# Patient Record
Sex: MALE | Race: BLACK OR AFRICAN AMERICAN | NOT HISPANIC OR LATINO | Employment: OTHER | URBAN - METROPOLITAN AREA
[De-identification: names, ages, dates, MRNs, and addresses within clinical notes are randomized per-mention and may not be internally consistent; named-entity substitution may affect disease eponyms.]

---

## 2017-10-17 ENCOUNTER — HOSPITAL ENCOUNTER (INPATIENT)
Facility: HOSPITAL | Age: 57
LOS: 1 days | Discharge: HOME | DRG: 872 | End: 2017-10-19
Attending: HOSPITALIST | Admitting: HOSPITALIST
Payer: MEDICARE

## 2017-10-17 DIAGNOSIS — A41.9 SEPSIS SECONDARY TO UTI: ICD-10-CM

## 2017-10-17 DIAGNOSIS — R07.9 CHEST PAIN: ICD-10-CM

## 2017-10-17 DIAGNOSIS — N39.0 SEPSIS SECONDARY TO UTI: ICD-10-CM

## 2017-10-17 DIAGNOSIS — R07.89 CHEST TIGHTNESS OR PRESSURE: ICD-10-CM

## 2017-10-17 PROBLEM — E78.5 HYPERLIPIDEMIA: Status: ACTIVE | Noted: 2017-10-17

## 2017-10-17 PROBLEM — E66.01 MORBID OBESITY: Status: ACTIVE | Noted: 2017-10-17

## 2017-10-17 PROBLEM — E11.9 DIABETES MELLITUS, TYPE 2: Status: ACTIVE | Noted: 2017-10-17

## 2017-10-17 PROBLEM — R50.9 FEVER: Status: ACTIVE | Noted: 2017-10-17

## 2017-10-17 PROBLEM — I10 ESSENTIAL HYPERTENSION: Status: ACTIVE | Noted: 2017-10-17

## 2017-10-17 PROCEDURE — 87040 BLOOD CULTURE FOR BACTERIA: CPT

## 2017-10-17 PROCEDURE — G0379 DIRECT REFER HOSPITAL OBSERV: HCPCS

## 2017-10-17 PROCEDURE — 84439 ASSAY OF FREE THYROXINE: CPT

## 2017-10-17 PROCEDURE — 80053 COMPREHEN METABOLIC PANEL: CPT

## 2017-10-17 PROCEDURE — G0378 HOSPITAL OBSERVATION PER HR: HCPCS

## 2017-10-17 PROCEDURE — 36415 COLL VENOUS BLD VENIPUNCTURE: CPT

## 2017-10-17 PROCEDURE — 84443 ASSAY THYROID STIM HORMONE: CPT

## 2017-10-17 PROCEDURE — 84481 FREE ASSAY (FT-3): CPT

## 2017-10-17 PROCEDURE — 25000003 PHARM REV CODE 250: Performed by: HOSPITALIST

## 2017-10-17 PROCEDURE — 85025 COMPLETE CBC W/AUTO DIFF WBC: CPT

## 2017-10-17 PROCEDURE — 85610 PROTHROMBIN TIME: CPT

## 2017-10-17 PROCEDURE — 83036 HEMOGLOBIN GLYCOSYLATED A1C: CPT

## 2017-10-17 PROCEDURE — 84100 ASSAY OF PHOSPHORUS: CPT

## 2017-10-17 PROCEDURE — 84484 ASSAY OF TROPONIN QUANT: CPT

## 2017-10-17 RX ORDER — ASPIRIN 325 MG
325 TABLET ORAL DAILY
Status: DISCONTINUED | OUTPATIENT
Start: 2017-10-18 | End: 2017-10-19 | Stop reason: HOSPADM

## 2017-10-17 RX ORDER — POLYETHYLENE GLYCOL 3350 17 G/17G
17 POWDER, FOR SOLUTION ORAL DAILY
Status: DISCONTINUED | OUTPATIENT
Start: 2017-10-18 | End: 2017-10-19 | Stop reason: HOSPADM

## 2017-10-17 RX ORDER — SIMVASTATIN 40 MG/1
40 TABLET, FILM COATED ORAL NIGHTLY
COMMUNITY
End: 2020-02-19

## 2017-10-17 RX ORDER — MORPHINE SULFATE 10 MG/ML
2 INJECTION INTRAMUSCULAR; INTRAVENOUS; SUBCUTANEOUS EVERY 4 HOURS PRN
Status: DISCONTINUED | OUTPATIENT
Start: 2017-10-18 | End: 2017-10-19 | Stop reason: HOSPADM

## 2017-10-17 RX ORDER — HYDRALAZINE HYDROCHLORIDE 20 MG/ML
10 INJECTION INTRAMUSCULAR; INTRAVENOUS EVERY 6 HOURS PRN
Status: DISCONTINUED | OUTPATIENT
Start: 2017-10-18 | End: 2017-10-19 | Stop reason: HOSPADM

## 2017-10-17 RX ORDER — METOPROLOL TARTRATE 25 MG/1
25 TABLET, FILM COATED ORAL DAILY
Status: DISCONTINUED | OUTPATIENT
Start: 2017-10-18 | End: 2017-10-19 | Stop reason: HOSPADM

## 2017-10-17 RX ORDER — OLMESARTAN MEDOXOMIL AND HYDROCHLOROTHIAZIDE 40/25 40; 25 MG/1; MG/1
1 TABLET ORAL DAILY
Status: ON HOLD | COMMUNITY
End: 2019-09-16 | Stop reason: HOSPADM

## 2017-10-17 RX ORDER — RAMELTEON 8 MG/1
8 TABLET ORAL NIGHTLY PRN
Status: DISCONTINUED | OUTPATIENT
Start: 2017-10-18 | End: 2017-10-19 | Stop reason: HOSPADM

## 2017-10-17 RX ORDER — INSULIN ASPART 100 [IU]/ML
1-10 INJECTION, SOLUTION INTRAVENOUS; SUBCUTANEOUS EVERY 6 HOURS PRN
Status: DISCONTINUED | OUTPATIENT
Start: 2017-10-18 | End: 2017-10-19 | Stop reason: HOSPADM

## 2017-10-17 RX ORDER — LUBIPROSTONE 24 UG/1
24 CAPSULE ORAL 2 TIMES DAILY
Status: DISCONTINUED | OUTPATIENT
Start: 2017-10-18 | End: 2017-10-19 | Stop reason: HOSPADM

## 2017-10-17 RX ORDER — METFORMIN HYDROCHLORIDE 1000 MG/1
1000 TABLET ORAL 2 TIMES DAILY WITH MEALS
COMMUNITY

## 2017-10-17 RX ORDER — FELODIPINE 5 MG/1
10 TABLET, EXTENDED RELEASE ORAL DAILY
Status: DISCONTINUED | OUTPATIENT
Start: 2017-10-18 | End: 2017-10-19 | Stop reason: HOSPADM

## 2017-10-17 RX ORDER — MELOXICAM 15 MG/1
15 TABLET ORAL DAILY
COMMUNITY
End: 2022-08-08

## 2017-10-17 RX ORDER — SODIUM CHLORIDE 0.9 % (FLUSH) 0.9 %
3 SYRINGE (ML) INJECTION EVERY 8 HOURS
Status: DISCONTINUED | OUTPATIENT
Start: 2017-10-18 | End: 2017-10-19 | Stop reason: HOSPADM

## 2017-10-17 RX ORDER — PROMETHAZINE HYDROCHLORIDE 25 MG/1
25 SUPPOSITORY RECTAL EVERY 6 HOURS PRN
Status: DISCONTINUED | OUTPATIENT
Start: 2017-10-18 | End: 2017-10-19 | Stop reason: HOSPADM

## 2017-10-17 RX ORDER — BISACODYL 10 MG
10 SUPPOSITORY, RECTAL RECTAL DAILY PRN
Status: DISCONTINUED | OUTPATIENT
Start: 2017-10-18 | End: 2017-10-19 | Stop reason: HOSPADM

## 2017-10-17 RX ORDER — ACETAMINOPHEN 325 MG/1
650 TABLET ORAL EVERY 6 HOURS PRN
Status: DISCONTINUED | OUTPATIENT
Start: 2017-10-18 | End: 2017-10-19 | Stop reason: HOSPADM

## 2017-10-17 RX ORDER — ONDANSETRON 2 MG/ML
8 INJECTION INTRAMUSCULAR; INTRAVENOUS EVERY 8 HOURS PRN
Status: DISCONTINUED | OUTPATIENT
Start: 2017-10-18 | End: 2017-10-19 | Stop reason: HOSPADM

## 2017-10-17 RX ORDER — METOPROLOL TARTRATE 1 MG/ML
5 INJECTION, SOLUTION INTRAVENOUS EVERY 5 MIN PRN
Status: DISCONTINUED | OUTPATIENT
Start: 2017-10-18 | End: 2017-10-19 | Stop reason: HOSPADM

## 2017-10-17 RX ORDER — ATORVASTATIN CALCIUM 40 MG/1
40 TABLET, FILM COATED ORAL DAILY
Status: DISCONTINUED | OUTPATIENT
Start: 2017-10-18 | End: 2017-10-19 | Stop reason: HOSPADM

## 2017-10-17 RX ORDER — HYDROCODONE BITARTRATE AND ACETAMINOPHEN 10; 325 MG/1; MG/1
1 TABLET ORAL EVERY 8 HOURS PRN
COMMUNITY
End: 2020-02-19

## 2017-10-17 RX ORDER — DEXTROMETHORPHAN POLISTIREX 30 MG/5 ML
1 SUSPENSION, EXTENDED RELEASE 12 HR ORAL DAILY PRN
Status: DISCONTINUED | OUTPATIENT
Start: 2017-10-18 | End: 2017-10-19 | Stop reason: HOSPADM

## 2017-10-17 RX ORDER — MORPHINE SULFATE 10 MG/ML
4 INJECTION INTRAMUSCULAR; INTRAVENOUS; SUBCUTANEOUS EVERY 4 HOURS PRN
Status: DISCONTINUED | OUTPATIENT
Start: 2017-10-18 | End: 2017-10-19 | Stop reason: HOSPADM

## 2017-10-17 RX ORDER — NITROGLYCERIN 0.4 MG/1
0.4 TABLET SUBLINGUAL EVERY 5 MIN PRN
Status: DISCONTINUED | OUTPATIENT
Start: 2017-10-18 | End: 2017-10-19 | Stop reason: HOSPADM

## 2017-10-17 RX ORDER — GLUCAGON 1 MG
1 KIT INJECTION
Status: DISCONTINUED | OUTPATIENT
Start: 2017-10-18 | End: 2017-10-19 | Stop reason: HOSPADM

## 2017-10-17 RX ORDER — AMOXICILLIN 250 MG
1 CAPSULE ORAL 2 TIMES DAILY
Status: DISCONTINUED | OUTPATIENT
Start: 2017-10-17 | End: 2017-10-19 | Stop reason: HOSPADM

## 2017-10-17 RX ORDER — HEPARIN SODIUM 5000 [USP'U]/ML
5000 INJECTION, SOLUTION INTRAVENOUS; SUBCUTANEOUS EVERY 8 HOURS
Status: DISCONTINUED | OUTPATIENT
Start: 2017-10-18 | End: 2017-10-18

## 2017-10-17 RX ORDER — METOPROLOL TARTRATE 25 MG/1
25 TABLET, FILM COATED ORAL DAILY
Status: ON HOLD | COMMUNITY
End: 2019-09-16 | Stop reason: HOSPADM

## 2017-10-17 RX ORDER — FELODIPINE 10 MG/1
10 TABLET, EXTENDED RELEASE ORAL DAILY
COMMUNITY
End: 2022-08-08 | Stop reason: ALTCHOICE

## 2017-10-17 RX ORDER — ATORVASTATIN CALCIUM 40 MG/1
80 TABLET, FILM COATED ORAL DAILY
Status: DISCONTINUED | OUTPATIENT
Start: 2017-10-17 | End: 2017-10-17

## 2017-10-17 RX ORDER — LUBIPROSTONE 24 UG/1
24 CAPSULE ORAL 2 TIMES DAILY
COMMUNITY
End: 2022-08-08

## 2017-10-17 RX ADMIN — DOCUSATE SODIUM AND SENNOSIDES 1 TABLET: 8.6; 5 TABLET, FILM COATED ORAL at 11:10

## 2017-10-17 RX ADMIN — ACETAMINOPHEN 650 MG: 325 TABLET ORAL at 11:10

## 2017-10-18 PROBLEM — N39.0 UTI (URINARY TRACT INFECTION): Status: ACTIVE | Noted: 2017-10-18

## 2017-10-18 PROBLEM — N39.0 SEPSIS SECONDARY TO UTI: Status: ACTIVE | Noted: 2017-10-18

## 2017-10-18 PROBLEM — A41.9 SEPSIS SECONDARY TO UTI: Status: ACTIVE | Noted: 2017-10-18

## 2017-10-18 LAB
ALBUMIN SERPL BCP-MCNC: 3.6 G/DL
ALBUMIN SERPL BCP-MCNC: 3.6 G/DL
ALP SERPL-CCNC: 61 U/L
ALP SERPL-CCNC: 62 U/L
ALT SERPL W/O P-5'-P-CCNC: 23 U/L
ALT SERPL W/O P-5'-P-CCNC: 26 U/L
ANION GAP SERPL CALC-SCNC: 10 MMOL/L
ANION GAP SERPL CALC-SCNC: 11 MMOL/L
AST SERPL-CCNC: 25 U/L
AST SERPL-CCNC: 29 U/L
BACTERIA #/AREA URNS HPF: ABNORMAL /HPF
BASOPHILS # BLD AUTO: 0.02 K/UL
BASOPHILS NFR BLD: 0.3 %
BILIRUB SERPL-MCNC: 0.6 MG/DL
BILIRUB SERPL-MCNC: 0.7 MG/DL
BILIRUB UR QL STRIP: NEGATIVE
BUN SERPL-MCNC: 10 MG/DL
BUN SERPL-MCNC: 8 MG/DL
CALCIUM SERPL-MCNC: 9 MG/DL
CALCIUM SERPL-MCNC: 9.3 MG/DL
CHLORIDE SERPL-SCNC: 104 MMOL/L
CHLORIDE SERPL-SCNC: 104 MMOL/L
CHOLEST SERPL-MCNC: 118 MG/DL
CHOLEST/HDLC SERPL: 3.3 {RATIO}
CLARITY UR: CLEAR
CO2 SERPL-SCNC: 25 MMOL/L
CO2 SERPL-SCNC: 25 MMOL/L
COLOR UR: YELLOW
CREAT SERPL-MCNC: 0.7 MG/DL
CREAT SERPL-MCNC: 0.7 MG/DL
DIFFERENTIAL METHOD: ABNORMAL
EOSINOPHIL # BLD AUTO: 0.1 K/UL
EOSINOPHIL NFR BLD: 0.7 %
ERYTHROCYTE [DISTWIDTH] IN BLOOD BY AUTOMATED COUNT: 17.5 %
EST. GFR  (AFRICAN AMERICAN): >60 ML/MIN/1.73 M^2
EST. GFR  (AFRICAN AMERICAN): >60 ML/MIN/1.73 M^2
EST. GFR  (NON AFRICAN AMERICAN): >60 ML/MIN/1.73 M^2
EST. GFR  (NON AFRICAN AMERICAN): >60 ML/MIN/1.73 M^2
ESTIMATED AVG GLUCOSE: 131 MG/DL
ESTIMATED PA SYSTOLIC PRESSURE: 63.06
GLOBAL PERICARDIAL EFFUSION: ABNORMAL
GLUCOSE SERPL-MCNC: 133 MG/DL
GLUCOSE SERPL-MCNC: 164 MG/DL
GLUCOSE UR QL STRIP: NEGATIVE
HBA1C MFR BLD HPLC: 6.2 %
HCT VFR BLD AUTO: 35.1 %
HDLC SERPL-MCNC: 36 MG/DL
HDLC SERPL: 30.5 %
HGB BLD-MCNC: 11.3 G/DL
HGB UR QL STRIP: ABNORMAL
INR PPP: 1.1
KETONES UR QL STRIP: NEGATIVE
LDLC SERPL CALC-MCNC: 69.8 MG/DL
LEUKOCYTE ESTERASE UR QL STRIP: ABNORMAL
LYMPHOCYTES # BLD AUTO: 0.6 K/UL
LYMPHOCYTES NFR BLD: 7.4 %
MAGNESIUM SERPL-MCNC: 1.8 MG/DL
MCH RBC QN AUTO: 23.9 PG
MCHC RBC AUTO-ENTMCNC: 32.2 G/DL
MCV RBC AUTO: 74 FL
MICROSCOPIC COMMENT: ABNORMAL
MITRAL VALVE MOBILITY: NORMAL
MONOCYTES # BLD AUTO: 0.1 K/UL
MONOCYTES NFR BLD: 0.9 %
NEUTROPHILS # BLD AUTO: 6.9 K/UL
NEUTROPHILS NFR BLD: 90.7 %
NITRITE UR QL STRIP: NEGATIVE
NONHDLC SERPL-MCNC: 82 MG/DL
PH UR STRIP: 5 [PH] (ref 5–8)
PHOSPHATE SERPL-MCNC: 3.1 MG/DL
PLATELET # BLD AUTO: 225 K/UL
PMV BLD AUTO: 9.3 FL
POCT GLUCOSE: 141 MG/DL (ref 70–110)
POCT GLUCOSE: 171 MG/DL (ref 70–110)
POCT GLUCOSE: 183 MG/DL (ref 70–110)
POTASSIUM SERPL-SCNC: 3.7 MMOL/L
POTASSIUM SERPL-SCNC: 3.7 MMOL/L
PROT SERPL-MCNC: 7.4 G/DL
PROT SERPL-MCNC: 7.5 G/DL
PROT UR QL STRIP: NEGATIVE
PROTHROMBIN TIME: 11.5 SEC
RBC # BLD AUTO: 4.72 M/UL
RBC #/AREA URNS HPF: 2 /HPF (ref 0–4)
RETIRED EF AND QEF - SEE NOTES: 70 (ref 55–65)
SODIUM SERPL-SCNC: 139 MMOL/L
SODIUM SERPL-SCNC: 140 MMOL/L
SP GR UR STRIP: 1.01 (ref 1–1.03)
SQUAMOUS #/AREA URNS HPF: ABNORMAL /HPF
T3FREE SERPL-MCNC: 2.1 PG/ML
T4 FREE SERPL-MCNC: 1.16 NG/DL
TRICUSPID VALVE REGURGITATION: ABNORMAL
TRIGL SERPL-MCNC: 61 MG/DL
TROPONIN I SERPL DL<=0.01 NG/ML-MCNC: 0.04 NG/ML
TROPONIN I SERPL DL<=0.01 NG/ML-MCNC: 0.05 NG/ML
TROPONIN I SERPL DL<=0.01 NG/ML-MCNC: 0.06 NG/ML
TSH SERPL DL<=0.005 MIU/L-ACNC: 0.18 UIU/ML
URN SPEC COLLECT METH UR: ABNORMAL
UROBILINOGEN UR STRIP-ACNC: NEGATIVE EU/DL
WBC # BLD AUTO: 7.6 K/UL
WBC #/AREA URNS HPF: >100 /HPF (ref 0–5)

## 2017-10-18 PROCEDURE — 99900035 HC TECH TIME PER 15 MIN (STAT)

## 2017-10-18 PROCEDURE — 80053 COMPREHEN METABOLIC PANEL: CPT

## 2017-10-18 PROCEDURE — 99223 1ST HOSP IP/OBS HIGH 75: CPT | Mod: ,,, | Performed by: INTERNAL MEDICINE

## 2017-10-18 PROCEDURE — 83735 ASSAY OF MAGNESIUM: CPT

## 2017-10-18 PROCEDURE — 81000 URINALYSIS NONAUTO W/SCOPE: CPT

## 2017-10-18 PROCEDURE — 63600175 PHARM REV CODE 636 W HCPCS: Performed by: INTERNAL MEDICINE

## 2017-10-18 PROCEDURE — 25000003 PHARM REV CODE 250: Performed by: HOSPITALIST

## 2017-10-18 PROCEDURE — 63600175 PHARM REV CODE 636 W HCPCS: Performed by: HOSPITALIST

## 2017-10-18 PROCEDURE — 93306 TTE W/DOPPLER COMPLETE: CPT | Mod: 26,,, | Performed by: INTERNAL MEDICINE

## 2017-10-18 PROCEDURE — 21400001 HC TELEMETRY ROOM

## 2017-10-18 PROCEDURE — 94761 N-INVAS EAR/PLS OXIMETRY MLT: CPT

## 2017-10-18 PROCEDURE — C8929 TTE W OR WO FOL WCON,DOPPLER: HCPCS

## 2017-10-18 PROCEDURE — 36415 COLL VENOUS BLD VENIPUNCTURE: CPT

## 2017-10-18 PROCEDURE — 82962 GLUCOSE BLOOD TEST: CPT

## 2017-10-18 PROCEDURE — 87086 URINE CULTURE/COLONY COUNT: CPT

## 2017-10-18 PROCEDURE — 84484 ASSAY OF TROPONIN QUANT: CPT

## 2017-10-18 PROCEDURE — 27000190 HC CPAP FULL FACE MASK W/VALVE

## 2017-10-18 PROCEDURE — 80061 LIPID PANEL: CPT

## 2017-10-18 PROCEDURE — 63600175 PHARM REV CODE 636 W HCPCS

## 2017-10-18 PROCEDURE — 94660 CPAP INITIATION&MGMT: CPT

## 2017-10-18 PROCEDURE — A4216 STERILE WATER/SALINE, 10 ML: HCPCS | Performed by: HOSPITALIST

## 2017-10-18 PROCEDURE — 87040 BLOOD CULTURE FOR BACTERIA: CPT

## 2017-10-18 PROCEDURE — 27000221 HC OXYGEN, UP TO 24 HOURS

## 2017-10-18 RX ORDER — ENOXAPARIN SODIUM 100 MG/ML
40 INJECTION SUBCUTANEOUS EVERY 24 HOURS
Status: DISCONTINUED | OUTPATIENT
Start: 2017-10-18 | End: 2017-10-19 | Stop reason: HOSPADM

## 2017-10-18 RX ORDER — SODIUM CHLORIDE 9 MG/ML
INJECTION, SOLUTION INTRAVENOUS CONTINUOUS
Status: DISCONTINUED | OUTPATIENT
Start: 2017-10-18 | End: 2017-10-19 | Stop reason: HOSPADM

## 2017-10-18 RX ADMIN — ENOXAPARIN SODIUM 40 MG: 100 INJECTION SUBCUTANEOUS at 04:10

## 2017-10-18 RX ADMIN — SODIUM CHLORIDE, PRESERVATIVE FREE 3 ML: 5 INJECTION INTRAVENOUS at 05:10

## 2017-10-18 RX ADMIN — SODIUM CHLORIDE: 0.9 INJECTION, SOLUTION INTRAVENOUS at 05:10

## 2017-10-18 RX ADMIN — HUMAN ALBUMIN MICROSPHERES AND PERFLUTREN 0.5 ML: 10; .22 INJECTION, SOLUTION INTRAVENOUS at 09:10

## 2017-10-18 RX ADMIN — ASPIRIN 325 MG ORAL TABLET 325 MG: 325 PILL ORAL at 10:10

## 2017-10-18 RX ADMIN — LUBIPROSTONE 24 MCG: 24 CAPSULE, GELATIN COATED ORAL at 10:10

## 2017-10-18 RX ADMIN — FELODIPINE 10 MG: 5 TABLET, FILM COATED, EXTENDED RELEASE ORAL at 10:10

## 2017-10-18 RX ADMIN — DOCUSATE SODIUM AND SENNOSIDES 1 TABLET: 8.6; 5 TABLET, FILM COATED ORAL at 10:10

## 2017-10-18 RX ADMIN — DOCUSATE SODIUM AND SENNOSIDES 1 TABLET: 8.6; 5 TABLET, FILM COATED ORAL at 09:10

## 2017-10-18 RX ADMIN — POLYETHYLENE GLYCOL 3350 17 G: 17 POWDER, FOR SOLUTION ORAL at 10:10

## 2017-10-18 RX ADMIN — ACETAMINOPHEN 650 MG: 325 TABLET ORAL at 05:10

## 2017-10-18 RX ADMIN — METOPROLOL TARTRATE 25 MG: 25 TABLET ORAL at 02:10

## 2017-10-18 RX ADMIN — CEFTRIAXONE 1 G: 1 INJECTION, SOLUTION INTRAVENOUS at 10:10

## 2017-10-18 RX ADMIN — LUBIPROSTONE 24 MCG: 24 CAPSULE, GELATIN COATED ORAL at 09:10

## 2017-10-18 RX ADMIN — ATORVASTATIN CALCIUM 40 MG: 40 TABLET, FILM COATED ORAL at 10:10

## 2017-10-18 RX ADMIN — HEPARIN SODIUM 5000 UNITS: 5000 INJECTION, SOLUTION INTRAVENOUS; SUBCUTANEOUS at 05:10

## 2017-10-18 NOTE — PROGRESS NOTES
Rec'd fax number for medical records at [facility].  Release of information request faxed.

## 2017-10-18 NOTE — ASSESSMENT & PLAN NOTE
· 2 out of 4 SIRS criteria in immunocompromised patient with type 2 diabetes  · As evidenced by UA and history of urinary incontinence  · Started on Rocephin.  Await UCx and sensitivities.  · Will tailor antibiotic regimen according to culture & sensitivity results  · Maintain euvolemic status with IV fluids

## 2017-10-18 NOTE — PROGRESS NOTES
[facility] Hospital Medicine  Progress Note    Patient Name: Russell Santos  MRN: [id number]  Patient Class: IP- Inpatient   Admission Date: 10/17/2017  Length of Stay: 0 days  Attending Physician: Emery Busby MD  Primary Care Provider: [facility]        Subjective:     Principal Problem:Sepsis secondary to UTI    HPI:     Russell Santos is a 57 y.o. male that (in part)  has a past medical history of Diabetes mellitus; High cholesterol; and Hypertension. Presents to [facility]  From the [place] emergency Department for evaluation of chest pain and fever.  He was being admitted there by hospital medicine, however his insurance company would not cover the admission costs so he was transferred here.  He states he started having chest pain at approximately 2 PM today and it was intermittent.  Each episode lasted approximately 15 minutes.  He has had several episodes throughout the day the lasted up until the time he arrived in the emergency department.  He received nitroglycerin and aspirin without significant change in his chest discomfort.  In fact he states the only gave him relief was after starting CPAP tonight after arrival at our facility.  He also had a fever and has been experiencing urinary incontinence throughout the day.  Denies dysuria or hematuria.  After the patient was accepted, received information from the transferring facility that his urinalysis was positive for a urinary tract infection and that he was given a dose of Rocephin just prior to discharge.     In the emergency department at [place] he underwent evaluation for what was thought to be possible acute coronary syndrome but with a negative preliminary workup.       Hospital medicine has been asked to accept the patient for admission for further evaluation and treatment.     Hospital Course:  56 y/o male presented with chest pain and fevers.  Cardiology consulted.  Minimally  elevated Troponin probably secondary to UTI.  No plans for further work up.  UTI and started on Rocephin.    Interval History: Febrile overnight.  Feeling better.    Review of Systems   HENT: Negative for ear discharge and ear pain.    Eyes: Negative for pain and itching.   Neurological: Negative for seizures and syncope.   Psychiatric/Behavioral: Negative for agitation and hallucinations.     Objective:     Vital Signs (Most Recent):  Temp: 98.6 °F (37 °C) (10/18/17 1213)  Pulse: 94 (10/18/17 1213)  Resp: 18 (10/18/17 1213)  BP: 135/71 (10/18/17 1213)  SpO2: (!) 93 % (10/18/17 1213) Vital Signs (24h Range):  Temp:  [97.6 °F (36.4 °C)-103.5 °F (39.7 °C)] 98.6 °F (37 °C)  Pulse:  [] 94  Resp:  [18-20] 18  SpO2:  [93 %-100 %] 93 %  BP: (123-135)/(63-99) 135/71     Weight: (!) 151.8 kg (334 lb 10.5 oz)  Body mass index is 49.42 kg/m².    Intake/Output Summary (Last 24 hours) at 10/18/17 1542  Last data filed at 10/18/17 1500   Gross per 24 hour   Intake           666.25 ml   Output              800 ml   Net          -133.75 ml      Physical Exam   Constitutional: He is oriented to person, place, and time. He appears well-developed. No distress.   HENT:   Head: Normocephalic and atraumatic.   Eyes: Conjunctivae are normal. Right eye exhibits no discharge. Left eye exhibits no discharge.   Neck: Neck supple.   Cardiovascular: Normal heart sounds.    Tachycardic   Pulmonary/Chest: Effort normal and breath sounds normal. No stridor.   Abdominal: Soft. Bowel sounds are normal.   Musculoskeletal: Normal range of motion. He exhibits no deformity.   Neurological: He is alert and oriented to person, place, and time.   Skin: Skin is warm and dry.       Significant Labs:   BMP:   Recent Labs  Lab 10/18/17  0548   *      K 3.7      CO2 25   BUN 8   CREATININE 0.7   CALCIUM 9.3   MG 1.8     CBC:   Recent Labs  Lab 10/17/17  2347   WBC 7.60   HGB 11.3*   HCT 35.1*          Significant Imaging: I have  reviewed all pertinent imaging results/findings within the past 24 hours.    Assessment/Plan:      * Sepsis secondary to UTI    · 2 out of 4 SIRS criteria in immunocompromised patient with type 2 diabetes  · As evidenced by UA and history of urinary incontinence  · Started on Rocephin.  Await UCx and sensitivities.  · Will tailor antibiotic regimen according to culture & sensitivity results  · Maintain euvolemic status with IV fluids              Chest pain    Acute Chest Pain, rule-out myocardial infarction   · Minimally elevated Troponin, probably secondary to infection.  · Appreciate Cards input.  · No plans for further work up.          Morbid obesity    · Body mass index is 49.41 kg/m².            Diabetes mellitus, type 2    · BG in acceptable range at this time  · Maintain w/ subcutaneous insulin management order set  · Hold oral diabetic meds  · ADA 1800 kcal diet            Hyperlipidemia     Continue statin            Essential hypertension    · Continue current management.              VTE Risk Mitigation         Ordered     enoxaparin injection 40 mg  Daily     Route:  Subcutaneous        10/18/17 1324     Medium Risk of VTE  Once      10/17/17 9401              Emery Busby MD  Department of Hospital Medicine   [facility]

## 2017-10-18 NOTE — SUBJECTIVE & OBJECTIVE
Past Medical History:   Diagnosis Date    Diabetes mellitus     High cholesterol     Hypertension        Past Surgical History:   Procedure Laterality Date    THYROIDECTOMY, PARTIAL  2006    TONSILLECTOMY         Review of patient's allergies indicates:   Allergen Reactions    Contrast media Hives       No current facility-administered medications on file prior to encounter.      No current outpatient prescriptions on file prior to encounter.     Family History     None        Social History Main Topics    Smoking status: Never Smoker    Smokeless tobacco: Never Used    Alcohol use Yes      Comment: occasionally    Drug use: No    Sexual activity: Not Currently     Review of Systems   Constitution: Positive for fever. Negative for chills, diaphoresis, weakness and malaise/fatigue.   HENT: Negative for nosebleeds.    Eyes: Negative for blurred vision and double vision.   Cardiovascular: Negative for chest pain, claudication, cyanosis, dyspnea on exertion, leg swelling, orthopnea, palpitations, paroxysmal nocturnal dyspnea and syncope.   Respiratory: Positive for shortness of breath. Negative for cough and wheezing.    Skin: Negative for dry skin and poor wound healing.   Musculoskeletal: Negative for back pain, joint swelling and myalgias.   Gastrointestinal: Negative for abdominal pain, nausea and vomiting.   Genitourinary: Positive for bladder incontinence. Negative for hematuria.   Neurological: Negative for dizziness, headaches, numbness and seizures.   Psychiatric/Behavioral: Negative for altered mental status and depression.     Objective:     Vital Signs (Most Recent):  Temp: (!) 101.6 °F (38.7 °C) (10/18/17 0640)  Pulse: 108 (10/18/17 0430)  Resp: 20 (10/18/17 0430)  BP: 125/70 (10/18/17 0430)  SpO2: 100 % (10/18/17 0430) Vital Signs (24h Range):  Temp:  [99.5 °F (37.5 °C)-103.5 °F (39.7 °C)] 101.6 °F (38.7 °C)  Pulse:  [107-116] 108  Resp:  [18-20] 20  SpO2:  [99 %-100 %] 100 %  BP:  (123-131)/(63-99) 125/70     Weight: (!) 151.8 kg (334 lb 10.5 oz)  Body mass index is 49.42 kg/m².    SpO2: 100 %  O2 Device (Oxygen Therapy): CPAP    No intake or output data in the 24 hours ending 10/18/17 0745    Lines/Drains/Airways     Peripheral Intravenous Line                 Peripheral IV - Single Lumen 10/17/17 Left Hand 1 day                Physical Exam   Constitutional: He is oriented to person, place, and time. He appears well-developed and well-nourished.   HENT:   Head: Normocephalic and atraumatic.   Eyes: Conjunctivae and EOM are normal. Pupils are equal, round, and reactive to light. No scleral icterus.   Neck: Neck supple. No JVD present. Carotid bruit is not present. No tracheal deviation present. No thyromegaly present.   Cardiovascular: Regular rhythm, S1 normal and S2 normal.  Frequent extrasystoles are present. Tachycardia present.  Exam reveals no gallop and no friction rub.    No murmur heard.  Pulmonary/Chest: Effort normal and breath sounds normal. He has no wheezes. He exhibits no tenderness.   Abdominal: Soft. Bowel sounds are normal. He exhibits no distension. There is no tenderness.   obese   Musculoskeletal: He exhibits no edema.   Neurological: He is alert and oriented to person, place, and time. He has normal strength. No cranial nerve deficit.   Skin: Skin is warm and dry. No rash noted.   Psychiatric: He has a normal mood and affect. His behavior is normal.       Current Medications:   aspirin  325 mg Oral Daily    atorvastatin  40 mg Oral Daily    cefTRIAXone (ROCEPHIN) IVPB  1 g Intravenous Q24H    felodipine  10 mg Oral Daily    heparin (porcine)  5,000 Units Subcutaneous Q8H    lubiprostone  24 mcg Oral BID    metoprolol tartrate  25 mg Oral Daily    polyethylene glycol  17 g Oral Daily    senna-docusate 8.6-50 mg  1 tablet Oral BID    sodium chloride 0.9%  3 mL Intravenous Q8H      sodium chloride 0.9% 75 mL/hr at 10/18/17 0545     acetaminophen, bisacodyl,  dextrose 50%, glucagon (human recombinant), hydrALAZINE, influenza, insulin aspart, metoprolol, mineral oil, morphine, morphine, nitroGLYCERIN, ondansetron, pneumoc 13-zayra conj-dip cr(PF), promethazine, ramelteon    Laboratory:  CBC:    Recent Labs  Lab 10/17/17  2347   WHITE BLOOD CELL COUNT 7.60   HEMOGLOBIN 11.3 L   HEMATOCRIT 35.1 L   PLATELETS 225       CHEMISTRIES:    Recent Labs  Lab 10/17/17  2345 10/18/17  0548   GLUCOSE 164 H 133 H   SODIUM 140 139   POTASSIUM 3.7 3.7   BUN BLD 10 8   CREATININE 0.7 0.7   EGFR IF  >60 >60   EGFR IF NON- >60 >60   CALCIUM 9.0 9.3   MAGNESIUM  --  1.8       CARDIAC BIOMARKERS:    Recent Labs  Lab 10/17/17  2345 10/18/17  0548   TROPONIN I 0.046 H 0.063 H       COAGS:    Recent Labs  Lab 10/17/17  2345   INR 1.1       LIPIDS/LFTS:    Recent Labs  Lab 10/17/17  2345 10/18/17  0548   CHOLESTEROL  --  118 L   TRIGLYCERIDES  --  61   HDL  --  36 L   LDL CHOLESTEROL  --  69.8   NON-HDL CHOLESTEROL  --  82   AST 25 29   ALT 23 26           Diagnostic Results:  ECG (personally reviewed tracings):   10/17/17     Chest X-Ray 10/17/17 ([facility]) NAD

## 2017-10-18 NOTE — PROGRESS NOTES
Spoke with Aure at [place] in medical records.  She will send records over within the hour.  Will follow up.

## 2017-10-18 NOTE — ASSESSMENT & PLAN NOTE
· Goal while inpatient is a systolic blood pressure less than 140mmHgin the setting of chest pain  · BP in acceptable range at this time  · Continue current home regimen with hold parameters  · PRN antihypertensives available

## 2017-10-18 NOTE — ASSESSMENT & PLAN NOTE
· 2 out of 4 SIRS criteria in immunocompromised patient with type 2 diabetes  · As evidenced by UA and history of urinary incontinence  · Urine culture and Gram stain obtained prior to antibiotics (follow-up with [facility])  · Given empiric antibiotics with ceftriaxone  · Will tailor antibiotic regimen according to culture & sensitivity results  · Maintain euvolemic status with IV fluids

## 2017-10-18 NOTE — PROGRESS NOTES
Patient transferred to Tele Rm 310A  Belongings and paperwork sent. Tele monitor exchanged..    Report given to Jennifer. Patient appears to be in no distress at this time. Call light within reach.

## 2017-10-18 NOTE — ASSESSMENT & PLAN NOTE
Minimal trop elev, likely demand from sepsis/fever.  Pt reports prior cor angio at [facility], will attempt to obtain records.  From his description, he has NICM.  Check echo.  No plan for stress test at this time.

## 2017-10-18 NOTE — ASSESSMENT & PLAN NOTE
Exact etiology unknown but suspicious  Early pneumonia versus bronchitis.  Blood cultures obtained.  No antibiotics given at this time.  Viral?

## 2017-10-18 NOTE — PLAN OF CARE
Problem: Patient Care Overview  Goal: Plan of Care Review   10/18/17 0424   Coping/Psychosocial   Plan Of Care Reviewed With patient   PAtient updated on plan of care and verbalized understanding.     Problem: Diabetes, Type 2 (Adult)  Intervention: Optimize Glycemic Control   10/18/17 0424   Nutrition Interventions   Glycemic Management blood glucose monitoring         Problem: Urinary Tract Infection (Adult)  Goal: Signs and Symptoms of Listed Potential Problems Will be Absent, Minimized or Managed (Urinary Tract Infection)  Signs and symptoms of listed potential problems will be absent, minimized or managed by discharge/transition of care (reference Urinary Tract Infection (Adult) CPG).    10/18/17 0424   Urinary Tract Infection   Problems Assessed (Urinary Tract Infection) all   Problems Present (Urinary Tract Infection) infection progression;pain     Patient received 1 dose of Rocephin in ED. Blood and urine cultures collected. Prn antipyretic administered per orders.

## 2017-10-18 NOTE — PROGRESS NOTES
Faxed rec'd from Aure at  medical records.   No records found for pt Russell Santos.  Attempted to notifiy Dr. Vasquez.  No answer.  Will try again.

## 2017-10-18 NOTE — HPI
"57 y.o. male that (in part)  has a past medical history of Diabetes mellitus; High cholesterol; and Hypertension. Presents to [facility]  From the [place] emergency Department for evaluation of chest pain and fever.  He was being admitted there by hospital medicine, however his insurance company would not cover the admission costs so he was transferred here.  He states he started having chest pain at approximately 2 PM today and it was intermittent.  Each episode lasted approximately 15 minutes.  He has had several episodes throughout the day the lasted up until the time he arrived in the emergency department.  He received nitroglycerin and aspirin without significant change in his chest discomfort.  In fact he states the only gave him relief was after starting CPAP tonight after arrival at our facility.  He also had a fever and has been experiencing urinary incontinence throughout the day.  Denies dysuria or hematuria.  After the patient was accepted, received information from the transferring facility that his urinalysis was positive for a urinary tract infection and that he was given a dose of Rocephin just prior to discharge.    The pt describes fever and dyspnea with associated CP.  He also reports a prior hx of "enlargened heart"  He says he had a heart cath at [facility] a few years ago without obst CAD.  "

## 2017-10-18 NOTE — CONSULTS
"  [facility]  Cardiology  Consult Note    Patient Name: Russell Santos  MRN: [id number]  Admission Date: 10/17/2017  Hospital Length of Stay: 0 days  Code Status: Full Code   Attending Provider: Emery Busby MD   Consulting Provider: Deven Haley MD  Primary Care Physician: Roxanne Phillip  Principal Problem:Sepsis secondary to UTI    Patient information was obtained from patient and ER records.     Inpatient consult to Cardiology  Consult performed by: DEVEN HALEY  Consult ordered by: DEVEN BRITT  Reason for consult: CP        Subjective:     Chief Complaint:  sepsis     HPI:   57 y.o. male that (in part)  has a past medical history of Diabetes mellitus; High cholesterol; and Hypertension. Presents to [facility]  From the [place] emergency Department for evaluation of chest pain and fever.  He was being admitted there by hospital medicine, however his insurance company would not cover the admission costs so he was transferred here.  He states he started having chest pain at approximately 2 PM today and it was intermittent.  Each episode lasted approximately 15 minutes.  He has had several episodes throughout the day the lasted up until the time he arrived in the emergency department.  He received nitroglycerin and aspirin without significant change in his chest discomfort.  In fact he states the only gave him relief was after starting CPAP tonight after arrival at our facility.  He also had a fever and has been experiencing urinary incontinence throughout the day.  Denies dysuria or hematuria.  After the patient was accepted, received information from the transferring facility that his urinalysis was positive for a urinary tract infection and that he was given a dose of Rocephin just prior to discharge.    The pt describes fever and dyspnea with associated CP.  He also reports a prior hx of "enlargened heart"  He says he had a heart cath at " [facility] a few years ago without obst CAD.    Past Medical History:   Diagnosis Date    Diabetes mellitus     High cholesterol     Hypertension        Past Surgical History:   Procedure Laterality Date    THYROIDECTOMY, PARTIAL  2006    TONSILLECTOMY         Review of patient's allergies indicates:   Allergen Reactions    Contrast media Hives       No current facility-administered medications on file prior to encounter.      No current outpatient prescriptions on file prior to encounter.     Family History     None        Social History Main Topics    Smoking status: Never Smoker    Smokeless tobacco: Never Used    Alcohol use Yes      Comment: occasionally    Drug use: No    Sexual activity: Not Currently     Review of Systems   Constitution: Positive for fever. Negative for chills, diaphoresis, weakness and malaise/fatigue.   HENT: Negative for nosebleeds.    Eyes: Negative for blurred vision and double vision.   Cardiovascular: Negative for chest pain, claudication, cyanosis, dyspnea on exertion, leg swelling, orthopnea, palpitations, paroxysmal nocturnal dyspnea and syncope.   Respiratory: Positive for shortness of breath. Negative for cough and wheezing.    Skin: Negative for dry skin and poor wound healing.   Musculoskeletal: Negative for back pain, joint swelling and myalgias.   Gastrointestinal: Negative for abdominal pain, nausea and vomiting.   Genitourinary: Positive for bladder incontinence. Negative for hematuria.   Neurological: Negative for dizziness, headaches, numbness and seizures.   Psychiatric/Behavioral: Negative for altered mental status and depression.     Objective:     Vital Signs (Most Recent):  Temp: (!) 101.6 °F (38.7 °C) (10/18/17 0640)  Pulse: 108 (10/18/17 0430)  Resp: 20 (10/18/17 0430)  BP: 125/70 (10/18/17 0430)  SpO2: 100 % (10/18/17 0430) Vital Signs (24h Range):  Temp:  [99.5 °F (37.5 °C)-103.5 °F (39.7 °C)] 101.6 °F (38.7 °C)  Pulse:  [107-116] 108  Resp:  [18-20]  20  SpO2:  [99 %-100 %] 100 %  BP: (123-131)/(63-99) 125/70     Weight: (!) 151.8 kg (334 lb 10.5 oz)  Body mass index is 49.42 kg/m².    SpO2: 100 %  O2 Device (Oxygen Therapy): CPAP    No intake or output data in the 24 hours ending 10/18/17 0745    Lines/Drains/Airways     Peripheral Intravenous Line                 Peripheral IV - Single Lumen 10/17/17 Left Hand 1 day                Physical Exam   Constitutional: He is oriented to person, place, and time. He appears well-developed and well-nourished.   HENT:   Head: Normocephalic and atraumatic.   Eyes: Conjunctivae and EOM are normal. Pupils are equal, round, and reactive to light. No scleral icterus.   Neck: Neck supple. No JVD present. Carotid bruit is not present. No tracheal deviation present. No thyromegaly present.   Cardiovascular: Regular rhythm, S1 normal and S2 normal.  Frequent extrasystoles are present. Tachycardia present.  Exam reveals no gallop and no friction rub.    No murmur heard.  Pulmonary/Chest: Effort normal and breath sounds normal. He has no wheezes. He exhibits no tenderness.   Abdominal: Soft. Bowel sounds are normal. He exhibits no distension. There is no tenderness.   obese   Musculoskeletal: He exhibits no edema.   Neurological: He is alert and oriented to person, place, and time. He has normal strength. No cranial nerve deficit.   Skin: Skin is warm and dry. No rash noted.   Psychiatric: He has a normal mood and affect. His behavior is normal.       Current Medications:   aspirin  325 mg Oral Daily    atorvastatin  40 mg Oral Daily    cefTRIAXone (ROCEPHIN) IVPB  1 g Intravenous Q24H    felodipine  10 mg Oral Daily    heparin (porcine)  5,000 Units Subcutaneous Q8H    lubiprostone  24 mcg Oral BID    metoprolol tartrate  25 mg Oral Daily    polyethylene glycol  17 g Oral Daily    senna-docusate 8.6-50 mg  1 tablet Oral BID    sodium chloride 0.9%  3 mL Intravenous Q8H      sodium chloride 0.9% 75 mL/hr at 10/18/17 0599      acetaminophen, bisacodyl, dextrose 50%, glucagon (human recombinant), hydrALAZINE, influenza, insulin aspart, metoprolol, mineral oil, morphine, morphine, nitroGLYCERIN, ondansetron, pneumoc 13-zayra conj-dip cr(PF), promethazine, ramelteon    Laboratory:  CBC:    Recent Labs  Lab 10/17/17  2347   WHITE BLOOD CELL COUNT 7.60   HEMOGLOBIN 11.3 L   HEMATOCRIT 35.1 L   PLATELETS 225       CHEMISTRIES:    Recent Labs  Lab 10/17/17  2345 10/18/17  0548   GLUCOSE 164 H 133 H   SODIUM 140 139   POTASSIUM 3.7 3.7   BUN BLD 10 8   CREATININE 0.7 0.7   EGFR IF  >60 >60   EGFR IF NON- >60 >60   CALCIUM 9.0 9.3   MAGNESIUM  --  1.8       CARDIAC BIOMARKERS:    Recent Labs  Lab 10/17/17  2345 10/18/17  0548   TROPONIN I 0.046 H 0.063 H       COAGS:    Recent Labs  Lab 10/17/17  2345   INR 1.1       LIPIDS/LFTS:    Recent Labs  Lab 10/17/17  2345 10/18/17  0548   CHOLESTEROL  --  118 L   TRIGLYCERIDES  --  61   HDL  --  36 L   LDL CHOLESTEROL  --  69.8   NON-HDL CHOLESTEROL  --  82   AST 25 29   ALT 23 26           Diagnostic Results:  ECG (personally reviewed tracings):   10/17/17     Chest X-Ray 10/17/17 ([facility]) NAD      Assessment and Plan:     * Sepsis secondary to UTI    Per IM        Diabetes mellitus, type 2    Per IM        Hyperlipidemia    Cont statin        Essential hypertension    Cont med rx  Consider ACEi in place of felodipine if NICM confirmed        Chest pain    Minimal trop elev, likely demand from sepsis/fever.  Pt reports prior cor angio at [facility], will attempt to obtain records.  From his description, he has NICM.  Check echo.  No plan for stress test at this time.            VTE Risk Mitigation         Ordered     heparin (porcine) injection 5,000 Units  Every 8 hours     Route:  Subcutaneous        10/17/17 2332     Medium Risk of VTE  Once      10/17/17 2332          Thank you for your consult. I will follow-up with patient. Please contact us if you have any  additional questions.    Deven Vasquez MD  Cardiology   [facility]

## 2017-10-18 NOTE — ASSESSMENT & PLAN NOTE
· BG in acceptable range at this time  · Maintain w/ subcutaneous insulin management order set  · Hold oral diabetic meds  · ADA 1800 kcal diet

## 2017-10-18 NOTE — ASSESSMENT & PLAN NOTE
Acute Chest Pain, rule-out myocardial infarction   · Intitial EKG findings shows no evidence of ST elevation MI  · Initial troponin is within normal limits  · Intermediate to high risk for MI;  Morbid obesity, hypertension, diabetes hyperlipidemia  · Initiate ACS protocol to rule out MI, then explore noncardiac etiology  · Trend cardiac enzymes  · Aspirin  · Beta-blocker (after stress or immediately if evidence of NSTEMI)  · Statin  · Supplemental oxygen  · nitroglycerine and morphine PRN  · 2D echocardiogram  · TSH, FT3, FT4  · ESR and cardiac specific CRP   · PT, PTT, INR   · Tight glycemic control  · Monitor on telemetry unit  · Consult cardiologist  · Will admit for rule out acute MI and possible further provocative testing for risk stratification.

## 2017-10-18 NOTE — ASSESSMENT & PLAN NOTE
Acute Chest Pain, rule-out myocardial infarction   · Minimally elevated Troponin, probably secondary to infection.  · Appreciate Cards input.  · No plans for further work up.

## 2017-10-18 NOTE — ASSESSMENT & PLAN NOTE
· Body mass index is 49.41 kg/m².  · Order a cardiac diet  · Counseling given  · Nutrition consult as an outpatient

## 2017-10-18 NOTE — H&P
[facility] Hospital Medicine  History & Physical    Patient Name: Russell Santos  MRN: [id number]  Admission Date: 10/18/2017  Attending Physician: Deven Ren MD, MPH      PCP:     Roxanne Phillip    CC:     Transfer from [facility] for evaluation of chest pain and fever.    HISTORY OF PRESENT ILLNESS:     Russell Santos is a 57 y.o. male that (in part)  has a past medical history of Diabetes mellitus; High cholesterol; and Hypertension. Presents to [facility]  From the [place] emergency Department for evaluation of chest pain and fever.  He was being admitted there by hospital medicine, however his insurance company would not cover the admission costs so he was transferred here.  He states he started having chest pain at approximately 2 PM today and it was intermittent.  Each episode lasted approximately 15 minutes.  He has had several episodes throughout the day the lasted up until the time he arrived in the emergency department.  He received nitroglycerin and aspirin without significant change in his chest discomfort.  In fact he states the only gave him relief was after starting CPAP tonight after arrival at our facility.  He also had a fever and has been experiencing urinary incontinence throughout the day.  Denies dysuria or hematuria.  After the patient was accepted, received information from the transferring facility that his urinalysis was positive for a urinary tract infection and that he was given a dose of Rocephin just prior to discharge.    In the emergency department at [place] he underwent evaluation for what was thought to be possible acute coronary syndrome but with a negative preliminary workup.      Hospital medicine has been asked to accept the patient for admission for further evaluation and treatment.       REVIEW OF SYSTEMS:     -- Constitutional: Positive for fever and chills.  -- Eyes: No visual changes,  diplopia, pain, tearing, blind spots, or discharge.   -- Ears, nose, mouth, throat, and face: No congestion, sore throat, epistaxis, d/c, bleeding gums, neck stiffness masses, or dental issues.  -- Respiratory: Sleep apnea.  Requires CPAP at night.  No cough, shortness of breath, hemoptysis, stridor, wheezing, or night sweats.  -- Cardiovascular: No chest pain, LYNCH, syncope, PND, edema, cyanosis, or palpitations.   -- Gastrointestinal: No vomiting, abdominal pain, hematemesis, melena, dyspepsia, or change in bowel habits.  -- Genitourinary: As above in history of present illness.  -- Integument/breast: No rash, pruritis, pigmentation changes, dryness, or changes in hair  -- Hematologic/lymphatic: No easy bruising or lymphadenopathy.   -- Musculoskeletal: Chronic arthralgias.  No acute arthralgias, acute myalgias, joint swelling, acute limitations of ROM, or acute muscular weakness.  -- Neurological: No seizures, headaches, incoordination, paraesthesias, ataxia, vertigo, or tremors.  -- Behavioral/Psych: No auditory or visual hallucinations, depression, or suicidal/homicidal ideations.  -- Endocrine: Chronic unintentional weight gain.  No heat or cold intolerance.  -- Allergy/Immunologic: No recurrent infections or adverse reaction to food, insects, or difficulty breathing.      PAST MEDICAL / SURGICAL HISTORY:     Past Medical History:   Diagnosis Date    Diabetes mellitus     High cholesterol     Hypertension      Past Surgical History:   Procedure Laterality Date    THYROIDECTOMY, PARTIAL  2006    TONSILLECTOMY           FAMILY HISTORY:     Family history of cardiovascular disease    SOCIAL HISTORY:     Social History   Substance Use Topics    Smoking status: Never Smoker    Smokeless tobacco: Never Used    Alcohol use Yes      Comment: occasionally     Social History     Social History    Marital status:      Spouse name: N/A    Number of children: N/A    Years of education: N/A     Social  History Main Topics    Smoking status: Never Smoker    Smokeless tobacco: Never Used    Alcohol use Yes      Comment: occasionally    Drug use: No    Sexual activity: Not Currently     Other Topics Concern    None     Social History Narrative    None         ALLERGIES:       Review of patient's allergies indicates:   Allergen Reactions    Contrast media Hives         HEALTH SCREENING:     Influenza vaccine not up-to-date for this season.  Prevnar 13 pneumonia vaccine = no evidence of previous vaccination found in the medical record      HOME MEDICATIONS:     Prior to Admission medications    Medication Sig Start Date End Date Taking? Authorizing Provider   felodipine (PLENDIL) 10 MG 24 hr tablet Take 10 mg by mouth once daily.   Yes Historical Provider, MD   hydrocodone-acetaminophen 10-325mg (NORCO)  mg Tab Take 1 tablet by mouth every 8 (eight) hours as needed for Pain.   Yes Historical Provider, MD   lubiprostone (AMITIZA) 24 MCG Cap Take 24 mcg by mouth 2 (two) times daily.   Yes Historical Provider, MD   meloxicam (MOBIC) 15 MG tablet Take 15 mg by mouth once daily.   Yes Historical Provider, MD   metformin (GLUCOPHAGE) 1000 MG tablet Take 1,000 mg by mouth 2 (two) times daily with meals.   Yes Historical Provider, MD   metoprolol tartrate (LOPRESSOR) 25 MG tablet Take 25 mg by mouth once daily.    Yes Historical Provider, MD   olmesartan-hydrochlorothiazide (BENICAR HCT) 40-25 mg per tablet Take 1 tablet by mouth once daily.   Yes Historical Provider, MD   simvastatin (ZOCOR) 40 MG tablet Take 40 mg by mouth every evening.   Yes Historical Provider, MD          HOSPITAL MEDICATIONS:     Scheduled Meds:    aspirin  325 mg Oral Daily    atorvastatin  40 mg Oral Daily    cefTRIAXone (ROCEPHIN) IVPB  1 g Intravenous Q24H    felodipine  10 mg Oral Daily    heparin (porcine)  5,000 Units Subcutaneous Q8H    lubiprostone  24 mcg Oral BID    metoprolol tartrate  25 mg Oral Daily    polyethylene  glycol  17 g Oral Daily    senna-docusate 8.6-50 mg  1 tablet Oral BID    sodium chloride 0.9%  3 mL Intravenous Q8H     Continuous Infusions:    PRN Meds: acetaminophen, bisacodyl, dextrose 50%, glucagon (human recombinant), hydrALAZINE, influenza, insulin aspart, metoprolol, mineral oil, morphine, morphine, nitroGLYCERIN, ondansetron, pneumoc 13-zayra conj-dip cr(PF), promethazine, ramelteon      PHYSICAL EXAM:     Wt Readings from Last 1 Encounters:   10/17/17 2331 (!) 151.8 kg (334 lb 10.5 oz)   10/17/17 2312 (!) 151.8 kg (334 lb 9.8 oz)     Body mass index is 49.42 kg/m².  Vitals:    10/17/17 2357 10/18/17 0009 10/18/17 0045 10/18/17 0101   BP:  123/63     BP Location:  Left arm     Patient Position:  Lying     Pulse:  (!) 116     Resp:  20     Temp: (!) 101.9 °F (38.8 °C) (!) 101.1 °F (38.4 °C)  100.1 °F (37.8 °C)   TempSrc:  Oral  Oral   SpO2:  99% 100%    Weight:       Height:              -- General appearance: well developed. appears stated age   -- Head: normocephalic, atraumatic   -- Eyes: conjunctivae clear. Extraocular muscles intact  -- Nose: Nares normal. Septum midline.   -- Mouth/Throat: lips, mucosa, and tongue normal. no throat erythema.   -- Neck: supple, symmetrical, trachea midline, no JVD and thyroid not grossly enlarged, appears symmetric  -- Lungs: clear to auscultation bilaterally. normal respiratory effort. No use of accessory muscles.   -- Chest wall: no tenderness. equal bilateral chest rise   -- Heart: rapid rate and regular rhythm. S1, S2 normal.  no click, rub or gallop   -- Abdomen:  Morbidly obese, soft, non-tender, non-distended, non-tympanic; bowel sounds normal; megaly exam limited by body habitus  -- Extremities: no cyanosis, clubbing or edema.   -- Pulses: 2+ and symmetric   -- Skin: color normal, texture normal, turgor normal. No rashes or lesions.   -- Neurologic: Normal strength and tone. No focal numbness or weakness. CNII-XII intact. Valarie coma scale: eyes open  spontaneously-4, oriented & converses-5, obeys commands-6.      LABORATORY STUDIES:     Recent Results (from the past 36 hour(s))   Phosphorus    Collection Time: 10/17/17 11:45 PM   Result Value Ref Range    Phosphorus 3.1 2.7 - 4.5 mg/dL   Troponin I    Collection Time: 10/17/17 11:45 PM   Result Value Ref Range    Troponin I 0.046 (H) 0.000 - 0.026 ng/mL   Protime-INR    Collection Time: 10/17/17 11:45 PM   Result Value Ref Range    Prothrombin Time 11.5 9.0 - 12.5 sec    INR 1.1 0.8 - 1.2   T4, free    Collection Time: 10/17/17 11:45 PM   Result Value Ref Range    Free T4 1.16 0.71 - 1.51 ng/dL   TSH    Collection Time: 10/17/17 11:45 PM   Result Value Ref Range    TSH 0.178 (L) 0.400 - 4.000 uIU/mL   Comprehensive metabolic panel    Collection Time: 10/17/17 11:45 PM   Result Value Ref Range    Sodium 140 136 - 145 mmol/L    Potassium 3.7 3.5 - 5.1 mmol/L    Chloride 104 95 - 110 mmol/L    CO2 25 23 - 29 mmol/L    Glucose 164 (H) 70 - 110 mg/dL    BUN, Bld 10 6 - 20 mg/dL    Creatinine 0.7 0.5 - 1.4 mg/dL    Calcium 9.0 8.7 - 10.5 mg/dL    Total Protein 7.4 6.0 - 8.4 g/dL    Albumin 3.6 3.5 - 5.2 g/dL    Total Bilirubin 0.6 0.1 - 1.0 mg/dL    Alkaline Phosphatase 61 55 - 135 U/L    AST 25 10 - 40 U/L    ALT 23 10 - 44 U/L    Anion Gap 11 8 - 16 mmol/L    eGFR if African American >60 >60 mL/min/1.73 m^2    eGFR if non African American >60 >60 mL/min/1.73 m^2   CBC auto differential    Collection Time: 10/17/17 11:47 PM   Result Value Ref Range    WBC 7.60 3.90 - 12.70 K/uL    RBC 4.72 4.60 - 6.20 M/uL    Hemoglobin 11.3 (L) 14.0 - 18.0 g/dL    Hematocrit 35.1 (L) 40.0 - 54.0 %    MCV 74 (L) 82 - 98 fL    MCH 23.9 (L) 27.0 - 31.0 pg    MCHC 32.2 32.0 - 36.0 g/dL    RDW 17.5 (H) 11.5 - 14.5 %    Platelets 225 150 - 350 K/uL    MPV 9.3 9.2 - 12.9 fL    Gran # 6.9 1.8 - 7.7 K/uL    Lymph # 0.6 (L) 1.0 - 4.8 K/uL    Mono # 0.1 (L) 0.3 - 1.0 K/uL    Eos # 0.1 0.0 - 0.5 K/uL    Baso # 0.02 0.00 - 0.20 K/uL    Gran%  90.7 (H) 38.0 - 73.0 %    Lymph% 7.4 (L) 18.0 - 48.0 %    Mono% 0.9 (L) 4.0 - 15.0 %    Eosinophil% 0.7 0.0 - 8.0 %    Basophil% 0.3 0.0 - 1.9 %    Differential Method Automated    POCT glucose    Collection Time: 10/18/17 12:11 AM   Result Value Ref Range    POCT Glucose 171 (H) 70 - 110 mg/dL   Urinalysis    Collection Time: 10/18/17 12:55 AM   Result Value Ref Range    Specimen UA Urine, Clean Catch     Color, UA Yellow Yellow, Straw, [place]    Appearance, UA Clear Clear    pH, UA 5.0 5.0 - 8.0    Specific Gravity, UA 1.015 1.005 - 1.030    Protein, UA Negative Negative    Glucose, UA Negative Negative    Ketones, UA Negative Negative    Bilirubin (UA) Negative Negative    Occult Blood UA 1+ (A) Negative    Nitrite, UA Negative Negative    Urobilinogen, UA Negative <2.0 EU/dL    Leukocytes, UA 2+ (A) Negative   Urinalysis Microscopic    Collection Time: 10/18/17 12:55 AM   Result Value Ref Range    RBC, UA 2 0 - 4 /hpf    WBC, UA >100 (H) 0 - 5 /hpf    Bacteria, UA Rare None-Occ /hpf    Squam Epithel, UA occ /hpf    Microscopic Comment SEE COMMENT        Lab Results   Component Value Date    INR 1.1 10/17/2017     No results found for: HGBA1C  Recent Labs      10/18/17   0011   POCTGLUCOSE  171*           MICROBIOLOGY DATA:      blood cultures ordered  [place] culture obtained at [place] per report    IMAGING:     CXR:    No evidence of acute intrapulmonary process.    CONSULTS:     IP CONSULT TO CARDIOLOGY       ASSESSMENT & PLAN:     Primary Diagnosis:  Sepsis secondary to UTI    Active Hospital Problems    Diagnosis  POA    *Sepsis secondary to UTI [A41.9, N39.0]  Yes     Priority: 1 - High    UTI (urinary tract infection) [N39.0]  Yes     Priority: 2     Chest pain [R07.9]  Yes     Priority: 4     Essential hypertension [I10]  Yes    Hyperlipidemia [E78.5]  Yes    Diabetes mellitus, type 2 [E11.9]  Yes    Morbid obesity [E66.01]  Yes      Resolved Hospital Problems    Diagnosis Date  Resolved POA   No resolved problems to display.         Sepsis secondary to UTI  · 2 out of 4 SIRS criteria in immunocompromised patient with type 2 diabetes  · As evidenced by UA and history of urinary incontinence  · Urine culture and Gram stain obtained prior to antibiotics (follow-up with [facility])  · Given empiric antibiotics with ceftriaxone  · Will tailor antibiotic regimen according to culture & sensitivity results  · Maintain euvolemic status with IV fluids        UTI (urinary tract infection)  Management of UTI as noted above.        Diabetes mellitus, type 2  · BG in acceptable range at this time  · Maintain w/ subcutaneous insulin management order set  · Hold oral diabetic meds  · ADA 1800 kcal diet  · BG goal while in patient is <180mg/dL  · HgA1c = Pending      Hyperlipidemia  · Lipid panel - pending  · Cardiac diet  · Continue statin      Essential hypertension  · Goal while inpatient is a systolic blood pressure less than 140mmHgin the setting of chest pain  · BP in acceptable range at this time  · Continue current home regimen with hold parameters  · PRN antihypertensives available      Chest pain  Acute Chest Pain, rule-out myocardial infarction   · Intitial EKG findings shows no evidence of ST elevation MI  · Initial troponin is within normal limits  · Intermediate to high risk for MI;  Morbid obesity, hypertension, diabetes hyperlipidemia  · Initiate ACS protocol to rule out MI, then explore noncardiac etiology  · Trend cardiac enzymes  · Aspirin  · Beta-blocker (after stress or immediately if evidence of NSTEMI)  · Statin  · Supplemental oxygen  · nitroglycerine and morphine PRN  · 2D echocardiogram  · TSH, FT3, FT4  · ESR and cardiac specific CRP   · PT, PTT, INR   · Tight glycemic control  · Monitor on telemetry unit  · Consult cardiologist  · Will admit for rule out acute MI and possible further provocative testing for risk stratification.      Morbid obesity  · Body mass  index is 49.41 kg/m².  · Order a cardiac diet  · Counseling given  · Nutrition consult as an outpatient          VTE Risk Mitigation         Ordered     heparin (porcine) injection 5,000 Units  Every 8 hours     Route:  Subcutaneous        10/17/17 2332     Medium Risk of VTE  Once      10/17/17 2332            Adult PRN medications available   DVT prophylaxis given       DISPOSITION:     Will admit to the Hospital Medicine service for further evaluation and treatment.    Chart reviewed and updated where applicable.    High Risk Conditions:  Patient has a condition that poses threat to life and bodily function: chest pain and fever      ===============================================================    Deven Ren MD, MPH  Department of Hospital Medicine   [facility]  [phone] pg  (7pm - 6am)          This note is dictated using Dragon Medical 360 voice recognition software.  There are word recognition mistakes that are occasionally missed on review.

## 2017-10-18 NOTE — HPI
Russell Santos is a 57 y.o. male that (in part)  has a past medical history of Diabetes mellitus; High cholesterol; and Hypertension. Presents to [facility]  From the [place] emergency Department for evaluation of chest pain and fever.  He was being admitted there by hospital medicine, however his insurance company would not cover the admission costs so he was transferred here.  He states he started having chest pain at approximately 2 PM today and it was intermittent.  Each episode lasted approximately 15 minutes.  He has had several episodes throughout the day the lasted up until the time he arrived in the emergency department.  He received nitroglycerin and aspirin without significant change in his chest discomfort.  In fact he states the only gave him relief was after starting CPAP tonight after arrival at our facility.  He also had a fever and has been experiencing urinary incontinence throughout the day.  Denies dysuria or hematuria.  After the patient was accepted, received information from the transferring facility that his urinalysis was positive for a urinary tract infection and that he was given a dose of Rocephin just prior to discharge.     In the emergency department at [place] he underwent evaluation for what was thought to be possible acute coronary syndrome but with a negative preliminary workup.       Hospital medicine has been asked to accept the patient for admission for further evaluation and treatment.

## 2017-10-18 NOTE — SUBJECTIVE & OBJECTIVE
Interval History: Febrile overnight.  Feeling better.    Review of Systems   HENT: Negative for ear discharge and ear pain.    Eyes: Negative for pain and itching.   Neurological: Negative for seizures and syncope.   Psychiatric/Behavioral: Negative for agitation and hallucinations.     Objective:     Vital Signs (Most Recent):  Temp: 98.6 °F (37 °C) (10/18/17 1213)  Pulse: 94 (10/18/17 1213)  Resp: 18 (10/18/17 1213)  BP: 135/71 (10/18/17 1213)  SpO2: (!) 93 % (10/18/17 1213) Vital Signs (24h Range):  Temp:  [97.6 °F (36.4 °C)-103.5 °F (39.7 °C)] 98.6 °F (37 °C)  Pulse:  [] 94  Resp:  [18-20] 18  SpO2:  [93 %-100 %] 93 %  BP: (123-135)/(63-99) 135/71     Weight: (!) 151.8 kg (334 lb 10.5 oz)  Body mass index is 49.42 kg/m².    Intake/Output Summary (Last 24 hours) at 10/18/17 1542  Last data filed at 10/18/17 1500   Gross per 24 hour   Intake           666.25 ml   Output              800 ml   Net          -133.75 ml      Physical Exam   Constitutional: He is oriented to person, place, and time. He appears well-developed. No distress.   HENT:   Head: Normocephalic and atraumatic.   Eyes: Conjunctivae are normal. Right eye exhibits no discharge. Left eye exhibits no discharge.   Neck: Neck supple.   Cardiovascular: Normal heart sounds.    Tachycardic   Pulmonary/Chest: Effort normal and breath sounds normal. No stridor.   Abdominal: Soft. Bowel sounds are normal.   Musculoskeletal: Normal range of motion. He exhibits no deformity.   Neurological: He is alert and oriented to person, place, and time.   Skin: Skin is warm and dry.       Significant Labs:   BMP:   Recent Labs  Lab 10/18/17  0548   *      K 3.7      CO2 25   BUN 8   CREATININE 0.7   CALCIUM 9.3   MG 1.8     CBC:   Recent Labs  Lab 10/17/17  2347   WBC 7.60   HGB 11.3*   HCT 35.1*          Significant Imaging: I have reviewed all pertinent imaging results/findings within the past 24 hours.

## 2017-10-18 NOTE — PROGRESS NOTES
Left message with medical records at [place] requesting fax number for cath procedure records requested by Dr. Vasquez.  Awaiting return call.

## 2017-10-18 NOTE — HOSPITAL COURSE
58 y/o male presented with chest pain and fevers.  Cardiology consulted.  Minimally elevated Troponin probably secondary to UTI.  No plans for further work up.  UTI and started on Rocephin.  Patient's fevers and tachycardia improved during hospital stay.  His urinary urgency also improved.  Patient had received ABx's prior to admission here.  UCx are currently unremarkable.  I have been unable to get UCx results from [facility].  Patient is currently hemodynamically stable.  Will change ABx's to PO Cipro.  BCx negative.  Patient would need to follow up with Urology if UTI recurs.  He will be discharged home.

## 2017-10-18 NOTE — PROGRESS NOTES
2300 Patient arrived to unit from [facility] via stretcher accompanied by wife and daughter. Patient ambulates to bed independently. Gait is steady. Patient complains of 3/10 mid sternal chest pressure at this time and appears short of breathe. Vitals documented. Shift assessment initiated. Tele monitoring initiated.     0000 Patient has chills and body feels warm. His temp is 101.9. Prn tylenol administered per orders. Per House Supervisor Marlo, the patient's urinalysis results from [facility] revealing bacteria, wbcs and leukocytes. Dr. Ren updated. New orders noted.     100 Temp is down to 100.1 orally. Patient appears to be in no distress at this time. Will continue to monitor.

## 2017-10-19 VITALS
TEMPERATURE: 98 F | BODY MASS INDEX: 46.65 KG/M2 | RESPIRATION RATE: 18 BRPM | SYSTOLIC BLOOD PRESSURE: 115 MMHG | HEIGHT: 69 IN | DIASTOLIC BLOOD PRESSURE: 66 MMHG | HEART RATE: 71 BPM | WEIGHT: 315 LBS | OXYGEN SATURATION: 99 %

## 2017-10-19 PROBLEM — N39.0 SEPSIS SECONDARY TO UTI: Status: RESOLVED | Noted: 2017-10-18 | Resolved: 2017-10-19

## 2017-10-19 PROBLEM — A41.9 SEPSIS SECONDARY TO UTI: Status: RESOLVED | Noted: 2017-10-18 | Resolved: 2017-10-19

## 2017-10-19 PROBLEM — R07.9 CHEST PAIN: Status: RESOLVED | Noted: 2017-10-17 | Resolved: 2017-10-19

## 2017-10-19 LAB
BASOPHILS # BLD AUTO: 0.01 K/UL
BASOPHILS NFR BLD: 0.2 %
DIFFERENTIAL METHOD: ABNORMAL
EOSINOPHIL # BLD AUTO: 0.1 K/UL
EOSINOPHIL NFR BLD: 1.6 %
ERYTHROCYTE [DISTWIDTH] IN BLOOD BY AUTOMATED COUNT: 17.6 %
HCT VFR BLD AUTO: 34.1 %
HGB BLD-MCNC: 10.9 G/DL
LYMPHOCYTES # BLD AUTO: 0.5 K/UL
LYMPHOCYTES NFR BLD: 12 %
MAGNESIUM SERPL-MCNC: 1.9 MG/DL
MCH RBC QN AUTO: 23.9 PG
MCHC RBC AUTO-ENTMCNC: 32 G/DL
MCV RBC AUTO: 75 FL
MONOCYTES # BLD AUTO: 0.1 K/UL
MONOCYTES NFR BLD: 2.7 %
NEUTROPHILS # BLD AUTO: 3.7 K/UL
NEUTROPHILS NFR BLD: 83.5 %
PLATELET # BLD AUTO: 188 K/UL
PMV BLD AUTO: 9.7 FL
POCT GLUCOSE: 139 MG/DL (ref 70–110)
POCT GLUCOSE: 139 MG/DL (ref 70–110)
POCT GLUCOSE: 96 MG/DL (ref 70–110)
RBC # BLD AUTO: 4.57 M/UL
WBC # BLD AUTO: 4.43 K/UL

## 2017-10-19 PROCEDURE — 63600175 PHARM REV CODE 636 W HCPCS: Performed by: HOSPITALIST

## 2017-10-19 PROCEDURE — G0009 ADMIN PNEUMOCOCCAL VACCINE: HCPCS | Performed by: HOSPITALIST

## 2017-10-19 PROCEDURE — 25000003 PHARM REV CODE 250: Performed by: HOSPITALIST

## 2017-10-19 PROCEDURE — 90472 IMMUNIZATION ADMIN EACH ADD: CPT | Performed by: HOSPITALIST

## 2017-10-19 PROCEDURE — 90670 PCV13 VACCINE IM: CPT | Performed by: HOSPITALIST

## 2017-10-19 PROCEDURE — G0008 ADMIN INFLUENZA VIRUS VAC: HCPCS | Performed by: HOSPITALIST

## 2017-10-19 PROCEDURE — 90686 IIV4 VACC NO PRSV 0.5 ML IM: CPT | Performed by: HOSPITALIST

## 2017-10-19 PROCEDURE — 99233 SBSQ HOSP IP/OBS HIGH 50: CPT | Mod: ,,, | Performed by: INTERNAL MEDICINE

## 2017-10-19 PROCEDURE — 85025 COMPLETE CBC W/AUTO DIFF WBC: CPT

## 2017-10-19 PROCEDURE — 3E0234Z INTRODUCTION OF SERUM, TOXOID AND VACCINE INTO MUSCLE, PERCUTANEOUS APPROACH: ICD-10-PCS | Performed by: HOSPITALIST

## 2017-10-19 PROCEDURE — 36415 COLL VENOUS BLD VENIPUNCTURE: CPT

## 2017-10-19 PROCEDURE — 83735 ASSAY OF MAGNESIUM: CPT

## 2017-10-19 PROCEDURE — 90471 IMMUNIZATION ADMIN: CPT | Performed by: HOSPITALIST

## 2017-10-19 RX ORDER — ACETAMINOPHEN 325 MG/1
650 TABLET ORAL EVERY 6 HOURS PRN
Refills: 0 | COMMUNITY
Start: 2017-10-19 | End: 2020-02-19

## 2017-10-19 RX ORDER — CIPROFLOXACIN 500 MG/1
500 TABLET ORAL EVERY 12 HOURS
Qty: 14 TABLET | Refills: 0 | Status: SHIPPED | OUTPATIENT
Start: 2017-10-19 | End: 2017-10-26

## 2017-10-19 RX ADMIN — DOCUSATE SODIUM AND SENNOSIDES 1 TABLET: 8.6; 5 TABLET, FILM COATED ORAL at 09:10

## 2017-10-19 RX ADMIN — FELODIPINE 10 MG: 5 TABLET, FILM COATED, EXTENDED RELEASE ORAL at 09:10

## 2017-10-19 RX ADMIN — SODIUM CHLORIDE: 0.9 INJECTION, SOLUTION INTRAVENOUS at 09:10

## 2017-10-19 RX ADMIN — LUBIPROSTONE 24 MCG: 24 CAPSULE, GELATIN COATED ORAL at 09:10

## 2017-10-19 RX ADMIN — POLYETHYLENE GLYCOL 3350 17 G: 17 POWDER, FOR SOLUTION ORAL at 09:10

## 2017-10-19 RX ADMIN — PNEUMOCOCCAL 13-VALENT CONJUGATE VACCINE 0.5 ML: 2.2; 2.2; 2.2; 2.2; 2.2; 4.4; 2.2; 2.2; 2.2; 2.2; 2.2; 2.2; 2.2 INJECTION, SUSPENSION INTRAMUSCULAR at 12:10

## 2017-10-19 RX ADMIN — CEFTRIAXONE 1 G: 1 INJECTION, SOLUTION INTRAVENOUS at 09:10

## 2017-10-19 RX ADMIN — INFLUENZA A VIRUS A/MICHIGAN/45/2015 X-275 (H1N1) ANTIGEN (FORMALDEHYDE INACTIVATED), INFLUENZA A VIRUS A/HONG KONG/4801/2014 X-263B (H3N2) ANTIGEN (FORMALDEHYDE INACTIVATED), INFLUENZA B VIRUS B/PHUKET/3073/2013 ANTIGEN (FORMALDEHYDE INACTIVATED), AND INFLUENZA B VIRUS B/BRISBANE/60/2008 ANTIGEN (FORMALDEHYDE INACTIVATED) 0.5 ML: 15; 15; 15; 15 INJECTION, SUSPENSION INTRAMUSCULAR at 12:10

## 2017-10-19 RX ADMIN — METOPROLOL TARTRATE 25 MG: 25 TABLET ORAL at 09:10

## 2017-10-19 RX ADMIN — ATORVASTATIN CALCIUM 40 MG: 40 TABLET, FILM COATED ORAL at 09:10

## 2017-10-19 RX ADMIN — ASPIRIN 325 MG ORAL TABLET 325 MG: 325 PILL ORAL at 09:10

## 2017-10-19 NOTE — PLAN OF CARE
10/19/17 1230   Discharge Assessment   Assessment Type Discharge Planning Assessment   Confirmed/corrected address and phone number on facesheet? Yes   Assessment information obtained from? Patient   Expected Length of Stay (days) 1   Communicated expected length of stay with patient/caregiver yes   Prior to hospitilization cognitive status: Alert/Oriented   Prior to hospitalization functional status: Independent;Assistive Equipment   Current cognitive status: Alert/Oriented   Current Functional Status: Independent;Assistive Equipment  (cane)   Facility Arrived From: home   Lives With spouse   Able to Return to Prior Arrangements yes   Is patient able to care for self after discharge? Yes   Patient's perception of discharge disposition home or selfcare   Readmission Within The Last 30 Days no previous admission in last 30 days   Patient currently being followed by outpatient case management? No   Patient currently receives any other outside agency services? No   Equipment Currently Used at Home CPAP;cane, straight   Do you have any problems affording any of your prescribed medications? No   Is the patient taking medications as prescribed? yes   Does the patient have transportation home? Yes   Transportation Available car   Does the patient receive services at the [facility]? No  (Aspirin)   Discharge Plan A Home   Discharge Plan B Home   Patient/Family In Agreement With Plan yes   Katherine Hoover, CHRISTOPHER, ACSIXTO-Sw, CCM  10/19/2017

## 2017-10-19 NOTE — PROGRESS NOTES
[facility]  Cardiology  Progress Note    Patient Name: Russell Santso  MRN: [id number]  Admission Date: 10/17/2017  Hospital Length of Stay: 1 days  Code Status: Full Code   Attending Physician: Emery Busby MD   Primary Care Physician: Roxanne Phillip  Expected Discharge Date:   Principal Problem:Sepsis secondary to UTI    Subjective:     Interval Hx: feeling better.  No cp/sob.    Tele: SR/ST (pers rev)      Past Medical History:   Diagnosis Date    Diabetes mellitus     High cholesterol     Hypertension        Past Surgical History:   Procedure Laterality Date    THYROIDECTOMY, PARTIAL  2006    TONSILLECTOMY         Review of patient's allergies indicates:   Allergen Reactions    Contrast media Hives       No current facility-administered medications on file prior to encounter.      No current outpatient prescriptions on file prior to encounter.     Family History     None        Social History Main Topics    Smoking status: Never Smoker    Smokeless tobacco: Never Used    Alcohol use Yes      Comment: occasionally    Drug use: No    Sexual activity: Not Currently     Review of Systems   Gastrointestinal: Negative for melena.   Genitourinary: Negative for hematuria.     Objective:     Vital Signs (Most Recent):  Temp: 97.8 °F (36.6 °C) (10/19/17 0721)  Pulse: 79 (10/19/17 0721)  Resp: 18 (10/19/17 0721)  BP: 102/61 (10/19/17 0721)  SpO2: 98 % (10/19/17 0721) Vital Signs (24h Range):  Temp:  [97.8 °F (36.6 °C)-98.6 °F (37 °C)] 97.8 °F (36.6 °C)  Pulse:  [] 79  Resp:  [18-20] 18  SpO2:  [93 %-100 %] 98 %  BP: (102-135)/(59-72) 102/61     Weight: (!) 146.5 kg (322 lb 15.6 oz)  Body mass index is 47.69 kg/m².    SpO2: 98 %  O2 Device (Oxygen Therapy): nasal cannula      Intake/Output Summary (Last 24 hours) at 10/19/17 0932  Last data filed at 10/19/17 0800   Gross per 24 hour   Intake          1568.75 ml   Output             1850 ml   Net          -281.25 ml        Lines/Drains/Airways     Peripheral Intravenous Line                 Peripheral IV - Single Lumen 10/17/17 Left Hand 2 days                Physical Exam   Constitutional: He is oriented to person, place, and time. He appears well-developed and well-nourished.   HENT:   Head: Normocephalic and atraumatic.   Eyes: Conjunctivae and EOM are normal. Pupils are equal, round, and reactive to light. No scleral icterus.   Neck: Neck supple. No JVD present. Carotid bruit is not present. No tracheal deviation present. No thyromegaly present.   Cardiovascular: Regular rhythm, S1 normal and S2 normal.  Frequent extrasystoles are present. Tachycardia present.  Exam reveals no gallop and no friction rub.    No murmur heard.  Pulmonary/Chest: Effort normal and breath sounds normal. He has no wheezes. He exhibits no tenderness.   Abdominal: Soft. Bowel sounds are normal. He exhibits no distension. There is no tenderness.   obese   Musculoskeletal: He exhibits no edema.   Neurological: He is alert and oriented to person, place, and time. He has normal strength. No cranial nerve deficit.   Skin: Skin is warm and dry. No rash noted.   Psychiatric: He has a normal mood and affect. His behavior is normal.       Current Medications:   aspirin  325 mg Oral Daily    atorvastatin  40 mg Oral Daily    cefTRIAXone (ROCEPHIN) IVPB  1 g Intravenous Q24H    enoxaparin  40 mg Subcutaneous Daily    felodipine  10 mg Oral Daily    lubiprostone  24 mcg Oral BID    metoprolol tartrate  25 mg Oral Daily    polyethylene glycol  17 g Oral Daily    senna-docusate 8.6-50 mg  1 tablet Oral BID    sodium chloride 0.9%  3 mL Intravenous Q8H      sodium chloride 0.9% 75 mL/hr at 10/18/17 0545     acetaminophen, bisacodyl, dextrose 50%, glucagon (human recombinant), hydrALAZINE, influenza, insulin aspart, metoprolol, mineral oil, morphine, morphine, nitroGLYCERIN, ondansetron, pneumoc 13-zayra conj-dip cr(PF), promethazine,  ramelteon    Laboratory:  CBC:    Recent Labs  Lab 10/17/17  2347 10/19/17  0603   WHITE BLOOD CELL COUNT 7.60 4.43   HEMOGLOBIN 11.3 L 10.9 L   HEMATOCRIT 35.1 L 34.1 L   PLATELETS 225 188       CHEMISTRIES:    Recent Labs  Lab 10/17/17  2345 10/18/17  0548 10/19/17  0603   GLUCOSE 164 H 133 H  --    SODIUM 140 139  --    POTASSIUM 3.7 3.7  --    BUN BLD 10 8  --    CREATININE 0.7 0.7  --    EGFR IF  >60 >60  --    EGFR IF NON- >60 >60  --    CALCIUM 9.0 9.3  --    MAGNESIUM  --  1.8 1.9       CARDIAC BIOMARKERS:    Recent Labs  Lab 10/17/17  2345 10/18/17  0548 10/18/17  1207   TROPONIN I 0.046 H 0.063 H 0.037 H       COAGS:    Recent Labs  Lab 10/17/17  2345   INR 1.1       LIPIDS/LFTS:    Recent Labs  Lab 10/17/17  2345 10/18/17  0548   CHOLESTEROL  --  118 L   TRIGLYCERIDES  --  61   HDL  --  36 L   LDL CHOLESTEROL  --  69.8   NON-HDL CHOLESTEROL  --  82   AST 25 29   ALT 23 26           Diagnostic Results:  ECG (personally reviewed tracings):   10/17/17     Chest X-Ray 10/17/17 ([facility]) NAD    Echo 10/18/17 (images pers rev)    1 - Normal left ventricular systolic function (EF 65-70%).     2 - No wall motion abnormalities.     3 - Concentric hypertrophy.     4 - Mildly to moderately enlarged aortic root, 4.3 cm.     5 - Trivial tricuspid regurgitation.     6 - Pulmonary hypertension. The estimated PA systolic pressure is 63 mmHg.       Assessment and Plan:     * Sepsis secondary to UTI    Per IM  Improved        Diabetes mellitus, type 2    Per IM        Hyperlipidemia    Cont statin        Essential hypertension    Cont med rx        Chest pain    Minimal trop elev, likely demand from sepsis/fever.  No record of cath at [facility] (we received fax from their records dept).  Echo with normal LV fxn.  No plan for stress test as inpat, can be contemplated as outpat if no recent isch eval (or if pt has not had cath).  He plan to follow up with [facility] cardiology.              VTE  Risk Mitigation         Ordered     enoxaparin injection 40 mg  Daily     Route:  Subcutaneous        10/18/17 1324     Medium Risk of VTE  Once      10/17/17 2332        Cardiology will sign off, pls call with questions.  Pt to follow up with [facility] cardiology.    Deven Vasquez MD  Cardiology  [facility]

## 2017-10-19 NOTE — SUBJECTIVE & OBJECTIVE
Past Medical History:   Diagnosis Date    Diabetes mellitus     High cholesterol     Hypertension        Past Surgical History:   Procedure Laterality Date    THYROIDECTOMY, PARTIAL  2006    TONSILLECTOMY         Review of patient's allergies indicates:   Allergen Reactions    Contrast media Hives       No current facility-administered medications on file prior to encounter.      No current outpatient prescriptions on file prior to encounter.     Family History     None        Social History Main Topics    Smoking status: Never Smoker    Smokeless tobacco: Never Used    Alcohol use Yes      Comment: occasionally    Drug use: No    Sexual activity: Not Currently     Review of Systems   Gastrointestinal: Negative for melena.   Genitourinary: Negative for hematuria.     Objective:     Vital Signs (Most Recent):  Temp: 97.8 °F (36.6 °C) (10/19/17 0721)  Pulse: 79 (10/19/17 0721)  Resp: 18 (10/19/17 0721)  BP: 102/61 (10/19/17 0721)  SpO2: 98 % (10/19/17 0721) Vital Signs (24h Range):  Temp:  [97.8 °F (36.6 °C)-98.6 °F (37 °C)] 97.8 °F (36.6 °C)  Pulse:  [] 79  Resp:  [18-20] 18  SpO2:  [93 %-100 %] 98 %  BP: (102-135)/(59-72) 102/61     Weight: (!) 146.5 kg (322 lb 15.6 oz)  Body mass index is 47.69 kg/m².    SpO2: 98 %  O2 Device (Oxygen Therapy): nasal cannula      Intake/Output Summary (Last 24 hours) at 10/19/17 0932  Last data filed at 10/19/17 0800   Gross per 24 hour   Intake          1568.75 ml   Output             1850 ml   Net          -281.25 ml       Lines/Drains/Airways     Peripheral Intravenous Line                 Peripheral IV - Single Lumen 10/17/17 Left Hand 2 days                Physical Exam   Constitutional: He is oriented to person, place, and time. He appears well-developed and well-nourished.   HENT:   Head: Normocephalic and atraumatic.   Eyes: Conjunctivae and EOM are normal. Pupils are equal, round, and reactive to light. No scleral icterus.   Neck: Neck supple. No JVD  present. Carotid bruit is not present. No tracheal deviation present. No thyromegaly present.   Cardiovascular: Regular rhythm, S1 normal and S2 normal.  Frequent extrasystoles are present. Tachycardia present.  Exam reveals no gallop and no friction rub.    No murmur heard.  Pulmonary/Chest: Effort normal and breath sounds normal. He has no wheezes. He exhibits no tenderness.   Abdominal: Soft. Bowel sounds are normal. He exhibits no distension. There is no tenderness.   obese   Musculoskeletal: He exhibits no edema.   Neurological: He is alert and oriented to person, place, and time. He has normal strength. No cranial nerve deficit.   Skin: Skin is warm and dry. No rash noted.   Psychiatric: He has a normal mood and affect. His behavior is normal.       Current Medications:   aspirin  325 mg Oral Daily    atorvastatin  40 mg Oral Daily    cefTRIAXone (ROCEPHIN) IVPB  1 g Intravenous Q24H    enoxaparin  40 mg Subcutaneous Daily    felodipine  10 mg Oral Daily    lubiprostone  24 mcg Oral BID    metoprolol tartrate  25 mg Oral Daily    polyethylene glycol  17 g Oral Daily    senna-docusate 8.6-50 mg  1 tablet Oral BID    sodium chloride 0.9%  3 mL Intravenous Q8H      sodium chloride 0.9% 75 mL/hr at 10/18/17 0545     acetaminophen, bisacodyl, dextrose 50%, glucagon (human recombinant), hydrALAZINE, influenza, insulin aspart, metoprolol, mineral oil, morphine, morphine, nitroGLYCERIN, ondansetron, pneumoc 13-zayra conj-dip cr(PF), promethazine, ramelteon    Laboratory:  CBC:    Recent Labs  Lab 10/17/17  2347 10/19/17  0603   WHITE BLOOD CELL COUNT 7.60 4.43   HEMOGLOBIN 11.3 L 10.9 L   HEMATOCRIT 35.1 L 34.1 L   PLATELETS 225 188       CHEMISTRIES:    Recent Labs  Lab 10/17/17  2345 10/18/17  0548 10/19/17  0603   GLUCOSE 164 H 133 H  --    SODIUM 140 139  --    POTASSIUM 3.7 3.7  --    BUN BLD 10 8  --    CREATININE 0.7 0.7  --    EGFR IF  >60 >60  --    EGFR IF NON- >60  >60  --    CALCIUM 9.0 9.3  --    MAGNESIUM  --  1.8 1.9       CARDIAC BIOMARKERS:    Recent Labs  Lab 10/17/17  2345 10/18/17  0548 10/18/17  1207   TROPONIN I 0.046 H 0.063 H 0.037 H       COAGS:    Recent Labs  Lab 10/17/17  2345   INR 1.1       LIPIDS/LFTS:    Recent Labs  Lab 10/17/17  2345 10/18/17  0548   CHOLESTEROL  --  118 L   TRIGLYCERIDES  --  61   HDL  --  36 L   LDL CHOLESTEROL  --  69.8   NON-HDL CHOLESTEROL  --  82   AST 25 29   ALT 23 26           Diagnostic Results:  ECG (personally reviewed tracings):   10/17/17     Chest X-Ray 10/17/17 ([facility]) NAD    Echo 10/18/17 (images pers rev)    1 - Normal left ventricular systolic function (EF 65-70%).     2 - No wall motion abnormalities.     3 - Concentric hypertrophy.     4 - Mildly to moderately enlarged aortic root, 4.3 cm.     5 - Trivial tricuspid regurgitation.     6 - Pulmonary hypertension. The estimated PA systolic pressure is 63 mmHg.

## 2017-10-19 NOTE — ASSESSMENT & PLAN NOTE
Minimal trop elev, likely demand from sepsis/fever.  No record of cath at [facility] (we received fax from their records dept).  Echo with normal LV fxn.  No plan for stress test as inpat, can be contemplated as outpat if no recent isch eval (or if pt has not had cath).  He plan to follow up with [facility] cardiology.

## 2017-10-19 NOTE — NURSING
Discharge instructions, follow up appointments and prescriptions given and explained to patient. Verbalizes understanding of all. IV removed patient tolerated well. Patient awaiting transportation at this time. Will continue to monitor patient.

## 2017-10-19 NOTE — DISCHARGE SUMMARY
[facility] Hospital Medicine  Discharge Summary      Patient Name: Russell Santos  MRN: [id number]  Admission Date: 10/17/2017  Hospital Length of Stay: 1 days  Discharge Date and Time:  10/19/2017 10:23 AM  Attending Physician: Emery Busby MD   Discharging Provider: Emery Busby MD  Primary Care Provider: [facility]      HPI:      Russell Santso is a 57 y.o. male that (in part)  has a past medical history of Diabetes mellitus; High cholesterol; and Hypertension. Presents to [facility]  From the [place] emergency Department for evaluation of chest pain and fever.  He was being admitted there by hospital medicine, however his insurance company would not cover the admission costs so he was transferred here.  He states he started having chest pain at approximately 2 PM today and it was intermittent.  Each episode lasted approximately 15 minutes.  He has had several episodes throughout the day the lasted up until the time he arrived in the emergency department.  He received nitroglycerin and aspirin without significant change in his chest discomfort.  In fact he states the only gave him relief was after starting CPAP tonight after arrival at our facility.  He also had a fever and has been experiencing urinary incontinence throughout the day.  Denies dysuria or hematuria.  After the patient was accepted, received information from the transferring facility that his urinalysis was positive for a urinary tract infection and that he was given a dose of Rocephin just prior to discharge.     In the emergency department at [facility] he underwent evaluation for what was thought to be possible acute coronary syndrome but with a negative preliminary workup.       Hospital medicine has been asked to accept the patient for admission for further evaluation and treatment.     * No surgery found *      Indwelling Lines/Drains at time of discharge:   Lines/Drains/Airways           No matching active lines, drains, or airways        Hospital Course:   56 y/o male presented with chest pain and fevers.  Cardiology consulted.  Minimally elevated Troponin probably secondary to UTI.  No plans for further work up.  UTI and started on Rocephin.  Patient's fevers and tachycardia improved during hospital stay.  His urinary urgency also improved.  Patient had received ABx's prior to admission here.  UCx are currently unremarkable.  I have been unable to get UCx results from [facility].  Patient is currently hemodynamically stable.  Will change ABx's to PO Cipro.  BCx negative.  Patient would need to follow up with Urology if UTI recurs.  He will be discharged home.     Consults:   Consults         Status Ordering Provider     Inpatient consult to Cardiology  Once     Provider:  Deven Vasquez MD    Completed DEVEN BRITT              Pending Diagnostic Studies:     None        Final Active Diagnoses:    Diagnosis Date Noted POA    Essential hypertension [I10] 10/17/2017 Yes    Hyperlipidemia [E78.5] 10/17/2017 Yes    Diabetes mellitus, type 2 [E11.9] 10/17/2017 Yes    Morbid obesity [E66.01] 10/17/2017 Yes      Problems Resolved During this Admission:    Diagnosis Date Noted Date Resolved POA    PRINCIPAL PROBLEM:  Sepsis secondary to UTI [A41.9, N39.0] 10/18/2017 10/19/2017 Yes    Chest pain [R07.9] 10/17/2017 10/19/2017 Yes      No new Assessment & Plan notes have been filed under this hospital service since the last note was generated.  Service: Hospital Medicine      Discharged Condition: stable    Disposition: Home or Self Care    Follow Up:  Follow-up Information     [facility]    Contact information:  [address] [place] [address]  [place] [id number] [phone]                 Patient Instructions:     Diet Diabetic 2000 Calories     Diet Cardiac     Activity as tolerated     Call MD for:  temperature >100.4     Call MD for:  persistent nausea and vomiting or  diarrhea     Call MD for:  difficulty breathing or increased cough     Call MD for:  persistent dizziness, light-headedness, or visual disturbances     Call MD for:  increased confusion or weakness       Medications:  Reconciled Home Medications:   Current Discharge Medication List      START taking these medications    Details   acetaminophen (TYLENOL) 325 MG tablet Take 2 tablets (650 mg total) by mouth every 6 (six) hours as needed for Temperature greater than (100.4).  Refills: 0      ciprofloxacin HCl (CIPRO) 500 MG tablet Take 1 tablet (500 mg total) by mouth every 12 (twelve) hours.  Qty: 14 tablet, Refills: 0         CONTINUE these medications which have NOT CHANGED    Details   felodipine (PLENDIL) 10 MG 24 hr tablet Take 10 mg by mouth once daily.      hydrocodone-acetaminophen 10-325mg (NORCO)  mg Tab Take 1 tablet by mouth every 8 (eight) hours as needed for Pain.      lubiprostone (AMITIZA) 24 MCG Cap Take 24 mcg by mouth 2 (two) times daily.      meloxicam (MOBIC) 15 MG tablet Take 15 mg by mouth once daily.      metformin (GLUCOPHAGE) 1000 MG tablet Take 1,000 mg by mouth 2 (two) times daily with meals.      metoprolol tartrate (LOPRESSOR) 25 MG tablet Take 25 mg by mouth once daily.       olmesartan-hydrochlorothiazide (BENICAR HCT) 40-25 mg per tablet Take 1 tablet by mouth once daily.      simvastatin (ZOCOR) 40 MG tablet Take 40 mg by mouth every evening.           Time spent on the discharge of patient: >30 minutes      Emery Busby MD  Department of Hospital Medicine  [facility]

## 2017-10-19 NOTE — PROGRESS NOTES
WRITTEN HEALTHCARE INFORMATION:    Follow-up Information     [facility] On 10/20/2017.    Why:  10:30 a.m. Friday October 20, 2017 go to [facility] for your hospital followup  visit.  Contact information:  [place] [address]  [phone]                 [facility]  Unless otherwise directed by your provider, please   contact [facility], our nurse care line   that is available for 24/7 assistance.      [phone] (toll-free)      Registered nurses in the [facility]   provide: appointment scheduling, clinical advisement, health education, and other advisory services.    Thank you for choosing [facility] for your care.  Within 48-72 hours after leaving the hospital you will receive a call from [facility] nurses following up to see how you are doing.  The team will ask you a few questions and the call will last approximately 20 minutes.    Please answer any calls you may receive from [facility].  We want to continue to support you as you manage your healthcare needs.  [facility] happy to have the opportunity to serve you.    Sincerely      Katherine-  Your [facility] Team  [phone]

## 2017-10-19 NOTE — PLAN OF CARE
Patient's post discharge f/u appt @ [facility] scheduled for Friday, October 20, 2017 at 1:20 p.m.  Written healthcare instructions and case management's education sheet on chest pain reviewed with patient using teachback method.  Patient's nurse, Sarah, advised that it is ok for patient to discharge from SW standpoint.       10/19/17 1238   Final Note   Assessment Type Final Discharge Note   Discharge Disposition Home   What phone number can be called within the next 1-3 days to see how you are doing after discharge? [phone]   Hospital Follow Up  Appt(s) scheduled? Yes   Discharge plans and expectations educations in teach back method with documentation complete? Yes   Right Care Referral Info   Post Acute Recommendation No Care   Katherine Hoover LMSW, KAMINI-ANAID, [facility]  10/19/2017

## 2017-10-20 LAB — BACTERIA UR CULT: NO GROWTH

## 2017-10-23 LAB
BACTERIA BLD CULT: NORMAL
BACTERIA BLD CULT: NORMAL

## 2019-08-09 ENCOUNTER — TELEPHONE (OUTPATIENT)
Dept: FAMILY MEDICINE | Facility: CLINIC | Age: 59
End: 2019-08-09

## 2019-08-09 NOTE — TELEPHONE ENCOUNTER
----- Message from Florinda Smith sent at 8/9/2019  2:58 PM CDT -----  Contact: Naty/ Maureen/[phone]  Type: Patient Call Back    Who called:  Patient    What is the request in detail:  Patient couldn't get the MRI with contrast due to dye allergies.  So does Dr Lombard just want a lumbar MRI.  Thank you    Would the patient rather a call back or a response via [facility]?   Call back    Best call back number:  [phone]

## 2019-08-09 NOTE — TELEPHONE ENCOUNTER
Spoke to Nguyen with Maureen. Informed that patient has never been seen by Dr.Lombard and he an order for MRI has not been placed. Verbalized understanding. States she will let Naty original caller know.

## 2019-09-16 ENCOUNTER — ANESTHESIA (OUTPATIENT)
Dept: CARDIOLOGY | Facility: HOSPITAL | Age: 59
End: 2019-09-16
Payer: MEDICARE

## 2019-09-16 ENCOUNTER — ANESTHESIA EVENT (OUTPATIENT)
Dept: CARDIOLOGY | Facility: HOSPITAL | Age: 59
End: 2019-09-16
Payer: MEDICARE

## 2019-09-16 ENCOUNTER — HOSPITAL ENCOUNTER (OUTPATIENT)
Facility: HOSPITAL | Age: 59
Discharge: HOME | End: 2019-09-16
Attending: EMERGENCY MEDICINE | Admitting: EMERGENCY MEDICINE
Payer: MEDICARE

## 2019-09-16 VITALS
HEART RATE: 51 BPM | BODY MASS INDEX: 46.65 KG/M2 | DIASTOLIC BLOOD PRESSURE: 65 MMHG | OXYGEN SATURATION: 96 % | HEIGHT: 69 IN | TEMPERATURE: 98 F | SYSTOLIC BLOOD PRESSURE: 100 MMHG | RESPIRATION RATE: 20 BRPM | WEIGHT: 315 LBS

## 2019-09-16 DIAGNOSIS — R55 NEAR SYNCOPE: Primary | ICD-10-CM

## 2019-09-16 DIAGNOSIS — I48.91 NEW ONSET ATRIAL FIBRILLATION: ICD-10-CM

## 2019-09-16 DIAGNOSIS — I10 HYPERTENSION: ICD-10-CM

## 2019-09-16 DIAGNOSIS — I10 ESSENTIAL HYPERTENSION: ICD-10-CM

## 2019-09-16 DIAGNOSIS — I48.0 PAF (PAROXYSMAL ATRIAL FIBRILLATION): ICD-10-CM

## 2019-09-16 DIAGNOSIS — I48.91 ATRIAL FIBRILLATION, UNSPECIFIED TYPE: ICD-10-CM

## 2019-09-16 DIAGNOSIS — E11.01 TYPE 2 DIABETES MELLITUS WITH HYPEROSMOLAR COMA, WITHOUT LONG-TERM CURRENT USE OF INSULIN: ICD-10-CM

## 2019-09-16 DIAGNOSIS — I95.9 HYPOTENSION, UNSPECIFIED HYPOTENSION TYPE: ICD-10-CM

## 2019-09-16 DIAGNOSIS — E78.2 MIXED HYPERLIPIDEMIA: ICD-10-CM

## 2019-09-16 LAB
ALBUMIN SERPL BCP-MCNC: 3.9 G/DL (ref 3.5–5.2)
ALP SERPL-CCNC: 57 U/L (ref 55–135)
ALT SERPL W/O P-5'-P-CCNC: 20 U/L (ref 10–44)
ANION GAP SERPL CALC-SCNC: 11 MMOL/L (ref 8–16)
AST SERPL-CCNC: 20 U/L (ref 10–40)
BACTERIA #/AREA URNS HPF: NORMAL /HPF
BASOPHILS # BLD AUTO: 0.03 K/UL (ref 0–0.2)
BASOPHILS NFR BLD: 0.6 % (ref 0–1.9)
BILIRUB SERPL-MCNC: 0.4 MG/DL (ref 0.1–1)
BILIRUB UR QL STRIP: NEGATIVE
BNP SERPL-MCNC: 153 PG/ML (ref 0–99)
BSA FOR ECHO PROCEDURE: 2.64 M2
BUN SERPL-MCNC: 12 MG/DL (ref 6–20)
CALCIUM SERPL-MCNC: 9.3 MG/DL (ref 8.7–10.5)
CHLORIDE SERPL-SCNC: 108 MMOL/L (ref 95–110)
CHOLEST SERPL-MCNC: 87 MG/DL (ref 120–199)
CHOLEST/HDLC SERPL: 3.5 {RATIO} (ref 2–5)
CLARITY UR: ABNORMAL
CO2 SERPL-SCNC: 25 MMOL/L (ref 23–29)
COLOR UR: ABNORMAL
CREAT SERPL-MCNC: 0.7 MG/DL (ref 0.5–1.4)
CTP QC/QA: YES
DIFFERENTIAL METHOD: ABNORMAL
EOSINOPHIL # BLD AUTO: 0.1 K/UL (ref 0–0.5)
EOSINOPHIL NFR BLD: 1.7 % (ref 0–8)
ERYTHROCYTE [DISTWIDTH] IN BLOOD BY AUTOMATED COUNT: 17.6 % (ref 11.5–14.5)
EST. GFR  (AFRICAN AMERICAN): >60 ML/MIN/1.73 M^2
EST. GFR  (NON AFRICAN AMERICAN): >60 ML/MIN/1.73 M^2
GLUCOSE SERPL-MCNC: 113 MG/DL (ref 70–110)
GLUCOSE UR QL STRIP: NEGATIVE
HCT VFR BLD AUTO: 41.5 % (ref 40–54)
HDLC SERPL-MCNC: 25 MG/DL (ref 40–75)
HDLC SERPL: 28.7 % (ref 20–50)
HGB BLD-MCNC: 12.6 G/DL (ref 14–18)
HGB UR QL STRIP: NEGATIVE
HYALINE CASTS #/AREA URNS LPF: 0 /LPF
KETONES UR QL STRIP: NEGATIVE
LDLC SERPL CALC-MCNC: 49.4 MG/DL (ref 63–159)
LEUKOCYTE ESTERASE UR QL STRIP: NEGATIVE
LYMPHOCYTES # BLD AUTO: 1.3 K/UL (ref 1–4.8)
LYMPHOCYTES NFR BLD: 26.1 % (ref 18–48)
MAGNESIUM SERPL-MCNC: 1.7 MG/DL (ref 1.6–2.6)
MCH RBC QN AUTO: 23 PG (ref 27–31)
MCHC RBC AUTO-ENTMCNC: 30.4 G/DL (ref 32–36)
MCV RBC AUTO: 76 FL (ref 82–98)
MICROSCOPIC COMMENT: NORMAL
MONOCYTES # BLD AUTO: 0.1 K/UL (ref 0.3–1)
MONOCYTES NFR BLD: 2.7 % (ref 4–15)
NEUTROPHILS # BLD AUTO: 3.3 K/UL (ref 1.8–7.7)
NEUTROPHILS NFR BLD: 68.9 % (ref 38–73)
NITRITE UR QL STRIP: NEGATIVE
NONHDLC SERPL-MCNC: 62 MG/DL
PH UR STRIP: 5 [PH] (ref 5–8)
PLATELET # BLD AUTO: 261 K/UL (ref 150–350)
PMV BLD AUTO: 9.5 FL (ref 9.2–12.9)
POC MOLECULAR INFLUENZA A AGN: NEGATIVE
POC MOLECULAR INFLUENZA B AGN: NEGATIVE
POCT GLUCOSE: 111 MG/DL (ref 70–110)
POCT GLUCOSE: 116 MG/DL (ref 70–110)
POTASSIUM SERPL-SCNC: 3.7 MMOL/L (ref 3.5–5.1)
PROT SERPL-MCNC: 7.2 G/DL (ref 6–8.4)
PROT UR QL STRIP: ABNORMAL
RBC # BLD AUTO: 5.49 M/UL (ref 4.6–6.2)
RBC #/AREA URNS HPF: 0 /HPF (ref 0–4)
SINUS: 4.1 CM
SODIUM SERPL-SCNC: 144 MMOL/L (ref 136–145)
SP GR UR STRIP: 1.02 (ref 1–1.03)
SQUAMOUS #/AREA URNS HPF: 7 /HPF
TRIGL SERPL-MCNC: 63 MG/DL (ref 30–150)
TROPONIN I SERPL DL<=0.01 NG/ML-MCNC: <0.006 NG/ML (ref 0–0.03)
TSH SERPL DL<=0.005 MIU/L-ACNC: 1.6 UIU/ML (ref 0.4–4)
URN SPEC COLLECT METH UR: ABNORMAL
UROBILINOGEN UR STRIP-ACNC: NEGATIVE EU/DL
WBC # BLD AUTO: 4.82 K/UL (ref 3.9–12.7)
WBC #/AREA URNS HPF: 3 /HPF (ref 0–5)

## 2019-09-16 PROCEDURE — 96361 HYDRATE IV INFUSION ADD-ON: CPT

## 2019-09-16 PROCEDURE — 25000003 PHARM REV CODE 250: Performed by: INTERNAL MEDICINE

## 2019-09-16 PROCEDURE — 80061 LIPID PANEL: CPT

## 2019-09-16 PROCEDURE — G0378 HOSPITAL OBSERVATION PER HR: HCPCS

## 2019-09-16 PROCEDURE — 63600175 PHARM REV CODE 636 W HCPCS: Performed by: EMERGENCY MEDICINE

## 2019-09-16 PROCEDURE — 99220 PR INITIAL OBSERVATION CARE,LEVL III: CPT | Mod: ,,, | Performed by: INTERNAL MEDICINE

## 2019-09-16 PROCEDURE — 99220 PR INITIAL OBSERVATION CARE,LEVL III: ICD-10-PCS | Mod: ,,, | Performed by: INTERNAL MEDICINE

## 2019-09-16 PROCEDURE — 94761 N-INVAS EAR/PLS OXIMETRY MLT: CPT

## 2019-09-16 PROCEDURE — D9220A PRA ANESTHESIA: Mod: ANES,,, | Performed by: ANESTHESIOLOGY

## 2019-09-16 PROCEDURE — 99285 EMERGENCY DEPT VISIT HI MDM: CPT | Mod: 25

## 2019-09-16 PROCEDURE — 90686 IIV4 VACC NO PRSV 0.5 ML IM: CPT | Performed by: HOSPITALIST

## 2019-09-16 PROCEDURE — G0008 ADMIN INFLUENZA VIRUS VAC: HCPCS | Performed by: HOSPITALIST

## 2019-09-16 PROCEDURE — 94660 CPAP INITIATION&MGMT: CPT

## 2019-09-16 PROCEDURE — 25000003 PHARM REV CODE 250: Performed by: EMERGENCY MEDICINE

## 2019-09-16 PROCEDURE — 27000190 HC CPAP FULL FACE MASK W/VALVE

## 2019-09-16 PROCEDURE — 96372 THER/PROPH/DIAG INJ SC/IM: CPT | Mod: 59

## 2019-09-16 PROCEDURE — 83735 ASSAY OF MAGNESIUM: CPT

## 2019-09-16 PROCEDURE — 80053 COMPREHEN METABOLIC PANEL: CPT

## 2019-09-16 PROCEDURE — 84484 ASSAY OF TROPONIN QUANT: CPT

## 2019-09-16 PROCEDURE — 63600175 PHARM REV CODE 636 W HCPCS: Performed by: INTERNAL MEDICINE

## 2019-09-16 PROCEDURE — D9220A PRA ANESTHESIA: Mod: CRNA,,, | Performed by: NURSE ANESTHETIST, CERTIFIED REGISTERED

## 2019-09-16 PROCEDURE — D9220A PRA ANESTHESIA: ICD-10-PCS | Mod: ANES,,, | Performed by: ANESTHESIOLOGY

## 2019-09-16 PROCEDURE — 37000009 HC ANESTHESIA EA ADD 15 MINS: Performed by: ANESTHESIOLOGY

## 2019-09-16 PROCEDURE — 25000003 PHARM REV CODE 250: Performed by: NURSE PRACTITIONER

## 2019-09-16 PROCEDURE — 87502 INFLUENZA DNA AMP PROBE: CPT

## 2019-09-16 PROCEDURE — 84443 ASSAY THYROID STIM HORMONE: CPT

## 2019-09-16 PROCEDURE — 85025 COMPLETE CBC W/AUTO DIFF WBC: CPT

## 2019-09-16 PROCEDURE — 83880 ASSAY OF NATRIURETIC PEPTIDE: CPT

## 2019-09-16 PROCEDURE — 99900035 HC TECH TIME PER 15 MIN (STAT)

## 2019-09-16 PROCEDURE — D9220A PRA ANESTHESIA: ICD-10-PCS | Mod: CRNA,,, | Performed by: NURSE ANESTHETIST, CERTIFIED REGISTERED

## 2019-09-16 PROCEDURE — 37000008 HC ANESTHESIA 1ST 15 MINUTES: Performed by: ANESTHESIOLOGY

## 2019-09-16 PROCEDURE — 63600175 PHARM REV CODE 636 W HCPCS: Performed by: NURSE ANESTHETIST, CERTIFIED REGISTERED

## 2019-09-16 PROCEDURE — 36415 COLL VENOUS BLD VENIPUNCTURE: CPT

## 2019-09-16 PROCEDURE — 63600175 PHARM REV CODE 636 W HCPCS: Performed by: HOSPITALIST

## 2019-09-16 PROCEDURE — 90471 IMMUNIZATION ADMIN: CPT | Performed by: HOSPITALIST

## 2019-09-16 PROCEDURE — 81000 URINALYSIS NONAUTO W/SCOPE: CPT

## 2019-09-16 PROCEDURE — 96360 HYDRATION IV INFUSION INIT: CPT | Mod: 59

## 2019-09-16 RX ORDER — METOPROLOL TARTRATE 25 MG/1
25 TABLET, FILM COATED ORAL 2 TIMES DAILY
Status: DISCONTINUED | OUTPATIENT
Start: 2019-09-16 | End: 2019-09-16 | Stop reason: HOSPADM

## 2019-09-16 RX ORDER — ONDANSETRON 2 MG/ML
4 INJECTION INTRAMUSCULAR; INTRAVENOUS EVERY 8 HOURS PRN
Status: DISCONTINUED | OUTPATIENT
Start: 2019-09-16 | End: 2019-09-16 | Stop reason: HOSPADM

## 2019-09-16 RX ORDER — LIDOCAINE HCL/PF 100 MG/5ML
SYRINGE (ML) INTRAVENOUS
Status: DISCONTINUED | OUTPATIENT
Start: 2019-09-16 | End: 2019-09-16

## 2019-09-16 RX ORDER — SODIUM CHLORIDE 9 MG/ML
INJECTION, SOLUTION INTRAVENOUS CONTINUOUS
Status: DISCONTINUED | OUTPATIENT
Start: 2019-09-16 | End: 2019-09-16 | Stop reason: HOSPADM

## 2019-09-16 RX ORDER — PROPOFOL 10 MG/ML
VIAL (ML) INTRAVENOUS
Status: DISCONTINUED | OUTPATIENT
Start: 2019-09-16 | End: 2019-09-16

## 2019-09-16 RX ORDER — LOSARTAN POTASSIUM AND HYDROCHLOROTHIAZIDE 12.5; 5 MG/1; MG/1
1 TABLET ORAL DAILY
Status: ON HOLD | COMMUNITY
End: 2019-09-16 | Stop reason: HOSPADM

## 2019-09-16 RX ORDER — METOPROLOL TARTRATE 25 MG/1
25 TABLET, FILM COATED ORAL 2 TIMES DAILY
Qty: 60 TABLET | Refills: 6 | Status: SHIPPED | OUTPATIENT
Start: 2019-09-16 | End: 2020-02-19

## 2019-09-16 RX ORDER — OLMESARTAN MEDOXOMIL 40 MG/1
40 TABLET ORAL DAILY
Qty: 90 TABLET | Refills: 3 | Status: SHIPPED | OUTPATIENT
Start: 2019-09-16 | End: 2023-05-29

## 2019-09-16 RX ORDER — ENOXAPARIN SODIUM 150 MG/ML
1 INJECTION SUBCUTANEOUS
Status: DISCONTINUED | OUTPATIENT
Start: 2019-09-16 | End: 2019-09-16

## 2019-09-16 RX ORDER — FAMOTIDINE 20 MG/1
20 TABLET, FILM COATED ORAL 2 TIMES DAILY
Status: DISCONTINUED | OUTPATIENT
Start: 2019-09-16 | End: 2019-09-16 | Stop reason: HOSPADM

## 2019-09-16 RX ORDER — ASPIRIN 81 MG/1
81 TABLET ORAL DAILY
Status: DISCONTINUED | OUTPATIENT
Start: 2019-09-16 | End: 2019-09-16

## 2019-09-16 RX ORDER — ENOXAPARIN SODIUM 100 MG/ML
1 INJECTION SUBCUTANEOUS
Status: DISCONTINUED | OUTPATIENT
Start: 2019-09-16 | End: 2019-09-16

## 2019-09-16 RX ORDER — ACETAMINOPHEN 325 MG/1
650 TABLET ORAL EVERY 8 HOURS PRN
Status: DISCONTINUED | OUTPATIENT
Start: 2019-09-16 | End: 2019-09-16 | Stop reason: HOSPADM

## 2019-09-16 RX ORDER — ENOXAPARIN SODIUM 150 MG/ML
1 INJECTION SUBCUTANEOUS
Status: COMPLETED | OUTPATIENT
Start: 2019-09-16 | End: 2019-09-16

## 2019-09-16 RX ADMIN — PROPOFOL 20 MG: 10 INJECTION, EMULSION INTRAVENOUS at 12:09

## 2019-09-16 RX ADMIN — METOPROLOL TARTRATE 25 MG: 25 TABLET ORAL at 10:09

## 2019-09-16 RX ADMIN — SODIUM CHLORIDE: 0.9 INJECTION, SOLUTION INTRAVENOUS at 05:09

## 2019-09-16 RX ADMIN — INFLUENZA VIRUS VACCINE 0.5 ML: 15; 15; 15; 15 SUSPENSION INTRAMUSCULAR at 04:09

## 2019-09-16 RX ADMIN — ENOXAPARIN SODIUM 150 MG: 150 INJECTION SUBCUTANEOUS at 10:09

## 2019-09-16 RX ADMIN — SODIUM CHLORIDE 500 ML: 0.9 INJECTION, SOLUTION INTRAVENOUS at 02:09

## 2019-09-16 RX ADMIN — RIVAROXABAN 20 MG: 20 TABLET, FILM COATED ORAL at 04:09

## 2019-09-16 RX ADMIN — FAMOTIDINE 20 MG: 20 TABLET ORAL at 09:09

## 2019-09-16 RX ADMIN — ASPIRIN 81 MG: 81 TABLET ORAL at 09:09

## 2019-09-16 RX ADMIN — PROPOFOL 100 MG: 10 INJECTION, EMULSION INTRAVENOUS at 12:09

## 2019-09-16 RX ADMIN — LIDOCAINE HYDROCHLORIDE 100 MG: 20 INJECTION, SOLUTION INTRAVENOUS at 12:09

## 2019-09-16 NOTE — TRANSFER OF CARE
"Anesthesia Transfer of Care Note    Patient: Russell Santos    Procedure(s) Performed: Procedure(s) (LRB):  Transesophageal echo (BRI) intra-procedure log documentation Start before 1230pm or after 230pm (N/A)  Cardioversion or Defibrillation    Patient location: Other: OR 8 report to ary FRITZ    Anesthesia Type: general    Transport from OR: Transported from OR on room air with adequate spontaneous ventilation    Post pain: adequate analgesia    Post assessment: no apparent anesthetic complications    Post vital signs: stable    Level of consciousness: awake, alert and oriented    Nausea/Vomiting: no nausea/vomiting    Complications: none    Transfer of care protocol was followed      Last vitals:   Visit Vitals  BP (!) 98/54   Pulse (!) 56   Temp 36.5 °C (97.7 °F)   Resp 20   Ht 5' 9" (1.753 m)   Wt (!) 148.5 kg (327 lb 6.1 oz)   SpO2 100%   BMI 48.35 kg/m²     "

## 2019-09-16 NOTE — NURSING
Received report from MICHEL Dos Santos RN. Pt AAOx4 with no c/o pain. Telemetry monitor in place. Saline lock in place to site is clear. Pt informed of plan of care and safety maintained with bed low side rails up x2 with nurse call bell within reach w/ bed alarm

## 2019-09-16 NOTE — PROGRESS NOTES
WRITTEN HEALTHCARE DISCHARGE INFORMATION      Things that YOU are RESPONSIBLE for to Manage Your Care At Home:     1. Getting your prescriptions filled.  2. Taking you medications as directed. DO NOT MISS ANY DOSES!  3. Going to your follow-up doctor appointments. This is important because it allows the doctor to monitor your progress and to determine if any changes need to be made to your treatment plan.     If you are unable to make your follow up appointments, please call the number listed and reschedule this appointment.      ____________HELP AT HOME____________________     Experiencing any SIGNS or SYMPTOMS: YOU CAN     Schedule a same day appopintment with your Primary Care Doctor or  you can call [facility] Nurse Care Line for 24/7 assistance at [phone]     If you are experience any signs or symptoms that have become severe, Call 911 and come to your nearest Emergency Room.     Thank you for choosing [facility] and allowing us to care for you.   From your care management team:      You should receive a call from [facility] within 48-72 hours to help manage your care after discharge. Please try to make sure that you answer your phone for this important phone call.    Follow-up Information     [facility] On 9/18/2019.    Why:  Appointment scheduled for Wednesday September 18th at 1:30pm  Contact information:  [place] [address]  [phone]

## 2019-09-16 NOTE — SUBJECTIVE & OBJECTIVE
Past Medical History:   Diagnosis Date    Atrial fibrillation 09/16/2019    Diabetes mellitus     High cholesterol     Hypertension        Past Surgical History:   Procedure Laterality Date    THYROIDECTOMY, PARTIAL  2006    TONSILLECTOMY         Review of patient's allergies indicates:   Allergen Reactions    Contrast media Hives       No current facility-administered medications on file prior to encounter.      Current Outpatient Medications on File Prior to Encounter   Medication Sig    felodipine (PLENDIL) 10 MG 24 hr tablet Take 10 mg by mouth once daily.    hydrocodone-acetaminophen 10-325mg (NORCO)  mg Tab Take 1 tablet by mouth every 8 (eight) hours as needed for Pain.    losartan-hydrochlorothiazide 50-12.5 mg (HYZAAR) 50-12.5 mg per tablet Take 1 tablet by mouth once daily.    lubiprostone (AMITIZA) 24 MCG Cap Take 24 mcg by mouth 2 (two) times daily.    meloxicam (MOBIC) 15 MG tablet Take 15 mg by mouth once daily.    metformin (GLUCOPHAGE) 1000 MG tablet Take 1,000 mg by mouth 2 (two) times daily with meals.    simvastatin (ZOCOR) 40 MG tablet Take 40 mg by mouth every evening.    acetaminophen (TYLENOL) 325 MG tablet Take 2 tablets (650 mg total) by mouth every 6 (six) hours as needed for Temperature greater than (100.4).    metoprolol tartrate (LOPRESSOR) 25 MG tablet Take 25 mg by mouth once daily. PRN HR greater than 100    olmesartan-hydrochlorothiazide (BENICAR HCT) 40-25 mg per tablet Take 1 tablet by mouth once daily.     Family History     None        Tobacco Use    Smoking status: Never Smoker    Smokeless tobacco: Never Used   Substance and Sexual Activity    Alcohol use: Not Currently     Comment: occasionally    Drug use: No    Sexual activity: Not Currently     Review of Systems   Constitutional: Positive for activity change and diaphoresis. Negative for appetite change, chills and fever.   HENT: Negative for congestion, hearing loss, sore throat, tinnitus and  trouble swallowing.    Eyes: Negative for photophobia, discharge, itching and visual disturbance.   Respiratory: Positive for shortness of breath. Negative for apnea, cough, wheezing and stridor.    Cardiovascular: Positive for chest pain. Negative for palpitations and leg swelling.   Gastrointestinal: Negative for abdominal distention, abdominal pain, blood in stool, constipation, diarrhea and nausea.   Endocrine: Negative for polydipsia, polyphagia and polyuria.   Genitourinary: Negative for difficulty urinating, dysuria, flank pain and frequency.   Musculoskeletal: Negative for arthralgias, joint swelling and neck stiffness.   Skin: Negative for color change, rash and wound.   Neurological: Positive for dizziness, weakness and light-headedness. Negative for tremors, seizures, numbness and headaches.   Hematological: Negative for adenopathy.   Psychiatric/Behavioral: Negative for hallucinations and self-injury.     Objective:     Vital Signs (Most Recent):  Temp: 97.5 °F (36.4 °C) (09/16/19 0804)  Pulse: (!) 116 (09/16/19 0915)  Resp: (!) 24 (09/16/19 0915)  BP: 133/81 (09/16/19 0915)  SpO2: 99 % (09/16/19 0915) Vital Signs (24h Range):  Temp:  [97.5 °F (36.4 °C)-98.1 °F (36.7 °C)] 97.5 °F (36.4 °C)  Pulse:  [] 116  Resp:  [18-31] 24  SpO2:  [92 %-100 %] 99 %  BP: ()/(50-87) 133/81     Weight: (!) 148.5 kg (327 lb 6.1 oz)  Body mass index is 48.35 kg/m².    Physical Exam   Constitutional: He appears well-developed and well-nourished. He is cooperative.   HENT:   Head: Normocephalic and atraumatic.   Eyes: Pupils are equal, round, and reactive to light. Conjunctivae, EOM and lids are normal.   Neck: Normal range of motion and full passive range of motion without pain. Neck supple. No JVD present. No edema present. No thyroid mass present.   Cardiovascular: S1 normal, S2 normal and intact distal pulses.   No murmur heard.  Irregular rhythm   Pulmonary/Chest:   sob   Abdominal: Soft. Bowel sounds are  normal. He exhibits no distension and no abdominal bruit. There is no splenomegaly or hepatomegaly. There is no tenderness. There is no CVA tenderness.   Musculoskeletal: Normal range of motion. He exhibits no edema.   Lymphadenopathy:     He has no cervical adenopathy.     He has no axillary adenopathy.   Neurological: He is alert. He has normal reflexes. He displays no tremor. He displays no seizure activity.   Skin: Skin is warm, dry and intact.   Psychiatric: He has a normal mood and affect. His speech is normal. Thought content normal. Cognition and memory are normal.         CRANIAL NERVES     CN III, IV, VI   Pupils are equal, round, and reactive to light.  Extraocular motions are normal.        Significant Labs: All pertinent labs within the past 24 hours have been reviewed.    Significant Imaging: I have reviewed all pertinent imaging results/findings within the past 24 hours.

## 2019-09-16 NOTE — HPI
59 y.o male who presents to the ED, per EMS, complaining of a near syncopal episode lasting 10-15 mins PTA. Patient states that he woke up in a cold sweat, despite feeling fine before bed, as well as feeling well yesterday. He states that felt weak and dizzy, preventing him from responding to verbal commands or questions from his wife.  States he cannot sit up because he was too weak.  Symptoms are improving when EMS arrived but his blood pressure was reported is low so he came to the emergency room.  Patient also reports of having post nasal drip for the past week. However, he denies sore throat, rhinorrhea, and cough. Known sick contacts with his grandchildren that have colds. Patient admits that he can become short of breath upon walking a short distance, but denies any abnormal SOB.  This is chronic in nature.  He also denies abdominal pain, new back pain, HA, CP, and rash. No testicular pain or swelling, dysuria, or difficulty urinating. No myaligia other than usual arthritis. PMHx of AFIB, DM, HTN, HLD, thyroid cancer. Patient takes levothyroxine and medications for HTN.  No palpitations.  No chest pain. He is no longer on anticoagulants for atrial fibrillation.  He is only taking aspirin for last 18 months.  No rectal bleeding.  No melena.    The patient follows with Dr. Harper at [facility].    The patient is a very pleasant 59-year-old man who presents with symptoms of near neurocardiogenic syncope.  He reports the awaking in the middle night with his CPAP mask on feeling like he had swallowed quite a bit of air and was feeling bloated and like he had to burp.  Apparently, the patient's wife noted that he was cold, clammy, and diaphoretic.  He subsequently got very lightheaded, did not lose consciousness.  EMS was called and by report noted blood pressures in the 60s.  He was noted to be in atrial fibrillation he was somewhat increased ventricular response.  The patient is aware of a irregular  heart rhythm, but is not on anticoagulation for atrial fibrillation nor the familiar with the term atrial fibrillation.  He previously was on anticoagulation for DVT.  I have spoken with Dr. Harper regarding the patient and the patient has not been seen in the last year, and does not have a history of atrial fibrillation in his chart.    I discussed the pros and cons of proceeding with BRI and cardioversion, the patient is agreeable to proceed.

## 2019-09-16 NOTE — PLAN OF CARE
09/16/19 0936   Discharge Assessment   Assessment Type Discharge Planning Assessment   Assessment information obtained from? Medical Record   Prior to hospitilization cognitive status: Alert/Oriented   Prior to hospitalization functional status: Independent;Assistive Equipment   Current cognitive status: Alert/Oriented   Current Functional Status: Independent;Assistive Equipment   Facility Arrived From: home   Lives With spouse   Able to Return to Prior Arrangements yes   Is patient able to care for self after discharge? Yes   Who are your caregiver(s) and their phone number(s)? [place]- [phone]   Patient's perception of discharge disposition home or selfcare   Readmission Within the Last 30 Days no previous admission in last 30 days   Patient currently being followed by outpatient case management? No   Patient currently receives any other outside agency services? No   Equipment Currently Used at Home CPAP;cane, straight   Do you have any problems affording any of your prescribed medications? No   Is the patient taking medications as prescribed? yes   Does the patient have transportation home? Yes   Transportation Anticipated family or friend will provide   Does the patient receive services at the [facility]? No   Discharge Plan A Home with family  (with follow up )   Discharge Plan B Home with family   DME Needed Upon Discharge  none   Patient/Family in Agreement with Plan yes

## 2019-09-16 NOTE — H&P
[facility] Medicine  History & Physical    Patient Name: Russell Santos  MRN: [id number]  Admission Date: 9/16/2019  Attending Physician: Jennifer Live MD   Primary Care Provider: [facility]         Patient information was obtained from patient and ER records.     Subjective:     Principal Problem:Near syncope    Chief Complaint:   Chief Complaint   Patient presents with    Loss of Consciousness     +dizziness, lightheaded, SOB, hypotensive, pt place his own Cpap on at home         HPI: Russell Santos is a 59 y.o. AAM with PMHx including DMII, HTN, PE (2006). Patient presented for evaluation of acute onset of profound fatigue that started about 1 am in the morning prior to admission. He states that he became diaphoretic, dizzy/lightheaded, sat up on side of bed. He states that he attempted to stand and fell back on the bed. His wife who is at the bedside reports that the episode lasted about 5 seconds, she had to shake the patient to get him to respond. He denies similar episodes in the past but endorses that he was feeling fatigued the day prior to the event. He denies smoking history, is a [facility] patient. He did not check his BGL during the event but reports that when EMS arrived his SBP was 80. He denies recent long trips, leg or calve pain, sick contacts, changes in bowel or bladder functions, CP,  HA, weakness or deficits in any exremity.    Patient was found to be in atrial fibrillation at the time of presentation; controlled rate - he was not started on any rate controlled medication at that time. He has been controlled since that time    Past Medical History:   Diagnosis Date    Atrial fibrillation 09/16/2019    Diabetes mellitus     High cholesterol     Hypertension        Past Surgical History:   Procedure Laterality Date    THYROIDECTOMY, PARTIAL  2006    TONSILLECTOMY         Review of patient's allergies indicates:   Allergen Reactions    Contrast media Hives        No current facility-administered medications on file prior to encounter.      Current Outpatient Medications on File Prior to Encounter   Medication Sig    felodipine (PLENDIL) 10 MG 24 hr tablet Take 10 mg by mouth once daily.    hydrocodone-acetaminophen 10-325mg (NORCO)  mg Tab Take 1 tablet by mouth every 8 (eight) hours as needed for Pain.    losartan-hydrochlorothiazide 50-12.5 mg (HYZAAR) 50-12.5 mg per tablet Take 1 tablet by mouth once daily.    lubiprostone (AMITIZA) 24 MCG Cap Take 24 mcg by mouth 2 (two) times daily.    meloxicam (MOBIC) 15 MG tablet Take 15 mg by mouth once daily.    metformin (GLUCOPHAGE) 1000 MG tablet Take 1,000 mg by mouth 2 (two) times daily with meals.    simvastatin (ZOCOR) 40 MG tablet Take 40 mg by mouth every evening.    acetaminophen (TYLENOL) 325 MG tablet Take 2 tablets (650 mg total) by mouth every 6 (six) hours as needed for Temperature greater than (100.4).    metoprolol tartrate (LOPRESSOR) 25 MG tablet Take 25 mg by mouth once daily. PRN HR greater than 100    olmesartan-hydrochlorothiazide (BENICAR HCT) 40-25 mg per tablet Take 1 tablet by mouth once daily.     Family History     None        Tobacco Use    Smoking status: Never Smoker    Smokeless tobacco: Never Used   Substance and Sexual Activity    Alcohol use: Not Currently     Comment: occasionally    Drug use: No    Sexual activity: Not Currently     Review of Systems   Constitutional: Positive for activity change and diaphoresis. Negative for appetite change, chills and fever.   HENT: Negative for congestion, hearing loss, sore throat, tinnitus and trouble swallowing.    Eyes: Negative for photophobia, discharge, itching and visual disturbance.   Respiratory: Positive for shortness of breath. Negative for apnea, cough, wheezing and stridor.    Cardiovascular: Positive for chest pain. Negative for palpitations and leg swelling.   Gastrointestinal: Negative for abdominal distention,  abdominal pain, blood in stool, constipation, diarrhea and nausea.   Endocrine: Negative for polydipsia, polyphagia and polyuria.   Genitourinary: Negative for difficulty urinating, dysuria, flank pain and frequency.   Musculoskeletal: Negative for arthralgias, joint swelling and neck stiffness.   Skin: Negative for color change, rash and wound.   Neurological: Positive for dizziness, weakness and light-headedness. Negative for tremors, seizures, numbness and headaches.   Hematological: Negative for adenopathy.   Psychiatric/Behavioral: Negative for hallucinations and self-injury.     Objective:     Vital Signs (Most Recent):  Temp: 97.5 °F (36.4 °C) (09/16/19 0804)  Pulse: (!) 116 (09/16/19 0915)  Resp: (!) 24 (09/16/19 0915)  BP: 133/81 (09/16/19 0915)  SpO2: 99 % (09/16/19 0915) Vital Signs (24h Range):  Temp:  [97.5 °F (36.4 °C)-98.1 °F (36.7 °C)] 97.5 °F (36.4 °C)  Pulse:  [] 116  Resp:  [18-31] 24  SpO2:  [92 %-100 %] 99 %  BP: ()/(50-87) 133/81     Weight: (!) 148.5 kg (327 lb 6.1 oz)  Body mass index is 48.35 kg/m².    Physical Exam   Constitutional: He appears well-developed and well-nourished. He is cooperative.   HENT:   Head: Normocephalic and atraumatic.   Eyes: Pupils are equal, round, and reactive to light. Conjunctivae, EOM and lids are normal.   Neck: Normal range of motion and full passive range of motion without pain. Neck supple. No JVD present. No edema present. No thyroid mass present.   Cardiovascular: S1 normal, S2 normal and intact distal pulses.   No murmur heard.  Irregular rhythm   Pulmonary/Chest:   sob   Abdominal: Soft. Bowel sounds are normal. He exhibits no distension and no abdominal bruit. There is no splenomegaly or hepatomegaly. There is no tenderness. There is no CVA tenderness.   Musculoskeletal: Normal range of motion. He exhibits no edema.   Lymphadenopathy:     He has no cervical adenopathy.     He has no axillary adenopathy.   Neurological: He is alert. He has  normal reflexes. He displays no tremor. He displays no seizure activity.   Skin: Skin is warm, dry and intact.   Psychiatric: He has a normal mood and affect. His speech is normal. Thought content normal. Cognition and memory are normal.         CRANIAL NERVES     CN III, IV, VI   Pupils are equal, round, and reactive to light.  Extraocular motions are normal.        Significant Labs: All pertinent labs within the past 24 hours have been reviewed.    Significant Imaging: I have reviewed all pertinent imaging results/findings within the past 24 hours.    Assessment/Plan:     * Near syncope  Pre-syncope and dizziness likely symptomatic arrythmia - found in afib at the time of presenation.  2D echo  Start metoprolol  Consult to cardiology keep npo anticipate BRI/DCCV  Patient has been counseled on the risk and benefits of anticoagulation therapy for Afib and given choices including no therapy at all, coumadin therapy, Eliquis, and Xarelto. He has been instructed on a) coumadin dosing, therapeutic ranges for INR, reversal agents, monitoring, and diet restrictions b) Eliquis reversal agent twice daily dosing, and the need for close relationship and follow up with MD outpatient and c) Xarelto - reversal agent - and need for close relationship and follow up with MD outpatient as well as, need to continue the medication until Following MD discontinues. Opted for xarelto        New onset atrial fibrillation  As above - CHADS2 score at least two (DMII/HTN)  Xarelto  Cardiology consulted and following      Diabetes mellitus, type 2  Hold home oral hyperglycemic metformin - while hospitalized will use combined insulin therapy with basal and prandial insulin coverage, POCT glucose checks, hypoglycemic protocol and correction scale - HgA1c      Essential hypertension  Restart home medications    Hyperlipidemia  Lipid panel      VTE Risk Mitigation (From admission, onward)        Ordered     rivaroxaban tablet 20 mg  With dinner       09/16/19 0945     IP VTE HIGH RISK PATIENT  Once      09/16/19 0527             TORI Weiss, FNP-C  Hospitalist - Department of Hospital Medicine  [address] [place] [address]  Office [phone]; Pager [phone]

## 2019-09-16 NOTE — ANESTHESIA PREPROCEDURE EVALUATION
09/16/2019  Russell Santos is a 59 y.o., male.  Pre-operative evaluation for Procedure(s) (LRB):  Transesophageal echo (BRI) intra-procedure log documentation Start before 1230pm or after 230pm (N/A)        Patient Active Problem List   Diagnosis    Essential hypertension    Hyperlipidemia    Diabetes mellitus, type 2    Morbid obesity    Near syncope    New onset atrial fibrillation       Review of patient's allergies indicates:   Allergen Reactions    Contrast media Hives       No current facility-administered medications on file prior to encounter.      Current Outpatient Medications on File Prior to Encounter   Medication Sig Dispense Refill    felodipine (PLENDIL) 10 MG 24 hr tablet Take 10 mg by mouth once daily.      hydrocodone-acetaminophen 10-325mg (NORCO)  mg Tab Take 1 tablet by mouth every 8 (eight) hours as needed for Pain.      losartan-hydrochlorothiazide 50-12.5 mg (HYZAAR) 50-12.5 mg per tablet Take 1 tablet by mouth once daily.      lubiprostone (AMITIZA) 24 MCG Cap Take 24 mcg by mouth 2 (two) times daily.      meloxicam (MOBIC) 15 MG tablet Take 15 mg by mouth once daily.      metformin (GLUCOPHAGE) 1000 MG tablet Take 1,000 mg by mouth 2 (two) times daily with meals.      simvastatin (ZOCOR) 40 MG tablet Take 40 mg by mouth every evening.      acetaminophen (TYLENOL) 325 MG tablet Take 2 tablets (650 mg total) by mouth every 6 (six) hours as needed for Temperature greater than (100.4).  0    metoprolol tartrate (LOPRESSOR) 25 MG tablet Take 25 mg by mouth once daily. PRN HR greater than 100      olmesartan-hydrochlorothiazide (BENICAR HCT) 40-25 mg per tablet Take 1 tablet by mouth once daily.         Past Surgical History:   Procedure Laterality Date    THYROIDECTOMY, PARTIAL  2006    TONSILLECTOMY         Social History     Socioeconomic History    Marital  status:      Spouse name: Not on file    Number of children: Not on file    Years of education: Not on file    Highest education level: Not on file   Occupational History    Not on file   Social Needs    Financial resource strain: Not on file    Food insecurity:     Worry: Not on file     Inability: Not on file    Transportation needs:     Medical: Not on file     Non-medical: Not on file   Tobacco Use    Smoking status: Never Smoker    Smokeless tobacco: Never Used   Substance and Sexual Activity    Alcohol use: Not Currently     Comment: occasionally    Drug use: No    Sexual activity: Not Currently   Lifestyle    Physical activity:     Days per week: Not on file     Minutes per session: Not on file    Stress: Not on file   Relationships    Social connections:     Talks on phone: Not on file     Gets together: Not on file     Attends [employer] witness service: Not on file     Active member of club or organization: Not on file     Attends meetings of clubs or organizations: Not on file     Relationship status: Not on file   Other Topics Concern    Not on file   Social History Narrative    Not on file         CBC:   Recent Labs     19  0122   WBC 4.82   RBC 5.49   HGB 12.6*   HCT 41.5      MCV 76*   MCH 23.0*   MCHC 30.4*       CMP:   Recent Labs     19  0122      K 3.7      CO2 25   BUN 12   CREATININE 0.7   *   MG 1.7   CALCIUM 9.3   ALBUMIN 3.9   PROT 7.2   ALKPHOS 57   ALT 20   AST 20   BILITOT 0.4       INR  No results for input(s): PT, INR, PROTIME, APTT in the last 72 hours.        Diagnostic Studies:      EKD Echo:  Results for orders placed or performed during the hospital encounter of 10/17/17   2D echo with color flow doppler   Result Value Ref Range    QEF 70 55 - 65    Est. PA Systolic Pressure 63.06 (A)     Pericardial Effusion NONE     Mitral Valve Mobility NORMAL     Tricuspid Valve Regurgitation TRIVIAL      Anesthesia Evaluation    I  have reviewed the Patient Summary Reports.    I have reviewed the Nursing Notes.   I have reviewed the Medications.     Review of Systems  Anesthesia Hx:  No problems with previous Anesthesia    Social:  Non-Smoker    Cardiovascular:   Denies Pacemaker. Hypertension  Denies Valvular problems/Murmurs.  Denies MI.  Denies CAD.    Denies CABG/stent. Dysrhythmias atrial fibrillation  Denies Angina.             denies PVD hyperlipidemia    Pulmonary:  Pulmonary Normal    Renal/:  Renal/ Normal     Hepatic/GI:  Hepatic/GI Normal    Neurological:  Neurology Normal    Endocrine:   Diabetes, type 2 Denies Hypothyroidism. Denies Hyperthyroidism.        Physical Exam  General:  Morbid Obesity    Airway/Jaw/Neck:  AIRWAY FINDINGS: Normal      Chest/Lungs:  Chest/Lungs Clear    Heart/Vascular:  Heart Findings: Normal       Mental Status:  Mental Status Findings: Normal        Anesthesia Plan  Type of Anesthesia, risks & benefits discussed:  Anesthesia Type:  general  Patient's Preference:   Intra-op Monitoring Plan: standard ASA monitors  Intra-op Monitoring Plan Comments:   Post Op Pain Control Plan:   Post Op Pain Control Plan Comments:   Induction:   IV  Beta Blocker:  Patient is on a Beta-Blocker and has received one dose within the past 24 hours (No further documentation required).       Informed Consent: Patient understands risks and agrees with Anesthesia plan.  Questions answered. Anesthesia consent signed with patient.  ASA Score: 3     Day of Surgery Review of History & Physical:  There are no significant changes.  H&P update referred to the provider.         Ready For Surgery From Anesthesia Perspective.

## 2019-09-16 NOTE — ED TRIAGE NOTES
"Pt reports sleeping in bed when he woke up in a cold sweat, tried to stand and then collapsed back onto bed in a near syncopal episode. Pt expressed that he may have "doubled up" on one of his night time meds. Pt states that his symptoms have resolved at this time  "

## 2019-09-16 NOTE — ASSESSMENT & PLAN NOTE
CHADSVASC 2  Will plan BRI/DCCV today  Cont BBl  Add Xarelto  Likely home this PM  Follow up with Dr. Harper at [facility]

## 2019-09-16 NOTE — NURSING
Received patient from ER to room via stretcher.Patient accompanied by transport and family. Transferred patient to bed. Evaluated general patient appearance and condition. Admit assessment initiated. Tele monitoring initiated. Saline lock at left antecubital, and is intact. No apparent distress noted at this time.

## 2019-09-16 NOTE — ASSESSMENT & PLAN NOTE
Pre-syncope and dizziness likely symptomatic arrythmia - found in afib at the time of presenation.  2D echo  Start metoprolol  Consult to cardiology keep npo anticipate BRI/DCCV  Patient has been counseled on the risk and benefits of anticoagulation therapy for Afib and given choices including no therapy at all, coumadin therapy, Eliquis, and Xarelto. He has been instructed on a) coumadin dosing, therapeutic ranges for INR, reversal agents, monitoring, and diet restrictions b) Eliquis reversal agent twice daily dosing, and the need for close relationship and follow up with MD outpatient and c) Xarelto - reversal agent - and need for close relationship and follow up with MD outpatient as well as, need to continue the medication until Following MD discontinues. Opted for xarelto

## 2019-09-16 NOTE — ED NOTES
Bed: 07main  Expected date:   Expected time:   Means of arrival: Ambulance Service  Comments:  EMS

## 2019-09-16 NOTE — ASSESSMENT & PLAN NOTE
Hold home oral hyperglycemic metformin - while hospitalized will use combined insulin therapy with basal and prandial insulin coverage, POCT glucose checks, hypoglycemic protocol and correction scale - HgA1c

## 2019-09-16 NOTE — HPI
Russell Santos is a 59 y.o. AAM with PMHx including DMII, HTN, PE (2006). Patient presented for evaluation of acute onset of profound fatigue that started about 1 am in the morning prior to admission. He states that he became diaphoretic, dizzy/lightheaded, sat up on side of bed. He states that he attempted to stand and fell back on the bed. His wife who is at the bedside reports that the episode lasted about 5 seconds, she had to shake the patient to get him to respond. He denies similar episodes in the past but endorses that he was feeling fatigued the day prior to the event. He denies smoking history, is a [facility] patient. He did not check his BGL during the event but reports that when EMS arrived his SBP was 80. He denies recent long trips, leg or calve pain, sick contacts, changes in bowel or bladder functions, CP,  HA, weakness or deficits in any exremity.    Patient was found to be in atrial fibrillation at the time of presentation; controlled rate - he was not started on any rate controlled medication at that time. He has been controlled since that time

## 2019-09-16 NOTE — BRIEF OP NOTE
[facility]  Brief Operative Note     SUMMARY     Surgery Date: 9/16/2019     Surgeon(s) and Role:     * Deven Vasquez MD - Primary    Assisting Surgeon: None    Pre-op Diagnosis:  PAF (paroxysmal atrial fibrillation) [I48.0]    Post-op Diagnosis:  Post-Op Diagnosis Codes:     * PAF (paroxysmal atrial fibrillation) [I48.0]    Procedure(s) (LRB):  Transesophageal echo (BRI) intra-procedure log documentation Start before 1230pm or after 230pm (N/A)  Cardioversion or Defibrillation    Anesthesia: RN IV Sedation    Description of the findings of the procedure: Uneventful BRI/DCCV with anesthesia    Findings/Key Components:   LVEF 40%, mild global HK  Normal RV size/fxn  Normal valves  Trace AI, mild MR, mild-mod TR  No LA/SONNY thrombus  Biatrial dilation  Mild aortic root dilatation, 4.1 cm.    Successful DCCV AF-> NSR 200J x4 (last defib with replacement of pads to more of an interscapular location for the posterior pad).    Imp:  Successful BRI guided DCCV AF-> NSR  Mild CMP, ?tachymyopathy    Plan:  Cont med rx  Cont Xarelto 20mg qd  Home today  Follow up with Dr. Harper ([facility] cardiology)  Repeat echo 1-2 months for reassessment of LV fxn    Estimated Blood Loss: none         Specimens: None    Diet: ADA/cardiac    Activity: ad domi.

## 2019-09-16 NOTE — HOSPITAL COURSE
Placed in observation for evaluation of acute afib - cardiology consulted with successful BRI/DCCV today. Home on xarelto, fu with cardiology in clinic. He is SR. Stop HCTZ - continue olmesartin 40 mg and lopressor. /65. FU in clinic with Cardiology.

## 2019-09-16 NOTE — CONSULTS
[facility]  Cardiology  Consult Note    Patient Name: Russell Santos  MRN: [id number]  Admission Date: 9/16/2019  Hospital Length of Stay: 0 days  Code Status: Full Code   Attending Provider: Jennifer Live MD   Consulting Provider: Deven Vasquez MD  Primary Care Physician: [facility]  Principal Problem:Near syncope    Patient information was obtained from patient and ER records.     Inpatient consult to Cardiology  Consult performed by: Deven Vasquez MD  Consult ordered by: ROBBIN Weiss  Reason for consult: near syncope, AF new onset        Subjective:     Chief Complaint: near syncope     HPI:   59 y.o male who presents to the ED, per EMS, complaining of a near syncopal episode lasting 10-15 mins PTA. Patient states that he woke up in a cold sweat, despite feeling fine before bed, as well as feeling well yesterday. He states that felt weak and dizzy, preventing him from responding to verbal commands or questions from his wife.  States he cannot sit up because he was too weak.  Symptoms are improving when EMS arrived but his blood pressure was reported is low so he came to the emergency room.  Patient also reports of having post nasal drip for the past week. However, he denies sore throat, rhinorrhea, and cough. Known sick contacts with his grandchildren that have colds. Patient admits that he can become short of breath upon walking a short distance, but denies any abnormal SOB.  This is chronic in nature.  He also denies abdominal pain, new back pain, HA, CP, and rash. No testicular pain or swelling, dysuria, or difficulty urinating. No myaligia other than usual arthritis. PMHx of AFIB, DM, HTN, HLD, thyroid cancer. Patient takes levothyroxine and medications for HTN.  No palpitations.  No chest pain. He is no longer on anticoagulants for atrial fibrillation.  He is only taking aspirin for last 18 months.  No rectal bleeding.  No melena.    The patient follows with  Dr. Harper at [facility].    The patient is a very pleasant 59-year-old man who presents with symptoms of near neurocardiogenic syncope.  He reports the awaking in the middle night with his CPAP mask on feeling like he had swallowed quite a bit of air and was feeling bloated and like he had to burp.  Apparently, the patient's wife noted that he was cold, clammy, and diaphoretic.  He subsequently got very lightheaded, did not lose consciousness.  EMS was called and by report noted blood pressures in the 60s.  He was noted to be in atrial fibrillation he was somewhat increased ventricular response.  The patient is aware of a irregular heart rhythm, but is not on anticoagulation for atrial fibrillation nor the familiar with the term atrial fibrillation.  He previously was on anticoagulation for DVT.  I have spoken with Dr. Harper regarding the patient and the patient has not been seen in the last year, and does not have a history of atrial fibrillation in his chart.    I discussed the pros and cons of proceeding with BRI and cardioversion, the patient is agreeable to proceed.      Past Medical History:   Diagnosis Date    Atrial fibrillation 09/16/2019    Diabetes mellitus     High cholesterol     Hypertension        Past Surgical History:   Procedure Laterality Date    THYROIDECTOMY, PARTIAL  2006    TONSILLECTOMY         Review of patient's allergies indicates:   Allergen Reactions    Contrast media Hives       No current facility-administered medications on file prior to encounter.      Current Outpatient Medications on File Prior to Encounter   Medication Sig    felodipine (PLENDIL) 10 MG 24 hr tablet Take 10 mg by mouth once daily.    hydrocodone-acetaminophen 10-325mg (NORCO)  mg Tab Take 1 tablet by mouth every 8 (eight) hours as needed for Pain.    losartan-hydrochlorothiazide 50-12.5 mg (HYZAAR) 50-12.5 mg per tablet Take 1 tablet by mouth once daily.    lubiprostone (AMITIZA) 24  MCG Cap Take 24 mcg by mouth 2 (two) times daily.    meloxicam (MOBIC) 15 MG tablet Take 15 mg by mouth once daily.    metformin (GLUCOPHAGE) 1000 MG tablet Take 1,000 mg by mouth 2 (two) times daily with meals.    simvastatin (ZOCOR) 40 MG tablet Take 40 mg by mouth every evening.    acetaminophen (TYLENOL) 325 MG tablet Take 2 tablets (650 mg total) by mouth every 6 (six) hours as needed for Temperature greater than (100.4).    metoprolol tartrate (LOPRESSOR) 25 MG tablet Take 25 mg by mouth once daily. PRN HR greater than 100    olmesartan-hydrochlorothiazide (BENICAR HCT) 40-25 mg per tablet Take 1 tablet by mouth once daily.     Family History     None        Tobacco Use    Smoking status: Never Smoker    Smokeless tobacco: Never Used   Substance and Sexual Activity    Alcohol use: Not Currently     Comment: occasionally    Drug use: No    Sexual activity: Not Currently     Review of Systems   Constitution: Positive for diaphoresis. Negative for chills, fever and malaise/fatigue.   HENT: Negative for nosebleeds.    Eyes: Negative for blurred vision and double vision.   Cardiovascular: Negative for chest pain, claudication, cyanosis, dyspnea on exertion, leg swelling, orthopnea, palpitations, paroxysmal nocturnal dyspnea and syncope.   Respiratory: Negative for cough, shortness of breath and wheezing.    Skin: Negative for dry skin and poor wound healing.   Musculoskeletal: Negative for back pain, joint swelling and myalgias.   Gastrointestinal: Negative for abdominal pain, nausea and vomiting.   Genitourinary: Negative for hematuria.   Neurological: Positive for dizziness. Negative for headaches, numbness, seizures and weakness.   Psychiatric/Behavioral: Negative for altered mental status and depression.     Objective:     Vital Signs (Most Recent):  Temp: 97.5 °F (36.4 °C) (09/16/19 0804)  Pulse: (!) 116 (09/16/19 0915)  Resp: (!) 24 (09/16/19 0915)  BP: 133/81 (09/16/19 0915)  SpO2: 99 % (09/16/19  0915) Vital Signs (24h Range):  Temp:  [97.5 °F (36.4 °C)-98.1 °F (36.7 °C)] 97.5 °F (36.4 °C)  Pulse:  [] 116  Resp:  [18-31] 24  SpO2:  [92 %-100 %] 99 %  BP: ()/(50-87) 133/81     Weight: (!) 148.5 kg (327 lb 6.1 oz)  Body mass index is 48.35 kg/m².    SpO2: 99 %  O2 Device (Oxygen Therapy): CPAP      Intake/Output Summary (Last 24 hours) at 9/16/2019 0929  Last data filed at 9/16/2019 0230  Gross per 24 hour   Intake 500 ml   Output --   Net 500 ml       Lines/Drains/Airways     Peripheral Intravenous Line                 Peripheral IV - Single Lumen 09/16/19 0000 18 G Left Antecubital less than 1 day                Physical Exam   Constitutional: He is oriented to person, place, and time. He appears well-developed and well-nourished.   HENT:   Head: Normocephalic and atraumatic.   Eyes: Pupils are equal, round, and reactive to light. Conjunctivae and EOM are normal. No scleral icterus.   Neck: Normal range of motion. Neck supple. No JVD present. No tracheal deviation present. No thyromegaly present.   Cardiovascular: S1 normal and S2 normal. An irregular rhythm present. Tachycardia present. Exam reveals no gallop and no friction rub.   No murmur heard.  Pulmonary/Chest: Effort normal and breath sounds normal. No respiratory distress. He has no wheezes. He has no rales. He exhibits no tenderness.   Abdominal: Soft. He exhibits no distension.   obese   Musculoskeletal: Normal range of motion. He exhibits no edema.   Neurological: He is alert and oriented to person, place, and time. He has normal strength. No cranial nerve deficit.   Skin: Skin is warm and dry. No rash noted.   Psychiatric: He has a normal mood and affect. His behavior is normal.       Current Medications:   aspirin  81 mg Oral Daily    enoxaparin  1 mg/kg Subcutaneous Q12H    famotidine  20 mg Oral BID    metoprolol tartrate  25 mg Oral BID      sodium chloride 0.9% 100 mL/hr at 09/16/19 0542     acetaminophen, influenza,  ondansetron    Laboratory (all labs reviewed):  CBC:  Recent Labs   Lab 10/17/17  2347 10/19/17  0603 09/16/19  0122   WBC 7.60 4.43 4.82   Hemoglobin 11.3 L 10.9 L 12.6 L   Hematocrit 35.1 L 34.1 L 41.5   Platelets 225 188 261       CHEMISTRIES:  Recent Labs   Lab 10/17/17  2345 10/18/17  0548 10/19/17  0603 09/16/19  0122   Glucose 164 H 133 H  --  113 H   Sodium 140 139  --  144   Potassium 3.7 3.7  --  3.7   BUN, Bld 10 8  --  12   Creatinine 0.7 0.7  --  0.7   eGFR if  >60 >60  --  >60   eGFR if non  >60 >60  --  >60   Calcium 9.0 9.3  --  9.3   Magnesium  --  1.8 1.9 1.7       CARDIAC BIOMARKERS:  Recent Labs   Lab 10/17/17  2345 10/18/17  0548 10/18/17  1207 09/16/19  0122 09/16/19  0748   Troponin I 0.046 H 0.063 H 0.037 H <0.006 <0.006       COAGS:  Recent Labs   Lab 10/17/17  2345   INR 1.1       LIPIDS/LFTS:  Recent Labs   Lab 10/17/17  2345 10/18/17  0548 09/16/19  0122   Cholesterol  --  118 L  --    Triglycerides  --  61  --    HDL  --  36 L  --    LDL Cholesterol  --  69.8  --    Non-HDL Cholesterol  --  82  --    AST 25 29 20   ALT 23 26 20       BNP:  Recent Labs   Lab 09/16/19  0122    H       TSH:  Recent Labs   Lab 10/17/17  2345 09/16/19  0122   TSH 0.178 L 1.604       Free T4:  Recent Labs   Lab 10/17/17  2345   Free T4 1.16       Diagnostic Results:  ECG (personally reviewed and interpreted tracing(s)):  9/16/19 0118 , PVC, NSSTTW changes.  AF new vs 10/17/17    Chest X-Ray (personally reviewed and interpreted image(s)): 9/16/19 NAD, elev R diaph    Echo: 10/18/17 (repeat pending)    1 - Normal left ventricular systolic function (EF 65-70%).     2 - No wall motion abnormalities.     3 - Concentric hypertrophy.     4 - Mildly to moderately enlarged aortic root, 4.3 cm.     5 - Trivial tricuspid regurgitation.     6 - Pulmonary hypertension. The estimated PA systolic pressure is 63 mmHg.           Assessment and Plan:     * Near syncope  Seems  vasovagal in character and pt did not actually lose consciousness.  Noted to be in AF, new dx.    New onset atrial fibrillation  CHADSVASC 2  Will plan BRI/DCCV today  Cont BBl  Add Xarelto  Likely home this PM  Follow up with Dr. Harper at [facility] cardiology    Essential hypertension  Controlled  Cont med rx    Hyperlipidemia  Cont statin    Diabetes mellitus, type 2  Per IM    Morbid obesity  On CPAP  Diet/exercise/weight loss          VTE Risk Mitigation (From admission, onward)        Ordered     rivaroxaban tablet 20 mg  With dinner      09/16/19 0945     enoxaparin injection 150 mg  Every 12 hours (non-standard times)      09/16/19 0945     IP VTE HIGH RISK PATIENT  Once      09/16/19 0574          Thank you for your consult. I will follow-up with patient. Please contact us if you have any additional questions.    Deven Vasquez MD  Cardiology   [facility]

## 2019-09-16 NOTE — DISCHARGE SUMMARY
[facility] Hospital Medicine  Discharge Summary      Patient Name: Russell Santos  MRN: [id number]  Admission Date: 9/16/2019  Hospital Length of Stay: 0 days  Discharge Date and Time:  09/16/2019 3:05 PM  Attending Physician: Jennifer Live MD   Discharging Provider: ROBBIN Hernadez  Primary Care Provider: [facility]      HPI:   Russell Santos is a 59 y.o. AAM with PMHx including DMII, HTN, PE (2006). Patient presented for evaluation of acute onset of profound fatigue that started about 1 am in the morning prior to admission. He states that he became diaphoretic, dizzy/lightheaded, sat up on side of bed. He states that he attempted to stand and fell back on the bed. His wife who is at the bedside reports that the episode lasted about 5 seconds, she had to shake the patient to get him to respond. He denies similar episodes in the past but endorses that he was feeling fatigued the day prior to the event. He denies smoking history, is a [facility] patient. He did not check his BGL during the event but reports that when EMS arrived his SBP was 80. He denies recent long trips, leg or calve pain, sick contacts, changes in bowel or bladder functions, CP,  HA, weakness or deficits in any exremity.    Patient was found to be in atrial fibrillation at the time of presentation; controlled rate - he was not started on any rate controlled medication at that time. He has been controlled since that time    Procedure(s) (LRB):  Transesophageal echo (BRI) intra-procedure log documentation Start before 1230pm or after 230pm (N/A)  Cardioversion or Defibrillation      Hospital Course:   Placed in observation for evaluation of acute afib - cardiology consulted with successful BRI/DCCV today. Home on xarelto, fu with cardiology in clinic. He is SR. Stop HCTZ - continue olmesartin 40 mg and lopressor. /65. FU in clinic with Cardiology.     Consults:   Consults (From admission, onward)        Status  Ordering Provider     Inpatient consult to Cardiology  Once     Provider:  Deven Haley MD    Completed KAREN JAVIER     Inpatient consult to Social Work  Once     Provider:  (Not yet assigned)    Acknowledged DEVEN HALEY          No new Assessment & Plan notes have been filed under this hospital service since the last note was generated.  Service: Hospital Medicine    Final Active Diagnoses:    Diagnosis Date Noted POA    PRINCIPAL PROBLEM:  Near syncope [R55] 09/16/2019 Yes    New onset atrial fibrillation [I48.91] 09/16/2019 Yes    Essential hypertension [I10] 10/17/2017 Yes    Diabetes mellitus, type 2 [E11.9] 10/17/2017 Yes    Hyperlipidemia [E78.5] 10/17/2017 Yes    Morbid obesity [E66.01] 10/17/2017 Yes      Problems Resolved During this Admission:       Discharged Condition: stable    Disposition: Home or Self Care    Follow Up:  Follow-up Information     [facility] On 9/18/2019.    Why:  Appointment scheduled for Wednesday September 18th at 1:30pm  Contact information:  [address] [place] [address]  [phone]                 Patient Instructions:      Diet Cardiac     Activity as tolerated       Significant Diagnostic Studies: Labs:   CMP   Recent Labs   Lab 09/16/19  0122      K 3.7      CO2 25   *   BUN 12   CREATININE 0.7   CALCIUM 9.3   PROT 7.2   ALBUMIN 3.9   BILITOT 0.4   ALKPHOS 57   AST 20   ALT 20   ANIONGAP 11   ESTGFRAFRICA >60   EGFRNONAA >60   , CBC   Recent Labs   Lab 09/16/19  0122   WBC 4.82   HGB 12.6*   HCT 41.5      , INR   Lab Results   Component Value Date    INR 1.1 10/17/2017   , Lipid Panel   Lab Results   Component Value Date    CHOL 87 (L) 09/16/2019    HDL 25 (L) 09/16/2019    LDLCALC 49.4 (L) 09/16/2019    TRIG 63 09/16/2019    CHOLHDL 28.7 09/16/2019   , Troponin   Recent Labs   Lab 09/16/19  0748   TROPONINI <0.006    and A1C: No results for input(s): HGBA1C in the last 4320 hours.    Pending Diagnostic  Studies:     Procedure Component Value Units Date/Time    Troponin I [[id number]] Collected:  09/16/19 1432    Order Status:  Sent Lab Status:  In process Updated:  09/16/19 1432    Specimen:  Blood          Medications:  Reconciled Home Medications:      Medication List      START taking these medications    olmesartan 40 MG tablet  Commonly known as:  BENICAR  Take 1 tablet (40 mg total) by mouth once daily.     rivaroxaban 20 mg Tab  Commonly known as:  XARELTO  Take 1 tablet (20 mg total) by mouth daily with dinner or evening meal.        CHANGE how you take these medications    metoprolol tartrate 25 MG tablet  Commonly known as:  LOPRESSOR  Take 1 tablet (25 mg total) by mouth 2 (two) times daily.  What changed:    · when to take this  · additional instructions        CONTINUE taking these medications    acetaminophen 325 MG tablet  Commonly known as:  TYLENOL  Take 2 tablets (650 mg total) by mouth every 6 (six) hours as needed for Temperature greater than (100.4).     felodipine 10 MG 24 hr tablet  Commonly known as:  PLENDIL  Take 10 mg by mouth once daily.     HYDROcodone-acetaminophen  mg per tablet  Commonly known as:  NORCO  Take 1 tablet by mouth every 8 (eight) hours as needed for Pain.     lubiprostone 24 MCG Cap  Commonly known as:  AMITIZA  Take 24 mcg by mouth 2 (two) times daily.     meloxicam 15 MG tablet  Commonly known as:  MOBIC  Take 15 mg by mouth once daily.     metFORMIN 1000 MG tablet  Commonly known as:  GLUCOPHAGE  Take 1,000 mg by mouth 2 (two) times daily with meals.     simvastatin 40 MG tablet  Commonly known as:  ZOCOR  Take 40 mg by mouth every evening.        STOP taking these medications    losartan-hydrochlorothiazide 50-12.5 mg 50-12.5 mg per tablet  Commonly known as:  HYZAAR     olmesartan-hydrochlorothiazide 40-25 mg per tablet  Commonly known as:  BENICAR HCT            Indwelling Lines/Drains at time of discharge:   Lines/Drains/Airways     Airway                  Airway - Non-Surgical 09/16/19 1145 Nasal Cannula;Mask less than 1 day                Time spent on the discharge of patient: 35 minutes  Patient was seen and examined on the date of discharge and determined to be suitable for discharge.           TORI Weiss, FNP-C  Hospitalist - Department of Hospital Medicine  [address] [place] [address]  Office [phone]; Pager [phone]

## 2019-09-16 NOTE — PLAN OF CARE
09/16/19 1433   Final Note   Assessment Type Final Discharge Note   Anticipated Discharge Disposition Home   Hospital Follow Up  Appt(s) scheduled? Yes   Discharge plans and expectations educations in teach back method with documentation complete? Yes   Right Care Referral Info   Post Acute Recommendation No Care   pts nurse [place] notified that pt can d/c from CM standpoint.

## 2019-09-16 NOTE — ED PROVIDER NOTES
Encounter Date: 9/16/2019    SCRIBE #1 NOTE: I, Jessica Burnette, am scribing for, and in the presence of,  Bernardo Schmidt MD. I have scribed the following portions of the note - Other sections scribed: HPI, ROS, PE, EKG.       History     Chief Complaint   Patient presents with    Loss of Consciousness     +dizziness, lightheaded, SOB, hypotensive, pt place his own Cpap on at home      CC: LOC    HPI: The patient is a 59 y.o male who presents to the ED, per EMS, complaining of a near syncopal episode lasting 10-15 mins PTA. Patient states that he woke up in a cold sweat, despite feeling fine before bed, as well as feeling well yesterday. He states that felt weak and dizzy, preventing him from responding to verbal commands or questions from his wife.  States he cannot sit up because he was too weak.  Symptoms are improving when EMS arrived but his blood pressure was reported is low so he came to the emergency room.  Patient also reports of having post nasal drip for the past week. However, he denies sore throat, rhinorrhea, and cough. Known sick contacts with his grandchildren that have colds. Patient admits that he can become short of breath upon walking a short distance, but denies any abnormal SOB.  This is chronic in nature.  He also denies abdominal pain, new back pain, HA, CP, and rash. No testicular pain or swelling, dysuria, or difficulty urinating. No myaligia other than usual arthritis. PMHx of AFIB, DM, HTN, HLD, thyroid cancer. Patient takes levothyroxine and medications for HTN.  No palpitations.  No chest pain. He is no longer on anticoagulants for is atrial fibrillation.  He is only taking aspirin for last 18 months.  No rectal bleeding.  No melena.    According to EMS, the patient's blood pressure was averaging in around 60 mmHg upon arrival.      The history is provided by the patient and the EMS personnel. No  was used.     Review of patient's allergies indicates:   Allergen  Reactions    Contrast media Hives     Past Medical History:   Diagnosis Date    Diabetes mellitus     High cholesterol     Hypertension      Past Surgical History:   Procedure Laterality Date    THYROIDECTOMY, PARTIAL  2006    TONSILLECTOMY       No family history on file.  Social History     Tobacco Use    Smoking status: Never Smoker    Smokeless tobacco: Never Used   Substance Use Topics    Alcohol use: Yes     Comment: occasionally    Drug use: No     Review of Systems   Constitutional: Positive for chills and diaphoresis. Negative for fever.   HENT: Positive for congestion and postnasal drip. Negative for rhinorrhea and sore throat.    Eyes: Negative.    Respiratory: Negative for cough and shortness of breath.    Cardiovascular: Positive for leg swelling (Chronic). Negative for chest pain.   Gastrointestinal: Negative for abdominal pain, blood in stool, diarrhea and vomiting.        NO melena or rectal bleeding   Genitourinary: Negative for difficulty urinating, dysuria, frequency, scrotal swelling and testicular pain.   Musculoskeletal: Positive for arthralgias (Chronic) and back pain (Chronic). Negative for myalgias.   Skin: Negative for rash.   Neurological: Positive for dizziness, syncope (Near syncopal episode) and weakness. Negative for headaches.        No numbness   Psychiatric/Behavioral: Negative for confusion.   All other systems reviewed and are negative.      Physical Exam     Initial Vitals [09/16/19 0107]   BP Pulse Resp Temp SpO2   115/80 91 18 98.1 °F (36.7 °C) (!) 92 %      MAP       --         Physical Exam    Nursing note and vitals reviewed.  Constitutional: He appears well-developed and well-nourished. He is not diaphoretic. No distress.   Patient is morbidly obese.    HENT:   Head: Normocephalic and atraumatic.   Nose: Nose normal.   Mouth/Throat: Oropharynx is clear and moist.   Eyes: Conjunctivae and EOM are normal. Pupils are equal, round, and reactive to light. Right eye  exhibits no discharge. Left eye exhibits no discharge. No scleral icterus.   Neck: Neck supple. No tracheal deviation present.   Cardiovascular: Normal heart sounds. An irregularly irregular rhythm present.  Tachycardia present.  Exam reveals no gallop and no friction rub.    No murmur heard.  Pulmonary/Chest: Breath sounds normal. No stridor. No respiratory distress. He has no wheezes. He has no rhonchi. He has no rales. He exhibits no tenderness.   Abdominal: Soft. He exhibits no distension and no mass. There is no tenderness. There is no rebound and no guarding.   No abdominal tenderness.   Musculoskeletal: Normal range of motion. He exhibits no edema or tenderness.   Patient has trace, non-pitting edema to bilateral ankles.   Neurological: He is alert and oriented to person, place, and time. He has normal strength and normal reflexes. No cranial nerve deficit or sensory deficit. GCS score is 15. GCS eye subscore is 4. GCS verbal subscore is 5. GCS motor subscore is 6.   Neuro exam was normal.   Skin: Skin is warm and dry. No rash noted.   Psychiatric: He has a normal mood and affect. His behavior is normal. Judgment and thought content normal.         ED Course   Procedures  Labs Reviewed   CBC W/ AUTO DIFFERENTIAL - Abnormal; Notable for the following components:       Result Value    Hemoglobin 12.6 (*)     Mean Corpuscular Volume 76 (*)     Mean Corpuscular Hemoglobin 23.0 (*)     Mean Corpuscular Hemoglobin Conc 30.4 (*)     RDW 17.6 (*)     Mono # 0.1 (*)     Mono% 2.7 (*)     All other components within normal limits   COMPREHENSIVE METABOLIC PANEL - Abnormal; Notable for the following components:    Glucose 113 (*)     All other components within normal limits   URINALYSIS, REFLEX TO URINE CULTURE - Abnormal; Notable for the following components:    Appearance, UA Hazy (*)     Protein, UA 1+ (*)     All other components within normal limits    Narrative:     Preferred Collection Type->Urine, Clean Catch    B-TYPE NATRIURETIC PEPTIDE - Abnormal; Notable for the following components:     (*)     All other components within normal limits   TROPONIN I   TSH   MAGNESIUM   URINALYSIS MICROSCOPIC    Narrative:     Preferred Collection Type->Urine, Clean Catch   POCT INFLUENZA A/B MOLECULAR     EKG Readings: (Independently Interpreted)   Initial Reading: No STEMI. Previous EKG: Compared with most recent EKG Previous EKG Date: 10/17/2017. Rhythm: Atrial Fibrillation. Heart Rate: 125 bpm. Ectopy: PVCs. Conduction: Normal. ST Segments: Non-Specific ST Segment Depression. T Waves: Normal. Axis: Normal. Other Findings: Prolonged QT Interval. Clinical Impression: Atrial Fibrillation with RVR with PVCs   No acute ischemic changes.       Imaging Results          X-Ray Chest AP Portable (Final result)  Result time 09/16/19 03:22:51    Final result by Carlton Castillo MD (09/16/19 03:22:51)                 Impression:      No acute findings in the chest.      Electronically signed by: Carlton Castillo MD  Date:    09/16/2019  Time:    03:22             Narrative:    EXAMINATION:  XR CHEST AP PORTABLE    CLINICAL HISTORY:  Chest Pain;    TECHNIQUE:  Single frontal view of the chest was performed.    COMPARISON:  None    FINDINGS:  Elevated right hemidiaphragm.  Surgical clips overlie the cervicothoracic junction.    No consolidation, pleural effusion or pneumothorax.    Cardiomediastinal silhouette is magnified by portable technique..                                 Medical Decision Making:   History:   Old Medical Records: I decided to obtain old medical records.  Differential Diagnosis:   CHF, ACS, pulmonary embolism, uncontrolled atrial fibrillation, ventricular tachycardia  Clinical Tests:   Lab Tests: Ordered and Reviewed  The following lab test(s) were unremarkable: CBC, CMP, Urinalysis, Troponin and BNP  Radiological Study: Ordered and Reviewed  Medical Tests: Ordered and Reviewed  ED Management:  0200:  Frequent PVCs  noted on monitor with continued atrial fibrillation.  Heart rate fluctuating between 95 and 125.    0325:  Heart rate is stabilized to 98.  Patient remains in atrial fibrillation.  Blood pressure stabilized to 109/60 a fluid bolus.  No obvious etiology of patient's near syncopal episode.  Will admit for serial cardiac enzymes.  Suspect that patient had an episode of atrial fibrillation with RVR or a run of ventricular tachycardia given the frequent PVCs that he had on arrival.  Will continue current medications.  Discussed results patient family.            Scribe Attestation:   Scribe #1: I performed the above scribed service and the documentation accurately describes the services I performed. I attest to the accuracy of the note.              I, Bernardo Schmidt MD , personally performed the services described in this documentation. All medical record entries made by the scribe were at my direction and in my presence.  I have reviewed the chart and agree that the record reflects my personal performance and is accurate and complete.    Clinical Impression:       ICD-10-CM ICD-9-CM   1. Near syncope R55 780.2   2. Atrial fibrillation, unspecified type I48.91 427.31   3. Hypotension, unspecified hypotension type I95.9 458.9         Disposition:   Disposition: Placed in Observation  Condition: Stable                        Bernardo Schmidt MD  09/16/19 0337

## 2019-09-16 NOTE — NURSING
Pt IV and telemetry removed w/ nad noted. Pt received D/C instructions and verbalized understanding w/ spouse at bedside.Pt waiting for transport

## 2019-09-16 NOTE — ASSESSMENT & PLAN NOTE
Seems vasovagal in character and pt did not actually lose consciousness.  Noted to be in AF, new dx.

## 2019-09-19 NOTE — ANESTHESIA POSTPROCEDURE EVALUATION
Anesthesia Post Evaluation    Patient: Russell Santos    Procedure(s) Performed: Procedure(s) (LRB):  Transesophageal echo (BRI) intra-procedure log documentation Start before 1230pm or after 230pm (N/A)  Cardioversion or Defibrillation    Final Anesthesia Type: general  Patient location: pt recovered in OR.  Patient participation: Yes- Able to Participate  Level of consciousness: awake and alert  Post-procedure vital signs: reviewed and stable  Pain management: adequate  Airway patency: patent  PONV status at discharge: No PONV  Anesthetic complications: no      Cardiovascular status: blood pressure returned to baseline and hemodynamically stable  Respiratory status: unassisted and spontaneous ventilation  Hydration status: euvolemic  Follow-up not needed.          Vitals Value Taken Time   /65 9/16/2019 12:41 PM   Temp 36.6 °C (97.8 °F) 9/16/2019 12:59 PM   Pulse 51 9/16/2019 12:41 PM   Resp 20 9/16/2019 12:41 PM   SpO2 96 % 9/16/2019 12:41 PM         No case tracking events are documented in the log.      Pain/Devika Score: No data recorded

## 2020-02-19 ENCOUNTER — INITIAL CONSULT (OUTPATIENT)
Dept: HEMATOLOGY/ONCOLOGY | Facility: CLINIC | Age: 60
End: 2020-02-19
Payer: MEDICARE

## 2020-02-19 VITALS
TEMPERATURE: 99 F | HEART RATE: 74 BPM | OXYGEN SATURATION: 98 % | SYSTOLIC BLOOD PRESSURE: 146 MMHG | DIASTOLIC BLOOD PRESSURE: 92 MMHG | HEIGHT: 70 IN | BODY MASS INDEX: 45.1 KG/M2 | WEIGHT: 315 LBS

## 2020-02-19 DIAGNOSIS — R26.81 UNSTEADINESS ON FEET: ICD-10-CM

## 2020-02-19 DIAGNOSIS — D72.819 LEUKOPENIA, UNSPECIFIED TYPE: Primary | ICD-10-CM

## 2020-02-19 DIAGNOSIS — D64.9 ANEMIA, UNSPECIFIED TYPE: ICD-10-CM

## 2020-02-19 PROCEDURE — 3080F DIAST BP >= 90 MM HG: CPT | Mod: CPTII,S$GLB,, | Performed by: INTERNAL MEDICINE

## 2020-02-19 PROCEDURE — 99999 PR PBB SHADOW E&M-EST. PATIENT-LVL IV: CPT | Mod: PBBFAC,,, | Performed by: INTERNAL MEDICINE

## 2020-02-19 PROCEDURE — 3008F BODY MASS INDEX DOCD: CPT | Mod: CPTII,S$GLB,, | Performed by: INTERNAL MEDICINE

## 2020-02-19 PROCEDURE — 99204 OFFICE O/P NEW MOD 45 MIN: CPT | Mod: S$GLB,,, | Performed by: INTERNAL MEDICINE

## 2020-02-19 PROCEDURE — 3080F PR MOST RECENT DIASTOLIC BLOOD PRESSURE >= 90 MM HG: ICD-10-PCS | Mod: CPTII,S$GLB,, | Performed by: INTERNAL MEDICINE

## 2020-02-19 PROCEDURE — 3077F PR MOST RECENT SYSTOLIC BLOOD PRESSURE >= 140 MM HG: ICD-10-PCS | Mod: CPTII,S$GLB,, | Performed by: INTERNAL MEDICINE

## 2020-02-19 PROCEDURE — 99204 PR OFFICE/OUTPT VISIT, NEW, LEVL IV, 45-59 MIN: ICD-10-PCS | Mod: S$GLB,,, | Performed by: INTERNAL MEDICINE

## 2020-02-19 PROCEDURE — 99999 PR PBB SHADOW E&M-EST. PATIENT-LVL IV: ICD-10-PCS | Mod: PBBFAC,,, | Performed by: INTERNAL MEDICINE

## 2020-02-19 PROCEDURE — 3077F SYST BP >= 140 MM HG: CPT | Mod: CPTII,S$GLB,, | Performed by: INTERNAL MEDICINE

## 2020-02-19 PROCEDURE — 3008F PR BODY MASS INDEX (BMI) DOCUMENTED: ICD-10-PCS | Mod: CPTII,S$GLB,, | Performed by: INTERNAL MEDICINE

## 2020-02-19 RX ORDER — LEVOTHYROXINE SODIUM 200 UG/1
200 TABLET ORAL
COMMUNITY

## 2020-02-19 RX ORDER — CHOLECALCIFEROL (VITAMIN D3) 50 MCG
TABLET ORAL DAILY
COMMUNITY

## 2020-02-19 RX ORDER — SOTALOL HYDROCHLORIDE 80 MG/1
TABLET ORAL 2 TIMES DAILY
COMMUNITY
End: 2022-08-11

## 2020-02-19 RX ORDER — ROSUVASTATIN CALCIUM 40 MG/1
10 TABLET, COATED ORAL NIGHTLY
COMMUNITY

## 2020-02-19 RX ORDER — TESTOSTERONE 200 MG
PELLET (EA) IMPLANTATION
COMMUNITY

## 2020-02-19 RX ORDER — GABAPENTIN 600 MG/1
600 TABLET ORAL 3 TIMES DAILY
COMMUNITY

## 2020-02-19 RX ORDER — HYDROCODONE BITARTRATE AND ACETAMINOPHEN 7.5; 325 MG/1; MG/1
1 TABLET ORAL EVERY 6 HOURS PRN
COMMUNITY
End: 2022-08-08

## 2020-02-19 RX ORDER — LANCETS 33 GAUGE
EACH MISCELLANEOUS
COMMUNITY
Start: 2020-02-04

## 2020-02-19 RX ORDER — CALCIUM CITRATE/VITAMIN D3 200MG-6.25
TABLET ORAL
COMMUNITY
Start: 2020-02-04

## 2020-02-19 RX ORDER — ACETAMINOPHEN 500 MG
500 TABLET ORAL EVERY 6 HOURS PRN
COMMUNITY

## 2020-02-19 NOTE — PROGRESS NOTES
Subjective:       Patient ID: Russell Santos is a 59 y.o. male.    Chief Complaint: Consult    HPI     REASON FOR CONSULTATION: Neutropenia  Referring Physician: Daphnie Torres NP       Russell Santos is a 59 y.o. AAM with history of , HTN, PE (2006)., thyroid malignancy, hyperlipidemia with type 2 diabetes mellitus, history of atrial fibrillation, cardiomyopathy, chronic pain, osteoarthritis of spine seen today in consultation for neutropenia.  Patient reports that he has had intermittent neutropenia for the past year.  He denies any history of recurrent infections.  He denies any new medications.  No fevers night sweats or weight loss.  Appetite stable.  No rashes.  No enlarged nodes.  .  He has chronic mild fatigue, stable he has chronic mild dyspnea on exertion stable. He ambulates with assistance of cane.  No chest pain.  He is on chronic anticoagulation for atrial fibrillation.  He also has a history of a PE in 2006. He was recently prescribed iron supplementation B12 supplementation although he has not started taking the supplements.  CBC from 01/31 20/20 reveals a white blood cell count of 2600/mm 3 hemoglobin 11.8 grams/deciliter hematocrit of 38.7% MCV of 76 and a platelet count of 220. Limited records available at time of visit.  He is here for further evaluation.         Past  medical history: Atrial fibrillation, Osteo Adriana arthritis, cardiomyopathy, low testosterone, opioid dependence, chronic pain, osteoarthritis of spine, chronic knee pain, personal history of malignant neoplasm of thyroid, calcified granuloma of lung, atherosclerosis of aorta, hyperlipidemia associated with type 2 diabetes mellitus        Past Surgical History:   Procedure Laterality Date    THYROIDECTOMY, PARTIAL  2006    TONSILLECTOMY      TREATMENT OF CARDIAC ARRHYTHMIA  9/16/2019    Procedure: Cardioversion or Defibrillation;  Surgeon: Deven Vasquez MD;  Location: [facility] CATH LAB;  Service: Cardiology;;             Review of  "Systems   Constitutional: Positive for fatigue. Negative for appetite change, fever and unexpected weight change.   HENT: Negative for mouth sores.    Eyes: Negative for visual disturbance.   Respiratory: Positive for shortness of breath (LYNCH- chronic, mild). Negative for cough.    Cardiovascular: Negative for chest pain.   Gastrointestinal: Negative for abdominal pain and diarrhea.   Genitourinary: Negative for frequency.   Musculoskeletal: Negative for back pain.   Skin: Negative for rash.   Neurological: Negative for headaches.   Hematological: Negative for adenopathy.   Psychiatric/Behavioral: The patient is not nervous/anxious.        Objective:       Vitals:    02/19/20 0921   BP: (!) 146/92   BP Location: Left arm   Patient Position: Sitting   BP Method: Large (Automatic)   Pulse: 74   Temp: 99 °F (37.2 °C)   TempSrc: Oral   SpO2: 98%   Weight: (!) 147.8 kg (325 lb 13.4 oz)   Height: 5' 9.5" (1.765 m)       Physical Exam   Constitutional: He is oriented to person, place, and time. He appears well-developed and well-nourished.   HENT:   Head: Normocephalic.   Mouth/Throat: Oropharynx is clear and moist. No oropharyngeal exudate.   Eyes: Pupils are equal, round, and reactive to light. Conjunctivae are normal. No scleral icterus.   Neck: Normal range of motion. Neck supple. No thyromegaly present.   Cardiovascular: Normal rate, regular rhythm and normal heart sounds.   No murmur heard.  Pulmonary/Chest: Effort normal and breath sounds normal. He has no wheezes. He has no rales.   Abdominal: Soft. Bowel sounds are normal. He exhibits no distension and no mass. There is no hepatosplenomegaly. There is no tenderness. There is no rebound and no guarding.   Musculoskeletal: Normal range of motion. He exhibits no edema.   Lymphadenopathy:     He has no cervical adenopathy.     He has no axillary adenopathy.        Right: No supraclavicular adenopathy present.        Left: No supraclavicular adenopathy present. "   Neurological: He is alert and oriented to person, place, and time. No cranial nerve deficit.   Skin: No rash noted. No erythema.   Psychiatric: He has a normal mood and affect.         Lab Results   Component Value Date    WBC 4.82 09/16/2019    HGB 12.6 (L) 09/16/2019    HCT 41.5 09/16/2019    MCV 76 (L) 09/16/2019     09/16/2019         Assessment:       1. Leukopenia, unspecified type    2. Anemia, unspecified type    3. Unsteadiness on feet         Plan:        Patient  is a 59-year-old multiple medical diagnoses as listed with the neutropenia and a mild anemia of unknown etiology.  Plan to request additional laboratory data from outside facility for review.  Plan battery of testing including CBC, CMP, iron studies, reticulocyte count, SHELLY, B12, red blood cell folate, SPEP and  granulocyte antibodies.  In addition,  plan to evaluate peripheral blood smear for any abnormalities.  All questions posed answered to patient's satisfaction    Thank you for allowing me to participate in the care of your patient    Cc: Daphnie Torres NP

## 2020-03-02 ENCOUNTER — LAB VISIT (OUTPATIENT)
Dept: LAB | Facility: HOSPITAL | Age: 60
End: 2020-03-02
Attending: INTERNAL MEDICINE
Payer: MEDICARE

## 2020-03-02 DIAGNOSIS — D64.9 ANEMIA, UNSPECIFIED TYPE: ICD-10-CM

## 2020-03-02 DIAGNOSIS — D72.819 LEUKOPENIA, UNSPECIFIED TYPE: ICD-10-CM

## 2020-03-02 DIAGNOSIS — R26.81 UNSTEADINESS ON FEET: ICD-10-CM

## 2020-03-02 LAB
ALBUMIN SERPL BCP-MCNC: 3.9 G/DL (ref 3.5–5.2)
ALP SERPL-CCNC: 46 U/L (ref 55–135)
ALT SERPL W/O P-5'-P-CCNC: 13 U/L (ref 10–44)
ANION GAP SERPL CALC-SCNC: 10 MMOL/L (ref 8–16)
AST SERPL-CCNC: 18 U/L (ref 10–40)
BASOPHILS # BLD AUTO: 0.03 K/UL (ref 0–0.2)
BASOPHILS NFR BLD: 0.9 % (ref 0–1.9)
BILIRUB SERPL-MCNC: 0.7 MG/DL (ref 0.1–1)
BUN SERPL-MCNC: 16 MG/DL (ref 6–20)
CALCIUM SERPL-MCNC: 9.3 MG/DL (ref 8.7–10.5)
CHLORIDE SERPL-SCNC: 108 MMOL/L (ref 95–110)
CO2 SERPL-SCNC: 27 MMOL/L (ref 23–29)
CREAT SERPL-MCNC: 0.7 MG/DL (ref 0.5–1.4)
DIFFERENTIAL METHOD: ABNORMAL
EOSINOPHIL # BLD AUTO: 0.1 K/UL (ref 0–0.5)
EOSINOPHIL NFR BLD: 2.4 % (ref 0–8)
ERYTHROCYTE [DISTWIDTH] IN BLOOD BY AUTOMATED COUNT: 16.7 % (ref 11.5–14.5)
EST. GFR  (AFRICAN AMERICAN): >60 ML/MIN/1.73 M^2
EST. GFR  (NON AFRICAN AMERICAN): >60 ML/MIN/1.73 M^2
FERRITIN SERPL-MCNC: 121 NG/ML (ref 20–300)
GLUCOSE SERPL-MCNC: 127 MG/DL (ref 70–110)
HCT VFR BLD AUTO: 39.9 % (ref 40–54)
HGB BLD-MCNC: 11.8 G/DL (ref 14–18)
IMM GRANULOCYTES # BLD AUTO: 0 K/UL (ref 0–0.04)
IMM GRANULOCYTES NFR BLD AUTO: 0 % (ref 0–0.5)
IRON SERPL-MCNC: 32 UG/DL (ref 45–160)
LYMPHOCYTES # BLD AUTO: 1.1 K/UL (ref 1–4.8)
LYMPHOCYTES NFR BLD: 31.7 % (ref 18–48)
MCH RBC QN AUTO: 22.9 PG (ref 27–31)
MCHC RBC AUTO-ENTMCNC: 29.6 G/DL (ref 32–36)
MCV RBC AUTO: 78 FL (ref 82–98)
MONOCYTES # BLD AUTO: 0.1 K/UL (ref 0.3–1)
MONOCYTES NFR BLD: 3.8 % (ref 4–15)
NEUTROPHILS # BLD AUTO: 2.1 K/UL (ref 1.8–7.7)
NEUTROPHILS NFR BLD: 61.2 % (ref 38–73)
NRBC BLD-RTO: 0 /100 WBC
PATH REV BLD -IMP: NORMAL
PLATELET # BLD AUTO: 202 K/UL (ref 150–350)
PMV BLD AUTO: 10.4 FL (ref 9.2–12.9)
POTASSIUM SERPL-SCNC: 4.1 MMOL/L (ref 3.5–5.1)
PROT SERPL-MCNC: 7.6 G/DL (ref 6–8.4)
RBC # BLD AUTO: 5.15 M/UL (ref 4.6–6.2)
RETICS/RBC NFR AUTO: 0.7 % (ref 0.4–2)
SATURATED IRON: 9 % (ref 20–50)
SODIUM SERPL-SCNC: 145 MMOL/L (ref 136–145)
TOTAL IRON BINDING CAPACITY: 357 UG/DL (ref 250–450)
TRANSFERRIN SERPL-MCNC: 241 MG/DL (ref 200–375)
VIT B12 SERPL-MCNC: 348 PG/ML (ref 210–950)
WBC # BLD AUTO: 3.38 K/UL (ref 3.9–12.7)

## 2020-03-02 PROCEDURE — 84165 PATHOLOGIST INTERPRETATION SPE: ICD-10-PCS | Mod: 26,,, | Performed by: PATHOLOGY

## 2020-03-02 PROCEDURE — 86334 PATHOLOGIST INTERPRETATION IFE: ICD-10-PCS | Mod: 26,,, | Performed by: PATHOLOGY

## 2020-03-02 PROCEDURE — 82747 ASSAY OF FOLIC ACID RBC: CPT

## 2020-03-02 PROCEDURE — 86334 IMMUNOFIX E-PHORESIS SERUM: CPT | Mod: 26,,, | Performed by: PATHOLOGY

## 2020-03-02 PROCEDURE — 85025 COMPLETE CBC W/AUTO DIFF WBC: CPT

## 2020-03-02 PROCEDURE — 82607 VITAMIN B-12: CPT

## 2020-03-02 PROCEDURE — 84165 PROTEIN E-PHORESIS SERUM: CPT | Mod: 26,,, | Performed by: PATHOLOGY

## 2020-03-02 PROCEDURE — 83540 ASSAY OF IRON: CPT

## 2020-03-02 PROCEDURE — 86334 IMMUNOFIX E-PHORESIS SERUM: CPT

## 2020-03-02 PROCEDURE — 80053 COMPREHEN METABOLIC PANEL: CPT

## 2020-03-02 PROCEDURE — 84165 PROTEIN E-PHORESIS SERUM: CPT

## 2020-03-02 PROCEDURE — 36415 COLL VENOUS BLD VENIPUNCTURE: CPT | Mod: PO

## 2020-03-02 PROCEDURE — 85045 AUTOMATED RETICULOCYTE COUNT: CPT

## 2020-03-02 PROCEDURE — 86038 ANTINUCLEAR ANTIBODIES: CPT

## 2020-03-02 PROCEDURE — 86021 WBC ANTIBODY IDENTIFICATION: CPT

## 2020-03-02 PROCEDURE — 82728 ASSAY OF FERRITIN: CPT

## 2020-03-03 LAB — INTERPRETATION SERPL IFE-IMP: NORMAL

## 2020-03-04 LAB
ALBUMIN SERPL ELPH-MCNC: 3.86 G/DL (ref 3.35–5.55)
ALPHA1 GLOB SERPL ELPH-MCNC: 0.45 G/DL (ref 0.17–0.41)
ALPHA2 GLOB SERPL ELPH-MCNC: 0.87 G/DL (ref 0.43–0.99)
ANA SER QL IF: NORMAL
B-GLOBULIN SERPL ELPH-MCNC: 0.85 G/DL (ref 0.5–1.1)
FOLATE RBC-MCNC: 539 NG/ML (ref 280–791)
GAMMA GLOB SERPL ELPH-MCNC: 1.27 G/DL (ref 0.67–1.58)
PATHOLOGIST INTERPRETATION IFE: NORMAL
PATHOLOGIST INTERPRETATION SPE: NORMAL
PROT SERPL-MCNC: 7.3 G/DL (ref 6–8.4)

## 2020-03-09 LAB — GRANULOCYTES ANTIBODIES, SERUM: NEGATIVE

## 2020-03-25 ENCOUNTER — OFFICE VISIT (OUTPATIENT)
Dept: HEMATOLOGY/ONCOLOGY | Facility: CLINIC | Age: 60
End: 2020-03-25
Payer: MEDICARE

## 2020-03-25 DIAGNOSIS — D72.819 LEUKOPENIA, UNSPECIFIED TYPE: Primary | ICD-10-CM

## 2020-03-25 DIAGNOSIS — D50.9 MICROCYTIC ANEMIA: ICD-10-CM

## 2020-03-25 PROCEDURE — 99499 NO LOS: ICD-10-PCS | Mod: S$GLB,,, | Performed by: INTERNAL MEDICINE

## 2020-03-25 PROCEDURE — 99499 UNLISTED E&M SERVICE: CPT | Mod: S$GLB,,, | Performed by: INTERNAL MEDICINE

## 2020-03-25 NOTE — PROGRESS NOTES
Subjective:       Patient ID: Russell Santos is a 59 y.o. male.    Chief Complaint: No chief complaint on file.    HPI     The patient location is: HOME  The chief complaint leading to consultation: Follow-up for neutropenia  Visit type: Telephone due to COVID-19 outbreak  Total time spent with patient: 15-20    Diagnosis: Neutropenia  Referring Physician: Daphnie Torres NP       Russell Santos is a 59 y.o. AAM with history of , HTN, PE (2006)., thyroid malignancy, hyperlipidemia with type 2 diabetes mellitus, history of atrial fibrillation, cardiomyopathy, chronic pain, osteoarthritis of spine seen today in consultation for neutropenia.  Patient reports that he has had intermittent neutropenia for the past year.  He denies any history of recurrent infections.  He denies any new medications.  No fevers night sweats or weight loss.  Appetite stable.  No rashes.  No enlarged nodes.  .  He has chronic mild fatigue, stable he has chronic mild dyspnea on exertion stable. He ambulates with assistance of cane.  No chest pain.  He is on chronic anticoagulation for atrial fibrillation.  He also has a history of a PE in 2006. He was recently prescribed iron supplementation B12 supplementation although he has not started taking the supplements.  CBC from 01/31 20/20 reveals a white blood cell count of 2600/mm 3 hemoglobin 11.8 grams/deciliter hematocrit of 38.7% MCV of 76 and a platelet count of 220. Limited records available       No new issues  Doing well  No fatigue  No recent infections  No rashes  No shortness of breath/chest pain/cough  No fevers    Last Colonoscopy 2013-unremarkable      Past  medical history: Atrial fibrillation, Osteo Adriana arthritis, cardiomyopathy, low testosterone, opioid dependence, chronic pain, osteoarthritis of spine, chronic knee pain, personal history of malignant neoplasm of thyroid, calcified granuloma of lung, atherosclerosis of aorta, hyperlipidemia associated with type 2 diabetes  mellitus        Past Surgical History:   Procedure Laterality Date    THYROIDECTOMY, PARTIAL  2006    TONSILLECTOMY      TREATMENT OF CARDIAC ARRHYTHMIA  9/16/2019    Procedure: Cardioversion or Defibrillation;  Surgeon: Deven Vasquez MD;  Location: [facility] CATH LAB;  Service: Cardiology;;             Review of Systems   Constitutional: Negative for appetite change, fatigue, fever and unexpected weight change.   Eyes: Negative for visual disturbance.   Respiratory: Negative for cough.    Cardiovascular: Negative for chest pain.   Gastrointestinal: Negative for abdominal pain and diarrhea.   Musculoskeletal: Negative for back pain.   Skin: Negative for rash.   Neurological: Negative for headaches.   Hematological: Negative for adenopathy.       Objective:       There were no vitals filed for this visit. Televisit          Lab Results   Component Value Date    WBC 4.82 09/16/2019    HGB 12.6 (L) 09/16/2019    HCT 41.5 09/16/2019    MCV 76 (L) 09/16/2019     09/16/2019     Lab Results   Component Value Date    IRON 32 (L) 03/02/2020    TIBC 357 03/02/2020    FERRITIN 121 03/02/2020         Assessment:       1. Leukopenia, unspecified type    2. Microcytic anemia        Plan:        Patient  is a 59-year-old multiple medical diagnoses as listed with the neutropenia and a mild anemia of unknown etiology.    Iron parameters reveal component  iron deficiency  Patient reports underwent a colonoscopy in 2013  Plan referral to GI in future due to iron deficiency anemia for GI evaluation.  Hold off on referral due to COVD-19 19 outbreak  Anti granulocyte antibody testing negative  B12 and red blood cell folate within normal  SPEP within normal  Consider testing for co existing hemoglobinopathy    Consider bone marrow biopsy in the near future for further evaluation of possible underlying hematological malignancy  Bone marrow biopsies delayed in setting of Covid 19 outbreak unless emergent      Follow-up 1month  with cbc, Fe studies  .  All questions posed answered to patient's satisfaction        Cc: Daphnie Torres, NP

## 2020-04-27 ENCOUNTER — LAB VISIT (OUTPATIENT)
Dept: LAB | Facility: HOSPITAL | Age: 60
End: 2020-04-27
Attending: INTERNAL MEDICINE
Payer: MEDICARE

## 2020-04-27 DIAGNOSIS — D50.9 MICROCYTIC ANEMIA: ICD-10-CM

## 2020-04-27 LAB
BASOPHILS # BLD AUTO: 0.02 K/UL (ref 0–0.2)
BASOPHILS NFR BLD: 0.6 % (ref 0–1.9)
DIFFERENTIAL METHOD: ABNORMAL
EOSINOPHIL # BLD AUTO: 0.1 K/UL (ref 0–0.5)
EOSINOPHIL NFR BLD: 1.8 % (ref 0–8)
ERYTHROCYTE [DISTWIDTH] IN BLOOD BY AUTOMATED COUNT: 16.7 % (ref 11.5–14.5)
FERRITIN SERPL-MCNC: 42 NG/ML (ref 20–300)
HCT VFR BLD AUTO: 40.2 % (ref 40–54)
HGB BLD-MCNC: 11.7 G/DL (ref 14–18)
IMM GRANULOCYTES # BLD AUTO: 0.01 K/UL (ref 0–0.04)
IMM GRANULOCYTES NFR BLD AUTO: 0.3 % (ref 0–0.5)
IRON SERPL-MCNC: 59 UG/DL (ref 45–160)
LYMPHOCYTES # BLD AUTO: 1.1 K/UL (ref 1–4.8)
LYMPHOCYTES NFR BLD: 34.3 % (ref 18–48)
MCH RBC QN AUTO: 23 PG (ref 27–31)
MCHC RBC AUTO-ENTMCNC: 29.1 G/DL (ref 32–36)
MCV RBC AUTO: 79 FL (ref 82–98)
MONOCYTES # BLD AUTO: 0.1 K/UL (ref 0.3–1)
MONOCYTES NFR BLD: 3.6 % (ref 4–15)
NEUTROPHILS # BLD AUTO: 2 K/UL (ref 1.8–7.7)
NEUTROPHILS NFR BLD: 59.4 % (ref 38–73)
NRBC BLD-RTO: 0 /100 WBC
PLATELET # BLD AUTO: 225 K/UL (ref 150–350)
PMV BLD AUTO: 10.4 FL (ref 9.2–12.9)
RBC # BLD AUTO: 5.08 M/UL (ref 4.6–6.2)
SATURATED IRON: 14 % (ref 20–50)
TOTAL IRON BINDING CAPACITY: 426 UG/DL (ref 250–450)
TRANSFERRIN SERPL-MCNC: 288 MG/DL (ref 200–375)
WBC # BLD AUTO: 3.29 K/UL (ref 3.9–12.7)

## 2020-04-27 PROCEDURE — 83540 ASSAY OF IRON: CPT

## 2020-04-27 PROCEDURE — 81269 HBA1/HBA2 GENE DUP/DEL VRNTS: CPT

## 2020-04-27 PROCEDURE — 85025 COMPLETE CBC W/AUTO DIFF WBC: CPT

## 2020-04-27 PROCEDURE — 36415 COLL VENOUS BLD VENIPUNCTURE: CPT | Mod: PO

## 2020-04-27 PROCEDURE — 82728 ASSAY OF FERRITIN: CPT

## 2020-04-29 ENCOUNTER — OFFICE VISIT (OUTPATIENT)
Dept: HEMATOLOGY/ONCOLOGY | Facility: CLINIC | Age: 60
End: 2020-04-29
Payer: MEDICARE

## 2020-04-29 DIAGNOSIS — D72.819 LEUKOPENIA, UNSPECIFIED TYPE: Primary | ICD-10-CM

## 2020-04-29 DIAGNOSIS — D50.9 MICROCYTIC ANEMIA: ICD-10-CM

## 2020-04-29 PROCEDURE — 99442 PR PHYSICIAN TELEPHONE EVALUATION 11-20 MIN: CPT | Mod: 95,,, | Performed by: INTERNAL MEDICINE

## 2020-04-29 PROCEDURE — 99442 PR PHYSICIAN TELEPHONE EVALUATION 11-20 MIN: ICD-10-PCS | Mod: 95,,, | Performed by: INTERNAL MEDICINE

## 2020-04-29 NOTE — Clinical Note
Cbc,cmp prior to f/u Early JuneReferral to Dr. shubham LUTZ for DEE ( pt is a former pt of Dr. Luis)

## 2020-04-29 NOTE — PROGRESS NOTES
Subjective:       Patient ID: Russell Santos is a 59 y.o. male.    Chief Complaint: No chief complaint on file.    HPI     Established Patient - Audio Only Telehealth Visit     The patient location is: Home  The chief complaint leading to consultation is: Follow-up Neutropenia  Visit type: Virtual visit with audio only (telephone)  Visit time: 15 min     The reason for the audio only service rather than synchronous audio and video virtual visit was related to technical difficulties or patient preference/necessity.     Each patient to whom I provide medical services by telemedicine is:  (1) informed of the relationship between the physician and patient and the respective role of any other health care provider with respect to management of the patient; and (2) notified that they may decline to receive medical services by telemedicine and may withdraw from such care at any time. Patient verbally consented to receive this service via voice-only telephone call.    This service was not originating from a related E/M service provided within the previous 7 days nor will  to an E/M service or procedure within the next 24 hours or my soonest available appointment.  Prevailing standard of care was able to be met in this audio-only visit.           Diagnosis: Neutropenia  Referring Physician: Daphnie Torres NP     Russell Santos is a 59 y.o. AAM with history of , HTN, PE (2006)., thyroid malignancy, hyperlipidemia with type 2 diabetes mellitus, history of atrial fibrillation, cardiomyopathy, chronic pain, osteoarthritis of spine seen today in follow-up via televisit  for neutropenia.  Patient reports that he has had intermittent neutropenia for the past year.  He denies any history of recurrent infections.  He denies any new medications.  No fevers night sweats or weight loss.  Appetite stable.  No rashes.  No enlarged nodes.  .  He has chronic mild fatigue, stable he has chronic mild dyspnea on exertion stable. He ambulates  with assistance of cane.  No chest pain.  He is on chronic anticoagulation for atrial fibrillation.  He also has a history of a PE in 2006. He was recently prescribed iron supplementation B12 supplementation although he has not started taking the supplements.  CBC from 01/31 20/20 reveals a white blood cell count of 2600/mm 3 hemoglobin 11.8 grams/deciliter hematocrit of 38.7% MCV of 76 and a platelet count of 220. Limited records available       No new issues  Doing well  Appetite and weight stable  No SOB/CP/cough  No fatigue  No recent infections  No rashes  No fevers    Last Colonoscopy 2013-unremarkable      Past  medical history: Atrial fibrillation, Osteo Adriana arthritis, cardiomyopathy, low testosterone, opioid dependence, chronic pain, osteoarthritis of spine, chronic knee pain, personal history of malignant neoplasm of thyroid, calcified granuloma of lung, atherosclerosis of aorta, hyperlipidemia associated with type 2 diabetes mellitus        Past Surgical History:   Procedure Laterality Date    THYROIDECTOMY, PARTIAL  2006    TONSILLECTOMY      TREATMENT OF CARDIAC ARRHYTHMIA  9/16/2019    Procedure: Cardioversion or Defibrillation;  Surgeon: Deven Vasquez MD;  Location: [facility] CATH LAB;  Service: Cardiology;;             Review of Systems   Constitutional: Negative for appetite change, fatigue, fever and unexpected weight change.   Eyes: Negative for visual disturbance.   Respiratory: Negative for cough.    Cardiovascular: Negative for chest pain.   Gastrointestinal: Negative for abdominal pain and diarrhea.   Musculoskeletal: Negative for back pain.   Skin: Negative for rash.   Neurological: Negative for headaches.   Hematological: Negative for adenopathy.       Objective:       PE deferred   Televisit          Lab Results   Component Value Date    WBC 4.82 09/16/2019    HGB 12.6 (L) 09/16/2019    HCT 41.5 09/16/2019    MCV 76 (L) 09/16/2019     09/16/2019     Lab Results   Component  Value Date    IRON 59 04/27/2020    TIBC 426 04/27/2020    FERRITIN 42 04/27/2020     Lab Results   Component Value Date    WBC 3.29 (L) 04/27/2020    HGB 11.7 (L) 04/27/2020    HCT 40.2 04/27/2020    MCV 79 (L) 04/27/2020     04/27/2020       Lab Results   Component Value Date    IRON 59 04/27/2020    TIBC 426 04/27/2020    FERRITIN 42 04/27/2020             Assessment:       1. Leukopenia, unspecified type    2. Microcytic anemia        Plan:        Patient  is a 59-year-old multiple medical diagnoses as listed with the neutropenia and a mild anemia of unknown etiology.    Iron parameters reveal component  iron deficiency  Patient reports underwent a colonoscopy in 2013 ( next C-scope planned 2023), Dr. Castaneda  Plan referral to GI in future due to iron deficiency anemia for GI evaluation.    Hold off on referral due to COVD-19 19 outbreak  Anti granulocyte antibody testing negative  B12 and red blood cell folate within normal  SPEP within normal  Consider testing for co existing hemoglobinopathy in future    Revisited issue of bone marrow biopsy in the near future for further evaluation of possible underlying hematological malignancy  Pt uncertain and plans to discuss procedure further with his wife  Bone marrow biopsies delayed in setting of Covid 19 outbreak unless emergent     Pt with microcytic anemia with component of Fe def'  Plan Ambulatory Referral to [facility]  Start OTC Fe supp  Pt advised on potential drug-induced constipation    Follow-up 1month with cbc, Fe studies      .  All questions posed answered to patient's satisfaction        Cc: YFN Mejia II, MD

## 2020-05-01 LAB
ALPHA GLOBIN RELEASED BY: NORMAL
ALPHA-GLOBIN SPECIMEN: NORMAL
GENETICIST REVIEW: NORMAL
HBA1 GENE MUT ANL BLD/T: NORMAL
HBA1 GENE MUT ANL BLD/T: NORMAL
REF LAB TEST METHOD: NORMAL
SERVICE CMNT-IMP: NORMAL
SPECIMEN SOURCE: NORMAL

## 2020-05-28 ENCOUNTER — DOCUMENTATION ONLY (OUTPATIENT)
Dept: HEMATOLOGY/ONCOLOGY | Facility: CLINIC | Age: 60
End: 2020-05-28

## 2020-05-28 ENCOUNTER — TELEPHONE (OUTPATIENT)
Dept: HEMATOLOGY/ONCOLOGY | Facility: CLINIC | Age: 60
End: 2020-05-28

## 2020-05-29 ENCOUNTER — LAB VISIT (OUTPATIENT)
Dept: LAB | Facility: HOSPITAL | Age: 60
End: 2020-05-29
Attending: INTERNAL MEDICINE
Payer: MEDICARE

## 2020-05-29 DIAGNOSIS — D50.9 MICROCYTIC ANEMIA: ICD-10-CM

## 2020-05-29 LAB
BASOPHILS # BLD AUTO: 0.02 K/UL (ref 0–0.2)
BASOPHILS NFR BLD: 0.5 % (ref 0–1.9)
DIFFERENTIAL METHOD: ABNORMAL
EOSINOPHIL # BLD AUTO: 0 K/UL (ref 0–0.5)
EOSINOPHIL NFR BLD: 0.9 % (ref 0–8)
ERYTHROCYTE [DISTWIDTH] IN BLOOD BY AUTOMATED COUNT: 16.3 % (ref 11.5–14.5)
FERRITIN SERPL-MCNC: 147 NG/ML (ref 20–300)
HCT VFR BLD AUTO: 39.4 % (ref 40–54)
HGB BLD-MCNC: 11.7 G/DL (ref 14–18)
IMM GRANULOCYTES # BLD AUTO: 0.01 K/UL (ref 0–0.04)
IMM GRANULOCYTES NFR BLD AUTO: 0.2 % (ref 0–0.5)
IRON SERPL-MCNC: 23 UG/DL (ref 45–160)
LYMPHOCYTES # BLD AUTO: 1.2 K/UL (ref 1–4.8)
LYMPHOCYTES NFR BLD: 26.9 % (ref 18–48)
MCH RBC QN AUTO: 23.4 PG (ref 27–31)
MCHC RBC AUTO-ENTMCNC: 29.7 G/DL (ref 32–36)
MCV RBC AUTO: 79 FL (ref 82–98)
MONOCYTES # BLD AUTO: 0.2 K/UL (ref 0.3–1)
MONOCYTES NFR BLD: 3.7 % (ref 4–15)
NEUTROPHILS # BLD AUTO: 2.9 K/UL (ref 1.8–7.7)
NEUTROPHILS NFR BLD: 67.8 % (ref 38–73)
NRBC BLD-RTO: 0 /100 WBC
PLATELET # BLD AUTO: 247 K/UL (ref 150–350)
PMV BLD AUTO: 10.9 FL (ref 9.2–12.9)
RBC # BLD AUTO: 5 M/UL (ref 4.6–6.2)
SATURATED IRON: 6 % (ref 20–50)
TOTAL IRON BINDING CAPACITY: 369 UG/DL (ref 250–450)
TRANSFERRIN SERPL-MCNC: 249 MG/DL (ref 200–375)
WBC # BLD AUTO: 4.27 K/UL (ref 3.9–12.7)

## 2020-05-29 PROCEDURE — 85025 COMPLETE CBC W/AUTO DIFF WBC: CPT

## 2020-05-29 PROCEDURE — 82728 ASSAY OF FERRITIN: CPT

## 2020-05-29 PROCEDURE — 83540 ASSAY OF IRON: CPT

## 2020-05-29 PROCEDURE — 36415 COLL VENOUS BLD VENIPUNCTURE: CPT | Mod: PO

## 2020-06-02 ENCOUNTER — OFFICE VISIT (OUTPATIENT)
Dept: HEMATOLOGY/ONCOLOGY | Facility: CLINIC | Age: 60
End: 2020-06-02
Payer: MEDICARE

## 2020-06-02 DIAGNOSIS — D72.819 LEUKOPENIA, UNSPECIFIED TYPE: Primary | ICD-10-CM

## 2020-06-02 DIAGNOSIS — D56.3 ALPHA THALASSEMIA SILENT CARRIER: ICD-10-CM

## 2020-06-02 DIAGNOSIS — D50.9 MICROCYTIC ANEMIA: ICD-10-CM

## 2020-06-02 PROCEDURE — 99441 PR PHYSICIAN TELEPHONE EVALUATION 5-10 MIN: ICD-10-PCS | Mod: 95,,, | Performed by: INTERNAL MEDICINE

## 2020-06-02 PROCEDURE — 99441 PR PHYSICIAN TELEPHONE EVALUATION 5-10 MIN: CPT | Mod: 95,,, | Performed by: INTERNAL MEDICINE

## 2020-06-02 NOTE — PROGRESS NOTES
Subjective:       Patient ID: Russell Santos is a 59 y.o. male.    Chief Complaint: No chief complaint on file.    HPI     Established Patient - Audio Only Telehealth Visit     The patient location is: Home  The chief complaint leading to consultation is: Follow-up Neutropenia  Visit type: Virtual visit with audio only (telephone)  Visit time: 10 min     The reason for the audio only service rather than synchronous audio and video virtual visit was related to technical difficulties or patient preference/necessity.     Each patient to whom I provide medical services by telemedicine is:  (1) informed of the relationship between the physician and patient and the respective role of any other health care provider with respect to management of the patient; and (2) notified that they may decline to receive medical services by telemedicine and may withdraw from such care at any time. Patient verbally consented to receive this service via voice-only telephone call.    This service was not originating from a related E/M service provided within the previous 7 days nor will  to an E/M service or procedure within the next 24 hours or my soonest available appointment.  Prevailing standard of care was able to be met in this audio-only visit.           Diagnosis: Neutropenia  Referring Physician: Daphnie Torres NP     Russell Santos is a 59 y.o. AAM with history of , HTN, PE (2006)., thyroid malignancy, hyperlipidemia with type 2 diabetes mellitus, history of atrial fibrillation, cardiomyopathy, chronic pain, osteoarthritis of spine seen today in follow-up via televisit  for neutropenia.  Patient reports that he has had intermittent neutropenia for the past year.  He denies any history of recurrent infections.  He denies any new medications.  No fevers night sweats or weight loss.  Appetite stable.  No rashes.  No enlarged nodes.  .  He has chronic mild fatigue, stable he has chronic mild dyspnea on exertion stable. He ambulates  "with assistance of cane.  No chest pain.  He is on chronic anticoagulation for atrial fibrillation.  He also has a history of a PE in 2006. He was recently prescribed iron supplementation B12 supplementation although he has not started taking the supplements.  CBC from 01/31 20/20 reveals a white blood cell count of 2600/mm 3 hemoglobin 11.8 grams/deciliter hematocrit of 38.7% MCV of 76 and a platelet count of 220. Limited records available       He recently visited [facility] for insect bite  He received steroid injections  He reports a " bump and swelling on head" has improved  Appetite and weight stable  No SOB  No cough/CP   No fatigue  No recent infections  No rashes  No fevers    Last Colonoscopy 2013-unremarkable      Past  medical history: Atrial fibrillation, Osteo Adriana arthritis, cardiomyopathy, low testosterone, opioid dependence, chronic pain, osteoarthritis of spine, chronic knee pain, personal history of malignant neoplasm of thyroid, calcified granuloma of lung, atherosclerosis of aorta, hyperlipidemia associated with type 2 diabetes mellitus        Past Surgical History:   Procedure Laterality Date    THYROIDECTOMY, PARTIAL  2006    TONSILLECTOMY      TREATMENT OF CARDIAC ARRHYTHMIA  9/16/2019    Procedure: Cardioversion or Defibrillation;  Surgeon: Deven Vasquez MD;  Location: [facility] CATH LAB;  Service: Cardiology;;             Review of Systems   Constitutional: Negative for appetite change, fatigue, fever and unexpected weight change.   Eyes: Negative for visual disturbance.   Respiratory: Negative for cough.    Cardiovascular: Negative for chest pain.   Gastrointestinal: Negative for abdominal pain and diarrhea.   Musculoskeletal: Negative for back pain.   Skin: Negative for rash.   Neurological: Negative for headaches.   Hematological: Negative for adenopathy.       Objective:       PE deferred   Televisit          Lab Results   Component Value Date    WBC 4.82 09/16/2019    HGB 12.6 (L) " 09/16/2019    HCT 41.5 09/16/2019    MCV 76 (L) 09/16/2019     09/16/2019     Lab Results   Component Value Date    IRON 23 (L) 05/29/2020    TIBC 369 05/29/2020    FERRITIN 147 05/29/2020     Lab Results   Component Value Date    WBC 3.29 (L) 04/27/2020    HGB 11.7 (L) 04/27/2020    HCT 40.2 04/27/2020    MCV 79 (L) 04/27/2020     04/27/2020       Lab Results   Component Value Date    IRON 23 (L) 05/29/2020    TIBC 369 05/29/2020    FERRITIN 147 05/29/2020       A single alpha globin gene is deleted (-3.7 Kb,   &quot;rightward&quot;) on one chromosome.   This result indicates that this individual is a silent   alpha thalassemia carrier    Assessment:       1. Leukopenia, unspecified type    2. Microcytic anemia    3. Alpha thalassemia silent carrier        Plan:        1-3 Patient  is a 59-year-old multiple medical diagnoses as listed with the neutropenia and a mild anemia of unknown etiology.    Iron parameters reveal component  iron deficiency  Patient reports underwent a colonoscopy in 2013 ( next C-scope planned 2023), Dr. Castaneda  Follow-up with [facility] for iron deficiency anemia for GI evaluation.    Anti granulocyte antibody testing negative  B12 and red blood cell folate within normal  SPEP within normal  Testing for co existing hemoglobinopathy reveals silent alpha thal carrier. Discussed significance of findings.     WBC ct now normalized  Hold off on bmbx       Pt with microcytic anemia with component of Fe def' ( Fe def and underlying hemoglobinopathy)   Follow-up with  [facility] planned 6/16/20  Cont  OTC Fe supp 2x wk as tolerated   Pt advised on potential drug-induced constipation    Follow-up 2 month with cbc, Fe studies      .  All questions posed answered to patient's satisfaction        Cc: YFN Mejia II, MD

## 2020-07-30 ENCOUNTER — LAB VISIT (OUTPATIENT)
Dept: LAB | Facility: HOSPITAL | Age: 60
End: 2020-07-30
Attending: INTERNAL MEDICINE
Payer: MEDICARE

## 2020-07-30 DIAGNOSIS — D50.9 MICROCYTIC ANEMIA: ICD-10-CM

## 2020-07-30 LAB
BASOPHILS # BLD AUTO: 0.04 K/UL (ref 0–0.2)
BASOPHILS NFR BLD: 1.3 % (ref 0–1.9)
DIFFERENTIAL METHOD: ABNORMAL
EOSINOPHIL # BLD AUTO: 0.1 K/UL (ref 0–0.5)
EOSINOPHIL NFR BLD: 1.9 % (ref 0–8)
ERYTHROCYTE [DISTWIDTH] IN BLOOD BY AUTOMATED COUNT: 17.2 % (ref 11.5–14.5)
FERRITIN SERPL-MCNC: 40 NG/ML (ref 20–300)
HCT VFR BLD AUTO: 40.5 % (ref 40–54)
HGB BLD-MCNC: 12.1 G/DL (ref 14–18)
IMM GRANULOCYTES # BLD AUTO: 0.01 K/UL (ref 0–0.04)
IMM GRANULOCYTES NFR BLD AUTO: 0.3 % (ref 0–0.5)
IRON SERPL-MCNC: 48 UG/DL (ref 45–160)
LYMPHOCYTES # BLD AUTO: 1.2 K/UL (ref 1–4.8)
LYMPHOCYTES NFR BLD: 37.4 % (ref 18–48)
MCH RBC QN AUTO: 23.4 PG (ref 27–31)
MCHC RBC AUTO-ENTMCNC: 29.9 G/DL (ref 32–36)
MCV RBC AUTO: 78 FL (ref 82–98)
MONOCYTES # BLD AUTO: 0.1 K/UL (ref 0.3–1)
MONOCYTES NFR BLD: 3.5 % (ref 4–15)
NEUTROPHILS # BLD AUTO: 1.7 K/UL (ref 1.8–7.7)
NEUTROPHILS NFR BLD: 55.6 % (ref 38–73)
NRBC BLD-RTO: 0 /100 WBC
PLATELET # BLD AUTO: 226 K/UL (ref 150–350)
PMV BLD AUTO: 10.6 FL (ref 9.2–12.9)
RBC # BLD AUTO: 5.18 M/UL (ref 4.6–6.2)
SATURATED IRON: 12 % (ref 20–50)
TOTAL IRON BINDING CAPACITY: 410 UG/DL (ref 250–450)
TRANSFERRIN SERPL-MCNC: 277 MG/DL (ref 200–375)
WBC # BLD AUTO: 3.13 K/UL (ref 3.9–12.7)

## 2020-07-30 PROCEDURE — 85025 COMPLETE CBC W/AUTO DIFF WBC: CPT

## 2020-07-30 PROCEDURE — 83540 ASSAY OF IRON: CPT

## 2020-07-30 PROCEDURE — 36415 COLL VENOUS BLD VENIPUNCTURE: CPT | Mod: PO

## 2020-07-30 PROCEDURE — 82728 ASSAY OF FERRITIN: CPT

## 2020-08-03 ENCOUNTER — OFFICE VISIT (OUTPATIENT)
Dept: HEMATOLOGY/ONCOLOGY | Facility: CLINIC | Age: 60
End: 2020-08-03
Payer: MEDICARE

## 2020-08-03 VITALS
TEMPERATURE: 100 F | WEIGHT: 315 LBS | DIASTOLIC BLOOD PRESSURE: 88 MMHG | BODY MASS INDEX: 45.1 KG/M2 | SYSTOLIC BLOOD PRESSURE: 168 MMHG | HEIGHT: 70 IN | OXYGEN SATURATION: 95 % | HEART RATE: 95 BPM

## 2020-08-03 DIAGNOSIS — D72.819 LEUKOPENIA, UNSPECIFIED TYPE: Primary | ICD-10-CM

## 2020-08-03 DIAGNOSIS — D64.9 ANEMIA, UNSPECIFIED TYPE: ICD-10-CM

## 2020-08-03 DIAGNOSIS — D56.3 ALPHA THALASSEMIA SILENT CARRIER: ICD-10-CM

## 2020-08-03 DIAGNOSIS — I10 ESSENTIAL HYPERTENSION: ICD-10-CM

## 2020-08-03 PROCEDURE — 99999 PR PBB SHADOW E&M-EST. PATIENT-LVL IV: CPT | Mod: PBBFAC,,, | Performed by: INTERNAL MEDICINE

## 2020-08-03 PROCEDURE — 3077F PR MOST RECENT SYSTOLIC BLOOD PRESSURE >= 140 MM HG: ICD-10-PCS | Mod: CPTII,S$GLB,, | Performed by: INTERNAL MEDICINE

## 2020-08-03 PROCEDURE — 99214 OFFICE O/P EST MOD 30 MIN: CPT | Mod: S$GLB,,, | Performed by: INTERNAL MEDICINE

## 2020-08-03 PROCEDURE — 3079F DIAST BP 80-89 MM HG: CPT | Mod: CPTII,S$GLB,, | Performed by: INTERNAL MEDICINE

## 2020-08-03 PROCEDURE — 99999 PR PBB SHADOW E&M-EST. PATIENT-LVL IV: ICD-10-PCS | Mod: PBBFAC,,, | Performed by: INTERNAL MEDICINE

## 2020-08-03 PROCEDURE — 99214 PR OFFICE/OUTPT VISIT, EST, LEVL IV, 30-39 MIN: ICD-10-PCS | Mod: S$GLB,,, | Performed by: INTERNAL MEDICINE

## 2020-08-03 PROCEDURE — 3008F BODY MASS INDEX DOCD: CPT | Mod: CPTII,S$GLB,, | Performed by: INTERNAL MEDICINE

## 2020-08-03 PROCEDURE — 3079F PR MOST RECENT DIASTOLIC BLOOD PRESSURE 80-89 MM HG: ICD-10-PCS | Mod: CPTII,S$GLB,, | Performed by: INTERNAL MEDICINE

## 2020-08-03 PROCEDURE — 3008F PR BODY MASS INDEX (BMI) DOCUMENTED: ICD-10-PCS | Mod: CPTII,S$GLB,, | Performed by: INTERNAL MEDICINE

## 2020-08-03 PROCEDURE — 3077F SYST BP >= 140 MM HG: CPT | Mod: CPTII,S$GLB,, | Performed by: INTERNAL MEDICINE

## 2020-08-03 NOTE — PROGRESS NOTES
Subjective:       Patient ID: Russell Santos is a 59 y.o. male.    Chief Complaint: No chief complaint on file.    HPI        Diagnosis: Neutropenia       Russell Santos is a 59 y.o. AAM with history of , HTN, PE (2006)., thyroid malignancy, hyperlipidemia with type 2 diabetes mellitus, history of atrial fibrillation, cardiomyopathy, chronic pain, osteoarthritis of spine seen today in follow-up   for neutropenia.  Patient reports that he has had intermittent neutropenia for the past year.  He denies any history of recurrent infections.  He denies any new medications.  No fevers night sweats or weight loss.  Appetite stable.  No rashes.  No enlarged nodes.  .  He has chronic mild fatigue, stable he has chronic mild dyspnea on exertion stable. He ambulates with assistance of cane.  No chest pain.  He is on chronic anticoagulation for atrial fibrillation.  He also has a history of a PE in 2006. He was recently prescribed iron supplementation B12 supplementation although he has not started taking the supplements.  CBC from 01/31 20/20 reveals a white blood cell count of 2600/mm 3 hemoglobin 11.8 grams/deciliter hematocrit of 38.7% MCV of 76 and a platelet count of 220. Limited records available       He forgot to take BP med today       Appetite and weight stable  No SOB  No cough/CP   No fatigue  No recent infections  No rashes  No fevers    Followed by Dr. Tonya Oleary  Colonoscopy and EGD planned in near future   Last Colonoscopy 2013-unremarkable      Past  medical history: Atrial fibrillation, Osteo Adriana arthritis, cardiomyopathy, low testosterone, opioid dependence, chronic pain, osteoarthritis of spine, chronic knee pain, personal history of malignant neoplasm of thyroid, calcified granuloma of lung, atherosclerosis of aorta, hyperlipidemia associated with type 2 diabetes mellitus        Past Surgical History:   Procedure Laterality Date    THYROIDECTOMY, PARTIAL  2006    TONSILLECTOMY      TREATMENT OF  "CARDIAC ARRHYTHMIA  9/16/2019    Procedure: Cardioversion or Defibrillation;  Surgeon: Deven Vasquez MD;  Location: [facility] CATH LAB;  Service: Cardiology;;             Review of Systems   Constitutional: Negative for appetite change, fatigue, fever and unexpected weight change.   Eyes: Negative for visual disturbance.   Respiratory: Negative for cough.    Cardiovascular: Negative for chest pain.   Gastrointestinal: Negative for abdominal pain and diarrhea.   Musculoskeletal: Negative for back pain.   Skin: Negative for rash.   Neurological: Negative for headaches.   Hematological: Negative for adenopathy.       Objective:       Vitals:    08/03/20 1408   BP: (!) 168/88 ( did not take BP meds)    BP Location: Left arm   Patient Position: Sitting   BP Method: Large (Manual)   Pulse: 95   Temp: 99.5 °F (37.5 °C)   TempSrc: Oral   SpO2: 95%   Weight: (!) 146.9 kg (323 lb 13.7 oz)   Height: 5' 9.5" (1.765 m)       Physical Exam   Constitutional: She is oriented to person, place, and time. She appears well-developed and well-nourished.   HENT:   Head: Normocephalic.   Mouth/Throat: Oropharynx is clear and moist. No oropharyngeal exudate.   Eyes: Conjunctivae and lids are normal. Pupils are equal, round, and reactive to light. No scleral icterus.   Neck: Normal range of motion. Neck supple. No thyromegaly present.   Cardiovascular: Normal rate, regular rhythm and normal heart sounds.    No murmur heard.  Pulmonary/Chest: Breath sounds normal. She has no wheezes. She has no rales.   Abdominal: Soft. Bowel sounds are normal. She exhibits no distension and no mass. There is no hepatosplenomegaly. There is no tenderness. There is no rebound and no guarding.   Musculoskeletal: Normal range of motion. She exhibits no edema and no tenderness.   Lymphadenopathy:     She has no cervical adenopathy.     She has no axillary adenopathy.        Right: No supraclavicular adenopathy present.        Left: No supraclavicular adenopathy " present.   Neurological: She is alert and oriented to person, place, and time. No cranial nerve deficit. Coordination normal.   Skin: Skin is warm and dry. No ecchymosis, no petechiae and no rash noted. No erythema.   Psychiatric: She has a normal mood and affect.           Lab Results   Component Value Date    WBC 3.13 (L) 07/30/2020    HGB 12.1 (L) 07/30/2020    HCT 40.5 07/30/2020    MCV 78 (L) 07/30/2020     07/30/2020           Lab Results   Component Value Date    WBC 4.82 09/16/2019    HGB 12.6 (L) 09/16/2019    HCT 41.5 09/16/2019    MCV 76 (L) 09/16/2019     09/16/2019     Lab Results   Component Value Date    IRON 48 07/30/2020    TIBC 410 07/30/2020    FERRITIN 40 07/30/2020     Lab Results   Component Value Date    WBC 3.29 (L) 04/27/2020    HGB 11.7 (L) 04/27/2020    HCT 40.2 04/27/2020    MCV 79 (L) 04/27/2020     04/27/2020     ANC 1700    Lab Results   Component Value Date    IRON 48 07/30/2020    TIBC 410 07/30/2020    FERRITIN 40 07/30/2020       A single alpha globin gene is deleted (-3.7 Kb,   &quot;rightward&quot;) on one chromosome.   This result indicates that this individual is a silent   alpha thalassemia carrier    Assessment:       1. Leukopenia, unspecified type    2. Alpha thalassemia silent carrier    3. Essential hypertension        Plan:        1-2  Patient  is a 59-year-old multiple medical diagnoses as listed with the neutropenia and a mild anemia of unknown etiology.    Iron parameters reveal component  iron deficiency  Patient reports underwent a colonoscopy in 2013 ( next C-scope planned 2023), Dr. Castaneda  Follow-up with [facility] for planned GI evaluation   Anti granulocyte antibody testing negative  B12 and red blood cell folate within normal  SPEP within normal  SHELLY neg   Testing for co existing hemoglobinopathy reveals silent alpha thal carrier. Discussed significance of findings.     WBC ct 3130/mm3 with ANC 1700  Discussed bmbx  Pt elects to hold off  until GI eval complete     Pt with microcytic anemia with component of Fe def' ( Fe def and underlying hemoglobinopathy)   Follow-up with  [facility] , GI evaluation planned   Cont  OTC Fe supp 2x wk as tolerated   Pt advised on potential drug-induced constipation  Pt taking OTC stool softeners     3. Pt advised on compliance with BP meds  Follow-up with PCP    Follow-up 2 month with cbc, Fe studies      .  All questions posed answered to patient's satisfaction        Cc: YFN Mejia II, MD

## 2020-08-25 ENCOUNTER — PATIENT OUTREACH (OUTPATIENT)
Dept: ADMINISTRATIVE | Facility: HOSPITAL | Age: 60
End: 2020-08-25

## 2020-08-25 RX ORDER — ERGOCALCIFEROL 1.25 MG/1
50000 CAPSULE ORAL
COMMUNITY
Start: 2020-05-27

## 2020-08-25 RX ORDER — EPINEPHRINE 0.3 MG/.3ML
INJECTION SUBCUTANEOUS
COMMUNITY
Start: 2020-07-22

## 2020-08-25 RX ORDER — TRIAMCINOLONE ACETONIDE 5 MG/G
CREAM TOPICAL
COMMUNITY
Start: 2020-05-29

## 2020-08-25 RX ORDER — HYDROCODONE BITARTRATE AND ACETAMINOPHEN 10; 325 MG/1; MG/1
TABLET ORAL
COMMUNITY
Start: 2020-07-22

## 2020-08-25 RX ORDER — POLYETHYLENE GLYCOL-3350 AND ELECTROLYTES 236; 6.74; 5.86; 2.97; 22.74 G/274.31G; G/274.31G; G/274.31G; G/274.31G; G/274.31G
POWDER, FOR SOLUTION ORAL
COMMUNITY
Start: 2020-07-15 | End: 2022-08-08

## 2020-10-06 ENCOUNTER — LAB VISIT (OUTPATIENT)
Dept: LAB | Facility: HOSPITAL | Age: 60
End: 2020-10-06
Attending: INTERNAL MEDICINE
Payer: MEDICARE

## 2020-10-06 DIAGNOSIS — D72.819 LEUKOPENIA, UNSPECIFIED TYPE: ICD-10-CM

## 2020-10-06 DIAGNOSIS — D64.9 ANEMIA, UNSPECIFIED TYPE: ICD-10-CM

## 2020-10-06 LAB
ALBUMIN SERPL BCP-MCNC: 4.1 G/DL (ref 3.5–5.2)
ALP SERPL-CCNC: 48 U/L (ref 55–135)
ALT SERPL W/O P-5'-P-CCNC: 10 U/L (ref 10–44)
ANION GAP SERPL CALC-SCNC: 8 MMOL/L (ref 8–16)
AST SERPL-CCNC: 15 U/L (ref 10–40)
BASOPHILS # BLD AUTO: 0.03 K/UL (ref 0–0.2)
BASOPHILS NFR BLD: 1.1 % (ref 0–1.9)
BILIRUB SERPL-MCNC: 0.8 MG/DL (ref 0.1–1)
BUN SERPL-MCNC: 8 MG/DL (ref 6–20)
CALCIUM SERPL-MCNC: 8.8 MG/DL (ref 8.7–10.5)
CHLORIDE SERPL-SCNC: 105 MMOL/L (ref 95–110)
CO2 SERPL-SCNC: 27 MMOL/L (ref 23–29)
CREAT SERPL-MCNC: 0.6 MG/DL (ref 0.5–1.4)
DIFFERENTIAL METHOD: ABNORMAL
EOSINOPHIL # BLD AUTO: 0.1 K/UL (ref 0–0.5)
EOSINOPHIL NFR BLD: 3 % (ref 0–8)
ERYTHROCYTE [DISTWIDTH] IN BLOOD BY AUTOMATED COUNT: 18.6 % (ref 11.5–14.5)
EST. GFR  (AFRICAN AMERICAN): >60 ML/MIN/1.73 M^2
EST. GFR  (NON AFRICAN AMERICAN): >60 ML/MIN/1.73 M^2
FERRITIN SERPL-MCNC: 85 NG/ML (ref 20–300)
GLUCOSE SERPL-MCNC: 109 MG/DL (ref 70–110)
HCT VFR BLD AUTO: 34.8 % (ref 40–54)
HGB BLD-MCNC: 10.3 G/DL (ref 14–18)
IMM GRANULOCYTES # BLD AUTO: 0 K/UL (ref 0–0.04)
IMM GRANULOCYTES NFR BLD AUTO: 0 % (ref 0–0.5)
IRON SERPL-MCNC: 54 UG/DL (ref 45–160)
LYMPHOCYTES # BLD AUTO: 1 K/UL (ref 1–4.8)
LYMPHOCYTES NFR BLD: 37.5 % (ref 18–48)
MCH RBC QN AUTO: 23.8 PG (ref 27–31)
MCHC RBC AUTO-ENTMCNC: 29.6 G/DL (ref 32–36)
MCV RBC AUTO: 80 FL (ref 82–98)
MONOCYTES # BLD AUTO: 0.1 K/UL (ref 0.3–1)
MONOCYTES NFR BLD: 3.8 % (ref 4–15)
NEUTROPHILS # BLD AUTO: 1.4 K/UL (ref 1.8–7.7)
NEUTROPHILS NFR BLD: 54.6 % (ref 38–73)
NRBC BLD-RTO: 0 /100 WBC
PLATELET # BLD AUTO: 261 K/UL (ref 150–350)
PMV BLD AUTO: 10.1 FL (ref 9.2–12.9)
POTASSIUM SERPL-SCNC: 3.9 MMOL/L (ref 3.5–5.1)
PROT SERPL-MCNC: 7.5 G/DL (ref 6–8.4)
RBC # BLD AUTO: 4.33 M/UL (ref 4.6–6.2)
SATURATED IRON: 14 % (ref 20–50)
SODIUM SERPL-SCNC: 140 MMOL/L (ref 136–145)
TOTAL IRON BINDING CAPACITY: 385 UG/DL (ref 250–450)
TRANSFERRIN SERPL-MCNC: 260 MG/DL (ref 200–375)
WBC # BLD AUTO: 2.64 K/UL (ref 3.9–12.7)

## 2020-10-06 PROCEDURE — 36415 COLL VENOUS BLD VENIPUNCTURE: CPT | Mod: PO

## 2020-10-06 PROCEDURE — 80053 COMPREHEN METABOLIC PANEL: CPT

## 2020-10-06 PROCEDURE — 83540 ASSAY OF IRON: CPT

## 2020-10-06 PROCEDURE — 85025 COMPLETE CBC W/AUTO DIFF WBC: CPT

## 2020-10-06 PROCEDURE — 82728 ASSAY OF FERRITIN: CPT

## 2020-10-08 ENCOUNTER — TELEPHONE (OUTPATIENT)
Dept: HEMATOLOGY/ONCOLOGY | Facility: CLINIC | Age: 60
End: 2020-10-08

## 2020-10-08 ENCOUNTER — OFFICE VISIT (OUTPATIENT)
Dept: HEMATOLOGY/ONCOLOGY | Facility: CLINIC | Age: 60
End: 2020-10-08
Payer: MEDICARE

## 2020-10-08 DIAGNOSIS — D72.819 LEUKOPENIA, UNSPECIFIED TYPE: Primary | ICD-10-CM

## 2020-10-08 DIAGNOSIS — D56.3 ALPHA THALASSEMIA SILENT CARRIER: ICD-10-CM

## 2020-10-08 DIAGNOSIS — D64.9 ANEMIA, UNSPECIFIED TYPE: ICD-10-CM

## 2020-10-08 PROCEDURE — 99214 OFFICE O/P EST MOD 30 MIN: CPT | Mod: 95,,, | Performed by: INTERNAL MEDICINE

## 2020-10-08 PROCEDURE — 99214 PR OFFICE/OUTPT VISIT, EST, LEVL IV, 30-39 MIN: ICD-10-PCS | Mod: 95,,, | Performed by: INTERNAL MEDICINE

## 2020-10-08 NOTE — TELEPHONE ENCOUNTER
"----- Message from Thien Sales sent at 10/8/2020 11:29 AM CDT -----  Consult/Advisory:    Name Of Caller: Russell   Contact Preference?: [phone]    What is the nature of the call?:  Pt would like to change 3 PM appointment to a virtual visit per not feeling well  Please contact to discuss    Additional Notes:  "Thank you for all that you do for our patients'"           "

## 2020-10-08 NOTE — TELEPHONE ENCOUNTER
"----- Message from Thien Sales sent at 10/8/2020 11:29 AM CDT -----  Consult/Advisory:    Name Of Caller: Russell   Contact Preference?: [phone]    What is the nature of the call?:  Pt would like to change 3 PM appointment to a virtual visit per not feeling well  Please contact to discuss    Additional Notes:  "Thank you for all that you do for our patients'"           "

## 2020-10-08 NOTE — PROGRESS NOTES
Subjective:        The patient location is: Home  The chief complaint leading to consultation is: Neutropenia     Visit type: audiovisual    Face to Face time with patient: 17 min via [employer]  25  minutes of total time spent on the encounter, which includes face to face time and non-face to face time preparing to see the patient (eg, review of tests), Obtaining and/or reviewing separately obtained history, Documenting clinical information in the electronic or other health record, Independently interpreting results (not separately reported) and communicating results to the patient/family/caregiver, or Care coordination (not separately reported).         Each patient to whom he or she provides medical services by telemedicine is:  (1) informed of the relationship between the physician and patient and the respective role of any other health care provider with respect to management of the patient; and (2) notified that he or she may decline to receive medical services by telemedicine and may withdraw from such care at any time.       Patient ID: Russell Santos is a 60 y.o. male.    Chief Complaint: No chief complaint on file.    HPI        Diagnosis: Neutropenia       Russell Santos is a 59 y.o. AAM with history of , HTN, PE (2006)., thyroid malignancy, hyperlipidemia with type 2 diabetes mellitus, history of atrial fibrillation, cardiomyopathy, chronic pain, osteoarthritis of spine seen today in follow-up   for neutropenia.  Patient reports that he has had intermittent neutropenia for the past year.  He denies any history of recurrent infections.  He denies any new medications.  No fevers night sweats or weight loss.  Appetite stable.  No rashes.  No enlarged nodes.  .  He has chronic mild fatigue, stable he has chronic mild dyspnea on exertion stable. He ambulates with assistance of cane.  No chest pain.  He is on chronic anticoagulation for atrial fibrillation.  He also has a history of a PE in 2006. He was recently  prescribed iron supplementation B12 supplementation although he has not started taking the supplements.  CBC from 01/31 20/20 reveals a white blood cell count of 2600/mm 3 hemoglobin 11.8 grams/deciliter hematocrit of 38.7% MCV of 76 and a platelet count of 220. Limited records available           Appetite and weight stable  No SOB/ cough/CP   No fatigue  No recent infections  No rashes  No weakness  No melena, hematochezia or change in bowel habits      Followed by [facility], Dr. Castaneda  Colonoscopy and EGD planned in near future   Last Colonoscopy 2013-unremarkable      Past  medical history: Atrial fibrillation, Osteo Adriana arthritis, cardiomyopathy, low testosterone, opioid dependence, chronic pain, osteoarthritis of spine, chronic knee pain, personal history of malignant neoplasm of thyroid, calcified granuloma of lung, atherosclerosis of aorta, hyperlipidemia associated with type 2 diabetes mellitus        Past Surgical History:   Procedure Laterality Date    THYROIDECTOMY, PARTIAL  2006    TONSILLECTOMY      TREATMENT OF CARDIAC ARRHYTHMIA  9/16/2019    Procedure: Cardioversion or Defibrillation;  Surgeon: Deven Vasquez MD;  Location: [facility] CATH LAB;  Service: Cardiology;;             Review of Systems   Constitutional: Negative for appetite change, fatigue, fever and unexpected weight change.   Eyes: Negative for visual disturbance.   Respiratory: Negative for cough.    Cardiovascular: Negative for chest pain.   Gastrointestinal: Negative for abdominal pain and diarrhea.   Musculoskeletal: Negative for back pain.   Skin: Negative for rash.   Neurological: Negative for headaches.   Hematological: Negative for adenopathy.       Objective:     Televisit           Lab Results   Component Value Date    WBC 2.64 (L) 10/06/2020    HGB 10.3 (L) 10/06/2020    HCT 34.8 (L) 10/06/2020    MCV 80 (L) 10/06/2020     10/06/2020           Lab Results   Component Value Date    WBC 4.82 09/16/2019    HGB 12.6  (L) 09/16/2019    HCT 41.5 09/16/2019    MCV 76 (L) 09/16/2019     09/16/2019     Lab Results   Component Value Date    IRON 54 10/06/2020    TIBC 385 10/06/2020    FERRITIN 85 10/06/2020     Lab Results   Component Value Date    WBC 3.29 (L) 04/27/2020    HGB 11.7 (L) 04/27/2020    HCT 40.2 04/27/2020    MCV 79 (L) 04/27/2020     04/27/2020         Lab Results   Component Value Date    IRON 54 10/06/2020    TIBC 385 10/06/2020    FERRITIN 85 10/06/2020       A single alpha globin gene is deleted (-3.7 Kb,   &quot;rightward&quot;) on one chromosome.   This result indicates that this individual is a silent   alpha thalassemia carrier    Assessment:       1. Leukopenia, unspecified type    2. Anemia, unspecified type    3. Alpha thalassemia silent carrier        Plan:        1-3  Patient  is a 59-year-old multiple medical diagnoses as listed with the neutropenia and a mild anemia of unknown etiology.    Iron parameters reveal component  iron deficiency  Patient reports underwent a colonoscopy in 2013 ( next C-scope planned 2023), Dr. Castaneda  Follow-up with [facility] for planned GI evaluation   Anti granulocyte antibody testing negative  B12 and red blood cell folate within normal  SPEP within normal  SHELLY neg   Testing for co existing hemoglobinopathy reveals silent alpha thal carrier. Discussed significance of findings.     WBC ct  2640/mm3 with ANC 1400    Discussed bmbx       Pt with microcytic anemia with component of Fe def' ( Fe def and underlying hemoglobinopathy)   Followed by [facility]  GI eval- no bleeding source  Discussed bmbx for further eval     Pt advised on potential drug-induced constipation  Pt taking OTC stool softeners       PLAN BONE MARROW BIOPSY   FOLLOW-UP FOLLOWING BMBX      .  All questions posed answered to patient's satisfaction        Cc: YFN Mejia II, MD

## 2020-10-12 ENCOUNTER — TELEPHONE (OUTPATIENT)
Dept: HEMATOLOGY/ONCOLOGY | Facility: CLINIC | Age: 60
End: 2020-10-12

## 2020-10-12 NOTE — TELEPHONE ENCOUNTER
Received call from pt. Would like a call back from Dr. Marquez. Said he had an umbilical hernia repair. Would like to talk with Dr. Marquez to narrow down his problem concerning infection in his body. Will forward message to Dr. Pagan.  ~June

## 2020-11-02 ENCOUNTER — TELEPHONE (OUTPATIENT)
Dept: HEMATOLOGY/ONCOLOGY | Facility: CLINIC | Age: 60
End: 2020-11-02

## 2020-11-02 NOTE — TELEPHONE ENCOUNTER
Patient called and stated he stopped Xarelto for BM biopsy tomorrow. Patient states he had a bad headache yesterday and took some excedrin. Patient wants to know if BM biopsy should be rescheduled and if so can patient go back on Xarelto because he complains of having a blood clot in neck that is painful and causing discomfort.

## 2020-11-03 NOTE — TELEPHONE ENCOUNTER
Spoke with Mr. Santos and advised ok to take Xarelto again. I also scheduled patient for one month follow up with labs. Mailing appt reminder to patient. Patient thanked me for my call.

## 2020-12-02 ENCOUNTER — LAB VISIT (OUTPATIENT)
Dept: LAB | Facility: HOSPITAL | Age: 60
End: 2020-12-02
Attending: INTERNAL MEDICINE
Payer: MEDICARE

## 2020-12-02 DIAGNOSIS — D64.9 ANEMIA, UNSPECIFIED TYPE: ICD-10-CM

## 2020-12-02 DIAGNOSIS — D72.819 LEUKOPENIA, UNSPECIFIED TYPE: ICD-10-CM

## 2020-12-02 LAB
ALBUMIN SERPL BCP-MCNC: 4 G/DL (ref 3.5–5.2)
ALP SERPL-CCNC: 52 U/L (ref 55–135)
ALT SERPL W/O P-5'-P-CCNC: 13 U/L (ref 10–44)
ANION GAP SERPL CALC-SCNC: 8 MMOL/L (ref 8–16)
AST SERPL-CCNC: 17 U/L (ref 10–40)
BILIRUB SERPL-MCNC: 0.5 MG/DL (ref 0.1–1)
BUN SERPL-MCNC: 11 MG/DL (ref 6–20)
CALCIUM SERPL-MCNC: 9.1 MG/DL (ref 8.7–10.5)
CHLORIDE SERPL-SCNC: 105 MMOL/L (ref 95–110)
CO2 SERPL-SCNC: 30 MMOL/L (ref 23–29)
CREAT SERPL-MCNC: 0.6 MG/DL (ref 0.5–1.4)
EST. GFR  (AFRICAN AMERICAN): >60 ML/MIN/1.73 M^2
EST. GFR  (NON AFRICAN AMERICAN): >60 ML/MIN/1.73 M^2
GLUCOSE SERPL-MCNC: 112 MG/DL (ref 70–110)
POTASSIUM SERPL-SCNC: 4.6 MMOL/L (ref 3.5–5.1)
PROT SERPL-MCNC: 7.8 G/DL (ref 6–8.4)
SODIUM SERPL-SCNC: 143 MMOL/L (ref 136–145)

## 2020-12-02 PROCEDURE — 36415 COLL VENOUS BLD VENIPUNCTURE: CPT | Mod: PO

## 2020-12-02 PROCEDURE — 80053 COMPREHEN METABOLIC PANEL: CPT

## 2020-12-02 NOTE — ASSESSMENT & PLAN NOTE
· BG in acceptable range at this time  · Maintain w/ subcutaneous insulin management order set  · Hold oral diabetic meds  · ADA 1800 kcal diet  · BG goal while in patient is <180mg/dL  · HgA1c = Pending     Methotrexate Pregnancy And Lactation Text: This medication is Pregnancy Category X and is known to cause fetal harm. This medication is excreted in breast milk.

## 2020-12-03 ENCOUNTER — OFFICE VISIT (OUTPATIENT)
Dept: HEMATOLOGY/ONCOLOGY | Facility: CLINIC | Age: 60
End: 2020-12-03
Payer: MEDICARE

## 2020-12-03 VITALS
WEIGHT: 315 LBS | OXYGEN SATURATION: 99 % | DIASTOLIC BLOOD PRESSURE: 95 MMHG | TEMPERATURE: 99 F | HEIGHT: 70 IN | SYSTOLIC BLOOD PRESSURE: 146 MMHG | BODY MASS INDEX: 45.1 KG/M2 | HEART RATE: 69 BPM

## 2020-12-03 DIAGNOSIS — I10 ESSENTIAL HYPERTENSION: ICD-10-CM

## 2020-12-03 DIAGNOSIS — D56.3 ALPHA THALASSEMIA SILENT CARRIER: ICD-10-CM

## 2020-12-03 DIAGNOSIS — D64.9 ANEMIA, UNSPECIFIED TYPE: ICD-10-CM

## 2020-12-03 DIAGNOSIS — D72.819 LEUKOPENIA, UNSPECIFIED TYPE: Primary | ICD-10-CM

## 2020-12-03 PROCEDURE — 99214 OFFICE O/P EST MOD 30 MIN: CPT | Mod: S$GLB,,, | Performed by: INTERNAL MEDICINE

## 2020-12-03 PROCEDURE — 3008F BODY MASS INDEX DOCD: CPT | Mod: CPTII,S$GLB,, | Performed by: INTERNAL MEDICINE

## 2020-12-03 PROCEDURE — 99214 PR OFFICE/OUTPT VISIT, EST, LEVL IV, 30-39 MIN: ICD-10-PCS | Mod: S$GLB,,, | Performed by: INTERNAL MEDICINE

## 2020-12-03 PROCEDURE — 3080F DIAST BP >= 90 MM HG: CPT | Mod: CPTII,S$GLB,, | Performed by: INTERNAL MEDICINE

## 2020-12-03 PROCEDURE — 99999 PR PBB SHADOW E&M-EST. PATIENT-LVL III: ICD-10-PCS | Mod: PBBFAC,,, | Performed by: INTERNAL MEDICINE

## 2020-12-03 PROCEDURE — 1126F AMNT PAIN NOTED NONE PRSNT: CPT | Mod: S$GLB,,, | Performed by: INTERNAL MEDICINE

## 2020-12-03 PROCEDURE — 99999 PR PBB SHADOW E&M-EST. PATIENT-LVL III: CPT | Mod: PBBFAC,,, | Performed by: INTERNAL MEDICINE

## 2020-12-03 PROCEDURE — 3008F PR BODY MASS INDEX (BMI) DOCUMENTED: ICD-10-PCS | Mod: CPTII,S$GLB,, | Performed by: INTERNAL MEDICINE

## 2020-12-03 PROCEDURE — 3077F SYST BP >= 140 MM HG: CPT | Mod: CPTII,S$GLB,, | Performed by: INTERNAL MEDICINE

## 2020-12-03 PROCEDURE — 1126F PR PAIN SEVERITY QUANTIFIED, NO PAIN PRESENT: ICD-10-PCS | Mod: S$GLB,,, | Performed by: INTERNAL MEDICINE

## 2020-12-03 PROCEDURE — 3077F PR MOST RECENT SYSTOLIC BLOOD PRESSURE >= 140 MM HG: ICD-10-PCS | Mod: CPTII,S$GLB,, | Performed by: INTERNAL MEDICINE

## 2020-12-03 PROCEDURE — 3080F PR MOST RECENT DIASTOLIC BLOOD PRESSURE >= 90 MM HG: ICD-10-PCS | Mod: CPTII,S$GLB,, | Performed by: INTERNAL MEDICINE

## 2020-12-03 RX ORDER — OMEPRAZOLE 20 MG/1
CAPSULE, DELAYED RELEASE ORAL
COMMUNITY
Start: 2020-10-17

## 2020-12-03 RX ORDER — SYRINGE WITH NEEDLE, 1 ML 25GX5/8"
SYRINGE, EMPTY DISPOSABLE MISCELLANEOUS
COMMUNITY
Start: 2020-09-18 | End: 2022-08-08

## 2020-12-03 NOTE — PROGRESS NOTES
"Subjective:           Patient ID: Russell Santos is a 60 y.o. male.    Chief Complaint: No chief complaint on file.    HPI        Diagnosis: Neutropenia       Russell Santos is a 60 y.o. AAM with history of , HTN, PE (2006)., thyroid malignancy, hyperlipidemia with type 2 diabetes mellitus, history of atrial fibrillation, cardiomyopathy, chronic pain, osteoarthritis of spine seen today in follow-up   for neutropenia.  Patient reports that he has had intermittent neutropenia for the past year.  He denies any history of recurrent infections.  He denies any new medications.  No fevers night sweats or weight loss.  Appetite stable.  No rashes.  No enlarged nodes.  .  He has chronic mild fatigue, stable he has chronic mild dyspnea on exertion stable. He ambulates with assistance of cane.  No chest pain.  He is on chronic anticoagulation for atrial fibrillation.  He also has a history of a PE in 2006. He was recently prescribed iron supplementation B12 supplementation although he has not started taking the supplements.  CBC from 01/31 20/20 reveals a white blood cell count of 2600/mm 3 hemoglobin 11.8 grams/deciliter hematocrit of 38.7% MCV of 76 and a platelet count of 220. Limited records available         Pt declined planned bmbx    Pt reports had pain in rt neck and radiated to shoulder  Pt concerned " had a blockage"   He is followed by [facility] and underwent Carotid US  Pt now back on anticoagulation   Appetite and weight stable  No SOB/ cough/CP   No fatigue  No recent infections  No rashes  No weakness  No melena, hematochezia or change in bowel habits      Followed by [facility], Dr. Castaneda  Colonoscopy and EGD planned in near future   Last Colonoscopy 2013-unremarkable      Past  medical history: Atrial fibrillation, Osteo Adriana arthritis, cardiomyopathy, low testosterone, opioid dependence, chronic pain, osteoarthritis of spine, chronic knee pain, personal history of malignant neoplasm of thyroid, calcified granuloma " "of lung, atherosclerosis of aorta, hyperlipidemia associated with type 2 diabetes mellitus        Past Surgical History:   Procedure Laterality Date    THYROIDECTOMY, PARTIAL  2006    TONSILLECTOMY      TREATMENT OF CARDIAC ARRHYTHMIA  9/16/2019    Procedure: Cardioversion or Defibrillation;  Surgeon: Deven Vasquez MD;  Location: [facility] CATH LAB;  Service: Cardiology;;             Review of Systems   Constitutional: Negative for appetite change, fatigue, fever and unexpected weight change.   Eyes: Negative for visual disturbance.   Respiratory: Negative for cough.    Cardiovascular: Negative for chest pain.   Gastrointestinal: Negative for abdominal pain and diarrhea.   Musculoskeletal: Negative for back pain.   Skin: Negative for rash.   Neurological: Negative for headaches.   Hematological: Negative for adenopathy.       Objective:       Vitals:    12/03/20 1047   BP: (!) 146/95   BP Location: Left arm   Patient Position: Sitting   BP Method: Large (Automatic)   Pulse: 69   Temp: 98.8 °F (37.1 °C)   TempSrc: Oral   SpO2: 99%   Weight: (!) 144.8 kg (319 lb 3.6 oz)   Height: 5' 9.5" (1.765 m)     Physical Exam   Constitutional: He is oriented to person, place, and time. He appears well-developed and well-nourished.   HENT:   Head: Normocephalic.   Mouth/Throat: Oropharynx is clear and moist. No oropharyngeal exudate.   Eyes: No scleral icterus.   Neck: Normal range of motion. Neck supple. No thyromegaly present.   Cardiovascular: Normal rate, regular rhythm and normal heart sounds.    No murmur heard.  Pulmonary/Chest: Effort normal and breath sounds normal. He has no wheezes. He has no rales.   Abdominal: Soft. Bowel sounds are normal. He exhibits no distension. There is no tenderness. There is no rebound and no guarding.   Musculoskeletal: Normal range of motion. He exhibits no edema.   Lymphadenopathy:     He has no cervical adenopathy.     He has no axillary adenopathy.        Right: No supraclavicular " adenopathy present.        Left: No supraclavicular adenopathy present.   Neurological: He is alert and oriented to person, place, and time. No cranial nerve deficit.   Skin: No rash noted. No erythema.   Psychiatric: He has a normal mood and affect.                 Lab Results   Component Value Date    WBC 2.64 (L) 10/06/2020    HGB 10.3 (L) 10/06/2020    HCT 34.8 (L) 10/06/2020    MCV 80 (L) 10/06/2020     10/06/2020           Lab Results   Component Value Date    WBC 4.82 09/16/2019    HGB 12.6 (L) 09/16/2019    HCT 41.5 09/16/2019    MCV 76 (L) 09/16/2019     09/16/2019     Lab Results   Component Value Date    IRON 54 10/06/2020    TIBC 385 10/06/2020    FERRITIN 85 10/06/2020     Lab Results   Component Value Date    WBC 3.29 (L) 04/27/2020    HGB 11.7 (L) 04/27/2020    HCT 40.2 04/27/2020    MCV 79 (L) 04/27/2020     04/27/2020         Lab Results   Component Value Date    IRON 54 10/06/2020    TIBC 385 10/06/2020    FERRITIN 85 10/06/2020       A single alpha globin gene is deleted (-3.7 Kb,   &quot;rightward&quot;) on one chromosome.   This result indicates that this individual is a silent   alpha thalassemia carrier    Assessment:       1. Leukopenia, unspecified type    2. Anemia, unspecified type    3. Alpha thalassemia silent carrier    4. Essential hypertension        Plan:        1-3  Patient  is a 59-year-old multiple medical diagnoses as listed with the neutropenia and a mild anemia of unknown etiology.    Iron parameters reveal component  iron deficiency  Patient reports underwent a colonoscopy in 2013 ( next C-scope planned 2023), Dr. Castaneda  Follow-up with [facility] for planned GI evaluation   Anti granulocyte antibody testing negative  B12 and red blood cell folate within normal  SPEP within normal  SHELLY neg   Testing for co existing hemoglobinopathy reveals silent alpha thal carrier. Discussed significance of findings.     WBC ct  2640/mm3 with ANC 1400    Revisited issue  of bmbx. Pt now agreeable to procedure       Pt with microcytic anemia with component of Fe def' ( Fe def and underlying hemoglobinopathy)   Followed by [facility]  GI eval- no bleeding source  Discussed bmbx for further eval     Pt advised on potential drug-induced constipation  Pt taking OTC stool softeners       PLAN BONE MARROW BIOPSY   FOLLOW-UP FOLLOWING BMBX  Informed consent obtained    .  All questions posed answered to patient's satisfaction     Follow-up in 1 month with cbc prior to f/u     Cc: YFN Mejia II, MD

## 2020-12-03 NOTE — Clinical Note
Sched bone marrow bx ( pt on xarelto- will d/w his cardiologist hold)  Cbc prior to f/u 1month    Scan consent in chart

## 2020-12-07 NOTE — PROGRESS NOTES
PROCEDURE NOTE:  Bone Marrow Biopsy  Date: 12/15/2020  Indication: Luekopenia   Consent: Informed consent was obtained from patient.  Timeout: Done and documented.  Site: Left posterior illiac crest.  Prep: Betadine.  Needle used: 11 gauge Jamshidi needle.  Anesthetic: 2% lidocaine 10 cc.  Biopsy: The biopsy needle was introduced into the marrow cavity and an aspirate was obtained without complications. Core biopsy obtained and sent for routine histologic examination and cytogenetics. Fish and DNA/RNA Hold done.   Complications: None.  Disposition: The patient was discharged home after applying pressure to site for 15 minutes and being check by an RN.  Minimal blood loss  DOMINIC Barnett NP    Return to clinic in 2 weeks with MD appointment and labs.     Patient is in agreement with the proposed treatment plan. All questions were answered to the patient's satisfaction. Patient knows to call clinic for any new or worsening symptoms and if anything is needed before the next clinic visit.          Selma Barnett, FNP-C  Hematology & Medical Oncology   [facility] [place] [address]  ph. [phone]  Fax. [phone]    Patient dicussed with collaborating physician, Dr. Marquez.

## 2020-12-15 ENCOUNTER — OFFICE VISIT (OUTPATIENT)
Dept: HEMATOLOGY/ONCOLOGY | Facility: CLINIC | Age: 60
End: 2020-12-15
Payer: MEDICARE

## 2020-12-15 VITALS — DIASTOLIC BLOOD PRESSURE: 82 MMHG | SYSTOLIC BLOOD PRESSURE: 144 MMHG

## 2020-12-15 DIAGNOSIS — D72.819 LEUKOPENIA, UNSPECIFIED TYPE: Primary | ICD-10-CM

## 2020-12-15 PROCEDURE — 88342 IMHCHEM/IMCYTCHM 1ST ANTB: CPT | Mod: 26,59,, | Performed by: PATHOLOGY

## 2020-12-15 PROCEDURE — 38222 DX BONE MARROW BX & ASPIR: CPT | Mod: LT,S$GLB,, | Performed by: NURSE PRACTITIONER

## 2020-12-15 PROCEDURE — 99499 NO LOS: ICD-10-PCS | Mod: S$GLB,,, | Performed by: NURSE PRACTITIONER

## 2020-12-15 PROCEDURE — 88311 PR  DECALCIFY TISSUE: ICD-10-PCS | Mod: 26,,, | Performed by: PATHOLOGY

## 2020-12-15 PROCEDURE — 88313 SPECIAL STAINS GROUP 2: CPT | Mod: 26,,, | Performed by: PATHOLOGY

## 2020-12-15 PROCEDURE — 88341 IMHCHEM/IMCYTCHM EA ADD ANTB: CPT | Mod: 26,59,, | Performed by: PATHOLOGY

## 2020-12-15 PROCEDURE — 88189 PR  FLOWCYTOMETRY/READ, 16 & > MARKERS: ICD-10-PCS | Mod: ,,, | Performed by: PATHOLOGY

## 2020-12-15 PROCEDURE — 88341 IMHCHEM/IMCYTCHM EA ADD ANTB: CPT | Performed by: PATHOLOGY

## 2020-12-15 PROCEDURE — 88313 PR  SPECIAL STAINS,GROUP II: ICD-10-PCS | Mod: 26,,, | Performed by: PATHOLOGY

## 2020-12-15 PROCEDURE — 88299 UNLISTED CYTOGENETIC STUDY: CPT

## 2020-12-15 PROCEDURE — 99499 UNLISTED E&M SERVICE: CPT | Mod: S$GLB,,, | Performed by: NURSE PRACTITIONER

## 2020-12-15 PROCEDURE — 99999 PR PBB SHADOW E&M-EST. PATIENT-LVL III: CPT | Mod: PBBFAC,,, | Performed by: NURSE PRACTITIONER

## 2020-12-15 PROCEDURE — 88305 TISSUE EXAM BY PATHOLOGIST: CPT | Mod: 26,,, | Performed by: PATHOLOGY

## 2020-12-15 PROCEDURE — 38222 PR BONE MARROW BIOPSY(IES) W/ASPIRATION(S); DIAGNOSTIC: ICD-10-PCS | Mod: LT,S$GLB,, | Performed by: NURSE PRACTITIONER

## 2020-12-15 PROCEDURE — 81261 IGH GENE REARRANGE AMP METH: CPT | Performed by: PATHOLOGY

## 2020-12-15 PROCEDURE — 88237 TISSUE CULTURE BONE MARROW: CPT

## 2020-12-15 PROCEDURE — 88264 CHROMOSOME ANALYSIS 20-25: CPT

## 2020-12-15 PROCEDURE — 88342 CHG IMMUNOCYTOCHEMISTRY: ICD-10-PCS | Mod: 26,59,, | Performed by: PATHOLOGY

## 2020-12-15 PROCEDURE — 88311 DECALCIFY TISSUE: CPT | Mod: 26,,, | Performed by: PATHOLOGY

## 2020-12-15 PROCEDURE — 88189 FLOWCYTOMETRY/READ 16 & >: CPT | Mod: ,,, | Performed by: PATHOLOGY

## 2020-12-15 PROCEDURE — 88185 FLOWCYTOMETRY/TC ADD-ON: CPT | Mod: 59 | Performed by: PATHOLOGY

## 2020-12-15 PROCEDURE — 85097 BONE MARROW INTERPRETATION: CPT | Mod: ,,, | Performed by: PATHOLOGY

## 2020-12-15 PROCEDURE — 88184 FLOWCYTOMETRY/ TC 1 MARKER: CPT | Performed by: PATHOLOGY

## 2020-12-15 PROCEDURE — 88305 TISSUE EXAM BY PATHOLOGIST: ICD-10-PCS | Mod: 26,,, | Performed by: PATHOLOGY

## 2020-12-15 PROCEDURE — 99999 PR PBB SHADOW E&M-EST. PATIENT-LVL III: ICD-10-PCS | Mod: PBBFAC,,, | Performed by: NURSE PRACTITIONER

## 2020-12-15 PROCEDURE — 81264 IGK REARRANGEABN CLONAL POP: CPT | Performed by: PATHOLOGY

## 2020-12-15 PROCEDURE — 88342 IMHCHEM/IMCYTCHM 1ST ANTB: CPT | Performed by: PATHOLOGY

## 2020-12-15 PROCEDURE — 88313 SPECIAL STAINS GROUP 2: CPT | Performed by: PATHOLOGY

## 2020-12-15 PROCEDURE — 88305 TISSUE EXAM BY PATHOLOGIST: CPT | Performed by: PATHOLOGY

## 2020-12-15 PROCEDURE — 88311 DECALCIFY TISSUE: CPT | Performed by: PATHOLOGY

## 2020-12-15 PROCEDURE — 88341 PR IHC OR ICC EACH ADD'L SINGLE ANTIBODY  STAINPR: ICD-10-PCS | Mod: 26,59,, | Performed by: PATHOLOGY

## 2020-12-15 PROCEDURE — 85097 PR  BONE MARROW,SMEAR INTERPRETATION: ICD-10-PCS | Mod: ,,, | Performed by: PATHOLOGY

## 2020-12-17 LAB
BODY SITE - BONE MARROW: NORMAL
CLINICAL DIAGNOSIS - BONE MARROW: NORMAL
FLOW CYTOMETRY ANTIBODIES ANALYZED - BONE MARROW: NORMAL
FLOW CYTOMETRY COMMENT - BONE MARROW: NORMAL
FLOW CYTOMETRY INTERPRETATION - BONE MARROW: NORMAL

## 2020-12-21 LAB
ANNOTATION COMMENT IMP: NORMAL
HOLDF INTERPRETATION: NORMAL
HOLDF REASON FOR REFERRAL: NORMAL
HOLDF RELEASED BY: NORMAL
HOLDF REQUESTED FISH TEST: NORMAL
HOLDF SOURCE: NORMAL
MOL DX INTERP BLD/T QL: NORMAL
REF LAB TEST METHOD: NORMAL
SPECIMEN TYPE: NORMAL

## 2020-12-22 LAB
CHROM BANDING METHOD: NORMAL
CHROMOSOME ANALYSIS BM ADDITIONAL INFORMATION: NORMAL
CHROMOSOME ANALYSIS BM RELEASED BY: NORMAL
CHROMOSOME ANALYSIS BM RESULT SUMMARY: NORMAL
CLINICAL CYTOGENETICIST REVIEW: NORMAL
KARYOTYP MAR: NORMAL
REASON FOR REFERRAL (NARRATIVE): NORMAL
REF LAB TEST METHOD: NORMAL
SPECIMEN SOURCE: NORMAL
SPECIMEN: NORMAL

## 2020-12-28 LAB
DNA/RNA EXTRACT AND HOLD RESULT: NORMAL
DNA/RNA EXTRACTION: NORMAL
EXHR SPECIMEN TYPE: NORMAL
FINAL PATHOLOGIC DIAGNOSIS: NORMAL
GROSS: NORMAL
Lab: NORMAL
MICROSCOPIC EXAM: NORMAL
SUPPLEMENTAL DIAGNOSIS: NORMAL

## 2020-12-29 ENCOUNTER — LAB VISIT (OUTPATIENT)
Dept: LAB | Facility: HOSPITAL | Age: 60
End: 2020-12-29
Attending: INTERNAL MEDICINE
Payer: MEDICARE

## 2020-12-29 DIAGNOSIS — D64.9 ANEMIA, UNSPECIFIED TYPE: ICD-10-CM

## 2020-12-29 DIAGNOSIS — D72.819 LEUKOPENIA, UNSPECIFIED TYPE: ICD-10-CM

## 2020-12-29 LAB
BASOPHILS # BLD AUTO: 0.04 K/UL (ref 0–0.2)
BASOPHILS NFR BLD: 1.3 % (ref 0–1.9)
DIFFERENTIAL METHOD: ABNORMAL
EOSINOPHIL # BLD AUTO: 0.1 K/UL (ref 0–0.5)
EOSINOPHIL NFR BLD: 3.3 % (ref 0–8)
ERYTHROCYTE [DISTWIDTH] IN BLOOD BY AUTOMATED COUNT: 17.2 % (ref 11.5–14.5)
FERRITIN SERPL-MCNC: 41 NG/ML (ref 20–300)
HCT VFR BLD AUTO: 38.5 % (ref 40–54)
HGB BLD-MCNC: 11.1 G/DL (ref 14–18)
IMM GRANULOCYTES # BLD AUTO: 0.01 K/UL (ref 0–0.04)
IMM GRANULOCYTES NFR BLD AUTO: 0.3 % (ref 0–0.5)
IRON SERPL-MCNC: 52 UG/DL (ref 45–160)
LYMPHOCYTES # BLD AUTO: 1.1 K/UL (ref 1–4.8)
LYMPHOCYTES NFR BLD: 36.5 % (ref 18–48)
MCH RBC QN AUTO: 22.6 PG (ref 27–31)
MCHC RBC AUTO-ENTMCNC: 28.8 G/DL (ref 32–36)
MCV RBC AUTO: 78 FL (ref 82–98)
MONOCYTES # BLD AUTO: 0.1 K/UL (ref 0.3–1)
MONOCYTES NFR BLD: 3.3 % (ref 4–15)
NEUTROPHILS # BLD AUTO: 1.7 K/UL (ref 1.8–7.7)
NEUTROPHILS NFR BLD: 55.3 % (ref 38–73)
NRBC BLD-RTO: 0 /100 WBC
PLATELET # BLD AUTO: 244 K/UL (ref 150–350)
PMV BLD AUTO: 11 FL (ref 9.2–12.9)
RBC # BLD AUTO: 4.92 M/UL (ref 4.6–6.2)
SATURATED IRON: 13 % (ref 20–50)
TOTAL IRON BINDING CAPACITY: 401 UG/DL (ref 250–450)
TRANSFERRIN SERPL-MCNC: 271 MG/DL (ref 200–375)
WBC # BLD AUTO: 3.01 K/UL (ref 3.9–12.7)

## 2020-12-29 PROCEDURE — 85025 COMPLETE CBC W/AUTO DIFF WBC: CPT

## 2020-12-29 PROCEDURE — 36415 COLL VENOUS BLD VENIPUNCTURE: CPT | Mod: PO

## 2020-12-29 PROCEDURE — 83540 ASSAY OF IRON: CPT

## 2020-12-29 PROCEDURE — 82728 ASSAY OF FERRITIN: CPT

## 2021-01-05 ENCOUNTER — OFFICE VISIT (OUTPATIENT)
Dept: HEMATOLOGY/ONCOLOGY | Facility: CLINIC | Age: 61
End: 2021-01-05
Payer: MEDICARE

## 2021-01-05 VITALS
SYSTOLIC BLOOD PRESSURE: 146 MMHG | BODY MASS INDEX: 46.65 KG/M2 | HEART RATE: 71 BPM | TEMPERATURE: 99 F | HEIGHT: 69 IN | OXYGEN SATURATION: 94 % | WEIGHT: 315 LBS | DIASTOLIC BLOOD PRESSURE: 97 MMHG

## 2021-01-05 DIAGNOSIS — D56.3 ALPHA THALASSEMIA SILENT CARRIER: ICD-10-CM

## 2021-01-05 DIAGNOSIS — D72.819 LEUKOPENIA, UNSPECIFIED TYPE: ICD-10-CM

## 2021-01-05 DIAGNOSIS — D64.9 ANEMIA, UNSPECIFIED TYPE: Primary | ICD-10-CM

## 2021-01-05 PROCEDURE — 1126F AMNT PAIN NOTED NONE PRSNT: CPT | Mod: S$GLB,,, | Performed by: INTERNAL MEDICINE

## 2021-01-05 PROCEDURE — 3080F PR MOST RECENT DIASTOLIC BLOOD PRESSURE >= 90 MM HG: ICD-10-PCS | Mod: CPTII,S$GLB,, | Performed by: INTERNAL MEDICINE

## 2021-01-05 PROCEDURE — 1126F PR PAIN SEVERITY QUANTIFIED, NO PAIN PRESENT: ICD-10-PCS | Mod: S$GLB,,, | Performed by: INTERNAL MEDICINE

## 2021-01-05 PROCEDURE — 3080F DIAST BP >= 90 MM HG: CPT | Mod: CPTII,S$GLB,, | Performed by: INTERNAL MEDICINE

## 2021-01-05 PROCEDURE — 3008F BODY MASS INDEX DOCD: CPT | Mod: CPTII,S$GLB,, | Performed by: INTERNAL MEDICINE

## 2021-01-05 PROCEDURE — 99999 PR PBB SHADOW E&M-EST. PATIENT-LVL V: ICD-10-PCS | Mod: PBBFAC,,, | Performed by: INTERNAL MEDICINE

## 2021-01-05 PROCEDURE — 99214 PR OFFICE/OUTPT VISIT, EST, LEVL IV, 30-39 MIN: ICD-10-PCS | Mod: S$GLB,,, | Performed by: INTERNAL MEDICINE

## 2021-01-05 PROCEDURE — 3077F SYST BP >= 140 MM HG: CPT | Mod: CPTII,S$GLB,, | Performed by: INTERNAL MEDICINE

## 2021-01-05 PROCEDURE — 99214 OFFICE O/P EST MOD 30 MIN: CPT | Mod: S$GLB,,, | Performed by: INTERNAL MEDICINE

## 2021-01-05 PROCEDURE — 3077F PR MOST RECENT SYSTOLIC BLOOD PRESSURE >= 140 MM HG: ICD-10-PCS | Mod: CPTII,S$GLB,, | Performed by: INTERNAL MEDICINE

## 2021-01-05 PROCEDURE — 99999 PR PBB SHADOW E&M-EST. PATIENT-LVL V: CPT | Mod: PBBFAC,,, | Performed by: INTERNAL MEDICINE

## 2021-01-05 PROCEDURE — 3008F PR BODY MASS INDEX (BMI) DOCUMENTED: ICD-10-PCS | Mod: CPTII,S$GLB,, | Performed by: INTERNAL MEDICINE

## 2021-01-05 RX ORDER — FERROUS SULFATE 325(65) MG
325 TABLET, DELAYED RELEASE (ENTERIC COATED) ORAL DAILY
Qty: 30 TABLET | Refills: 1 | Status: SHIPPED | OUTPATIENT
Start: 2021-01-05 | End: 2021-02-04

## 2021-01-20 ENCOUNTER — LAB VISIT (OUTPATIENT)
Dept: LAB | Facility: HOSPITAL | Age: 61
End: 2021-01-20
Attending: INTERNAL MEDICINE
Payer: MEDICARE

## 2021-01-20 DIAGNOSIS — D64.9 ANEMIA, UNSPECIFIED TYPE: ICD-10-CM

## 2021-01-20 LAB
ANISOCYTOSIS BLD QL SMEAR: SLIGHT
BASOPHILS # BLD AUTO: 0.04 K/UL (ref 0–0.2)
BASOPHILS NFR BLD: 1.3 % (ref 0–1.9)
DIFFERENTIAL METHOD: ABNORMAL
EOSINOPHIL # BLD AUTO: 0.1 K/UL (ref 0–0.5)
EOSINOPHIL NFR BLD: 4.2 % (ref 0–8)
ERYTHROCYTE [DISTWIDTH] IN BLOOD BY AUTOMATED COUNT: 17.2 % (ref 11.5–14.5)
FERRITIN SERPL-MCNC: 37 NG/ML (ref 20–300)
HCT VFR BLD AUTO: 39.1 % (ref 40–54)
HGB BLD-MCNC: 11.5 G/DL (ref 14–18)
IMM GRANULOCYTES # BLD AUTO: 0.01 K/UL (ref 0–0.04)
IMM GRANULOCYTES NFR BLD AUTO: 0.3 % (ref 0–0.5)
IRON SERPL-MCNC: 56 UG/DL (ref 45–160)
LYMPHOCYTES # BLD AUTO: 1 K/UL (ref 1–4.8)
LYMPHOCYTES NFR BLD: 33 % (ref 18–48)
MCH RBC QN AUTO: 22.6 PG (ref 27–31)
MCHC RBC AUTO-ENTMCNC: 29.4 G/DL (ref 32–36)
MCV RBC AUTO: 77 FL (ref 82–98)
MONOCYTES # BLD AUTO: 0.1 K/UL (ref 0.3–1)
MONOCYTES NFR BLD: 3.5 % (ref 4–15)
NEUTROPHILS # BLD AUTO: 1.8 K/UL (ref 1.8–7.7)
NEUTROPHILS NFR BLD: 57.7 % (ref 38–73)
NRBC BLD-RTO: 0 /100 WBC
OVALOCYTES BLD QL SMEAR: ABNORMAL
PLATELET # BLD AUTO: 240 K/UL (ref 150–350)
PLATELET BLD QL SMEAR: ABNORMAL
PMV BLD AUTO: 10.7 FL (ref 9.2–12.9)
POIKILOCYTOSIS BLD QL SMEAR: SLIGHT
RBC # BLD AUTO: 5.09 M/UL (ref 4.6–6.2)
SATURATED IRON: 13 % (ref 20–50)
TOTAL IRON BINDING CAPACITY: 426 UG/DL (ref 250–450)
TRANSFERRIN SERPL-MCNC: 288 MG/DL (ref 200–375)
WBC # BLD AUTO: 3.12 K/UL (ref 3.9–12.7)

## 2021-01-20 PROCEDURE — 36415 COLL VENOUS BLD VENIPUNCTURE: CPT | Mod: PO

## 2021-01-20 PROCEDURE — 85025 COMPLETE CBC W/AUTO DIFF WBC: CPT

## 2021-01-20 PROCEDURE — 83540 ASSAY OF IRON: CPT

## 2021-01-20 PROCEDURE — 82728 ASSAY OF FERRITIN: CPT

## 2021-03-04 ENCOUNTER — LAB VISIT (OUTPATIENT)
Dept: LAB | Facility: HOSPITAL | Age: 61
End: 2021-03-04
Attending: INTERNAL MEDICINE
Payer: MEDICARE

## 2021-03-04 DIAGNOSIS — D72.819 LEUKOPENIA, UNSPECIFIED TYPE: ICD-10-CM

## 2021-03-04 DIAGNOSIS — D64.9 ANEMIA, UNSPECIFIED TYPE: ICD-10-CM

## 2021-03-04 LAB
ALBUMIN SERPL BCP-MCNC: 4.2 G/DL (ref 3.5–5.2)
ALP SERPL-CCNC: 51 U/L (ref 55–135)
ALT SERPL W/O P-5'-P-CCNC: 14 U/L (ref 10–44)
ANION GAP SERPL CALC-SCNC: 13 MMOL/L (ref 8–16)
AST SERPL-CCNC: 17 U/L (ref 10–40)
BASOPHILS # BLD AUTO: 0.04 K/UL (ref 0–0.2)
BASOPHILS NFR BLD: 1.2 % (ref 0–1.9)
BILIRUB SERPL-MCNC: 0.6 MG/DL (ref 0.1–1)
BUN SERPL-MCNC: 8 MG/DL (ref 6–20)
CALCIUM SERPL-MCNC: 8.7 MG/DL (ref 8.7–10.5)
CHLORIDE SERPL-SCNC: 104 MMOL/L (ref 95–110)
CO2 SERPL-SCNC: 24 MMOL/L (ref 23–29)
CREAT SERPL-MCNC: 0.6 MG/DL (ref 0.5–1.4)
DIFFERENTIAL METHOD: ABNORMAL
EOSINOPHIL # BLD AUTO: 0.1 K/UL (ref 0–0.5)
EOSINOPHIL NFR BLD: 3 % (ref 0–8)
ERYTHROCYTE [DISTWIDTH] IN BLOOD BY AUTOMATED COUNT: 18.6 % (ref 11.5–14.5)
EST. GFR  (AFRICAN AMERICAN): >60 ML/MIN/1.73 M^2
EST. GFR  (NON AFRICAN AMERICAN): >60 ML/MIN/1.73 M^2
GLUCOSE SERPL-MCNC: 202 MG/DL (ref 70–110)
HCT VFR BLD AUTO: 39.3 % (ref 40–54)
HGB BLD-MCNC: 11.7 G/DL (ref 14–18)
IMM GRANULOCYTES # BLD AUTO: 0.01 K/UL (ref 0–0.04)
IMM GRANULOCYTES NFR BLD AUTO: 0.3 % (ref 0–0.5)
LYMPHOCYTES # BLD AUTO: 1.2 K/UL (ref 1–4.8)
LYMPHOCYTES NFR BLD: 35.7 % (ref 18–48)
MCH RBC QN AUTO: 22.5 PG (ref 27–31)
MCHC RBC AUTO-ENTMCNC: 29.8 G/DL (ref 32–36)
MCV RBC AUTO: 76 FL (ref 82–98)
MONOCYTES # BLD AUTO: 0.1 K/UL (ref 0.3–1)
MONOCYTES NFR BLD: 2.4 % (ref 4–15)
NEUTROPHILS # BLD AUTO: 1.9 K/UL (ref 1.8–7.7)
NEUTROPHILS NFR BLD: 57.4 % (ref 38–73)
NRBC BLD-RTO: 0 /100 WBC
PLATELET # BLD AUTO: 237 K/UL (ref 150–350)
PMV BLD AUTO: 10.6 FL (ref 9.2–12.9)
POTASSIUM SERPL-SCNC: 4 MMOL/L (ref 3.5–5.1)
PROT SERPL-MCNC: 7.9 G/DL (ref 6–8.4)
RBC # BLD AUTO: 5.19 M/UL (ref 4.6–6.2)
SODIUM SERPL-SCNC: 141 MMOL/L (ref 136–145)
WBC # BLD AUTO: 3.36 K/UL (ref 3.9–12.7)

## 2021-03-04 PROCEDURE — 80053 COMPREHEN METABOLIC PANEL: CPT | Performed by: INTERNAL MEDICINE

## 2021-03-04 PROCEDURE — 36415 COLL VENOUS BLD VENIPUNCTURE: CPT | Mod: PO | Performed by: INTERNAL MEDICINE

## 2021-03-04 PROCEDURE — 85025 COMPLETE CBC W/AUTO DIFF WBC: CPT | Performed by: INTERNAL MEDICINE

## 2021-03-08 ENCOUNTER — OFFICE VISIT (OUTPATIENT)
Dept: HEMATOLOGY/ONCOLOGY | Facility: CLINIC | Age: 61
End: 2021-03-08
Payer: MEDICARE

## 2021-03-08 VITALS
DIASTOLIC BLOOD PRESSURE: 99 MMHG | SYSTOLIC BLOOD PRESSURE: 146 MMHG | WEIGHT: 315 LBS | HEART RATE: 67 BPM | HEIGHT: 70 IN | TEMPERATURE: 98 F | OXYGEN SATURATION: 97 % | BODY MASS INDEX: 45.1 KG/M2

## 2021-03-08 DIAGNOSIS — I10 ESSENTIAL HYPERTENSION: ICD-10-CM

## 2021-03-08 DIAGNOSIS — D56.3 ALPHA THALASSEMIA SILENT CARRIER: ICD-10-CM

## 2021-03-08 DIAGNOSIS — D64.9 ANEMIA, UNSPECIFIED TYPE: Primary | ICD-10-CM

## 2021-03-08 DIAGNOSIS — D72.819 LEUKOPENIA, UNSPECIFIED TYPE: ICD-10-CM

## 2021-03-08 PROCEDURE — 1126F AMNT PAIN NOTED NONE PRSNT: CPT | Mod: S$GLB,,, | Performed by: INTERNAL MEDICINE

## 2021-03-08 PROCEDURE — 3080F DIAST BP >= 90 MM HG: CPT | Mod: CPTII,S$GLB,, | Performed by: INTERNAL MEDICINE

## 2021-03-08 PROCEDURE — 3008F BODY MASS INDEX DOCD: CPT | Mod: CPTII,S$GLB,, | Performed by: INTERNAL MEDICINE

## 2021-03-08 PROCEDURE — 3008F PR BODY MASS INDEX (BMI) DOCUMENTED: ICD-10-PCS | Mod: CPTII,S$GLB,, | Performed by: INTERNAL MEDICINE

## 2021-03-08 PROCEDURE — 3077F PR MOST RECENT SYSTOLIC BLOOD PRESSURE >= 140 MM HG: ICD-10-PCS | Mod: CPTII,S$GLB,, | Performed by: INTERNAL MEDICINE

## 2021-03-08 PROCEDURE — 99999 PR PBB SHADOW E&M-EST. PATIENT-LVL IV: ICD-10-PCS | Mod: PBBFAC,,, | Performed by: INTERNAL MEDICINE

## 2021-03-08 PROCEDURE — 99999 PR PBB SHADOW E&M-EST. PATIENT-LVL IV: CPT | Mod: PBBFAC,,, | Performed by: INTERNAL MEDICINE

## 2021-03-08 PROCEDURE — 99214 OFFICE O/P EST MOD 30 MIN: CPT | Mod: S$GLB,,, | Performed by: INTERNAL MEDICINE

## 2021-03-08 PROCEDURE — 3077F SYST BP >= 140 MM HG: CPT | Mod: CPTII,S$GLB,, | Performed by: INTERNAL MEDICINE

## 2021-03-08 PROCEDURE — 1126F PR PAIN SEVERITY QUANTIFIED, NO PAIN PRESENT: ICD-10-PCS | Mod: S$GLB,,, | Performed by: INTERNAL MEDICINE

## 2021-03-08 PROCEDURE — 99214 PR OFFICE/OUTPT VISIT, EST, LEVL IV, 30-39 MIN: ICD-10-PCS | Mod: S$GLB,,, | Performed by: INTERNAL MEDICINE

## 2021-03-08 PROCEDURE — 3080F PR MOST RECENT DIASTOLIC BLOOD PRESSURE >= 90 MM HG: ICD-10-PCS | Mod: CPTII,S$GLB,, | Performed by: INTERNAL MEDICINE

## 2021-05-05 ENCOUNTER — LAB VISIT (OUTPATIENT)
Dept: LAB | Facility: HOSPITAL | Age: 61
End: 2021-05-05
Attending: INTERNAL MEDICINE
Payer: MEDICARE

## 2021-05-05 DIAGNOSIS — D64.9 ANEMIA, UNSPECIFIED TYPE: ICD-10-CM

## 2021-05-05 LAB
BASOPHILS # BLD AUTO: 0.03 K/UL (ref 0–0.2)
BASOPHILS NFR BLD: 1 % (ref 0–1.9)
DIFFERENTIAL METHOD: ABNORMAL
EOSINOPHIL # BLD AUTO: 0.1 K/UL (ref 0–0.5)
EOSINOPHIL NFR BLD: 3.3 % (ref 0–8)
ERYTHROCYTE [DISTWIDTH] IN BLOOD BY AUTOMATED COUNT: 17.9 % (ref 11.5–14.5)
FERRITIN SERPL-MCNC: 34 NG/ML (ref 20–300)
HCT VFR BLD AUTO: 39.6 % (ref 40–54)
HGB BLD-MCNC: 11.9 G/DL (ref 14–18)
IMM GRANULOCYTES # BLD AUTO: 0.01 K/UL (ref 0–0.04)
IMM GRANULOCYTES NFR BLD AUTO: 0.3 % (ref 0–0.5)
IRON SERPL-MCNC: 60 UG/DL (ref 45–160)
LYMPHOCYTES # BLD AUTO: 1.2 K/UL (ref 1–4.8)
LYMPHOCYTES NFR BLD: 38.9 % (ref 18–48)
MCH RBC QN AUTO: 23.2 PG (ref 27–31)
MCHC RBC AUTO-ENTMCNC: 30.1 G/DL (ref 32–36)
MCV RBC AUTO: 77 FL (ref 82–98)
MONOCYTES # BLD AUTO: 0.1 K/UL (ref 0.3–1)
MONOCYTES NFR BLD: 2.9 % (ref 4–15)
NEUTROPHILS # BLD AUTO: 1.6 K/UL (ref 1.8–7.7)
NEUTROPHILS NFR BLD: 53.6 % (ref 38–73)
NRBC BLD-RTO: 0 /100 WBC
PLATELET # BLD AUTO: 239 K/UL (ref 150–450)
PMV BLD AUTO: 10.2 FL (ref 9.2–12.9)
RBC # BLD AUTO: 5.13 M/UL (ref 4.6–6.2)
SATURATED IRON: 14 % (ref 20–50)
TOTAL IRON BINDING CAPACITY: 435 UG/DL (ref 250–450)
TRANSFERRIN SERPL-MCNC: 294 MG/DL (ref 200–375)
WBC # BLD AUTO: 3.06 K/UL (ref 3.9–12.7)

## 2021-05-05 PROCEDURE — 85025 COMPLETE CBC W/AUTO DIFF WBC: CPT | Performed by: INTERNAL MEDICINE

## 2021-05-05 PROCEDURE — 83540 ASSAY OF IRON: CPT | Performed by: INTERNAL MEDICINE

## 2021-05-05 PROCEDURE — 82728 ASSAY OF FERRITIN: CPT | Performed by: INTERNAL MEDICINE

## 2021-05-05 PROCEDURE — 36415 COLL VENOUS BLD VENIPUNCTURE: CPT | Mod: PO | Performed by: INTERNAL MEDICINE

## 2022-08-08 ENCOUNTER — HOSPITAL ENCOUNTER (INPATIENT)
Facility: HOSPITAL | Age: 62
LOS: 3 days | Discharge: HOME | DRG: 308 | End: 2022-08-11
Attending: EMERGENCY MEDICINE | Admitting: HOSPITALIST
Payer: MEDICARE

## 2022-08-08 DIAGNOSIS — I48.92 ATRIAL FLUTTER WITH RAPID VENTRICULAR RESPONSE: ICD-10-CM

## 2022-08-08 DIAGNOSIS — R07.89 CHEST PRESSURE: ICD-10-CM

## 2022-08-08 DIAGNOSIS — I95.9 HYPOTENSION, UNSPECIFIED HYPOTENSION TYPE: Primary | ICD-10-CM

## 2022-08-08 PROBLEM — E03.9 HYPOTHYROIDISM: Status: ACTIVE | Noted: 2022-08-08

## 2022-08-08 PROBLEM — Z86.718 HISTORY OF DVT (DEEP VEIN THROMBOSIS): Status: ACTIVE | Noted: 2022-08-08

## 2022-08-08 PROBLEM — U07.1 LAB TEST POSITIVE FOR DETECTION OF COVID-19 VIRUS: Status: ACTIVE | Noted: 2022-08-08

## 2022-08-08 LAB
ALBUMIN SERPL BCP-MCNC: 3.3 G/DL (ref 3.5–5.2)
ALP SERPL-CCNC: 43 U/L (ref 55–135)
ALT SERPL W/O P-5'-P-CCNC: 13 U/L (ref 10–44)
AMPHET+METHAMPHET UR QL: NEGATIVE
ANION GAP SERPL CALC-SCNC: 7 MMOL/L (ref 8–16)
ASCENDING AORTA: 4.21 CM
AST SERPL-CCNC: 14 U/L (ref 10–40)
AV INDEX (PROSTH): 0.73
AV MEAN GRADIENT: 2 MMHG
AV PEAK GRADIENT: 3 MMHG
AV VALVE AREA: 3.98 CM2
AV VELOCITY RATIO: 0.81
BACTERIA #/AREA URNS HPF: ABNORMAL /HPF
BARBITURATES UR QL SCN>200 NG/ML: NEGATIVE
BASOPHILS # BLD AUTO: 0.05 K/UL (ref 0–0.2)
BASOPHILS NFR BLD: 1.8 % (ref 0–1.9)
BENZODIAZ UR QL SCN>200 NG/ML: NEGATIVE
BILIRUB SERPL-MCNC: 0.6 MG/DL (ref 0.1–1)
BILIRUB UR QL STRIP: NEGATIVE
BNP SERPL-MCNC: 122 PG/ML (ref 0–99)
BSA FOR ECHO PROCEDURE: 2.64 M2
BUN SERPL-MCNC: 12 MG/DL (ref 6–20)
BZE UR QL SCN: NEGATIVE
CALCIUM SERPL-MCNC: 9.2 MG/DL (ref 8.7–10.5)
CANNABINOIDS UR QL SCN: NEGATIVE
CHLORIDE SERPL-SCNC: 108 MMOL/L (ref 95–110)
CLARITY UR: ABNORMAL
CO2 SERPL-SCNC: 24 MMOL/L (ref 23–29)
COLOR UR: YELLOW
CREAT SERPL-MCNC: 0.7 MG/DL (ref 0.5–1.4)
CREAT UR-MCNC: 193.3 MG/DL (ref 23–375)
CTP QC/QA: YES
CV ECHO LV RWT: 0.49 CM
D DIMER PPP IA.FEU-MCNC: 0.5 MG/L FEU
DIFFERENTIAL METHOD: ABNORMAL
DOP CALC AO PEAK VEL: 0.83 M/S
DOP CALC AO VTI: 20.07 CM
DOP CALC LVOT AREA: 5.4 CM2
DOP CALC LVOT DIAMETER: 2.63 CM
DOP CALC LVOT PEAK VEL: 0.67 M/S
DOP CALC LVOT STROKE VOLUME: 79.82 CM3
DOP CALCLVOT PEAK VEL VTI: 14.7 CM
ECHO LV POSTERIOR WALL: 1.37 CM (ref 0.6–1.1)
EJECTION FRACTION: 55 %
EOSINOPHIL # BLD AUTO: 0.1 K/UL (ref 0–0.5)
EOSINOPHIL NFR BLD: 2.5 % (ref 0–8)
ERYTHROCYTE [DISTWIDTH] IN BLOOD BY AUTOMATED COUNT: 16.9 % (ref 11.5–14.5)
EST. GFR  (NO RACE VARIABLE): >60 ML/MIN/1.73 M^2
ESTIMATED AVG GLUCOSE: 134 MG/DL (ref 68–131)
ETHANOL SERPL-MCNC: <10 MG/DL
FERRITIN SERPL-MCNC: 67 NG/ML (ref 20–300)
FOLATE SERPL-MCNC: 12.9 NG/ML (ref 4–24)
FRACTIONAL SHORTENING: 37 % (ref 28–44)
GLUCOSE SERPL-MCNC: 172 MG/DL (ref 70–110)
GLUCOSE UR QL STRIP: NEGATIVE
HBA1C MFR BLD: 6.3 % (ref 4–5.6)
HCT VFR BLD AUTO: 38.7 % (ref 40–54)
HGB BLD-MCNC: 11.8 G/DL (ref 14–18)
HGB UR QL STRIP: ABNORMAL
HIV1+2 IGG SERPL QL IA.RAPID: NORMAL
HYALINE CASTS #/AREA URNS LPF: 74 /LPF
IMM GRANULOCYTES # BLD AUTO: 0.01 K/UL (ref 0–0.04)
IMM GRANULOCYTES NFR BLD AUTO: 0.4 % (ref 0–0.5)
INR PPP: 1.2 (ref 0.8–1.2)
INTERVENTRICULAR SEPTUM: 1.37 CM (ref 0.6–1.1)
IRON SERPL-MCNC: 45 UG/DL (ref 45–160)
IVRT: 133.79 MSEC
KETONES UR QL STRIP: NEGATIVE
LA MAJOR: 6.71 CM
LA MINOR: 6.09 CM
LA WIDTH: 4.93 CM
LACTATE SERPL-SCNC: 2.1 MMOL/L (ref 0.5–2.2)
LEFT ATRIUM SIZE: 4.76 CM
LEFT ATRIUM VOLUME INDEX: 50.7 ML/M2
LEFT ATRIUM VOLUME: 127.36 CM3
LEFT INTERNAL DIMENSION IN SYSTOLE: 3.54 CM (ref 2.1–4)
LEFT VENTRICLE DIASTOLIC VOLUME INDEX: 61.14 ML/M2
LEFT VENTRICLE DIASTOLIC VOLUME: 153.46 ML
LEFT VENTRICLE MASS INDEX: 134 G/M2
LEFT VENTRICLE SYSTOLIC VOLUME INDEX: 20.9 ML/M2
LEFT VENTRICLE SYSTOLIC VOLUME: 52.42 ML
LEFT VENTRICULAR INTERNAL DIMENSION IN DIASTOLE: 5.6 CM (ref 3.5–6)
LEFT VENTRICULAR MASS: 337.13 G
LEUKOCYTE ESTERASE UR QL STRIP: NEGATIVE
LYMPHOCYTES # BLD AUTO: 1.2 K/UL (ref 1–4.8)
LYMPHOCYTES NFR BLD: 43.7 % (ref 18–48)
MCH RBC QN AUTO: 23.2 PG (ref 27–31)
MCHC RBC AUTO-ENTMCNC: 30.5 G/DL (ref 32–36)
MCV RBC AUTO: 76 FL (ref 82–98)
METHADONE UR QL SCN>300 NG/ML: NEGATIVE
MICROSCOPIC COMMENT: ABNORMAL
MONOCYTES # BLD AUTO: 0.1 K/UL (ref 0.3–1)
MONOCYTES NFR BLD: 4.2 % (ref 4–15)
NEUTROPHILS # BLD AUTO: 1.4 K/UL (ref 1.8–7.7)
NEUTROPHILS NFR BLD: 47.4 % (ref 38–73)
NITRITE UR QL STRIP: NEGATIVE
NRBC BLD-RTO: 0 /100 WBC
OPIATES UR QL SCN: NEGATIVE
PCP UR QL SCN>25 NG/ML: NEGATIVE
PH UR STRIP: 6 [PH] (ref 5–8)
PISA TR MAX VEL: 1.98 M/S
PLATELET # BLD AUTO: 233 K/UL (ref 150–450)
PMV BLD AUTO: 9.4 FL (ref 9.2–12.9)
POCT GLUCOSE: 109 MG/DL (ref 70–110)
POCT GLUCOSE: 115 MG/DL (ref 70–110)
POCT GLUCOSE: 131 MG/DL (ref 70–110)
POCT GLUCOSE: 138 MG/DL (ref 70–110)
POTASSIUM SERPL-SCNC: 3.9 MMOL/L (ref 3.5–5.1)
PROCALCITONIN SERPL IA-MCNC: 0.03 NG/ML
PROT SERPL-MCNC: 6.8 G/DL (ref 6–8.4)
PROT UR QL STRIP: ABNORMAL
PROTHROMBIN TIME: 13 SEC (ref 9–12.5)
PV PEAK VELOCITY: 0.43 CM/S
RA MAJOR: 6.8 CM
RA PRESSURE: 8 MMHG
RA WIDTH: 4.8 CM
RBC # BLD AUTO: 5.08 M/UL (ref 4.6–6.2)
RBC #/AREA URNS HPF: 14 /HPF (ref 0–4)
RIGHT VENTRICULAR END-DIASTOLIC DIMENSION: 4.33 CM
RV TISSUE DOPPLER FREE WALL SYSTOLIC VELOCITY 1 (APICAL 4 CHAMBER VIEW): 8.36 CM/S
SARS-COV-2 RDRP RESP QL NAA+PROBE: POSITIVE
SATURATED IRON: 13 % (ref 20–50)
SINUS: 4.82 CM
SODIUM SERPL-SCNC: 139 MMOL/L (ref 136–145)
SP GR UR STRIP: 1.03 (ref 1–1.03)
SQUAMOUS #/AREA URNS HPF: 4 /HPF
STJ: 3.79 CM
T4 FREE SERPL-MCNC: 1.02 NG/DL (ref 0.71–1.51)
TDI LATERAL: 0.09 M/S
TOTAL IRON BINDING CAPACITY: 352 UG/DL (ref 250–450)
TOXICOLOGY INFORMATION: NORMAL
TR MAX PG: 16 MMHG
TRANSFERRIN SERPL-MCNC: 238 MG/DL (ref 200–375)
TRICUSPID ANNULAR PLANE SYSTOLIC EXCURSION: 1.78 CM
TROPONIN I SERPL DL<=0.01 NG/ML-MCNC: 0.01 NG/ML (ref 0–0.03)
TSH SERPL DL<=0.005 MIU/L-ACNC: 0.24 UIU/ML (ref 0.4–4)
TV REST PULMONARY ARTERY PRESSURE: 24 MMHG
URN SPEC COLLECT METH UR: ABNORMAL
UROBILINOGEN UR STRIP-ACNC: NEGATIVE EU/DL
VIT B12 SERPL-MCNC: 239 PG/ML (ref 210–950)
WBC # BLD AUTO: 2.84 K/UL (ref 3.9–12.7)
WBC #/AREA URNS HPF: 4 /HPF (ref 0–5)

## 2022-08-08 PROCEDURE — 84439 ASSAY OF FREE THYROXINE: CPT | Performed by: HOSPITALIST

## 2022-08-08 PROCEDURE — 93005 ELECTROCARDIOGRAM TRACING: CPT

## 2022-08-08 PROCEDURE — 86703 HIV-1/HIV-2 1 RESULT ANTBDY: CPT | Performed by: HOSPITALIST

## 2022-08-08 PROCEDURE — 83605 ASSAY OF LACTIC ACID: CPT | Performed by: HOSPITALIST

## 2022-08-08 PROCEDURE — 82077 ASSAY SPEC XCP UR&BREATH IA: CPT | Performed by: HOSPITALIST

## 2022-08-08 PROCEDURE — 84466 ASSAY OF TRANSFERRIN: CPT | Performed by: HOSPITALIST

## 2022-08-08 PROCEDURE — U0002 COVID-19 LAB TEST NON-CDC: HCPCS | Performed by: EMERGENCY MEDICINE

## 2022-08-08 PROCEDURE — 84443 ASSAY THYROID STIM HORMONE: CPT | Performed by: HOSPITALIST

## 2022-08-08 PROCEDURE — 80053 COMPREHEN METABOLIC PANEL: CPT | Performed by: EMERGENCY MEDICINE

## 2022-08-08 PROCEDURE — 99291 CRITICAL CARE FIRST HOUR: CPT

## 2022-08-08 PROCEDURE — 80307 DRUG TEST PRSMV CHEM ANLYZR: CPT | Performed by: HOSPITALIST

## 2022-08-08 PROCEDURE — 94660 CPAP INITIATION&MGMT: CPT

## 2022-08-08 PROCEDURE — 82962 GLUCOSE BLOOD TEST: CPT

## 2022-08-08 PROCEDURE — 25000003 PHARM REV CODE 250: Performed by: EMERGENCY MEDICINE

## 2022-08-08 PROCEDURE — 25000003 PHARM REV CODE 250: Performed by: HOSPITALIST

## 2022-08-08 PROCEDURE — 85025 COMPLETE CBC W/AUTO DIFF WBC: CPT | Performed by: EMERGENCY MEDICINE

## 2022-08-08 PROCEDURE — 82746 ASSAY OF FOLIC ACID SERUM: CPT | Performed by: HOSPITALIST

## 2022-08-08 PROCEDURE — 93010 ELECTROCARDIOGRAM REPORT: CPT | Mod: ,,, | Performed by: INTERNAL MEDICINE

## 2022-08-08 PROCEDURE — 96365 THER/PROPH/DIAG IV INF INIT: CPT | Mod: 59

## 2022-08-08 PROCEDURE — 27000190 HC CPAP FULL FACE MASK W/VALVE

## 2022-08-08 PROCEDURE — 96366 THER/PROPH/DIAG IV INF ADDON: CPT

## 2022-08-08 PROCEDURE — 82728 ASSAY OF FERRITIN: CPT | Performed by: HOSPITALIST

## 2022-08-08 PROCEDURE — 99291 PR CRITICAL CARE, E/M 30-74 MINUTES: ICD-10-PCS | Mod: ,,, | Performed by: INTERNAL MEDICINE

## 2022-08-08 PROCEDURE — 99291 CRITICAL CARE FIRST HOUR: CPT | Mod: ,,, | Performed by: INTERNAL MEDICINE

## 2022-08-08 PROCEDURE — 83036 HEMOGLOBIN GLYCOSYLATED A1C: CPT | Performed by: HOSPITALIST

## 2022-08-08 PROCEDURE — 81000 URINALYSIS NONAUTO W/SCOPE: CPT | Mod: 59 | Performed by: HOSPITALIST

## 2022-08-08 PROCEDURE — 93010 EKG 12-LEAD: ICD-10-PCS | Mod: ,,, | Performed by: INTERNAL MEDICINE

## 2022-08-08 PROCEDURE — 27000207 HC ISOLATION

## 2022-08-08 PROCEDURE — 84484 ASSAY OF TROPONIN QUANT: CPT | Performed by: EMERGENCY MEDICINE

## 2022-08-08 PROCEDURE — 20000000 HC ICU ROOM

## 2022-08-08 PROCEDURE — 85379 FIBRIN DEGRADATION QUANT: CPT | Performed by: EMERGENCY MEDICINE

## 2022-08-08 PROCEDURE — 99900035 HC TECH TIME PER 15 MIN (STAT)

## 2022-08-08 PROCEDURE — 84145 PROCALCITONIN (PCT): CPT | Performed by: HOSPITALIST

## 2022-08-08 PROCEDURE — 85610 PROTHROMBIN TIME: CPT | Performed by: EMERGENCY MEDICINE

## 2022-08-08 PROCEDURE — 82607 VITAMIN B-12: CPT | Performed by: HOSPITALIST

## 2022-08-08 PROCEDURE — 63600175 PHARM REV CODE 636 W HCPCS: Mod: TB | Performed by: HOSPITALIST

## 2022-08-08 PROCEDURE — 63600175 PHARM REV CODE 636 W HCPCS: Performed by: EMERGENCY MEDICINE

## 2022-08-08 PROCEDURE — 83880 ASSAY OF NATRIURETIC PEPTIDE: CPT | Performed by: EMERGENCY MEDICINE

## 2022-08-08 RX ORDER — IBUPROFEN 200 MG
24 TABLET ORAL
Status: DISCONTINUED | OUTPATIENT
Start: 2022-08-08 | End: 2022-08-11 | Stop reason: HOSPADM

## 2022-08-08 RX ORDER — POLYETHYLENE GLYCOL 3350 17 G/17G
17 POWDER, FOR SOLUTION ORAL 2 TIMES DAILY
Status: DISCONTINUED | OUTPATIENT
Start: 2022-08-08 | End: 2022-08-11 | Stop reason: HOSPADM

## 2022-08-08 RX ORDER — LEVOTHYROXINE SODIUM 100 UG/1
200 TABLET ORAL
Status: DISCONTINUED | OUTPATIENT
Start: 2022-08-09 | End: 2022-08-08

## 2022-08-08 RX ORDER — AMOXICILLIN 500 MG
1 CAPSULE ORAL 2 TIMES DAILY
COMMUNITY

## 2022-08-08 RX ORDER — ACETAMINOPHEN 325 MG/1
650 TABLET ORAL EVERY 6 HOURS PRN
Status: DISCONTINUED | OUTPATIENT
Start: 2022-08-08 | End: 2022-08-11 | Stop reason: HOSPADM

## 2022-08-08 RX ORDER — INSULIN ASPART 100 [IU]/ML
1-10 INJECTION, SOLUTION INTRAVENOUS; SUBCUTANEOUS
Status: DISCONTINUED | OUTPATIENT
Start: 2022-08-08 | End: 2022-08-11 | Stop reason: HOSPADM

## 2022-08-08 RX ORDER — LANOLIN ALCOHOL/MO/W.PET/CERES
1 CREAM (GRAM) TOPICAL DAILY
Refills: 0 | Status: DISCONTINUED | OUTPATIENT
Start: 2022-08-08 | End: 2022-08-11 | Stop reason: HOSPADM

## 2022-08-08 RX ORDER — ONDANSETRON 2 MG/ML
4 INJECTION INTRAMUSCULAR; INTRAVENOUS EVERY 4 HOURS PRN
Status: DISCONTINUED | OUTPATIENT
Start: 2022-08-08 | End: 2022-08-11 | Stop reason: HOSPADM

## 2022-08-08 RX ORDER — IBUPROFEN 200 MG
16 TABLET ORAL
Status: DISCONTINUED | OUTPATIENT
Start: 2022-08-08 | End: 2022-08-11 | Stop reason: HOSPADM

## 2022-08-08 RX ORDER — SODIUM CHLORIDE 0.9 % (FLUSH) 0.9 %
10 SYRINGE (ML) INJECTION
Status: DISCONTINUED | OUTPATIENT
Start: 2022-08-08 | End: 2022-08-11 | Stop reason: HOSPADM

## 2022-08-08 RX ORDER — TALC
6 POWDER (GRAM) TOPICAL NIGHTLY PRN
Status: DISCONTINUED | OUTPATIENT
Start: 2022-08-08 | End: 2022-08-11 | Stop reason: HOSPADM

## 2022-08-08 RX ORDER — PANTOPRAZOLE SODIUM 40 MG/1
40 TABLET, DELAYED RELEASE ORAL DAILY
Status: DISCONTINUED | OUTPATIENT
Start: 2022-08-08 | End: 2022-08-11 | Stop reason: HOSPADM

## 2022-08-08 RX ORDER — LOPERAMIDE HYDROCHLORIDE 2 MG/1
2 CAPSULE ORAL 4 TIMES DAILY PRN
Status: DISCONTINUED | OUTPATIENT
Start: 2022-08-08 | End: 2022-08-11 | Stop reason: HOSPADM

## 2022-08-08 RX ORDER — LEVOTHYROXINE SODIUM 150 UG/1
150 TABLET ORAL
Status: DISCONTINUED | OUTPATIENT
Start: 2022-08-09 | End: 2022-08-11 | Stop reason: HOSPADM

## 2022-08-08 RX ORDER — NALOXEGOL OXALATE 25 MG/1
25 TABLET, FILM COATED ORAL DAILY
COMMUNITY
Start: 2022-06-28

## 2022-08-08 RX ORDER — DOCUSATE SODIUM 100 MG/1
100 CAPSULE, LIQUID FILLED ORAL 2 TIMES DAILY
Status: DISCONTINUED | OUTPATIENT
Start: 2022-08-08 | End: 2022-08-11 | Stop reason: HOSPADM

## 2022-08-08 RX ORDER — SODIUM CHLORIDE 9 MG/ML
INJECTION, SOLUTION INTRAVENOUS CONTINUOUS
Status: DISCONTINUED | OUTPATIENT
Start: 2022-08-08 | End: 2022-08-09

## 2022-08-08 RX ORDER — ATORVASTATIN CALCIUM 40 MG/1
40 TABLET, FILM COATED ORAL DAILY
Status: DISCONTINUED | OUTPATIENT
Start: 2022-08-08 | End: 2022-08-11 | Stop reason: HOSPADM

## 2022-08-08 RX ORDER — GLUCAGON 1 MG
1 KIT INJECTION
Status: DISCONTINUED | OUTPATIENT
Start: 2022-08-08 | End: 2022-08-11 | Stop reason: HOSPADM

## 2022-08-08 RX ADMIN — RIVAROXABAN 20 MG: 20 TABLET, FILM COATED ORAL at 04:08

## 2022-08-08 RX ADMIN — PANTOPRAZOLE SODIUM 40 MG: 40 TABLET, DELAYED RELEASE ORAL at 12:08

## 2022-08-08 RX ADMIN — SODIUM CHLORIDE: 0.9 INJECTION, SOLUTION INTRAVENOUS at 02:08

## 2022-08-08 RX ADMIN — ACETAMINOPHEN 650 MG: 325 TABLET ORAL at 07:08

## 2022-08-08 RX ADMIN — AMIODARONE HYDROCHLORIDE 0.5 MG/MIN: 1.8 INJECTION, SOLUTION INTRAVENOUS at 02:08

## 2022-08-08 RX ADMIN — SODIUM CHLORIDE 1000 ML: 0.9 INJECTION, SOLUTION INTRAVENOUS at 07:08

## 2022-08-08 RX ADMIN — AMIODARONE HYDROCHLORIDE 1 MG/MIN: 1.8 INJECTION, SOLUTION INTRAVENOUS at 07:08

## 2022-08-08 RX ADMIN — DOCUSATE SODIUM 100 MG: 100 CAPSULE, LIQUID FILLED ORAL at 08:08

## 2022-08-08 RX ADMIN — REMDESIVIR 200 MG: 100 INJECTION, POWDER, LYOPHILIZED, FOR SOLUTION INTRAVENOUS at 02:08

## 2022-08-08 RX ADMIN — POLYETHYLENE GLYCOL 3350 17 G: 17 POWDER, FOR SOLUTION ORAL at 08:08

## 2022-08-08 RX ADMIN — PHENYLEPHRINE HYDROCHLORIDE 0.5 MCG/KG/MIN: 10 INJECTION INTRAVENOUS at 08:08

## 2022-08-08 RX ADMIN — ATORVASTATIN CALCIUM 40 MG: 40 TABLET, FILM COATED ORAL at 12:08

## 2022-08-08 RX ADMIN — AMIODARONE HYDROCHLORIDE 150 MG: 1.5 INJECTION, SOLUTION INTRAVENOUS at 07:08

## 2022-08-08 RX ADMIN — FERROUS SULFATE TAB 325 MG (65 MG ELEMENTAL FE) 1 EACH: 325 (65 FE) TAB at 02:08

## 2022-08-08 RX ADMIN — PHENYLEPHRINE HYDROCHLORIDE 0.5 MCG/KG/MIN: 10 INJECTION INTRAVENOUS at 12:08

## 2022-08-08 NOTE — PHARMACY MED REC
"  Admission Medication History     The home medication history was taken by Julissa Dewitt CPhT.  You may go to "Admission" then "Reconcile Home Medications" tabs to review and/or act upon these items.      The home medication list has been updated by the Pharmacy department.    Please read ALL comments highlighted in yellow.    Please address this information as you see fit.     Feel free to contact us if you have any questions or require assistance.      The medications listed below were removed from the home medication list. Please reorder if appropriate:  Patient reports no longer taking the following medication(s):   BD luer-keith syringe   Gavylyte-G    Felodipine ER 10 mg tab (d/c)    Medications listed below were obtained from: Patient/family and Analytic software- [employer]  (Not in a hospital admission)      Potential issues to be addressed PRIOR TO DISCHARGE  Patient reported not taking the following medications: (cholecalciferol,vitiamin d3 (Vitamin D3) 2,000 unit tab; ergocalciferol 50,000 unit; hydrocodone-acetaminophen (NORCO)  mg tab; hydrocodone-acetaminophen (NORCO) 7.5-325 mg tab; lubiprosone (Amitiza) 24 mcg tab; meloxicam (mobic) 15 mg tab; testosterone 200 mg; triamcinolone acetonide 0.5% (Kenalog) cream). These medications remain on the home medication list. Please address accordingly.         Julissa Dewitt CPhT.  [phone]                  .          "

## 2022-08-08 NOTE — ASSESSMENT & PLAN NOTE
Hx PAF on xarelto. Medical noncompliance. Currently rate controlled on IV amiodarone. Desmond needed for hypotension. Check echo. Continue with rate control for now. Consider BRI/CV if A-flutter persists once recovered from COVID

## 2022-08-08 NOTE — HPI
This 61-year-old white male presents emergency room waking this morning feeling weak dizzy hot and sweaty.  He did have some chest pressure.  He denies shortness of breath.  He has a history of deep venous thrombosis is maintained on Xarelto and thinks he probably did not miss any doses.  At the same time, he is moving.  There is a problem with his medicines being in boxes, and he is not sure where they are.  He is without other injuries or problems.  He has a history of diabetes and high cholesterol.  Has a history of hypertension.  He was noted to be hypotensive with a systolic of 80 on the scene.  The patient received a fluid bolus.  EMS notes that he is in atrial flutter with a heart rate of 100 and 36. The patient no longer feels weak and dizzy.      Initially A-flutter RVR - HR improved with IV amiodarone but developed hypotension. Placed on marichuy-synephrine with imrpovement in BP  Currently Denies CP or SOB  COVID +  EKG A-flutter 107 NSSTT changes  Troponin negative      Followed by Dr Harper at [facility] PAF    BRI/CV 9/16/19  LVEF 40%, mild global HK  Normal RV size/fxn  Normal valves  Trace AI, mild MR, mild-mod TR  No LA/SONNY thrombus  Biatrial dilation  Mild aortic root dilatation, 4.1 cm.     Successful DCCV AF-> NSR 200J x4 (last defib with replacement of pads to more of an interscapular location for the posterior pad).    Echo 2017    1 - Normal left ventricular systolic function (EF 65-70%).     2 - No wall motion abnormalities.     3 - Concentric hypertrophy.     4 - Mildly to moderately enlarged aortic root, 4.3 cm.     5 - Trivial tricuspid regurgitation.     6 - Pulmonary hypertension. The estimated PA systolic pressure is 63 mmHg.

## 2022-08-08 NOTE — SUBJECTIVE & OBJECTIVE
Past Medical History:   Diagnosis Date    Acquired hypothyroidism     Atrial fibrillation 09/16/2019    Diabetes mellitus     High cholesterol     History of DVT (deep vein thrombosis)     History of pulmonary embolism     Hypertension     LARRY (obstructive sleep apnea)     S/P IVC filter        Past Surgical History:   Procedure Laterality Date    THYROIDECTOMY, PARTIAL  2006    TONSILLECTOMY      TREATMENT OF CARDIAC ARRHYTHMIA  9/16/2019    Procedure: Cardioversion or Defibrillation;  Surgeon: Deven Vasquez MD;  Location: [facility] CATH LAB;  Service: Cardiology;;       Review of patient's allergies indicates:   Allergen Reactions    Contrast media Hives       No current facility-administered medications on file prior to encounter.     Current Outpatient Medications on File Prior to Encounter   Medication Sig    acetaminophen (TYLENOL) 500 MG tablet Take 500 mg by mouth every 6 (six) hours as needed for Pain.    EPINEPHrine (EPIPEN) 0.3 mg/0.3 mL AtIn     gabapentin (NEURONTIN) 600 MG tablet Take 600 mg by mouth 3 (three) times daily.    levothyroxine (SYNTHROID) 200 MCG tablet Take 200 mcg by mouth before breakfast.    metformin (GLUCOPHAGE) 1000 MG tablet Take 1,000 mg by mouth 2 (two) times daily with meals.    MOVANTIK 25 mg tablet Take 25 mg by mouth once daily.    olmesartan (BENICAR) 40 MG tablet Take 1 tablet (40 mg total) by mouth once daily.    omega-3 fatty acids/fish oil (FISH OIL-OMEGA-3 FATTY ACIDS) 300-1,000 mg capsule Take 1 capsule by mouth 2 (two) times a day.    omeprazole (PRILOSEC) 20 MG capsule     rivaroxaban (XARELTO) 20 mg Tab Take 1 tablet (20 mg total) by mouth daily with dinner or evening meal.    rosuvastatin (CRESTOR) 40 MG Tab Take 10 mg by mouth every evening.    sotalol (BETAPACE) 80 MG tablet Take by mouth 2 (two) times daily.    TRUE METRIX GLUCOSE TEST STRIP Strp     TRUEPLUS LANCETS 33 gauge Misc     cholecalciferol, vitamin D3, (VITAMIN D3) 50 mcg  "(2,000 unit) Tab Take by mouth once daily.    ergocalciferol (ERGOCALCIFEROL) 50,000 unit Cap Take 50,000 Units by mouth every 7 days.    ferrous sulfate 325 (65 FE) MG EC tablet TAKE 1 TABLET EVERY DAY (Patient taking differently: 325 mg.)    HYDROcodone-acetaminophen (NORCO)  mg per tablet     testosterone 200 mg Pllt by Implant route.    triamcinolone acetonide 0.5% (KENALOG) 0.5 % Crea APPLY TOPICALLY A SMALL AMOUNT TO THE AFFECTED AREA(S) ONCE DAILY    [DISCONTINUED] BD LUER-TIM SYRINGE 3 mL 22 x 1 1/2" Syrg     [DISCONTINUED] felodipine (PLENDIL) 10 MG 24 hr tablet Take 10 mg by mouth once daily.    [DISCONTINUED] GAVILYTE-G 236-22.74-6.74 -5.86 gram suspension MIX WITH LIQUID AND DRINK BY MOUTH ONCE AS DIRECTED    [DISCONTINUED] HYDROcodone-acetaminophen (NORCO) 7.5-325 mg per tablet Take 1 tablet by mouth every 6 (six) hours as needed for Pain.    [DISCONTINUED] lubiprostone (AMITIZA) 24 MCG Cap Take 24 mcg by mouth 2 (two) times daily.    [DISCONTINUED] meloxicam (MOBIC) 15 MG tablet Take 15 mg by mouth once daily.     Family History       Family history is unknown by patient.          Tobacco Use    Smoking status: Never Smoker    Smokeless tobacco: Never Used   Substance and Sexual Activity    Alcohol use: Not Currently     Comment: occasionally    Drug use: No    Sexual activity: Not Currently     Review of Systems   Constitutional:  Negative for activity change, appetite change and fever.   HENT:  Negative for congestion and dental problem.    Eyes:  Negative for discharge and itching.   Respiratory:  Negative for apnea, cough, chest tightness and shortness of breath.    Cardiovascular:  Positive for chest pain and palpitations. Negative for leg swelling.   Gastrointestinal:  Negative for abdominal distention and abdominal pain.   Endocrine: Negative for cold intolerance and heat intolerance.   Genitourinary:  Negative for difficulty urinating and dysuria.   Musculoskeletal:  " Negative for arthralgias and back pain.   Allergic/Immunologic: Negative for environmental allergies and food allergies.   Neurological:  Positive for weakness and light-headedness. Negative for dizziness and facial asymmetry.   Hematological:  Negative for adenopathy. Does not bruise/bleed easily.   Psychiatric/Behavioral:  Negative for agitation and behavioral problems.    Objective:     Vital Signs (Most Recent):  Temp: 97.9 °F (36.6 °C) (08/08/22 0723)  Pulse: 65 (08/08/22 1108)  Resp: 12 (08/08/22 1108)  BP: 119/86 (08/08/22 1108)  SpO2: 99 % (08/08/22 1108) Vital Signs (24h Range):  Temp:  [97.9 °F (36.6 °C)-98.7 °F (37.1 °C)] 97.9 °F (36.6 °C)  Pulse:  [] 65  Resp:  [11-25] 12  SpO2:  [89 %-100 %] 99 %  BP: ()/(51-94) 119/86     Weight: (!) 142.9 kg (315 lb)  Body mass index is 46.52 kg/m².    Physical Exam  Vitals and nursing note reviewed.   Constitutional:       General: He is not in acute distress.     Appearance: He is well-developed. He is obese. He is not ill-appearing, toxic-appearing or diaphoretic.   HENT:      Head: Normocephalic and atraumatic.      Right Ear: External ear normal.      Left Ear: External ear normal.      Nose: Nose normal.      Mouth/Throat:      Mouth: Mucous membranes are moist.   Eyes:      Extraocular Movements: Extraocular movements intact.      Conjunctiva/sclera: Conjunctivae normal.   Cardiovascular:      Rate and Rhythm: Normal rate and regular rhythm.      Heart sounds: Normal heart sounds.   Pulmonary:      Effort: Pulmonary effort is normal. No respiratory distress.      Breath sounds: Normal breath sounds.   Abdominal:      General: Bowel sounds are normal. There is no distension.      Palpations: Abdomen is soft.      Tenderness: There is no abdominal tenderness.   Musculoskeletal:         General: Normal range of motion.      Cervical back: Normal range of motion. No rigidity.   Skin:     General: Skin is warm and dry.   Neurological:      Mental Status:  He is alert and oriented to person, place, and time.      Cranial Nerves: No cranial nerve deficit.      Coordination: Coordination normal.   Psychiatric:         Behavior: Behavior normal.           Significant Labs: All pertinent labs within the past 24 hours have been reviewed.    Significant Imaging: I have reviewed all pertinent imaging results/findings within the past 24 hours.

## 2022-08-08 NOTE — CARE UPDATE
[facility] [place] 4 [place]      8/8/2022       Skin assessed on: Admit      [x] No Pressure Injuries Present    [x]Prevention Measures Documented    [] Yes LDA  for Pressure Injury Previously documented     [] Yes New Pressure Injury Discovered   [] LDA for New Pressure Injury Added      Attending RN:  Sarah Jack RN     Second RN:  Marilyn Oswald RN

## 2022-08-08 NOTE — ASSESSMENT & PLAN NOTE
-Initial BP 98/67 but as low as 72/51 in ER  -Troponin negative  -CXR clear.  -He is leukopenic but procal and lactate are normal and he is afebrile.  No convincing evidence of bacterial infection to suggest sepsis.  -He is covid positive but without symptoms.  -Adrenal insufficiency unlikely, but check cortisol in AM.  -hold home olmesartan and sotalol.  -Continue IV fluids and attempt to wean pressors.

## 2022-08-08 NOTE — ED NOTES
Dr. Molina aware of SBP 130s, reports to maintain phenylephrine dosage of 0.5 mcg/kg/hr per verbal order.

## 2022-08-08 NOTE — CARE UPDATE
[facility] Intensive Care  ICU Shift Summary  Date: 8/8/2022      Prehospitalization: Home  Admit Date / LOS : 8/8/2022/ 0 days    Diagnosis: Atrial flutter with rapid ventricular response    Consults:        Active: Cardio       Needed: N/A     Code Status: Full Code   Advanced Directive: <no information>    LDA:  Lines/Drains/Airways     Peripheral Intravenous Line  Duration                Peripheral IV - Single Lumen 08/08/22 0643 18 G Left Antecubital <1 day         Peripheral IV - Single Lumen 08/08/22 0720 20 G Right Antecubital <1 day         Peripheral IV - Single Lumen 08/08/22 1429 20 G Right Antecubital <1 day              Central Lines/Site/Justification:Patient Does Not Have Central Line  Urinary Cath/Order/Justification:Patient Does Not Have Urinary Catheter    Vasopressors/Infusions:    sodium chloride 0.9% 100 mL/hr at 08/08/22 1700    amiodarone in dextrose 5% 0.5 mg/min (08/08/22 1700)    phenylephrine Stopped (08/08/22 1624)          GOALS: Volume/ Hemodynamic: MAP >                     RASS: 0  alert and calm    Pain Management: none       Pain Controlled: not applicable     Rhythm: A-Fib    Respiratory Device: Room Air                  Most Recent SBT/ SAT: N/A       MOVE Screen: PT Consult  ICU Liberation: not applicable    VTE Prophylaxis: Mechanical  Mobility: Bedrest  Stress Ulcer Prophylaxis: No    Isolation: Airborne and Contact and Droplet    Dietary:   Current Diet Order   Procedures    Diet Cardiac    Diet NPO Except for: Medication, Ice Chips, Sips with Medication     For possible BRI-DCCV if needed tomorrow     Order Specific Question:   Except for     Answer:   Medication     Order Specific Question:   Except for     Answer:   Ice Chips     Order Specific Question:   Except for     Answer:   Sips with Medication      Tolerance: yes  Advancement: @ goal    I & O (24h):    Intake/Output Summary (Last 24 hours) at 8/8/2022 1714  Last data filed at 8/8/2022 1700  Gross per 24 hour    Intake 2261.55 ml   Output --   Net 2261.55 ml        Restraints: No    Significant Dates:  Post Op Date: N/A  Rescue Date: N/A  Imaging/ Diagnostics: N/A    Noteworthy Labs:  See labs    COVID Test: (+)  CBC/Anemia Labs: Coags:    Recent Labs   Lab 08/08/22  0710 08/08/22  0913   WBC 2.84*  --    HGB 11.8*  --    HCT 38.7*  --      --    MCV 76*  --    RDW 16.9*  --    IRON  --  45   FERRITIN  --  67    Recent Labs   Lab 08/08/22  0710   INR 1.2        Chemistries:   Recent Labs   Lab 08/08/22  0710      K 3.9      CO2 24   BUN 12   CREATININE 0.7   CALCIUM 9.2   PROT 6.8   BILITOT 0.6   ALKPHOS 43*   ALT 13   AST 14        Cardiac Enzymes: Ejection Fractions:    Recent Labs     08/08/22  0710   TROPONINI 0.012    EF   Date Value Ref Range Status   08/08/2022 55 % Final        POCT Glucose: HbA1c:    Recent Labs   Lab 08/08/22  0718 08/08/22  1233 08/08/22  1644   POCTGLUCOSE 138* 109 115*    Hemoglobin A1C   Date Value Ref Range Status   08/08/2022 6.3 (H) 4.0 - 5.6 % Final     Comment:     ADA Screening Guidelines:  5.7-6.4%  Consistent with prediabetes  >or=6.5%  Consistent with diabetes    High levels of fetal hemoglobin interfere with the HbA1C  assay. Heterozygous hemoglobin variants (HbS, HgC, etc)do  not significantly interfere with this assay.   However, presence of multiple variants may affect accuracy.             ICU LOS 2h  Level of Care: Critical Care  Chart Check: 12 HR Done  Shift Summary/Plan for the shift: see care plan note

## 2022-08-08 NOTE — ED NOTES
Pt unable to provide urine sample at this time. Pt verbalizes understanding to provide sample soon. Urinal placed at patient bedside.

## 2022-08-08 NOTE — HPI
61 year with obesity, history of DVT and PE (2008 with IVC filter), diabetes mellitus, hyperlipidemia, atrial fibrillation (on xarelto), LARRY (on cpap 16) and hypothyroidism who presented for evaluation of near syncope.  He states he felt fine when he went to bed last night but woke up feeling hot, sweaty  and light-headed.  He had had a mild sensation of chest pressure which is similar to the feeling of trapped air in his belly he feels most morning after using his cpap.  He said he felt like he was going to pass out and felt an irregular heart beat sensation which is unusual for him.  He denies fevers, chills, cough, shortness of breath, nausea, vomiting and diarrhea.  He has no new leg edema, claims medication and diet compliance.  He is vaccinated and boosted for covid-19.    In the ER he was found to be hypotensive with atrial flutter with RVR.  He was started on amiodarone and neosynephrine drips and feels much better now.

## 2022-08-08 NOTE — ASSESSMENT & PLAN NOTE
-Admitted to inpatient status  -Presented for near syncope and noted to have aflutter w RVR and hypotension  -Etiology unclear - but he is moving and doesn't know where his home medications are at.  Is covid positive but asymptomatic.  -CXR is clear.  -Troponin and procalcitonin negative.  BNP scantly elevated.  EtOH normal.    -TSH is low - will decrease dose of synthroid.  -In ER received fluids and started on amiodarone and neosynephrine with clinical resolution of symptoms  -Echo shows EF 55%, normal diastolic function and PASP 24mmHg.  -Cardiology consulted and input appreciated  -Trend troponins   -Continue amiodarone and neosynephrine drips for now.  Attempt to wean from marichuy.  -Monitor closely in ICU.

## 2022-08-08 NOTE — CONSULTS
[facility] - Emergency Dept  Cardiology  Consult Note    Patient Name: Russell Santos  MRN: [id number]  Admission Date: 8/8/2022  Hospital Length of Stay: 0 days  Code Status: Prior   Attending Provider: Primo Molina MD   Consulting Provider: Viktor Casey MD  Primary Care Physician: Roxanne Diaz [facility] [place]  Principal Problem:Atrial flutter with rapid ventricular response    Patient information was obtained from ER records.     Consults  Subjective:     Chief Complaint:  A-flutter, hypotension     HPI:   This 61-year-old white male presents emergency room waking this morning feeling weak dizzy hot and sweaty.  He did have some chest pressure.  He denies shortness of breath.  He has a history of deep venous thrombosis is maintained on Xarelto and thinks he probably did not miss any doses.  At the same time, he is moving.  There is a problem with his medicines being in boxes, and he is not sure where they are.  He is without other injuries or problems.  He has a history of diabetes and high cholesterol.  Has a history of hypertension.  He was noted to be hypotensive with a systolic of 80 on the scene.  The patient received a fluid bolus.  EMS notes that he is in atrial flutter with a heart rate of 100 and 36. The patient no longer feels weak and dizzy.      Initially A-flutter RVR - HR improved with IV amiodarone but developed hypotension. Placed on marichuy-synephrine with imrpovement in BP  Currently Denies CP or SOB  COVID +  EKG A-flutter 107 NSSTT changes  Troponin negative      Followed by Dr Harper [facility]  Hx PAF    BRI/CV 9/16/19  LVEF 40%, mild global HK  Normal RV size/fxn  Normal valves  Trace AI, mild MR, mild-mod TR  No LA/SONNY thrombus  Biatrial dilation  Mild aortic root dilatation, 4.1 cm.     Successful DCCV AF-> NSR 200J x4 (last defib with replacement of pads to more of an interscapular location for the posterior pad).    Echo 2017    1 - Normal left ventricular systolic function (EF 65-70%).     2  "- No wall motion abnormalities.     3 - Concentric hypertrophy.     4 - Mildly to moderately enlarged aortic root, 4.3 cm.     5 - Trivial tricuspid regurgitation.     6 - Pulmonary hypertension. The estimated PA systolic pressure is 63 mmHg.       Past Medical History:   Diagnosis Date    Atrial fibrillation 09/16/2019    Diabetes mellitus     High cholesterol     Hypertension        Past Surgical History:   Procedure Laterality Date    THYROIDECTOMY, PARTIAL  2006    TONSILLECTOMY      TREATMENT OF CARDIAC ARRHYTHMIA  9/16/2019    Procedure: Cardioversion or Defibrillation;  Surgeon: Deven Vasquez MD;  Location: [facility] CATH LAB;  Service: Cardiology;;       Review of patient's allergies indicates:   Allergen Reactions    Contrast media Hives       No current facility-administered medications on file prior to encounter.     Current Outpatient Medications on File Prior to Encounter   Medication Sig    acetaminophen (TYLENOL EXTRA STRENGTH) 500 MG tablet Take 500 mg by mouth every 6 (six) hours as needed for Pain.    BD LUER-TIM SYRINGE 3 mL 22 x 1 1/2" Syrg     cholecalciferol, vitamin D3, (VITAMIN D3) 2,000 unit Tab Take by mouth once daily.    EPINEPHrine (EPIPEN) 0.3 mg/0.3 mL AtIn     ergocalciferol (ERGOCALCIFEROL) 50,000 unit Cap Take 50,000 Units by mouth every 7 days.    ferrous sulfate 325 (65 FE) MG EC tablet TAKE 1 TABLET EVERY DAY    gabapentin (NEURONTIN) 600 MG tablet Take 600 mg by mouth 3 (three) times daily.    GAVILYTE-G 236-22.74-6.74 -5.86 gram suspension MIX WITH LIQUID AND DRINK BY MOUTH ONCE AS DIRECTED    HYDROcodone-acetaminophen (NORCO)  mg per tablet     HYDROcodone-acetaminophen (NORCO) 7.5-325 mg per tablet Take 1 tablet by mouth every 6 (six) hours as needed for Pain.    levothyroxine (SYNTHROID) 200 MCG tablet Take 200 mcg by mouth before breakfast.    lubiprostone (AMITIZA) 24 MCG Cap Take 24 mcg by mouth 2 (two) times daily.    meloxicam (MOBIC) 15 MG " tablet Take 15 mg by mouth once daily.    metformin (GLUCOPHAGE) 1000 MG tablet Take 1,000 mg by mouth 2 (two) times daily with meals.    olmesartan (BENICAR) 40 MG tablet Take 1 tablet (40 mg total) by mouth once daily.    omeprazole (PRILOSEC) 20 MG capsule     rivaroxaban (XARELTO) 20 mg Tab Take 1 tablet (20 mg total) by mouth daily with dinner or evening meal.    rosuvastatin (CRESTOR) 40 MG Tab Take 10 mg by mouth every evening.    sotalol (BETAPACE) 80 MG tablet Take 40 mg by mouth 2 (two) times daily.    testosterone 200 mg Pllt by Implant route.    triamcinolone acetonide 0.5% (KENALOG) 0.5 % Crea APPLY TOPICALLY A SMALL AMOUNT TO THE AFFECTED AREA(S) ONCE DAILY    TRUE METRIX GLUCOSE TEST STRIP Strp     TRUEPLUS LANCETS 33 gauge Misc     [DISCONTINUED] felodipine (PLENDIL) 10 MG 24 hr tablet Take 10 mg by mouth once daily.     Family History    None       Tobacco Use    Smoking status: Never Smoker    Smokeless tobacco: Never Used   Substance and Sexual Activity    Alcohol use: Not Currently     Comment: occasionally    Drug use: No    Sexual activity: Not Currently     Review of Systems   Unable to perform ROS: Other   Objective:     Vital Signs (Most Recent):  Temp: 97.9 °F (36.6 °C) (08/08/22 0723)  Pulse: 66 (08/08/22 0912)  Resp: 18 (08/08/22 0912)  BP: 134/82 (08/08/22 0912)  SpO2: 97 % (08/08/22 0912) Vital Signs (24h Range):  Temp:  [97.9 °F (36.6 °C)-98.7 °F (37.1 °C)] 97.9 °F (36.6 °C)  Pulse:  [] 66  Resp:  [15-25] 18  SpO2:  [96 %-100 %] 97 %  BP: ()/(51-94) 134/82     Weight: (!) 142.9 kg (315 lb)  Body mass index is 46.52 kg/m².    SpO2: 97 %  O2 Device (Oxygen Therapy): room air      Intake/Output Summary (Last 24 hours) at 8/8/2022 0916  Last data filed at 8/8/2022 0915  Gross per 24 hour   Intake 1126.84 ml   Output --   Net 1126.84 ml       Lines/Drains/Airways       Airway  Duration                  Airway - Non-Surgical 09/16/19 1145 Nasal Cannula;Mask 1056  days              Peripheral Intravenous Line  Duration                  Peripheral IV - Single Lumen 08/08/22 0643 18 G Left Antecubital <1 day         Peripheral IV - Single Lumen 08/08/22 0720 20 G Right Antecubital <1 day                    Physical Exam  Deferred COVID  Significant Labs: All pertinent lab results from the last 24 hours have been reviewed.    Significant Imaging: Echocardiogram: 2D echo with color flow doppler:   Results for orders placed or performed during the hospital encounter of 10/17/17   2D echo with color flow doppler   Result Value Ref Range    EF + QEF 70 55 - 65    Est. PA Systolic Pressure 63.06 (A)     Pericardial Effusion NONE     Mitral Valve Mobility NORMAL     Tricuspid Valve Regurgitation TRIVIAL     Narrative    Date of Procedure: 10/18/2017        TEST DESCRIPTION   Technical Quality: This is a technically adequate study. This study was performed in conjunction with a 0.5ml intravenous injection of Optison contrast agent.     Aorta: The aortic root is mildly to moderately enlarged, measuring 3.7 cm at sinotubular junction and 4.3 cm at Sinuses of Valsalva. The proximal ascending aorta is normal in size, measuring 4.1 cm across.     Left Atrium: The left atrial volume index is moderately enlarged, measuring 45.67 cc/m2.     Left Ventricle: The left ventricle is normal in size, with an end-diastolic diameter of 5.2 cm, and an end-systolic diameter of 3.6 cm. LV wall thickness is normal, with the septum measuring 1.7 cm and the posterior wall measuring 1.9 cm across. Relative   wall thickness was increased at 0.73, and the LV mass index was increased at 212.8 g/m2 consistent with concentric left ventricular hypertrophy. There are no regional wall motion abnormalities. Left ventricular systolic function appears normal. Visually   estimated ejection fraction is 65-70%. The LV Doppler derived stroke volume equals 141.0 ccs.     Diastolic indices: E wave velocity 0.8 m/s, E/A ratio  1.0,  msec., E/e' ratio(avg) 6. Diastolic function is indeterminate.     Right Atrium: The right atrium is moderately enlarged, measuring 6.4 cm in length and 4.4 cm in width in the apical view.     Right Ventricle: The right ventricle is normal in size measuring 4.1 cm at the base in the apical right ventricle-focused view. Global right ventricular systolic function appears normal. Tricuspid annular plane systolic excursion (TAPSE) is 2.8 cm.   Tissue Doppler-derived tricuspid annular peak systolic velocity (S prime) is 23.8 cm/s. The estimated PA systolic pressure is 63 mmHg.     Aortic Valve:  The aortic valve is normal in structure with normal leaflet mobility. The aortic valve is tri-leaflet in structure. The peak gradient obtained across the aortic valve is 16 mmHg, with a mean gradient of 8 mmHg. Using a left ventricular   outflow tract diameter of 2.5 cm, a left ventricular outflow tract velocity time integral of 29 cm, and a peak instantaneous transvalvular velocity time integral of 37 cm, the calculated aortic valve area is 3.87 cm2.     Mitral Valve:  The mitral valve is normal in structure with normal leaflet mobility.     Tricuspid Valve:  The tricuspid valve is normal in structure with normal leaflet mobility. There is trivial tricuspid regurgitation.     Pulmonary Valve:  The pulmonic valve is not well seen.     Pericardium: There is no evidence of pericardial effusion.      IVC: IVC is enlarged but collapses > 50% with a sniff, suggesting intermediate right atrial pressure of 8 mmHg.     Intracavitary: There is no evidence of intracavity mass, thrombi, or vegetation.         CONCLUSIONS     1 - Normal left ventricular systolic function (EF 65-70%).     2 - No wall motion abnormalities.     3 - Concentric hypertrophy.     4 - Mildly to moderately enlarged aortic root, 4.3 cm.     5 - Trivial tricuspid regurgitation.     6 - Pulmonary hypertension. The estimated PA systolic pressure is 63 mmHg.              This document has been electronically    SIGNED BY: Deven Vasquez MD On: 10/18/2017 11:53     Assessment and Plan:     * Atrial flutter with rapid ventricular response  Hx PAF on xarelto. Medical noncompliance. Currently rate controlled on IV amiodarone. Desmodn needed for hypotension. Check echo. Continue with rate control for now. Consider BRI/CV if A-flutter persists once recovered from COVID    Hypotension  Check echo. Pressors with [place] as needed    Leukopenia  Followed by Heme/Onc    Diabetes mellitus, type 2  Per primary    Hyperlipidemia  On crestor        VTE Risk Mitigation (From admission, onward)    None        35 minutes spent with patient in ICU    Thank you for your consult. I will follow-up with patient. Please contact us if you have any additional questions.    Viktor Casey MD  Cardiology   [facility] - Emergency Dept

## 2022-08-08 NOTE — ED PROVIDER NOTES
Encounter Date: 8/8/2022       History     Chief Complaint   Patient presents with    near syncope     EMS called to pt's home by wife for witnessed near syncopal episode. Wife reports pt looked like he was going to pass out approx 1 hour ago and pt endorsed feeling this way though LOC did not happen. On arrival to scene, BP was 80/palp with  in atrial flutter. Pt reports still feeling weak but does not feel as though he is going to pass out anymore. Pt is aaox3 in triage. Pt was able to ambulated to stretcher with some difficulty.     HPI   This 61-year-old white male presents emergency room waking this morning feeling weak dizzy hot and sweaty.  He did have some chest pressure.  He denies shortness of breath.  He has a history of deep venous thrombosis is maintained on Xarelto and thinks he probably did not miss any doses.  At the same time, he is moving.  There is a problem with his medicines being in boxes, and he is not sure where they are.  He is without other injuries or problems.  He has a history of diabetes and high cholesterol.  Has a history of hypertension.  He was noted to be hypotensive with a systolic of 80 on the scene.  The patient received a fluid bolus.  EMS notes that he is in atrial flutter with a heart rate of 100 and 36. The patient no longer feels weak and dizzy.    Review of patient's allergies indicates:   Allergen Reactions    Contrast media Hives     Past Medical History:   Diagnosis Date    Atrial fibrillation 09/16/2019    Diabetes mellitus     High cholesterol     Hypertension      Past Surgical History:   Procedure Laterality Date    THYROIDECTOMY, PARTIAL  2006    TONSILLECTOMY      TREATMENT OF CARDIAC ARRHYTHMIA  9/16/2019    Procedure: Cardioversion or Defibrillation;  Surgeon: Deven Vasquez MD;  Location: [facility] CATH LAB;  Service: Cardiology;;     No family history on file.  Social History     Tobacco Use    Smoking status: Never Smoker    Smokeless  tobacco: Never Used   Substance Use Topics    Alcohol use: Not Currently     Comment: occasionally    Drug use: No     Review of Systems  The patient was questioned specifically with regard to the following.  General: Fever, chills, sweats. Neuro: Headache. Eyes: eye problems. ENT: Ear pain, sore throat. Cardiovascular: Chest pain. Respiratory: Cough, shortness of breath. Gastrointestinal: Abdominal pain, vomiting, diarrhea. Genitourinary: Painful urination.  Musculoskeletal: Arm and leg problems. Skin: Rash.  The review of systems was negative except for the following:  Hot weak dizzy sweaty chest pressure palpitations.  The patient has a mild occipital headache  Physical Exam     Initial Vitals [08/08/22 0641]   BP Pulse Resp Temp SpO2   98/67 (!) 122 20 98.7 °F (37.1 °C) 97 %      MAP       --         Physical Exam  The patient was examined specifically for the following:   General:No significant distress, Good color, Warm and dry. Head and neck:Scalp atraumatic, Neck supple. Neurological:Appropriate conversation, Gross motor deficits. Eyes:Conjugate gaze, Clear corneas. ENT: No epistaxis. Cardiac: Regular rate and rhythm, Grossly normal heart tones. Pulmonary: Wheezing, Rales. Gastrointestinal: Abdominal tenderness, Abdominal distention. Musculoskeletal: Extremity deformity, Apparent pain with range of motion of the joints. Skin: Rash.   The findings on examination were normal except for the following:  Patient has an elevated BMI.  Lungs are clear and free of wheezing rales rubs or rhonchi.  Heart tones are remarkable for an irregular tachycardia.  The abdomen is nontender.  The patient is warm dry and not distressed.  His blood pressure is 98/67.  There is no evidence of respiratory distress.  The neck is supple.  Mental status examination, cranial nerves, motor and sensory examination are normal.  ED Course   Critical Care    Date/Time: 8/8/2022 7:58 AM  Performed by: Primo Molina MD  Authorized by:  Primo Molina MD   Direct patient critical care time: 22 minutes  Additional history critical care time: 11 minutes  Ordering / reviewing critical care time: 11 minutes  Documentation critical care time: 11 minutes  Consulting other physicians critical care time: 11 minutes  Consult with family critical care time: 5 minutes  Total critical care time (exclusive of procedural time) : 71 minutes  Critical care time was exclusive of separately billable procedures and treating other patients and teaching time.  Critical care was necessary to treat or prevent imminent or life-threatening deterioration of the following conditions: circulatory failure and cardiac failure.  Critical care was time spent personally by me on the following activities: development of treatment plan with patient or surrogate, discussions with consultants, discussions with primary provider, evaluation of patient's response to treatment, examination of patient, obtaining history from patient or surrogate, ordering and performing treatments and interventions, ordering and review of laboratory studies, ordering and review of radiographic studies, pulse oximetry, re-evaluation of patient's condition and review of old charts.        Labs Reviewed   COMPREHENSIVE METABOLIC PANEL - Abnormal; Notable for the following components:       Result Value    Glucose 172 (*)     Albumin 3.3 (*)     Alkaline Phosphatase 43 (*)     Anion Gap 7 (*)     All other components within normal limits   PROTIME-INR - Abnormal; Notable for the following components:    Prothrombin Time 13.0 (*)     All other components within normal limits   B-TYPE NATRIURETIC PEPTIDE - Abnormal; Notable for the following components:     (*)     All other components within normal limits   CBC W/ AUTO DIFFERENTIAL - Abnormal; Notable for the following components:    WBC 2.84 (*)     Hemoglobin 11.8 (*)     Hematocrit 38.7 (*)     MCV 76 (*)     MCH 23.2 (*)     MCHC 30.5 (*)     RDW  16.9 (*)     Gran # (ANC) 1.4 (*)     Mono # 0.1 (*)     All other components within normal limits   SARS-COV-2 RDRP GENE - Abnormal; Notable for the following components:    POC Rapid COVID Positive (*)     All other components within normal limits   POCT GLUCOSE - Abnormal; Notable for the following components:    POCT Glucose 138 (*)     All other components within normal limits   TROPONIN I   HEMOGLOBIN A1C   PROCALCITONIN   LACTIC ACID, PLASMA   RAPID HIV   TSH   DRUG SCREEN PANEL, URINE EMERGENCY   ALCOHOL,MEDICAL (ETHANOL)   FOLATE   VITAMIN B12   FERRITIN   IRON AND TIBC   URINALYSIS, REFLEX TO URINE CULTURE   D DIMER, QUANTITATIVE     EKG Readings: (Independently Interpreted)   This patient is in atrial flutter with a variable AV block.  The heart rate is 107. There are nonspecific ST segment and T-wave changes.  There is no definite evidence of acute myocardial infarction or malignant arrhythmia.         Imaging Results          X-Ray Chest AP Portable (Final result)  Result time 08/08/22 08:23:17    Final result by Everardo Shirley MD (08/08/22 08:23:17)                 Impression:      No active process from 2019, additional findings above.      Electronically signed by: Everardo Shirley MD  Date:    08/08/2022  Time:    08:23             Narrative:    EXAMINATION:  XR CHEST AP PORTABLE    CLINICAL HISTORY:  chest pain;    TECHNIQUE:  Single frontal view of the chest was performed.    COMPARISON:  09/16/2019    FINDINGS:  Large body size, incomplete inspiration, apical lordotic positioning degrades exam.  Metallic devices overlie the left lateral CP angle and cardiac margin.  Cardiomegaly and elevation right hemidiaphragm with compression atelectasis right lower lobe stable.  Postop changes overlie T1-T2 and adjacent trachea.                              Medical decision making: Given the above this patient presents to the emergency room with atrial flutter with rapid ventricular response.  The  patient had a blood pressure of 80 systolic in the field with a heart rate of 130.  The patient was treated with amiodarone in the emergency room.  His heart rate fell to 63. His repeat blood pressure was 68/40.  Consultation was obtained with Cardiology.  Dr. Casey recommends Yulia-Synephrine.  An echocardiogram is pending.  We will continue the amiodarone drip.   Dr. Casey saw the patient in the emergency room at 9:00 a.m..  Echocardiogram was underway.  Dr. Capellan accepts the patient to the ICU.       Medications   amiodarone 360 mg/200 mL (1.8 mg/mL) infusion (1 mg/min Intravenous New Bag 8/8/22 0753)   amiodarone 360 mg/200 mL (1.8 mg/mL) infusion (has no administration in time range)   PHENYLephrine (YULIA-SYNEPHRINE) 20 mg in sodium chloride 0.9% 250 mL infusion (1 mcg/kg/min × 142.9 kg Intravenous Verify Only 8/8/22 0847)   amiodarone in dextrose 150 mg/100 mL (1.5 mg/mL) loading dose 150 mg (0 mg Intravenous Stopped 8/8/22 0746)   sodium chloride 0.9% bolus 1,000 mL (0 mLs Intravenous Stopped 8/8/22 0749)                          Clinical Impression:   Final diagnoses:  [R07.89] Chest pressure  [I48.92] Atrial flutter with rapid ventricular response  [I95.9] Hypotension, unspecified hypotension type (Primary)          ED Disposition Condition    Admit               Primo Molina MD  08/08/22 0906

## 2022-08-08 NOTE — ASSESSMENT & PLAN NOTE
-Noted thyroid malignancy and s/p thyroidectomy  -TSH is overly suppressed  -Continue levothyroxine but at new lower dose 150mcg qd.  -Repeat TSH in 6 weeks.

## 2022-08-08 NOTE — ASSESSMENT & PLAN NOTE
-Covid positive on admit but without symptoms  -He is vaccinated and boosted  -Covid risk core elevated at 4  -Place in covid isolation.  -Will treat with remdesivir x 3 days due to elevated covid risk score and immunosuppression with chronic unexplained leukopenia.

## 2022-08-08 NOTE — ED TRIAGE NOTES
"Pt arrived to ED via ambulance with c/c of generalized weakness and fatigue. PT states that " he woke up out of his sleep this morning sweating and couldn't understand why." PT stated that 'he sat up at the edge of the bed and felt dizzy."  Pt states that "this has happened before and was hospitalized in the past." Pt states that he is on blood thinners which was last taken last night, and BP medication was taken this morning. PT had  Pt was given 650 Mls of fluids by ems. PT currently on cardiac monitor with systolic BP 70s, A-flutter, pace pads on, amiodarone loading dose. AOx4.   "

## 2022-08-08 NOTE — SUBJECTIVE & OBJECTIVE
"Past Medical History:   Diagnosis Date    Atrial fibrillation 09/16/2019    Diabetes mellitus     High cholesterol     Hypertension        Past Surgical History:   Procedure Laterality Date    THYROIDECTOMY, PARTIAL  2006    TONSILLECTOMY      TREATMENT OF CARDIAC ARRHYTHMIA  9/16/2019    Procedure: Cardioversion or Defibrillation;  Surgeon: Deven Vasquez MD;  Location: [facility] CATH LAB;  Service: Cardiology;;       Review of patient's allergies indicates:   Allergen Reactions    Contrast media Hives       No current facility-administered medications on file prior to encounter.     Current Outpatient Medications on File Prior to Encounter   Medication Sig    acetaminophen (TYLENOL EXTRA STRENGTH) 500 MG tablet Take 500 mg by mouth every 6 (six) hours as needed for Pain.    BD LUER-TIM SYRINGE 3 mL 22 x 1 1/2" Syrg     cholecalciferol, vitamin D3, (VITAMIN D3) 2,000 unit Tab Take by mouth once daily.    EPINEPHrine (EPIPEN) 0.3 mg/0.3 mL AtIn     ergocalciferol (ERGOCALCIFEROL) 50,000 unit Cap Take 50,000 Units by mouth every 7 days.    ferrous sulfate 325 (65 FE) MG EC tablet TAKE 1 TABLET EVERY DAY    gabapentin (NEURONTIN) 600 MG tablet Take 600 mg by mouth 3 (three) times daily.    GAVILYTE-G 236-22.74-6.74 -5.86 gram suspension MIX WITH LIQUID AND DRINK BY MOUTH ONCE AS DIRECTED    HYDROcodone-acetaminophen (NORCO)  mg per tablet     HYDROcodone-acetaminophen (NORCO) 7.5-325 mg per tablet Take 1 tablet by mouth every 6 (six) hours as needed for Pain.    levothyroxine (SYNTHROID) 200 MCG tablet Take 200 mcg by mouth before breakfast.    lubiprostone (AMITIZA) 24 MCG Cap Take 24 mcg by mouth 2 (two) times daily.    meloxicam (MOBIC) 15 MG tablet Take 15 mg by mouth once daily.    metformin (GLUCOPHAGE) 1000 MG tablet Take 1,000 mg by mouth 2 (two) times daily with meals.    olmesartan (BENICAR) 40 MG tablet Take 1 tablet (40 mg total) by mouth once daily.    omeprazole (PRILOSEC) 20 MG capsule     " rivaroxaban (XARELTO) 20 mg Tab Take 1 tablet (20 mg total) by mouth daily with dinner or evening meal.    rosuvastatin (CRESTOR) 40 MG Tab Take 10 mg by mouth every evening.    sotalol (BETAPACE) 80 MG tablet Take 40 mg by mouth 2 (two) times daily.    testosterone 200 mg Pllt by Implant route.    triamcinolone acetonide 0.5% (KENALOG) 0.5 % Crea APPLY TOPICALLY A SMALL AMOUNT TO THE AFFECTED AREA(S) ONCE DAILY    TRUE METRIX GLUCOSE TEST STRIP Strp     TRUEPLUS LANCETS 33 gauge Misc     [DISCONTINUED] felodipine (PLENDIL) 10 MG 24 hr tablet Take 10 mg by mouth once daily.     Family History    None       Tobacco Use    Smoking status: Never Smoker    Smokeless tobacco: Never Used   Substance and Sexual Activity    Alcohol use: Not Currently     Comment: occasionally    Drug use: No    Sexual activity: Not Currently     Review of Systems   Unable to perform ROS: Other   Objective:     Vital Signs (Most Recent):  Temp: 97.9 °F (36.6 °C) (08/08/22 0723)  Pulse: 66 (08/08/22 0912)  Resp: 18 (08/08/22 0912)  BP: 134/82 (08/08/22 0912)  SpO2: 97 % (08/08/22 0912) Vital Signs (24h Range):  Temp:  [97.9 °F (36.6 °C)-98.7 °F (37.1 °C)] 97.9 °F (36.6 °C)  Pulse:  [] 66  Resp:  [15-25] 18  SpO2:  [96 %-100 %] 97 %  BP: ()/(51-94) 134/82     Weight: (!) 142.9 kg (315 lb)  Body mass index is 46.52 kg/m².    SpO2: 97 %  O2 Device (Oxygen Therapy): room air      Intake/Output Summary (Last 24 hours) at 8/8/2022 0916  Last data filed at 8/8/2022 0915  Gross per 24 hour   Intake 1126.84 ml   Output --   Net 1126.84 ml       Lines/Drains/Airways       Airway  Duration                  Airway - Non-Surgical 09/16/19 1145 Nasal Cannula;Mask 1056 days              Peripheral Intravenous Line  Duration                  Peripheral IV - Single Lumen 08/08/22 0643 18 G Left Antecubital <1 day         Peripheral IV - Single Lumen 08/08/22 0720 20 G Right Antecubital <1 day                    Physical Exam  Deferred  COVID  Significant Labs: All pertinent lab results from the last 24 hours have been reviewed.    Significant Imaging: Echocardiogram: 2D echo with color flow doppler:   Results for orders placed or performed during the hospital encounter of 10/17/17   2D echo with color flow doppler   Result Value Ref Range    EF + QEF 70 55 - 65    Est. PA Systolic Pressure 63.06 (A)     Pericardial Effusion NONE     Mitral Valve Mobility NORMAL     Tricuspid Valve Regurgitation TRIVIAL     Narrative    Date of Procedure: 10/18/2017        TEST DESCRIPTION   Technical Quality: This is a technically adequate study. This study was performed in conjunction with a 0.5ml intravenous injection of Optison contrast agent.     Aorta: The aortic root is mildly to moderately enlarged, measuring 3.7 cm at sinotubular junction and 4.3 cm at Sinuses of Valsalva. The proximal ascending aorta is normal in size, measuring 4.1 cm across.     Left Atrium: The left atrial volume index is moderately enlarged, measuring 45.67 cc/m2.     Left Ventricle: The left ventricle is normal in size, with an end-diastolic diameter of 5.2 cm, and an end-systolic diameter of 3.6 cm. LV wall thickness is normal, with the septum measuring 1.7 cm and the posterior wall measuring 1.9 cm across. Relative   wall thickness was increased at 0.73, and the LV mass index was increased at 212.8 g/m2 consistent with concentric left ventricular hypertrophy. There are no regional wall motion abnormalities. Left ventricular systolic function appears normal. Visually   estimated ejection fraction is 65-70%. The LV Doppler derived stroke volume equals 141.0 ccs.     Diastolic indices: E wave velocity 0.8 m/s, E/A ratio 1.0,  msec., E/e' ratio(avg) 6. Diastolic function is indeterminate.     Right Atrium: The right atrium is moderately enlarged, measuring 6.4 cm in length and 4.4 cm in width in the apical view.     Right Ventricle: The right ventricle is normal in size measuring  4.1 cm at the base in the apical right ventricle-focused view. Global right ventricular systolic function appears normal. Tricuspid annular plane systolic excursion (TAPSE) is 2.8 cm.   Tissue Doppler-derived tricuspid annular peak systolic velocity (S prime) is 23.8 cm/s. The estimated PA systolic pressure is 63 mmHg.     Aortic Valve:  The aortic valve is normal in structure with normal leaflet mobility. The aortic valve is tri-leaflet in structure. The peak gradient obtained across the aortic valve is 16 mmHg, with a mean gradient of 8 mmHg. Using a left ventricular   outflow tract diameter of 2.5 cm, a left ventricular outflow tract velocity time integral of 29 cm, and a peak instantaneous transvalvular velocity time integral of 37 cm, the calculated aortic valve area is 3.87 cm2.     Mitral Valve:  The mitral valve is normal in structure with normal leaflet mobility.     Tricuspid Valve:  The tricuspid valve is normal in structure with normal leaflet mobility. There is trivial tricuspid regurgitation.     Pulmonary Valve:  The pulmonic valve is not well seen.     Pericardium: There is no evidence of pericardial effusion.      IVC: IVC is enlarged but collapses > 50% with a sniff, suggesting intermediate right atrial pressure of 8 mmHg.     Intracavitary: There is no evidence of intracavity mass, thrombi, or vegetation.         CONCLUSIONS     1 - Normal left ventricular systolic function (EF 65-70%).     2 - No wall motion abnormalities.     3 - Concentric hypertrophy.     4 - Mildly to moderately enlarged aortic root, 4.3 cm.     5 - Trivial tricuspid regurgitation.     6 - Pulmonary hypertension. The estimated PA systolic pressure is 63 mmHg.             This document has been electronically    SIGNED BY: Deven Vasquez MD On: 10/18/2017 11:53

## 2022-08-08 NOTE — ASSESSMENT & PLAN NOTE
-This is chronic and he follows with Dr. Marquez - last seen 3/2021  -Etiology unclear after extensive workup including BM Bx.  -Continue to monitor

## 2022-08-08 NOTE — PLAN OF CARE
Pt in the ICU on RA.  Afib on the monitor.  Afebrile.  Aox4.  Amiodarone infusing per order.  Desmond weaned off.  Urinal at the bedside.  Wife at bedside, plan of care reviewed. No injuries, falls or skin breakdown occurred this shift.

## 2022-08-08 NOTE — ED NOTES
Started phenylephrine initial dose at 0.5mcg/kg/min per titration drug protocol. Dr Molina aware.

## 2022-08-08 NOTE — H&P
[facility] [place] [facility]  [facility] Medicine  History & Physical    Patient Name: Russell Santos  MRN: [id number]  Patient Class: IP- Inpatient  Admission Date: 8/8/2022  Attending Physician: Primo Capellan MD  Primary Care Provider: [facility]         Patient information was obtained from patient, spouse/SO, past medical records and ER records.     Subjective:     Principal Problem:Atrial flutter with rapid ventricular response    Chief Complaint:   Chief Complaint   Patient presents with    near syncope     EMS called to pt's home by wife for witnessed near syncopal episode. Wife reports pt looked like he was going to pass out approx 1 hour ago and pt endorsed feeling this way though LOC did not happen. On arrival to scene, BP was 80/palp with  in atrial flutter. Pt reports still feeling weak but does not feel as though he is going to pass out anymore. Pt is aaox3 in triage. Pt was able to ambulated to stretcher with some difficulty.        HPI: 61 year with obesity, history of DVT and PE (2008 with IVC filter), diabetes mellitus, hyperlipidemia, atrial fibrillation (on xarelto), LARRY (on cpap 16) and hypothyroidism who presented for evaluation of near syncope.  He states he felt fine when he went to bed last night but woke up feeling hot, sweaty  and light-headed.  He had had a mild sensation of chest pressure which is similar to the feeling of trapped air in his belly he feels most morning after using his cpap.  He said he felt like he was going to pass out and felt an irregular heart beat sensation which is unusual for him.  He denies fevers, chills, cough, shortness of breath, nausea, vomiting and diarrhea.  He has no new leg edema, claims medication and diet compliance.  He is vaccinated and boosted for covid-19.    In the ER he was found to be hypotensive with atrial flutter with RVR.  He was started on amiodarone and neosynephrine drips and feels much better now.        Past Medical History:    Diagnosis Date    Acquired hypothyroidism     Atrial fibrillation 09/16/2019    Diabetes mellitus     High cholesterol     History of DVT (deep vein thrombosis)     History of pulmonary embolism     Hypertension     LARRY (obstructive sleep apnea)     S/P IVC filter        Past Surgical History:   Procedure Laterality Date    THYROIDECTOMY, PARTIAL  2006    TONSILLECTOMY      TREATMENT OF CARDIAC ARRHYTHMIA  9/16/2019    Procedure: Cardioversion or Defibrillation;  Surgeon: Deven Vasquez MD;  Location: [facility] CATH LAB;  Service: Cardiology;;       Review of patient's allergies indicates:   Allergen Reactions    Contrast media Hives       No current facility-administered medications on file prior to encounter.     Current Outpatient Medications on File Prior to Encounter   Medication Sig    acetaminophen (TYLENOL) 500 MG tablet Take 500 mg by mouth every 6 (six) hours as needed for Pain.    EPINEPHrine (EPIPEN) 0.3 mg/0.3 mL AtIn     gabapentin (NEURONTIN) 600 MG tablet Take 600 mg by mouth 3 (three) times daily.    levothyroxine (SYNTHROID) 200 MCG tablet Take 200 mcg by mouth before breakfast.    metformin (GLUCOPHAGE) 1000 MG tablet Take 1,000 mg by mouth 2 (two) times daily with meals.    MOVANTIK 25 mg tablet Take 25 mg by mouth once daily.    olmesartan (BENICAR) 40 MG tablet Take 1 tablet (40 mg total) by mouth once daily.    omega-3 fatty acids/fish oil (FISH OIL-OMEGA-3 FATTY ACIDS) 300-1,000 mg capsule Take 1 capsule by mouth 2 (two) times a day.    omeprazole (PRILOSEC) 20 MG capsule     rivaroxaban (XARELTO) 20 mg Tab Take 1 tablet (20 mg total) by mouth daily with dinner or evening meal.    rosuvastatin (CRESTOR) 40 MG Tab Take 10 mg by mouth every evening.    sotalol (BETAPACE) 80 MG tablet Take by mouth 2 (two) times daily.    TRUE METRIX GLUCOSE TEST STRIP Strp     TRUEPLUS LANCETS 33 gauge Misc     cholecalciferol, vitamin D3, (VITAMIN D3) 50 mcg (2,000 unit) Tab Take  "by mouth once daily.    ergocalciferol (ERGOCALCIFEROL) 50,000 unit Cap Take 50,000 Units by mouth every 7 days.    ferrous sulfate 325 (65 FE) MG EC tablet TAKE 1 TABLET EVERY DAY (Patient taking differently: 325 mg.)    HYDROcodone-acetaminophen (NORCO)  mg per tablet     testosterone 200 mg Pllt by Implant route.    triamcinolone acetonide 0.5% (KENALOG) 0.5 % Crea APPLY TOPICALLY A SMALL AMOUNT TO THE AFFECTED AREA(S) ONCE DAILY    [DISCONTINUED] BD LUER-TIM SYRINGE 3 mL 22 x 1 1/2" Syrg     [DISCONTINUED] felodipine (PLENDIL) 10 MG 24 hr tablet Take 10 mg by mouth once daily.    [DISCONTINUED] GAVILYTE-G 236-22.74-6.74 -5.86 gram suspension MIX WITH LIQUID AND DRINK BY MOUTH ONCE AS DIRECTED    [DISCONTINUED] HYDROcodone-acetaminophen (NORCO) 7.5-325 mg per tablet Take 1 tablet by mouth every 6 (six) hours as needed for Pain.    [DISCONTINUED] lubiprostone (AMITIZA) 24 MCG Cap Take 24 mcg by mouth 2 (two) times daily.    [DISCONTINUED] meloxicam (MOBIC) 15 MG tablet Take 15 mg by mouth once daily.     Family History       Family history is unknown by patient.          Tobacco Use    Smoking status: Never Smoker    Smokeless tobacco: Never Used   Substance and Sexual Activity    Alcohol use: Not Currently     Comment: occasionally    Drug use: No    Sexual activity: Not Currently     Review of Systems   Constitutional:  Negative for activity change, appetite change and fever.   HENT:  Negative for congestion and dental problem.    Eyes:  Negative for discharge and itching.   Respiratory:  Negative for apnea, cough, chest tightness and shortness of breath.    Cardiovascular:  Positive for chest pain and palpitations. Negative for leg swelling.   Gastrointestinal:  Negative for abdominal distention and abdominal pain.   Endocrine: Negative for cold intolerance and heat intolerance.   Genitourinary:  Negative for difficulty urinating and dysuria.   Musculoskeletal:  Negative for arthralgias " and back pain.   Allergic/Immunologic: Negative for environmental allergies and food allergies.   Neurological:  Positive for weakness and light-headedness. Negative for dizziness and facial asymmetry.   Hematological:  Negative for adenopathy. Does not bruise/bleed easily.   Psychiatric/Behavioral:  Negative for agitation and behavioral problems.    Objective:     Vital Signs (Most Recent):  Temp: 97.9 °F (36.6 °C) (08/08/22 0723)  Pulse: 65 (08/08/22 1108)  Resp: 12 (08/08/22 1108)  BP: 119/86 (08/08/22 1108)  SpO2: 99 % (08/08/22 1108) Vital Signs (24h Range):  Temp:  [97.9 °F (36.6 °C)-98.7 °F (37.1 °C)] 97.9 °F (36.6 °C)  Pulse:  [] 65  Resp:  [11-25] 12  SpO2:  [89 %-100 %] 99 %  BP: ()/(51-94) 119/86     Weight: (!) 142.9 kg (315 lb)  Body mass index is 46.52 kg/m².    Physical Exam  Vitals and nursing note reviewed.   Constitutional:       General: He is not in acute distress.     Appearance: He is well-developed. He is obese. He is not ill-appearing, toxic-appearing or diaphoretic.   HENT:      Head: Normocephalic and atraumatic.      Right Ear: External ear normal.      Left Ear: External ear normal.      Nose: Nose normal.      Mouth/Throat:      Mouth: Mucous membranes are moist.   Eyes:      Extraocular Movements: Extraocular movements intact.      Conjunctiva/sclera: Conjunctivae normal.   Cardiovascular:      Rate and Rhythm: Normal rate and regular rhythm.      Heart sounds: Normal heart sounds.   Pulmonary:      Effort: Pulmonary effort is normal. No respiratory distress.      Breath sounds: Normal breath sounds.   Abdominal:      General: Bowel sounds are normal. There is no distension.      Palpations: Abdomen is soft.      Tenderness: There is no abdominal tenderness.   Musculoskeletal:         General: Normal range of motion.      Cervical back: Normal range of motion. No rigidity.   Skin:     General: Skin is warm and dry.   Neurological:      Mental Status: He is alert and oriented  to person, place, and time.      Cranial Nerves: No cranial nerve deficit.      Coordination: Coordination normal.   Psychiatric:         Behavior: Behavior normal.           Significant Labs: All pertinent labs within the past 24 hours have been reviewed.    Significant Imaging: I have reviewed all pertinent imaging results/findings within the past 24 hours.    Assessment/Plan:     * Atrial flutter with rapid ventricular response  -Admitted to inpatient status  -Presented for near syncope and noted to have aflutter w RVR and hypotension  -Etiology unclear - but he is moving and doesn't know where his home medications are at.  Is covid positive but asymptomatic.  -CXR is clear.  -Troponin and procalcitonin negative.  BNP scantly elevated.  EtOH normal.    -TSH is low - will decrease dose of synthroid.  -In ER received fluids and started on amiodarone and neosynephrine with clinical resolution of symptoms  -Echo shows EF 55%, normal diastolic function and PASP 24mmHg.  -Cardiology consulted and input appreciated  -Trend troponins   -Continue amiodarone and neosynephrine drips for now.  Attempt to wean from marichuy.  -Monitor closely in ICU.    Hypotension  -Initial BP 98/67 but as low as 72/51 in ER  -Troponin negative  -CXR clear.  -He is leukopenic but procal and lactate are normal and he is afebrile.  No convincing evidence of bacterial infection to suggest sepsis.  -He is covid positive but without symptoms.  -Adrenal insufficiency unlikely, but check cortisol in AM.  -hold home olmesartan and sotalol.  -Continue IV fluids and attempt to wean pressors.    Near syncope  -Due to hypotension and aflutter w RVR  -Treatment as above.    Leukopenia  -This is chronic and he follows with Dr. Marquez - last seen 3/2021  -Etiology unclear after extensive workup including BM Bx.  -Continue to monitor      Hypothyroidism  -Noted thyroid malignancy and s/p thyroidectomy  -TSH is overly suppressed  -Continue levothyroxine but at  new lower dose 150mcg qd.  -Repeat TSH in 6 weeks.    Lab test positive for detection of COVID-19 virus  -Covid positive on admit but without symptoms  -He is vaccinated and boosted  -Covid risk core elevated at 4  -Place in covid isolation.  -Will treat with remdesivir x 3 days due to elevated covid risk score and immunosuppression with chronic unexplained leukopenia.    History of DVT (deep vein thrombosis)  -Noted DVT and PE 2006 or 2008  -Has IVC filter  -On xarelto for afib/flutter    Morbid obesity  Body mass index is 46.52 kg/m². Morbid obesity complicates all aspects of disease management from diagnostic modalities to treatment. Weight loss encouraged and health benefits explained to patient.     Diabetes mellitus, type 2  -Check A1c  -At home takes metformin  -Treat with SSI ac/hs for now.    Hyperlipidemia  -Continue statin    Essential hypertension  -Hypotensive in ER and on pressors  -Hold home olmesartan and sotalol      VTE Risk Mitigation (From admission, onward)         Ordered     rivaroxaban tablet 20 mg  With dinner         08/08/22 1151     Reason for No Pharmacological VTE Prophylaxis  Once        Question:  Reasons:  Answer:  Already adequately anticoagulated on oral Anticoagulants    08/08/22 1151     IP VTE HIGH RISK PATIENT  Once         08/08/22 1151     Place sequential compression device  Until discontinued         08/08/22 1151                   Primo Capellan MD  Department of Hospital Medicine   [facility] - [place], [place] - Emergency Dept

## 2022-08-08 NOTE — ED NOTES
Dr. Molina notified of , Dr changed dose to 1.5 mcg/kg/min. Dr Molina verbal order to maintain 1.5 dose with SBP <130s. Will continue to monitor.

## 2022-08-09 PROBLEM — L02.91 SOFT TISSUE ABSCESS: Status: ACTIVE | Noted: 2022-08-09

## 2022-08-09 LAB
ANION GAP SERPL CALC-SCNC: 6 MMOL/L (ref 8–16)
BUN SERPL-MCNC: 10 MG/DL (ref 6–20)
CALCIUM SERPL-MCNC: 8.5 MG/DL (ref 8.7–10.5)
CHLORIDE SERPL-SCNC: 109 MMOL/L (ref 95–110)
CO2 SERPL-SCNC: 24 MMOL/L (ref 23–29)
CORTIS SERPL-MCNC: 5.9 UG/DL
CREAT SERPL-MCNC: 0.7 MG/DL (ref 0.5–1.4)
EST. GFR  (NO RACE VARIABLE): >60 ML/MIN/1.73 M^2
GLUCOSE SERPL-MCNC: 130 MG/DL (ref 70–110)
MAGNESIUM SERPL-MCNC: 1.5 MG/DL (ref 1.6–2.6)
POCT GLUCOSE: 107 MG/DL (ref 70–110)
POCT GLUCOSE: 114 MG/DL (ref 70–110)
POCT GLUCOSE: 124 MG/DL (ref 70–110)
POTASSIUM SERPL-SCNC: 3.9 MMOL/L (ref 3.5–5.1)
SODIUM SERPL-SCNC: 139 MMOL/L (ref 136–145)

## 2022-08-09 PROCEDURE — 25000003 PHARM REV CODE 250: Performed by: STUDENT IN AN ORGANIZED HEALTH CARE EDUCATION/TRAINING PROGRAM

## 2022-08-09 PROCEDURE — 25000003 PHARM REV CODE 250: Performed by: HOSPITALIST

## 2022-08-09 PROCEDURE — 27000221 HC OXYGEN, UP TO 24 HOURS

## 2022-08-09 PROCEDURE — 83735 ASSAY OF MAGNESIUM: CPT | Performed by: HOSPITALIST

## 2022-08-09 PROCEDURE — 36415 COLL VENOUS BLD VENIPUNCTURE: CPT | Performed by: HOSPITALIST

## 2022-08-09 PROCEDURE — 63600175 PHARM REV CODE 636 W HCPCS: Mod: TB | Performed by: HOSPITALIST

## 2022-08-09 PROCEDURE — 99900035 HC TECH TIME PER 15 MIN (STAT)

## 2022-08-09 PROCEDURE — 63600175 PHARM REV CODE 636 W HCPCS: Performed by: EMERGENCY MEDICINE

## 2022-08-09 PROCEDURE — 94761 N-INVAS EAR/PLS OXIMETRY MLT: CPT

## 2022-08-09 PROCEDURE — 99233 SBSQ HOSP IP/OBS HIGH 50: CPT | Mod: ,,, | Performed by: INTERNAL MEDICINE

## 2022-08-09 PROCEDURE — 94660 CPAP INITIATION&MGMT: CPT

## 2022-08-09 PROCEDURE — 80048 BASIC METABOLIC PNL TOTAL CA: CPT | Performed by: HOSPITALIST

## 2022-08-09 PROCEDURE — 99233 PR SUBSEQUENT HOSPITAL CARE,LEVL III: ICD-10-PCS | Mod: ,,, | Performed by: INTERNAL MEDICINE

## 2022-08-09 PROCEDURE — 27000207 HC ISOLATION

## 2022-08-09 PROCEDURE — 20000000 HC ICU ROOM

## 2022-08-09 PROCEDURE — 82533 TOTAL CORTISOL: CPT | Performed by: HOSPITALIST

## 2022-08-09 PROCEDURE — 63600175 PHARM REV CODE 636 W HCPCS: Performed by: STUDENT IN AN ORGANIZED HEALTH CARE EDUCATION/TRAINING PROGRAM

## 2022-08-09 RX ORDER — AMIODARONE HYDROCHLORIDE 200 MG/1
400 TABLET ORAL 2 TIMES DAILY
Status: DISCONTINUED | OUTPATIENT
Start: 2022-08-09 | End: 2022-08-11 | Stop reason: HOSPADM

## 2022-08-09 RX ORDER — LABETALOL HYDROCHLORIDE 5 MG/ML
10 INJECTION, SOLUTION INTRAVENOUS EVERY 6 HOURS PRN
Status: DISCONTINUED | OUTPATIENT
Start: 2022-08-10 | End: 2022-08-11 | Stop reason: HOSPADM

## 2022-08-09 RX ORDER — MAGNESIUM SULFATE 1 G/100ML
1 INJECTION INTRAVENOUS ONCE
Status: COMPLETED | OUTPATIENT
Start: 2022-08-09 | End: 2022-08-09

## 2022-08-09 RX ORDER — MUPIROCIN 20 MG/G
OINTMENT TOPICAL 2 TIMES DAILY
Status: DISCONTINUED | OUTPATIENT
Start: 2022-08-10 | End: 2022-08-11 | Stop reason: HOSPADM

## 2022-08-09 RX ORDER — NOREPINEPHRINE BITARTRATE/D5W 4MG/250ML
0-3 PLASTIC BAG, INJECTION (ML) INTRAVENOUS CONTINUOUS
Status: CANCELLED | OUTPATIENT
Start: 2022-08-09

## 2022-08-09 RX ORDER — METOPROLOL TARTRATE 25 MG/1
25 TABLET, FILM COATED ORAL 2 TIMES DAILY
Status: DISCONTINUED | OUTPATIENT
Start: 2022-08-09 | End: 2022-08-11 | Stop reason: HOSPADM

## 2022-08-09 RX ADMIN — PANTOPRAZOLE SODIUM 40 MG: 40 TABLET, DELAYED RELEASE ORAL at 09:08

## 2022-08-09 RX ADMIN — ACETAMINOPHEN 650 MG: 325 TABLET ORAL at 05:08

## 2022-08-09 RX ADMIN — RIVAROXABAN 20 MG: 20 TABLET, FILM COATED ORAL at 06:08

## 2022-08-09 RX ADMIN — ACETAMINOPHEN 650 MG: 325 TABLET ORAL at 07:08

## 2022-08-09 RX ADMIN — METOPROLOL TARTRATE 25 MG: 25 TABLET, FILM COATED ORAL at 06:08

## 2022-08-09 RX ADMIN — AMIODARONE HYDROCHLORIDE 400 MG: 200 TABLET ORAL at 08:08

## 2022-08-09 RX ADMIN — SODIUM CHLORIDE: 0.9 INJECTION, SOLUTION INTRAVENOUS at 12:08

## 2022-08-09 RX ADMIN — DOCUSATE SODIUM 100 MG: 100 CAPSULE, LIQUID FILLED ORAL at 08:08

## 2022-08-09 RX ADMIN — AMIODARONE HYDROCHLORIDE 0.5 MG/MIN: 1.8 INJECTION, SOLUTION INTRAVENOUS at 12:08

## 2022-08-09 RX ADMIN — LEVOTHYROXINE SODIUM 150 MCG: 75 TABLET ORAL at 05:08

## 2022-08-09 RX ADMIN — SODIUM CHLORIDE: 0.9 INJECTION, SOLUTION INTRAVENOUS at 01:08

## 2022-08-09 RX ADMIN — ATORVASTATIN CALCIUM 40 MG: 40 TABLET, FILM COATED ORAL at 09:08

## 2022-08-09 RX ADMIN — POLYETHYLENE GLYCOL 3350 17 G: 17 POWDER, FOR SOLUTION ORAL at 08:08

## 2022-08-09 RX ADMIN — FERROUS SULFATE TAB 325 MG (65 MG ELEMENTAL FE) 1 EACH: 325 (65 FE) TAB at 09:08

## 2022-08-09 RX ADMIN — AMIODARONE HYDROCHLORIDE 400 MG: 200 TABLET ORAL at 01:08

## 2022-08-09 RX ADMIN — DOCUSATE SODIUM 100 MG: 100 CAPSULE, LIQUID FILLED ORAL at 09:08

## 2022-08-09 RX ADMIN — REMDESIVIR 100 MG: 100 INJECTION, POWDER, LYOPHILIZED, FOR SOLUTION INTRAVENOUS at 10:08

## 2022-08-09 RX ADMIN — MAGNESIUM SULFATE 1 G: 1 INJECTION INTRAVENOUS at 03:08

## 2022-08-09 RX ADMIN — POLYETHYLENE GLYCOL 3350 17 G: 17 POWDER, FOR SOLUTION ORAL at 09:08

## 2022-08-09 NOTE — SUBJECTIVE & OBJECTIVE
Interval History: No events overnight. Pt notes draining abscess on buttock, requests deferring exam at this time.    Review of Systems   Constitutional:  Negative for chills and fever.   Respiratory:  Negative for cough and shortness of breath.    Cardiovascular:  Negative for chest pain and leg swelling.   Gastrointestinal:  Negative for abdominal distention and abdominal pain.   Objective:     Vital Signs (Most Recent):  Temp: 98.9 °F (37.2 °C) (08/09/22 0800)  Pulse: 90 (08/09/22 1100)  Resp: 13 (08/09/22 1100)  BP: (!) 134/92 (08/09/22 1100)  SpO2: 99 % (08/09/22 1100)   Vital Signs (24h Range):  Temp:  [97.7 °F (36.5 °C)-98.9 °F (37.2 °C)] 98.9 °F (37.2 °C)  Pulse:  [] 90  Resp:  [13-55] 13  SpO2:  [88 %-100 %] 99 %  BP: (107-147)/() 134/92     Weight: (!) 144.1 kg (317 lb 10.9 oz)  Body mass index is 46.91 kg/m².    Intake/Output Summary (Last 24 hours) at 8/9/2022 1329  Last data filed at 8/9/2022 1000  Gross per 24 hour   Intake 3116.48 ml   Output 2275 ml   Net 841.48 ml      Physical Exam  Constitutional:       General: He is not in acute distress.     Appearance: He is ill-appearing. He is not toxic-appearing or diaphoretic.   Cardiovascular:      Rate and Rhythm: Normal rate and regular rhythm.      Heart sounds: No murmur heard.    No gallop.   Pulmonary:      Effort: Pulmonary effort is normal. No respiratory distress.      Breath sounds: Normal breath sounds. No wheezing.   Abdominal:      General: Bowel sounds are normal. There is no distension.      Palpations: Abdomen is soft.      Tenderness: There is no abdominal tenderness.   Skin:     Comments: Pt defers gluteal exam       Significant Labs: All pertinent labs within the past 24 hours have been reviewed.    Significant Imaging: I have reviewed all pertinent imaging results/findings within the past 24 hours.

## 2022-08-09 NOTE — CARE UPDATE
[facility] Intensive Care  ICU Shift Summary  Date: 8/9/2022      Prehospitalization: Home  Admit Date / LOS : 8/8/2022/ 1 days    Diagnosis: Atrial flutter with rapid ventricular response    Consults:        Active: Cardio       Needed: OT and PT     Code Status: Full Code   Advanced Directive: <no information>    LDA:  Lines/Drains/Airways     Peripheral Intravenous Line  Duration                Peripheral IV - Single Lumen 08/08/22 0643 18 G Left Antecubital 1 day         Peripheral IV - Single Lumen 08/08/22 0720 20 G Right Antecubital 1 day         Peripheral IV - Single Lumen 08/08/22 1429 20 G Right Antecubital 1 day              Central Lines/Site/Justification:Patient Does Not Have Central Line  Urinary Cath/Order/Justification:Patient Does Not Have Urinary Catheter    Vasopressors/Infusions:        GOALS: Volume/ Hemodynamic: MAP >65                     RASS: 0  alert and calm    Pain Management: none       Pain Controlled: not applicable     Rhythm: A-Fib    Respiratory Device: Room Air    Oxygen Concentration (%):  [28] 28             Most Recent SBT/ SAT: N/A       MOVE Screen: PASS  ICU Liberation: not applicable    VTE Prophylaxis: Pharm  Mobility: OOB to Chair  Stress Ulcer Prophylaxis: Yes    Isolation: Airborne and Contact and Droplet    Dietary:   Current Diet Order   Procedures    Diet Cardiac [facility]; Cardiac (Low Na/Chol); Isolation Tray - Regular [place]     Order Specific Question:   Indicate patient location for additional diet options:     Answer[facility]   [facility]     Order Specific Question:   Additional Diet Options:     Answer:   Cardiac (Low Na/Chol)     Order Specific Question:   Tray type:     Answer:   Isolation Tray - Regular [place]      Tolerance: yes  Advancement: @ goal    I & O (24h):    Intake/Output Summary (Last 24 hours) at 8/9/2022 1759  Last data filed at 8/9/2022 1000  Gross per 24 hour   Intake 1981.77 ml   Output 2275 ml   Net -293.23 ml        Restraints:  No    Significant Dates:  Post Op Date: N/A  Rescue Date: N/A  Imaging/ Diagnostics: N/A    Noteworthy Labs:  none    COVID Test: (+)  CBC/Anemia Labs: Coags:    Recent Labs   Lab 08/08/22  0710 08/08/22  0913   WBC 2.84*  --    HGB 11.8*  --    HCT 38.7*  --      --    MCV 76*  --    RDW 16.9*  --    IRON  --  45   FERRITIN  --  67   FOLATE  --  12.9   EECYNVEN20  --  239    Recent Labs   Lab 08/08/22  0710   INR 1.2        Chemistries:   Recent Labs   Lab 08/08/22  0710 08/09/22  0411    139   K 3.9 3.9    109   CO2 24 24   BUN 12 10   CREATININE 0.7 0.7   CALCIUM 9.2 8.5*   PROT 6.8  --    BILITOT 0.6  --    ALKPHOS 43*  --    ALT 13  --    AST 14  --    MG  --  1.5*        Cardiac Enzymes: Ejection Fractions:    Recent Labs     08/08/22  0710   TROPONINI 0.012    EF   Date Value Ref Range Status   08/08/2022 55 % Final        POCT Glucose: HbA1c:    Recent Labs   Lab 08/08/22  1940 08/09/22  1237 08/09/22  1635   POCTGLUCOSE 131* 114* 107    Hemoglobin A1C   Date Value Ref Range Status   08/08/2022 6.3 (H) 4.0 - 5.6 % Final     Comment:     ADA Screening Guidelines:  5.7-6.4%  Consistent with prediabetes  >or=6.5%  Consistent with diabetes    High levels of fetal hemoglobin interfere with the HbA1C  assay. Heterozygous hemoglobin variants (HbS, HgC, etc)do  not significantly interfere with this assay.   However, presence of multiple variants may affect accuracy.             ICU LOS 1d 2h  Level of Care: Critical Care    Chart Check: 12 HR Done  Shift Summary/Plan for the shift: Patient remains in ICU,  Amio drip transited to Amio PO, VSS, denied SOB throughout shift, good UOP,  VS and assessment per flowsheet, plan of care reviewed with pt and verbalized understanding, safety maintained remains free of injury, no acute distress noted at present time, will continue to monitor.

## 2022-08-09 NOTE — SUBJECTIVE & OBJECTIVE
Interval History:     Review of Systems   Unable to perform ROS: Other   Objective:     Vital Signs (Most Recent):  Temp: 98.8 °F (37.1 °C) (08/09/22 0305)  Pulse: 73 (08/09/22 0800)  Resp: 18 (08/09/22 0800)  BP: (!) 131/92 (08/09/22 0600)  SpO2: 100 % (08/09/22 0800)   Vital Signs (24h Range):  Temp:  [97.7 °F (36.5 °C)-98.8 °F (37.1 °C)] 98.8 °F (37.1 °C)  Pulse:  [65-90] 73  Resp:  [11-55] 18  SpO2:  [88 %-100 %] 100 %  BP: ()/() 131/92     Weight: (!) 144.1 kg (317 lb 10.9 oz)  Body mass index is 46.91 kg/m².     SpO2: 100 %  O2 Device (Oxygen Therapy): CPAP      Intake/Output Summary (Last 24 hours) at 8/9/2022 0929  Last data filed at 8/9/2022 0600  Gross per 24 hour   Intake 2649.93 ml   Output 1300 ml   Net 1349.93 ml       Lines/Drains/Airways       Peripheral Intravenous Line  Duration                  Peripheral IV - Single Lumen 08/08/22 0643 18 G Left Antecubital 1 day         Peripheral IV - Single Lumen 08/08/22 0720 20 G Right Antecubital 1 day         Peripheral IV - Single Lumen 08/08/22 1429 20 G Right Antecubital <1 day                    Physical Exam  Deferred COVID  Significant Labs: All pertinent lab results from the last 24 hours have been reviewed.    Significant Imaging: Echocardiogram: Transthoracic echo (TTE) complete (Cupid Only):   Results for orders placed or performed during the hospital encounter of 08/08/22   Echo   Result Value Ref Range    BSA 2.64 m2    LA WIDTH 4.93 cm    TDI LATERAL 0.09 m/s    PV PEAK VELOCITY 0.43 cm/s    LVIDd 5.60 3.5 - 6.0 cm    IVS 1.37 (A) 0.6 - 1.1 cm    Posterior Wall 1.37 (A) 0.6 - 1.1 cm    LVIDs 3.54 2.1 - 4.0 cm    FS 37 28 - 44 %    LA volume 127.36 cm3    Sinus 4.82 cm    STJ 3.79 cm    Ascending aorta 4.21 cm    LV mass 337.13 g    LA size 4.76 cm    RVDD 4.33 cm    TAPSE 1.78 cm    RV S' 8.36 cm/s    Left Ventricle Relative Wall Thickness 0.49 cm    AV mean gradient 2 mmHg    AV valve area 3.98 cm2    AV Velocity Ratio 0.81     AV  index (prosthetic) 0.73     IVRT 133.79 msec    LVOT diameter 2.63 cm    LVOT area 5.4 cm2    LVOT peak brandan 0.67 m/s    LVOT peak VTI 14.70 cm    Ao peak brandan 0.83 m/s    Ao VTI 20.07 cm    LVOT stroke volume 79.82 cm3    AV peak gradient 3 mmHg    TR Max Brandan 1.98 m/s    LV Systolic Volume 52.42 mL    LV Systolic Volume Index 20.9 mL/m2    LV Diastolic Volume 153.46 mL    LV Diastolic Volume Index 61.14 mL/m2    LA Volume Index 50.7 mL/m2    LV Mass Index 134 g/m2    RA Major Axis 6.80 cm    Left Atrium Minor Axis 6.09 cm    Left Atrium Major Axis 6.71 cm    Triscuspid Valve Regurgitation Peak Gradient 16 mmHg    RA Width 4.80 cm    Right Atrial Pressure (from IVC) 8 mmHg    EF 55 %    TV rest pulmonary artery pressure 24 mmHg    Narrative    · The left ventricle is normal in size with concentric hypertrophy and   normal systolic function.  · The estimated ejection fraction is 55%.  · Indeterminate left ventricular diastolic function.  · Normal right ventricular size with normal right ventricular systolic   function.  · Moderate left atrial enlargement.  · Mild right atrial enlargement.  · Mild mitral regurgitation.  · Intermediate central venous pressure (8 mmHg).  · The estimated PA systolic pressure is 24 mmHg.

## 2022-08-09 NOTE — ASSESSMENT & PLAN NOTE
Pt reports gluteal abscess, self-draining. Requested exam be deferred until later in the day  - will examine, likely start abx

## 2022-08-09 NOTE — ASSESSMENT & PLAN NOTE
Hx PAF on xarelto. Medical noncompliance. Currently rate controlled on IV amiodarone. Desmond needed for hypotension. Check echo. Continue with rate control for now. Consider BRI/CV if A-flutter persists once recovered from COVID    8/9/22 A-fib rates controlled. Echo with EF 55%. On xarelto. Transition to po amio when tolerated. Will f/u prn

## 2022-08-09 NOTE — PROGRESS NOTES
[facility] - Intensive Care  Cardiology  Progress Note    Patient Name: Russell Santos  MRN: [id number]  Admission Date: 8/8/2022  Hospital Length of Stay: 1 days  Code Status: Full Code   Attending Physician: Joseluis Harley MD   Primary Care Physician: Roxanne Diaz [facility] [place]  Expected Discharge Date:   Principal Problem:Atrial flutter with rapid ventricular response    Subjective:     Hospital Course:   8/9/22 Rate controlled A-fib 70-80s on IV amiodarone. BP stable off of pressors    Echo 8/8/22  · The left ventricle is normal in size with concentric hypertrophy and normal systolic function.  · The estimated ejection fraction is 55%.  · Indeterminate left ventricular diastolic function.  · Normal right ventricular size with normal right ventricular systolic function.  · Moderate left atrial enlargement.  · Mild right atrial enlargement.  · Mild mitral regurgitation.  · Intermediate central venous pressure (8 mmHg).  · The estimated PA systolic pressure is 24 mmHg.         Interval History:     Review of Systems   Unable to perform ROS: Other   Objective:     Vital Signs (Most Recent):  Temp: 98.8 °F (37.1 °C) (08/09/22 0305)  Pulse: 73 (08/09/22 0800)  Resp: 18 (08/09/22 0800)  BP: (!) 131/92 (08/09/22 0600)  SpO2: 100 % (08/09/22 0800)   Vital Signs (24h Range):  Temp:  [97.7 °F (36.5 °C)-98.8 °F (37.1 °C)] 98.8 °F (37.1 °C)  Pulse:  [65-90] 73  Resp:  [11-55] 18  SpO2:  [88 %-100 %] 100 %  BP: ()/() 131/92     Weight: (!) 144.1 kg (317 lb 10.9 oz)  Body mass index is 46.91 kg/m².     SpO2: 100 %  O2 Device (Oxygen Therapy): CPAP      Intake/Output Summary (Last 24 hours) at 8/9/2022 0929  Last data filed at 8/9/2022 0600  Gross per 24 hour   Intake 2649.93 ml   Output 1300 ml   Net 1349.93 ml       Lines/Drains/Airways       Peripheral Intravenous Line  Duration                  Peripheral IV - Single Lumen 08/08/22 0643 18 G Left Antecubital 1 day         Peripheral IV - Single Lumen 08/08/22 0720 20 G Right  Antecubital 1 day         Peripheral IV - Single Lumen 08/08/22 1429 20 G Right Antecubital <1 day                    Physical Exam  Deferred COVID  Significant Labs: All pertinent lab results from the last 24 hours have been reviewed.    Significant Imaging: Echocardiogram: Transthoracic echo (TTE) complete (Cupid Only):   Results for orders placed or performed during the hospital encounter of 08/08/22   Echo   Result Value Ref Range    BSA 2.64 m2    LA WIDTH 4.93 cm    TDI LATERAL 0.09 m/s    PV PEAK VELOCITY 0.43 cm/s    LVIDd 5.60 3.5 - 6.0 cm    IVS 1.37 (A) 0.6 - 1.1 cm    Posterior Wall 1.37 (A) 0.6 - 1.1 cm    LVIDs 3.54 2.1 - 4.0 cm    FS 37 28 - 44 %    LA volume 127.36 cm3    Sinus 4.82 cm    STJ 3.79 cm    Ascending aorta 4.21 cm    LV mass 337.13 g    LA size 4.76 cm    RVDD 4.33 cm    TAPSE 1.78 cm    RV S' 8.36 cm/s    Left Ventricle Relative Wall Thickness 0.49 cm    AV mean gradient 2 mmHg    AV valve area 3.98 cm2    AV Velocity Ratio 0.81     AV index (prosthetic) 0.73     IVRT 133.79 msec    LVOT diameter 2.63 cm    LVOT area 5.4 cm2    LVOT peak brandan 0.67 m/s    LVOT peak VTI 14.70 cm    Ao peak brandan 0.83 m/s    Ao VTI 20.07 cm    LVOT stroke volume 79.82 cm3    AV peak gradient 3 mmHg    TR Max Brandan 1.98 m/s    LV Systolic Volume 52.42 mL    LV Systolic Volume Index 20.9 mL/m2    LV Diastolic Volume 153.46 mL    LV Diastolic Volume Index 61.14 mL/m2    LA Volume Index 50.7 mL/m2    LV Mass Index 134 g/m2    RA Major Axis 6.80 cm    Left Atrium Minor Axis 6.09 cm    Left Atrium Major Axis 6.71 cm    Triscuspid Valve Regurgitation Peak Gradient 16 mmHg    RA Width 4.80 cm    Right Atrial Pressure (from IVC) 8 mmHg    EF 55 %    TV rest pulmonary artery pressure 24 mmHg    Narrative    · The left ventricle is normal in size with concentric hypertrophy and   normal systolic function.  · The estimated ejection fraction is 55%.  · Indeterminate left ventricular diastolic function.  · Normal right  ventricular size with normal right ventricular systolic   function.  · Moderate left atrial enlargement.  · Mild right atrial enlargement.  · Mild mitral regurgitation.  · Intermediate central venous pressure (8 mmHg).  · The estimated PA systolic pressure is 24 mmHg.        Assessment and Plan:     Brief HPI:     * Atrial flutter with rapid ventricular response  Hx PAF on xarelto. Medical noncompliance. Currently rate controlled on IV amiodarone. Desmond needed for hypotension. Check echo. Continue with rate control for now. Consider BRI/CV if A-flutter persists once recovered from COVID    8/9/22 A-fib rates controlled. Echo with EF 55%. On xarelto. Transition to po amio when tolerated. Will f/u prn    Hypotension  Check echo. Pressors with Desmond as needed    Leukopenia  Followed by Heme/Onc    Diabetes mellitus, type 2  Per primary    Hyperlipidemia  On crestor        VTE Risk Mitigation (From admission, onward)         Ordered     rivaroxaban tablet 20 mg  With dinner         08/08/22 1151     Reason for No Pharmacological VTE Prophylaxis  Once        Question:  Reasons:  Answer:  Already adequately anticoagulated on oral Anticoagulants    08/08/22 1151     IP VTE HIGH RISK PATIENT  Once         08/08/22 1151     Place sequential compression device  Until discontinued         08/08/22 1151                Viktor Casey MD  Cardiology  [facility] [place] - Intensive Care

## 2022-08-09 NOTE — PROGRESS NOTES
CM called patient's spouseSara at [phone] to complete DC needs assessment, no answer, detailed message left requesting call back.

## 2022-08-09 NOTE — ASSESSMENT & PLAN NOTE
Flutter on admission, now appears to be Fib. Rate improved with amiodarone.   - transition amio to po  - will consider adding low dose beta blocker for further control  - cardiology consulted   No - the patient is unable to be screened due to medical condition

## 2022-08-09 NOTE — ASSESSMENT & PLAN NOTE
Unclear cause of hypotension, now resolved. Possibly due to AF?  - continue to monitor off of pressors

## 2022-08-09 NOTE — ASSESSMENT & PLAN NOTE
Body mass index is 46.91 kg/m². Morbid obesity complicates all aspects of disease management from diagnostic modalities to treatment. Weight loss encouraged and health benefits explained to patient.

## 2022-08-09 NOTE — HOSPITAL COURSE
61y M admitted for atrial flutter. Found to be covid positive. Started on amiodarone gtt, was later found to be in Afib w/ controlled rate. Switched to po amiodarone. Metoprolol added. Found to have small gluteal abscess, spontaneously draining and was started on antibiotics. Pt continued to have controlled heart rate overnight on po amio and metoprolol. Pt to be discharged home with close pcp follow up as well as a cardiology appointment in 1 week to transition patient from amiodarone loading dose to maintenance. Pt highly encouraged in medication compliance. Pt will continue to quarantine for a total of 5 days due to covid diagnosis unless he has a medical emergency.

## 2022-08-09 NOTE — PROGRESS NOTES
[facility] Medicine  Progress Note    Patient Name: Russell Santos  MRN: [id number]  Patient Class: IP- Inpatient   Admission Date: 8/8/2022  Length of Stay: 1 days  Attending Physician: Joseluis Harley MD  Primary Care Provider: [facility]        Subjective:     Principal Problem:Atrial flutter with rapid ventricular response        HPI:  61 year with obesity, history of DVT and PE (2008 with IVC filter), diabetes mellitus, hyperlipidemia, atrial fibrillation (on xarelto), LARRY (on cpap 16) and hypothyroidism who presented for evaluation of near syncope.  He states he felt fine when he went to bed last night but woke up feeling hot, sweaty  and light-headed.  He had had a mild sensation of chest pressure which is similar to the feeling of trapped air in his belly he feels most morning after using his cpap.  He said he felt like he was going to pass out and felt an irregular heart beat sensation which is unusual for him.  He denies fevers, chills, cough, shortness of breath, nausea, vomiting and diarrhea.  He has no new leg edema, claims medication and diet compliance.  He is vaccinated and boosted for covid-19.    In the ER he was found to be hypotensive with atrial flutter with RVR.  He was started on amiodarone and neosynephrine drips and feels much better now.        Overview/Hospital Course:  61y M admitted for atrial flutter. Started on amiodarone gtt, was later found to be in Afib w/ controlled rate.       Interval History: No events overnight. Pt notes draining abscess on buttock, requests deferring exam at this time.    Review of Systems   Constitutional:  Negative for chills and fever.   Respiratory:  Negative for cough and shortness of breath.    Cardiovascular:  Negative for chest pain and leg swelling.   Gastrointestinal:  Negative for abdominal distention and abdominal pain.   Objective:     Vital Signs (Most Recent):  Temp: 98.9 °F (37.2 °C) (08/09/22 0800)  Pulse: 90  (08/09/22 1100)  Resp: 13 (08/09/22 1100)  BP: (!) 134/92 (08/09/22 1100)  SpO2: 99 % (08/09/22 1100)   Vital Signs (24h Range):  Temp:  [97.7 °F (36.5 °C)-98.9 °F (37.2 °C)] 98.9 °F (37.2 °C)  Pulse:  [] 90  Resp:  [13-55] 13  SpO2:  [88 %-100 %] 99 %  BP: (107-147)/() 134/92     Weight: (!) 144.1 kg (317 lb 10.9 oz)  Body mass index is 46.91 kg/m².    Intake/Output Summary (Last 24 hours) at 8/9/2022 1329  Last data filed at 8/9/2022 1000  Gross per 24 hour   Intake 3116.48 ml   Output 2275 ml   Net 841.48 ml      Physical Exam  Constitutional:       General: He is not in acute distress.     Appearance: He is ill-appearing. He is not toxic-appearing or diaphoretic.   Cardiovascular:      Rate and Rhythm: Normal rate and regular rhythm.      Heart sounds: No murmur heard.    No gallop.   Pulmonary:      Effort: Pulmonary effort is normal. No respiratory distress.      Breath sounds: Normal breath sounds. No wheezing.   Abdominal:      General: Bowel sounds are normal. There is no distension.      Palpations: Abdomen is soft.      Tenderness: There is no abdominal tenderness.   Skin:     Comments: Pt defers gluteal exam       Significant Labs: All pertinent labs within the past 24 hours have been reviewed.    Significant Imaging: I have reviewed all pertinent imaging results/findings within the past 24 hours.      Assessment/Plan:      * Atrial flutter with rapid ventricular response  Flutter on admission, now appears to be Fib. Rate improved with amiodarone.   - transition amio to po  - will consider adding low dose beta blocker for further control  - cardiology consulted    Soft tissue abscess  Pt reports gluteal abscess, self-draining. Requested exam be deferred until later in the day  - will examine, likely start abx      Hypothyroidism  Iatrogenic sublclinical hyperthyroidism, likely contributor to atrial arrhythmias  -Reduce synthroid dose    Lab test positive for detection of COVID-19  virus  -Covid positive on admit but without symptoms  -He is vaccinated and boosted  -Covid risk core elevated at 4  -Place in covid isolation.  -Will treat with remdesivir x 3 days due to elevated covid risk score and immunosuppression with chronic unexplained leukopenia.    History of DVT (deep vein thrombosis)  -Noted DVT and PE 2006 or 2008  -Has IVC filter  -On xarelto     Hypotension  Unclear cause of hypotension, now resolved. Possibly due to AF?  - continue to monitor off of pressors    Leukopenia  -This is chronic and he follows with Dr. Marquez - last seen 3/2021  -Etiology unclear after extensive workup including BM Bx.  -Continue to monitor      Near syncope  -Due to hypotension and aflutter w RVR  -Treatment as above.    Morbid obesity  Body mass index is 46.91 kg/m². Morbid obesity complicates all aspects of disease management from diagnostic modalities to treatment. Weight loss encouraged and health benefits explained to patient.     Diabetes mellitus, type 2  -Check A1c  -At home takes metformin  -Treat with SSI ac/hs for now.    Hyperlipidemia  -Continue statin    Essential hypertension  -Hypotensive in ER and on pressors  -Hold home olmesartan and sotalol      VTE Risk Mitigation (From admission, onward)         Ordered     rivaroxaban tablet 20 mg  With dinner         08/08/22 1151     Reason for No Pharmacological VTE Prophylaxis  Once        Question:  Reasons:  Answer:  Already adequately anticoagulated on oral Anticoagulants    08/08/22 1151     IP VTE HIGH RISK PATIENT  Once         08/08/22 1151     Place sequential compression device  Until discontinued         08/08/22 1151                Discharge Planning   CASA:      Code Status: Full Code   Is the patient medically ready for discharge?:     Reason for patient still in hospital (select all that apply): Patient trending condition, Laboratory test, Treatment, Consult recommendations and Pending disposition               Critical care time  spent on the evaluation and treatment of severe organ dysfunction, review of pertinent labs and imaging studies, discussions with consulting providers and discussions with patient/family: 45 minutes.      Joseluis Harley MD  Department of Hospital Medicine   [facility] - [place] - Intensive Care

## 2022-08-09 NOTE — HOSPITAL COURSE
8/9/22 Rate controlled A-fib 70-80s on IV amiodarone. BP stable off of pressors    Echo 8/8/22  The left ventricle is normal in size with concentric hypertrophy and normal systolic function.  The estimated ejection fraction is 55%.  Indeterminate left ventricular diastolic function.  Normal right ventricular size with normal right ventricular systolic function.  Moderate left atrial enlargement.  Mild right atrial enlargement.  Mild mitral regurgitation.  Intermediate central venous pressure (8 mmHg).  The estimated PA systolic pressure is 24 mmHg.

## 2022-08-09 NOTE — ASSESSMENT & PLAN NOTE
Iatrogenic sublclinical hyperthyroidism, likely contributor to atrial arrhythmias  -Reduce synthroid dose

## 2022-08-10 LAB
ANION GAP SERPL CALC-SCNC: 5 MMOL/L (ref 8–16)
BUN SERPL-MCNC: 10 MG/DL (ref 6–20)
CALCIUM SERPL-MCNC: 8.9 MG/DL (ref 8.7–10.5)
CHLORIDE SERPL-SCNC: 111 MMOL/L (ref 95–110)
CO2 SERPL-SCNC: 26 MMOL/L (ref 23–29)
CREAT SERPL-MCNC: 0.6 MG/DL (ref 0.5–1.4)
EST. GFR  (NO RACE VARIABLE): >60 ML/MIN/1.73 M^2
GLUCOSE SERPL-MCNC: 123 MG/DL (ref 70–110)
MAGNESIUM SERPL-MCNC: 1.6 MG/DL (ref 1.6–2.6)
POCT GLUCOSE: 116 MG/DL (ref 70–110)
POCT GLUCOSE: 117 MG/DL (ref 70–110)
POTASSIUM SERPL-SCNC: 4.6 MMOL/L (ref 3.5–5.1)
SODIUM SERPL-SCNC: 142 MMOL/L (ref 136–145)

## 2022-08-10 PROCEDURE — 27000207 HC ISOLATION

## 2022-08-10 PROCEDURE — 94660 CPAP INITIATION&MGMT: CPT

## 2022-08-10 PROCEDURE — 36415 COLL VENOUS BLD VENIPUNCTURE: CPT | Performed by: HOSPITALIST

## 2022-08-10 PROCEDURE — 25000003 PHARM REV CODE 250: Performed by: HOSPITALIST

## 2022-08-10 PROCEDURE — 63600175 PHARM REV CODE 636 W HCPCS: Mod: TB | Performed by: HOSPITALIST

## 2022-08-10 PROCEDURE — 80048 BASIC METABOLIC PNL TOTAL CA: CPT | Performed by: HOSPITALIST

## 2022-08-10 PROCEDURE — 25000003 PHARM REV CODE 250: Performed by: EMERGENCY MEDICINE

## 2022-08-10 PROCEDURE — 21400001 HC TELEMETRY ROOM

## 2022-08-10 PROCEDURE — 99900035 HC TECH TIME PER 15 MIN (STAT)

## 2022-08-10 PROCEDURE — 83735 ASSAY OF MAGNESIUM: CPT | Performed by: HOSPITALIST

## 2022-08-10 PROCEDURE — 94761 N-INVAS EAR/PLS OXIMETRY MLT: CPT

## 2022-08-10 PROCEDURE — 25000003 PHARM REV CODE 250: Performed by: STUDENT IN AN ORGANIZED HEALTH CARE EDUCATION/TRAINING PROGRAM

## 2022-08-10 RX ORDER — AMOXICILLIN AND CLAVULANATE POTASSIUM 875; 125 MG/1; MG/1
1 TABLET, FILM COATED ORAL EVERY 12 HOURS
Status: DISCONTINUED | OUTPATIENT
Start: 2022-08-10 | End: 2022-08-11 | Stop reason: HOSPADM

## 2022-08-10 RX ORDER — DOXYCYCLINE HYCLATE 100 MG
100 TABLET ORAL EVERY 12 HOURS
Status: DISCONTINUED | OUTPATIENT
Start: 2022-08-10 | End: 2022-08-11 | Stop reason: HOSPADM

## 2022-08-10 RX ADMIN — DOCUSATE SODIUM 100 MG: 100 CAPSULE, LIQUID FILLED ORAL at 08:08

## 2022-08-10 RX ADMIN — LEVOTHYROXINE SODIUM 150 MCG: 75 TABLET ORAL at 05:08

## 2022-08-10 RX ADMIN — AMIODARONE HYDROCHLORIDE 400 MG: 200 TABLET ORAL at 08:08

## 2022-08-10 RX ADMIN — MUPIROCIN: 20 OINTMENT TOPICAL at 08:08

## 2022-08-10 RX ADMIN — RIVAROXABAN 20 MG: 20 TABLET, FILM COATED ORAL at 05:08

## 2022-08-10 RX ADMIN — POLYETHYLENE GLYCOL 3350 17 G: 17 POWDER, FOR SOLUTION ORAL at 08:08

## 2022-08-10 RX ADMIN — LABETALOL HYDROCHLORIDE 10 MG: 5 INJECTION, SOLUTION INTRAVENOUS at 12:08

## 2022-08-10 RX ADMIN — METOPROLOL TARTRATE 25 MG: 25 TABLET, FILM COATED ORAL at 08:08

## 2022-08-10 RX ADMIN — FERROUS SULFATE TAB 325 MG (65 MG ELEMENTAL FE) 1 EACH: 325 (65 FE) TAB at 08:08

## 2022-08-10 RX ADMIN — PANTOPRAZOLE SODIUM 40 MG: 40 TABLET, DELAYED RELEASE ORAL at 08:08

## 2022-08-10 RX ADMIN — REMDESIVIR 100 MG: 100 INJECTION, POWDER, LYOPHILIZED, FOR SOLUTION INTRAVENOUS at 09:08

## 2022-08-10 RX ADMIN — DOXYCYCLINE HYCLATE 100 MG: 100 TABLET, COATED ORAL at 08:08

## 2022-08-10 RX ADMIN — AMOXICILLIN AND CLAVULANATE POTASSIUM 1 TABLET: 875; 125 TABLET, FILM COATED ORAL at 08:08

## 2022-08-10 RX ADMIN — ATORVASTATIN CALCIUM 40 MG: 40 TABLET, FILM COATED ORAL at 08:08

## 2022-08-10 RX ADMIN — Medication 6 MG: at 12:08

## 2022-08-10 NOTE — ASSESSMENT & PLAN NOTE
Flutter on admission, now appears to be Fib. Rate improved with amiodarone. Pt on sotalol at home but w/ some hx of med noncompliance, per cardiology going w/ amiodarone  - transitioned amio to po  - lopressor  - cardiology consulted

## 2022-08-10 NOTE — PLAN OF CARE
08/10/22 1730   Medicare Message   Important Message from Medicare regarding Discharge Appeal Rights Explained to patient/caregiver;Other (comments)   Date IMM was signed 08/10/22   Time IMM was signed 1718   [phone] [phone]

## 2022-08-10 NOTE — PROGRESS NOTES
[facility] Medicine  Progress Note    Patient Name: Russell Santos  MRN: [id number]  Patient Class: IP- Inpatient   Admission Date: 8/8/2022  Length of Stay: 2 days  Attending Physician: Joseluis Harley MD  Primary Care Provider: [facility]        Subjective:     Principal Problem:Atrial flutter with rapid ventricular response        HPI:  61 year with obesity, history of DVT and PE (2008 with IVC filter), diabetes mellitus, hyperlipidemia, atrial fibrillation (on xarelto), LARRY (on cpap 16) and hypothyroidism who presented for evaluation of near syncope.  He states he felt fine when he went to bed last night but woke up feeling hot, sweaty  and light-headed.  He had had a mild sensation of chest pressure which is similar to the feeling of trapped air in his belly he feels most morning after using his cpap.  He said he felt like he was going to pass out and felt an irregular heart beat sensation which is unusual for him.  He denies fevers, chills, cough, shortness of breath, nausea, vomiting and diarrhea.  He has no new leg edema, claims medication and diet compliance.  He is vaccinated and boosted for covid-19.    In the ER he was found to be hypotensive with atrial flutter with RVR.  He was started on amiodarone and neosynephrine drips and feels much better now.        Overview/Hospital Course:  61y M admitted for atrial flutter. Found to be covid positive. Started on amiodarone gtt, was later found to be in Afib w/ controlled rate. Switched to po amiodarone. Metoprolol added. Found to have small gluteal abscess, spontaneously draining.       Interval History: No events overnight. HR intermittently elevated    Review of Systems   Constitutional:  Negative for chills and fever.   Respiratory:  Negative for cough and shortness of breath.    Cardiovascular:  Negative for chest pain and leg swelling.   Gastrointestinal:  Negative for abdominal distention and abdominal pain.   Skin:          Scant drainage from gluteal abscess   Objective:     Vital Signs (Most Recent):  Temp: 97.9 °F (36.6 °C) (08/10/22 0800)  Pulse: 85 (08/10/22 1100)  Resp: (!) 24 (08/10/22 1100)  BP: 125/86 (08/10/22 1100)  SpO2: 99 % (08/10/22 1100)   Vital Signs (24h Range):  Temp:  [97.7 °F (36.5 °C)-98.2 °F (36.8 °C)] 97.9 °F (36.6 °C)  Pulse:  [] 85  Resp:  [13-36] 24  SpO2:  [96 %-100 %] 99 %  BP: ()/() 125/86     Weight: (!) 144.1 kg (317 lb 10.9 oz)  Body mass index is 46.91 kg/m².    Intake/Output Summary (Last 24 hours) at 8/10/2022 1543  Last data filed at 8/10/2022 0600  Gross per 24 hour   Intake 696.27 ml   Output 1850 ml   Net -1153.73 ml      Physical Exam  Constitutional:       General: He is not in acute distress.     Appearance: He is ill-appearing. He is not toxic-appearing or diaphoretic.   Cardiovascular:      Rate and Rhythm: Normal rate and regular rhythm.      Heart sounds: No murmur heard.    No gallop.   Pulmonary:      Effort: Pulmonary effort is normal. No respiratory distress.      Breath sounds: Normal breath sounds. No wheezing.   Abdominal:      General: Bowel sounds are normal. There is no distension.      Palpations: Abdomen is soft.      Tenderness: There is no abdominal tenderness.       Significant Labs: All pertinent labs within the past 24 hours have been reviewed.    Significant Imaging: I have reviewed all pertinent imaging results/findings within the past 24 hours.      Assessment/Plan:      * Atrial flutter with rapid ventricular response  Flutter on admission, now appears to be Fib. Rate improved with amiodarone. Pt on sotalol at home but w/ some hx of med noncompliance, per cardiology going w/ amiodarone  - transitioned amio to po  - lopressor  - cardiology consulted    Soft tissue abscess  Gluteal abscess, self-draining  - wound care  - antibiotics      Hypothyroidism  Iatrogenic sublclinical hyperthyroidism, likely contributor to atrial arrhythmias  -Reduce synthroid  dose    Lab test positive for detection of COVID-19 virus  -Covid positive on admit but without symptoms  -He is vaccinated and boosted  -Covid risk core elevated at 4  -Place in covid isolation.  -Will treat with remdesivir x 3 days due to elevated covid risk score and immunosuppression with chronic unexplained leukopenia.    History of DVT (deep vein thrombosis)  -Noted DVT and PE 2006 or 2008  -Has IVC filter  -On xarelto     Hypotension  Unclear cause of hypotension, now resolved. Possibly due to AF?  - continue to monitor off of pressors    Leukopenia  -This is chronic and he follows with Dr. Marquez - last seen 3/2021  -Etiology unclear after extensive workup including BM Bx.  -Continue to monitor      Near syncope  -Due to hypotension and aflutter w RVR  -Treatment as above.    Morbid obesity  Body mass index is 46.91 kg/m². Morbid obesity complicates all aspects of disease management from diagnostic modalities to treatment. Weight loss encouraged and health benefits explained to patient.     Diabetes mellitus, type 2  -Check A1c  -At home takes metformin  -Treat with SSI ac/hs for now.    Hyperlipidemia  -Continue statin    Essential hypertension  Holding home olmesartan      VTE Risk Mitigation (From admission, onward)         Ordered     rivaroxaban tablet 20 mg  With dinner         08/08/22 1151     Reason for No Pharmacological VTE Prophylaxis  Once        Question:  Reasons:  Answer:  Already adequately anticoagulated on oral Anticoagulants    08/08/22 1151     IP VTE HIGH RISK PATIENT  Once         08/08/22 1151     Place sequential compression device  Until discontinued         08/08/22 1151                Discharge Planning   CASA:      Code Status: Full Code   Is the patient medically ready for discharge?:     Reason for patient still in hospital (select all that apply): Patient trending condition, Laboratory test, Treatment, Consult recommendations and Pending disposition               Critical care  time spent on the evaluation and treatment of severe organ dysfunction, review of pertinent labs and imaging studies, discussions with consulting providers and discussions with patient/family: 45 minutes.      Joseluis Harley MD  Department of Hospital Medicine   [facility] - Intensive Care

## 2022-08-10 NOTE — PLAN OF CARE
[place] [place]  Initial Discharge Assessment       Primary Care Provider: Roxanne Phillip    Admission Diagnosis: Chest pressure [R07.89]  Atrial flutter with rapid ventricular response [I48.92]  Hypotension, unspecified hypotension type [I95.9]    Admission Date: 8/8/2022  Expected Discharge Date:     Discharge Barriers Identified: None    Payor: [employer] MANAGED MEDICARE / Plan: [employer] MEDICARE HMO / Product Type: Capitation /     Extended Emergency Contact Information  Primary Emergency Contact: Sara Santos   [facility]  Home Phone: [phone]  Relation: Spouse    Discharge Plan A: Home with family  Discharge Plan B:  (n/a)      "[facility] (Now [facility]) - [place], [place] - [address]  [address] [address]  Phone: [phone] Fax: [phone]    [facility] - [place] - [address]  [address]  [place] [address]  Phone: [phone] Fax: [phone]      Initial Assessment (most recent)     Adult Discharge Assessment - 08/09/22 1702        Discharge Assessment    Assessment Type Discharge Planning Assessment     Confirmed/corrected address, phone number and insurance Yes     Confirmed Demographics Correct on Facesheet     Source of Information patient     When was your last doctors appointment? --   "A couple of months"    Communicated CASA with patient/caregiver Yes     Reason For Admission Irregular heart beat     Lives With spouse     Do you expect to return to your current living situation? Yes     Do you have help at home or someone to help you manage your care at home? Yes     Prior to hospitilization cognitive status: Alert/Oriented     Current cognitive status: Alert/Oriented     Walking or Climbing Stairs Difficulty ambulation difficulty, requires equipment;stair climbing difficulty, assistance 1 person;stair climbing difficulty, requires equipment     " Dressing/Bathing Difficulty none     Home Layout Able to live on 1st floor     Equipment Currently Used at Home CPAP;cane, straight     Readmission within 30 days? No     Patient currently being followed by outpatient case management? No     Do you currently have service(s) that help you manage your care at home? No     Do you take prescription medications? Yes     Do you have prescription coverage? Yes     Coverage [employer]     Do you have any problems affording any of your prescribed medications? No     Is the patient taking medications as prescribed? yes     Who is going to help you get home at discharge? Daughter and wife     How do you get to doctors appointments? car, drives self     Are you on dialysis? No     Do you take coumadin? No     Discharge Plan A Home with family     Discharge Plan B --   n/a    DME Needed Upon Discharge  none     Discharge Plan discussed with: Patient     Discharge Barriers Identified None        Relationship/Environment    Name(s) of Who Lives With Patient Wife, daughter and grandchildren

## 2022-08-10 NOTE — CARE UPDATE
[facility], [facility]  Nurses Note -- 4 Eyes      8/9/2022       Skin assessed on: Q Shift      [x] No Pressure Injuries Present    [x]Prevention Measures Documented    [] Yes LDA  for Pressure Injury Previously documented     [] Yes New Pressure Injury Discovered   [] LDA for New Pressure Injury Added      Attending RN:  RICH CHIANG RN     Second RN:  Sherita ANTON RN

## 2022-08-10 NOTE — CARE UPDATE
61y M w/ DVT, afib, LARRY, hypothyroidism presented w/ near syncope, found to be in aflutter with RVR. Admitted to the ICU. Initially required neosynephrine (but was not cardioverted?) and started on amiodarone. On amio pt in Atrial fibrillation but with better rate control.     Hospital issues:    Afib: Cardiology signed off. Currently on po amiodarone and metoprolol. Still in AF but with better rate control. Follow up outpatient after covid for consideration of cardioversion if still in AF. Anticoagulated with xarelto.     Covid: Asymptomatic, received remdesivir x 3 days    Gluteal abscess: Small draining abscess on backside. Wound care consulted, started po antibiotics.     Likely home tomorrow if rate remains controlled.

## 2022-08-10 NOTE — SUBJECTIVE & OBJECTIVE
Interval History: No events overnight. HR intermittently elevated    Review of Systems   Constitutional:  Negative for chills and fever.   Respiratory:  Negative for cough and shortness of breath.    Cardiovascular:  Negative for chest pain and leg swelling.   Gastrointestinal:  Negative for abdominal distention and abdominal pain.   Skin:         Scant drainage from gluteal abscess   Objective:     Vital Signs (Most Recent):  Temp: 97.9 °F (36.6 °C) (08/10/22 0800)  Pulse: 85 (08/10/22 1100)  Resp: (!) 24 (08/10/22 1100)  BP: 125/86 (08/10/22 1100)  SpO2: 99 % (08/10/22 1100)   Vital Signs (24h Range):  Temp:  [97.7 °F (36.5 °C)-98.2 °F (36.8 °C)] 97.9 °F (36.6 °C)  Pulse:  [] 85  Resp:  [13-36] 24  SpO2:  [96 %-100 %] 99 %  BP: ()/() 125/86     Weight: (!) 144.1 kg (317 lb 10.9 oz)  Body mass index is 46.91 kg/m².    Intake/Output Summary (Last 24 hours) at 8/10/2022 1543  Last data filed at 8/10/2022 0600  Gross per 24 hour   Intake 696.27 ml   Output 1850 ml   Net -1153.73 ml      Physical Exam  Constitutional:       General: He is not in acute distress.     Appearance: He is ill-appearing. He is not toxic-appearing or diaphoretic.   Cardiovascular:      Rate and Rhythm: Normal rate and regular rhythm.      Heart sounds: No murmur heard.    No gallop.   Pulmonary:      Effort: Pulmonary effort is normal. No respiratory distress.      Breath sounds: Normal breath sounds. No wheezing.   Abdominal:      General: Bowel sounds are normal. There is no distension.      Palpations: Abdomen is soft.      Tenderness: There is no abdominal tenderness.       Significant Labs: All pertinent labs within the past 24 hours have been reviewed.    Significant Imaging: I have reviewed all pertinent imaging results/findings within the past 24 hours.

## 2022-08-11 VITALS
BODY MASS INDEX: 46.65 KG/M2 | DIASTOLIC BLOOD PRESSURE: 80 MMHG | HEIGHT: 69 IN | WEIGHT: 315 LBS | HEART RATE: 88 BPM | SYSTOLIC BLOOD PRESSURE: 119 MMHG | TEMPERATURE: 99 F | RESPIRATION RATE: 18 BRPM | OXYGEN SATURATION: 98 %

## 2022-08-11 DIAGNOSIS — U07.1 COVID-19 VIRUS DETECTED: ICD-10-CM

## 2022-08-11 LAB
ANION GAP SERPL CALC-SCNC: 10 MMOL/L (ref 8–16)
BUN SERPL-MCNC: 12 MG/DL (ref 6–20)
CALCIUM SERPL-MCNC: 9 MG/DL (ref 8.7–10.5)
CHLORIDE SERPL-SCNC: 107 MMOL/L (ref 95–110)
CO2 SERPL-SCNC: 26 MMOL/L (ref 23–29)
CREAT SERPL-MCNC: 0.6 MG/DL (ref 0.5–1.4)
EST. GFR  (NO RACE VARIABLE): >60 ML/MIN/1.73 M^2
GLUCOSE SERPL-MCNC: 112 MG/DL (ref 70–110)
MAGNESIUM SERPL-MCNC: 1.6 MG/DL (ref 1.6–2.6)
POCT GLUCOSE: 116 MG/DL (ref 70–110)
POCT GLUCOSE: 122 MG/DL (ref 70–110)
POTASSIUM SERPL-SCNC: 4.2 MMOL/L (ref 3.5–5.1)
SODIUM SERPL-SCNC: 143 MMOL/L (ref 136–145)

## 2022-08-11 PROCEDURE — 25000003 PHARM REV CODE 250: Performed by: STUDENT IN AN ORGANIZED HEALTH CARE EDUCATION/TRAINING PROGRAM

## 2022-08-11 PROCEDURE — 83735 ASSAY OF MAGNESIUM: CPT | Performed by: HOSPITALIST

## 2022-08-11 PROCEDURE — 25000003 PHARM REV CODE 250: Performed by: HOSPITALIST

## 2022-08-11 PROCEDURE — 99900035 HC TECH TIME PER 15 MIN (STAT)

## 2022-08-11 PROCEDURE — 80048 BASIC METABOLIC PNL TOTAL CA: CPT | Performed by: HOSPITALIST

## 2022-08-11 PROCEDURE — 63600175 PHARM REV CODE 636 W HCPCS: Performed by: STUDENT IN AN ORGANIZED HEALTH CARE EDUCATION/TRAINING PROGRAM

## 2022-08-11 PROCEDURE — 36415 COLL VENOUS BLD VENIPUNCTURE: CPT | Performed by: HOSPITALIST

## 2022-08-11 RX ORDER — DOXYCYCLINE HYCLATE 100 MG
100 TABLET ORAL EVERY 12 HOURS
Qty: 18 TABLET | Refills: 0 | Status: SHIPPED | OUTPATIENT
Start: 2022-08-11 | End: 2022-08-11 | Stop reason: SDUPTHER

## 2022-08-11 RX ORDER — METOPROLOL TARTRATE 25 MG/1
25 TABLET, FILM COATED ORAL 2 TIMES DAILY
Qty: 60 TABLET | Refills: 11 | Status: SHIPPED | OUTPATIENT
Start: 2022-08-11 | End: 2023-08-11

## 2022-08-11 RX ORDER — MAGNESIUM SULFATE HEPTAHYDRATE 40 MG/ML
2 INJECTION, SOLUTION INTRAVENOUS ONCE
Status: COMPLETED | OUTPATIENT
Start: 2022-08-11 | End: 2022-08-11

## 2022-08-11 RX ORDER — AMIODARONE HYDROCHLORIDE 400 MG/1
400 TABLET ORAL 2 TIMES DAILY
Qty: 28 TABLET | Refills: 0 | Status: SHIPPED | OUTPATIENT
Start: 2022-08-11 | End: 2022-08-30

## 2022-08-11 RX ORDER — AMIODARONE HYDROCHLORIDE 400 MG/1
400 TABLET ORAL 2 TIMES DAILY
Qty: 60 TABLET | Refills: 11 | Status: SHIPPED | OUTPATIENT
Start: 2022-08-11 | End: 2022-08-11 | Stop reason: SDUPTHER

## 2022-08-11 RX ORDER — METOPROLOL TARTRATE 25 MG/1
25 TABLET, FILM COATED ORAL 2 TIMES DAILY
Qty: 60 TABLET | Refills: 11 | Status: SHIPPED | OUTPATIENT
Start: 2022-08-11 | End: 2022-08-11 | Stop reason: SDUPTHER

## 2022-08-11 RX ORDER — DOXYCYCLINE HYCLATE 100 MG
100 TABLET ORAL EVERY 12 HOURS
Qty: 18 TABLET | Refills: 0 | Status: SHIPPED | OUTPATIENT
Start: 2022-08-11

## 2022-08-11 RX ADMIN — LEVOTHYROXINE SODIUM 150 MCG: 75 TABLET ORAL at 06:08

## 2022-08-11 RX ADMIN — MUPIROCIN: 20 OINTMENT TOPICAL at 08:08

## 2022-08-11 RX ADMIN — DOCUSATE SODIUM 100 MG: 100 CAPSULE, LIQUID FILLED ORAL at 08:08

## 2022-08-11 RX ADMIN — AMIODARONE HYDROCHLORIDE 400 MG: 200 TABLET ORAL at 08:08

## 2022-08-11 RX ADMIN — AMOXICILLIN AND CLAVULANATE POTASSIUM 1 TABLET: 875; 125 TABLET, FILM COATED ORAL at 08:08

## 2022-08-11 RX ADMIN — PANTOPRAZOLE SODIUM 40 MG: 40 TABLET, DELAYED RELEASE ORAL at 08:08

## 2022-08-11 RX ADMIN — ATORVASTATIN CALCIUM 40 MG: 40 TABLET, FILM COATED ORAL at 08:08

## 2022-08-11 RX ADMIN — POLYETHYLENE GLYCOL 3350 17 G: 17 POWDER, FOR SOLUTION ORAL at 08:08

## 2022-08-11 RX ADMIN — METOPROLOL TARTRATE 25 MG: 25 TABLET, FILM COATED ORAL at 08:08

## 2022-08-11 RX ADMIN — DOXYCYCLINE HYCLATE 100 MG: 100 TABLET, COATED ORAL at 08:08

## 2022-08-11 RX ADMIN — MAGNESIUM SULFATE HEPTAHYDRATE 2 G: 40 INJECTION, SOLUTION INTRAVENOUS at 08:08

## 2022-08-11 RX ADMIN — FERROUS SULFATE TAB 325 MG (65 MG ELEMENTAL FE) 1 EACH: 325 (65 FE) TAB at 08:08

## 2022-08-11 NOTE — CONSULTS
[facility] [place]  [place]    Patient Name:  Russell Santos   MRN:  [id number]  Date: 8/11/2022  Diagnosis: Atrial flutter with rapid ventricular response    History:     Past Medical History:   Diagnosis Date    Acquired hypothyroidism     Atrial fibrillation 09/16/2019    Diabetes mellitus     High cholesterol     History of DVT (deep vein thrombosis)     History of pulmonary embolism     Hypertension     LARRY (obstructive sleep apnea)     S/P IVC filter        Social History     Socioeconomic History    Marital status:    Tobacco Use    Smoking status: Never Smoker    Smokeless tobacco: Never Used   Substance and Sexual Activity    Alcohol use: Not Currently     Comment: occasionally    Drug use: No    Sexual activity: Not Currently       Precautions:     Allergies as of 08/08/2022 - Reviewed 08/08/2022   Allergen Reaction Noted    Contrast media Hives 10/17/2017       WO Assessment Details/Treatment   Consulted for wounds- gluteal abscess  A 61 year old male admitted 8/8/22 from home with atrial flutter with RVR; hypotension; near syncope; leukopenia; hypothyroidism; lab test positive for COVID; history DVT and PE; morbid obesity; DM II; HLD; essential hypertension  8/8 WBC 2.84 Hgb 11.8 Hct 38.7 Alb 3.3 Weight 317 lb A1C 6.3   On Isoflex mattress; Rolan score 21  8/10 Gluteal abscess identified and started po antibiotics with wound care consult  Assessment:  Patient reports having 2 hard areas on right buttock prior to admission. Areas was draining at home and soiling clothing. States area is now improved- dry and not as large.   Assessed area on right buttock and palpated 5 mm hard, circular area without surrounding erythema. No drainage. Induration 1 cm around hard area.   Recommendation:  Provided patient with Vashe for skin cleansing.   Instructed patient to apply warm compresses to area or heating pad, but be careful not to burn self.   Instructed to take antibiotic prescribed.    Do not open with razor or squeeze area as he has done in past.   If area enlarges, worsens, or starts having fever, to go to healthcare facility for attention.   Patient voiced understanding.   08/11/2022

## 2022-08-11 NOTE — PLAN OF CARE
[facility] - Telemetry  Discharge Final Note    Primary Care Provider: Roxanne Phillip    Expected Discharge Date: 8/11/2022    All CM needs met. CM notified nurse, Kalli that pt is ready for discharge from CM standpoint.    Final Discharge Note (most recent)       Final Note - 08/11/22 1354          Final Note    Assessment Type Final Discharge Note (P)      Anticipated Discharge Disposition Home or Self Care (P)      What phone number can be called within the next 1-3 days to see how you are doing after discharge? [phone] (P)      Hospital Resources/Appts/Education Provided Appointments scheduled and added to AVS;Appointments scheduled by Navigator/Coordinator (P)         Post-Acute Status    Coverage [employer] (P)      Discharge Delays None known at this time (P)                      Important Message from Medicare  Important Message from Medicare regarding Discharge Appeal Rights: Explained to patient/caregiver, Other (comments)     Date IMM was signed: 08/10/22  Time IMM was signed: 7207    Contact Info       Viktor Casey MD   Specialty: Cardiology    [address]  [address]  [facility] [address]   Phone: [phone]       Next Steps: Follow up in 1 week(s)    Roxanne Phillip   Relationship: PCP - General    [address] [facility] [address]   Phone: [phone]       Next Steps: Follow up    Instructions: Telehealth Appointment scheduled for 8/12/22 at 8:30.

## 2022-08-11 NOTE — NURSING
Nurses Note -- 4 Eyes      8/10/2022   10:09 PM      Skin assessed during: Transfer      [] No Pressure Injuries Present    []Prevention Measures Documented      [x] Yes- Altered Skin Integrity Present or Discovered   [] LDA Added if Not in Epic (Describe Wound)   [] New Altered Skin Integrity was Present on Admit and Documented in LDA   [] Wound Image Taken    Wound Care Consulted? Yes  abscess on buttocks    Attending Nurse:  GALA RICH RN     Second RN/Staff Member:  Tammy Gutierrez RN

## 2022-08-11 NOTE — PLAN OF CARE
Problem: Adult Inpatient Plan of Care  Goal: Plan of Care Review  Outcome: Ongoing, Progressing  Goal: Patient-Specific Goal (Individualized)  Outcome: Ongoing, Progressing  Goal: Absence of Hospital-Acquired Illness or Injury  Outcome: Ongoing, Progressing  Goal: Optimal Comfort and Wellbeing  Outcome: Ongoing, Progressing  Goal: Readiness for Transition of Care  Outcome: Ongoing, Progressing     Problem: Bariatric Environmental Safety  Goal: Safety Maintained with Care  Outcome: Ongoing, Progressing     Problem: Diabetes Comorbidity  Goal: Blood Glucose Level Within Targeted Range  Outcome: Ongoing, Progressing     Problem: Skin Injury Risk Increased  Goal: Skin Health and Integrity  Outcome: Ongoing, Progressing     Problem: Impaired Wound Healing  Goal: Optimal Wound Healing  Outcome: Ongoing, Progressing

## 2022-08-11 NOTE — ASSESSMENT & PLAN NOTE
Likely related to atrial fibrillation  - recovered after rate controlled and pt no longer required pressors.

## 2022-08-11 NOTE — ASSESSMENT & PLAN NOTE
Flutter on admission, now appears to be Fib. Rate improved with amiodarone. Pt on sotalol at home but w/ some hx of med noncompliance, per cardiology going w/ amiodarone  -given mag sulfate prior to d/c due to hypomagnesemia  - discussed with cardiology who recommend po amio 400 mg bid for 2 weeks as loading dose, want follow up in 1 week for further evaluation and transition to maintenance.   - lopressor

## 2022-08-11 NOTE — DISCHARGE INSTRUCTIONS
Please STOP TAKING YOUR SOTALOL and START TAKING AMIODARONE AND LOPRESSOR 2 times per day.  You need to follow up with your primary doctor next week and be sure to see your cardiologist in 1 week to discuss your medication changes.    Take the doxycycline WITH FOOD for the next 9 days 2 times per day for your wound.

## 2022-08-11 NOTE — NURSING
Pt discharge instructions given, verbalized understanding. IV taken out and in tact. Tele removed and returned. Amio and metoprolol delivered to bedside. Daughter to pay copay and retreive doxycycline from pharmacy when she picks pt up.

## 2022-08-11 NOTE — ASSESSMENT & PLAN NOTE
Flutter on admission, now appears to be Fib. Rate improved with amiodarone. Pt on sotalol at home but w/ some hx of med noncompliance, per cardiology going w/ amiodarone  - discussed with cardiology who recommend po amio 400 mg bid for 2 weeks as loading dose, want follow up in 1 week for further evaluation and transition to maintenance.   - lopressor

## 2022-08-11 NOTE — ASSESSMENT & PLAN NOTE
Restart home medication. Pt will need to f/u with his pcp for repeat tsh and possible medication adjustment

## 2022-08-11 NOTE — DISCHARGE SUMMARY
[facility] Medicine  Discharge Summary      Patient Name: Russell Santos  MRN: [id number]  Patient Class: IP- Inpatient  Admission Date: 8/8/2022  Hospital Length of Stay: 3 days  Discharge Date and Time:  08/11/2022 5:00 PM  Attending Physician: No att. providers found   Discharging Provider: Christian Roy III, MD  Primary Care Provider: [facility]      HPI:   61 year with obesity, history of DVT and PE (2008 with IVC filter), diabetes mellitus, hyperlipidemia, atrial fibrillation (on xarelto), LARRY (on cpap 16) and hypothyroidism who presented for evaluation of near syncope.  He states he felt fine when he went to bed last night but woke up feeling hot, sweaty  and light-headed.  He had had a mild sensation of chest pressure which is similar to the feeling of trapped air in his belly he feels most morning after using his cpap.  He said he felt like he was going to pass out and felt an irregular heart beat sensation which is unusual for him.  He denies fevers, chills, cough, shortness of breath, nausea, vomiting and diarrhea.  He has no new leg edema, claims medication and diet compliance.  He is vaccinated and boosted for covid-19.    In the ER he was found to be hypotensive with atrial flutter with RVR.  He was started on amiodarone and neosynephrine drips and feels much better now.        * No surgery found *      Hospital Course:   61y M admitted for atrial flutter. Found to be covid positive. Started on amiodarone gtt, was later found to be in Afib w/ controlled rate. Switched to po amiodarone. Metoprolol added. Found to have small gluteal abscess, spontaneously draining and was started on antibiotics. Pt continued to have controlled heart rate overnight on po amio and metoprolol. Pt to be discharged home with close pcp follow up as well as a cardiology appointment in 1 week to transition patient from amiodarone loading dose to maintenance. Pt highly encouraged in medication compliance.  Pt will continue to quarantine for a total of 5 days due to covid diagnosis unless he has a medical emergency.        Goals of Care Treatment Preferences:  Code Status: Full Code      Consults:   Consults (From admission, onward)        Status Ordering Provider     Inpatient consult to Cardiology  Once        Provider:  Viktor Casey MD    Acknowledged DEBBY SON          * Atrial flutter with rapid ventricular response  Flutter on admission, now appears to be Fib. Rate improved with amiodarone. Pt on sotalol at home but w/ some hx of med noncompliance, per cardiology going w/ amiodarone  -given mag sulfate prior to d/c due to hypomagnesemia  - discussed with cardiology who recommend po amio 400 mg bid for 2 weeks as loading dose, want follow up in 1 week for further evaluation and transition to maintenance.   - lopressor      Soft tissue abscess  Gluteal abscess, self-draining  - doxycyline for 9 more days   - f/u with pcp next week    Hypothyroidism  Restart home medication. Pt will need to f/u with his pcp for repeat tsh and possible medication adjustment    Lab test positive for detection of COVID-19 virus  -Covid positive on admit but without symptoms  -He is vaccinated and boosted  -Covid risk core elevated at 4  -Place in covid isolation.  -Will treat with remdesivir x 3 days due to elevated covid risk score and immunosuppression with chronic unexplained leukopenia.    History of DVT (deep vein thrombosis)  -Noted DVT and PE 2006 or 2008  -Has IVC filter  -On xarelto     Hypotension  Likely related to atrial fibrillation  - recovered after rate controlled and pt no longer required pressors.    Leukopenia  -This is chronic and he follows with Dr. Marquez - last seen 3/2021  -Etiology unclear after extensive workup including BM Bx.  -Continue to monitor      Near syncope  -Due to hypotension and aflutter w RVR  -Treatment as above.    Morbid obesity  Body mass index is 46.91 kg/m². Morbid obesity  complicates all aspects of disease management from diagnostic modalities to treatment. Weight loss encouraged and health benefits explained to patient.     Diabetes mellitus, type 2  Continue home metformin    Hyperlipidemia  -Continue statin    Essential hypertension  Restart home arb on discharge    Final Active Diagnoses:    Diagnosis Date Noted POA    PRINCIPAL PROBLEM:  Atrial flutter with rapid ventricular response [I48.92] 08/08/2022 Yes    Soft tissue abscess [L02.91] 08/09/2022 Unknown    Hypotension [I95.9] 08/08/2022 Yes    History of DVT (deep vein thrombosis) [Z86.718] 08/08/2022 Not Applicable    Lab test positive for detection of COVID-19 virus [U07.1] 08/08/2022 Yes    Hypothyroidism [E03.9] 08/08/2022 Yes    Leukopenia [D72.819] 12/03/2020 Yes    Near syncope [R55] 09/16/2019 Yes    Diabetes mellitus, type 2 [E11.9] 10/17/2017 Yes    Essential hypertension [I10] 10/17/2017 Yes    Hyperlipidemia [E78.5] 10/17/2017 Yes    Morbid obesity [E66.01] 10/17/2017 Yes      Problems Resolved During this Admission:       Discharged Condition: fair    Disposition: Home or Self Care    Follow Up:   Follow-up Information     Viktor Casey MD Follow up in 1 week(s).    Specialty: Cardiology  Contact information:  [address] [facility]  [address]  [place] [address]  [phone]             [facility] Follow up.    Why: Telehealth Appointment scheduled for 8/12/22 at 8:30.  Contact information:  [facility] [address]  [place] [address]  [phone]                       Patient Instructions:      Diet Cardiac     Notify your health care provider if you experience any of the following:  temperature >100.4     Notify your health care provider if you experience any of the following:  persistent nausea and vomiting or diarrhea     Notify your health care provider if you experience any of the following:  severe uncontrolled pain     Notify your health care provider if you experience any of the following:   redness, tenderness, or signs of infection (pain, swelling, redness, odor or green/yellow discharge around incision site)     Notify your health care provider if you experience any of the following:  difficulty breathing or increased cough     Notify your health care provider if you experience any of the following:  severe persistent headache     Notify your health care provider if you experience any of the following:  worsening rash     Notify your health care provider if you experience any of the following:  persistent dizziness, light-headedness, or visual disturbances     Notify your health care provider if you experience any of the following:  increased confusion or weakness     Activity as tolerated       Significant Diagnostic Studies: Labs: All labs within the past 24 hours have been reviewed    Pending Diagnostic Studies:     None         Medications:  Reconciled Home Medications:      Medication List      START taking these medications    amiodarone 400 MG tablet  Commonly known as: PACERONE  Take 1 tablet (400 mg total) by mouth 2 (two) times daily for 14 days     doxycycline 100 MG tablet  Commonly known as: VIBRA-TABS  Take 1 tablet (100 mg total) by mouth every 12 (twelve) hours.     metoprolol tartrate 25 MG tablet  Commonly known as: LOPRESSOR  Take 1 tablet (25 mg total) by mouth 2 (two) times daily.        CHANGE how you take these medications    ferrous sulfate 325 (65 FE) MG EC tablet  TAKE 1 TABLET EVERY DAY  What changed:   · how to take this  · when to take this        CONTINUE taking these medications    acetaminophen 500 MG tablet  Commonly known as: TYLENOL  Take 500 mg by mouth every 6 (six) hours as needed for Pain.     cholecalciferol (vitamin D3) 50 mcg (2,000 unit) Tab  Commonly known as: VITAMIN D3  Take by mouth once daily.     EPINEPHrine 0.3 mg/0.3 mL Atin  Commonly known as: EPIPEN     ergocalciferol 50,000 unit Cap  Commonly known as: ERGOCALCIFEROL  Take 50,000 Units by mouth  every 7 days.     fish oil-omega-3 fatty acids 300-1,000 mg capsule  Take 1 capsule by mouth 2 (two) times a day.     gabapentin 600 MG tablet  Commonly known as: NEURONTIN  Take 600 mg by mouth 3 (three) times daily.     HYDROcodone-acetaminophen  mg per tablet  Commonly known as: NORCO     levothyroxine 200 MCG tablet  Commonly known as: SYNTHROID  Take 200 mcg by mouth before breakfast.     metFORMIN 1000 MG tablet  Commonly known as: GLUCOPHAGE  Take 1,000 mg by mouth 2 (two) times daily with meals.     MOVANTIK 25 mg tablet  Generic drug: naloxegoL  Take 25 mg by mouth once daily.     olmesartan 40 MG tablet  Commonly known as: BENICAR  Take 1 tablet (40 mg total) by mouth once daily.     omeprazole 20 MG capsule  Commonly known as: PRILOSEC     rivaroxaban 20 mg Tab  Commonly known as: XARELTO  Take 1 tablet (20 mg total) by mouth daily with dinner or evening meal.     rosuvastatin 40 MG Tab  Commonly known as: CRESTOR  Take 10 mg by mouth every evening.     testosterone 200 mg Pllt  by Implant route.     triamcinolone acetonide 0.5% 0.5 % Crea  Commonly known as: KENALOG  APPLY TOPICALLY A SMALL AMOUNT TO THE AFFECTED AREA(S) ONCE DAILY     TRUE METRIX GLUCOSE TEST STRIP Strp  Generic drug: blood sugar diagnostic     TRUEPLUS LANCETS 33 gauge Misc  Generic drug: lancets        STOP taking these medications    lubiprostone 24 MCG Cap  Commonly known as: AMITIZA     meloxicam 15 MG tablet  Commonly known as: MOBIC     sotaloL 80 MG tablet  Commonly known as: BETAPACE            Indwelling Lines/Drains at time of discharge:   Lines/Drains/Airways     None                 Time spent on the discharge of patient: >35 minutes         Christian Roy III, MD  Department of Hospital Medicine  [facility] [place] [facility]

## 2022-08-11 NOTE — DISCHARGE SUMMARY
[facility] Medicine  Discharge Summary      Patient Name: Russell Santos  MRN: [id number]  Patient Class: IP- Inpatient  Admission Date: 8/8/2022  Hospital Length of Stay: 3 days  Discharge Date and Time:  08/11/2022 11:56 AM  Attending Physician: Christian Roy III, MD   Discharging Provider: Christian Roy III, MD  Primary Care Provider: [facility]      HPI:   61 year with obesity, history of DVT and PE (2008 with IVC filter), diabetes mellitus, hyperlipidemia, atrial fibrillation (on xarelto), LARRY (on cpap 16) and hypothyroidism who presented for evaluation of near syncope.  He states he felt fine when he went to bed last night but woke up feeling hot, sweaty  and light-headed.  He had had a mild sensation of chest pressure which is similar to the feeling of trapped air in his belly he feels most morning after using his cpap.  He said he felt like he was going to pass out and felt an irregular heart beat sensation which is unusual for him.  He denies fevers, chills, cough, shortness of breath, nausea, vomiting and diarrhea.  He has no new leg edema, claims medication and diet compliance.  He is vaccinated and boosted for covid-19.    In the ER he was found to be hypotensive with atrial flutter with RVR.  He was started on amiodarone and neosynephrine drips and feels much better now.        * No surgery found *      Hospital Course:   61y M admitted for atrial flutter. Found to be covid positive. Started on amiodarone gtt, was later found to be in Afib w/ controlled rate. Switched to po amiodarone. Metoprolol added. Found to have small gluteal abscess, spontaneously draining and was started on antibiotics. Pt continued to have controlled heart rate overnight on po amio and metoprolol. Pt to be discharged home with close pcp follow up as well as a cardiology appointment in 1 week to transition patient from amiodarone loading dose to maintenance. Pt highly encouraged in medication  compliance. Pt will continue to quarantine for a total of 5 days due to covid diagnosis unless he has a medical emergency.        Goals of Care Treatment Preferences:  Code Status: Full Code      Consults:   Consults (From admission, onward)        Status Ordering Provider     Inpatient consult to Cardiology  Once        Provider:  Viktor Casey MD    Acknowledged DEBBY SON          * Atrial flutter with rapid ventricular response  Flutter on admission, now appears to be Fib. Rate improved with amiodarone. Pt on sotalol at home but w/ some hx of med noncompliance, per cardiology going w/ amiodarone  - discussed with cardiology who recommend po amio 400 mg bid for 2 weeks as loading dose, want follow up in 1 week for further evaluation and transition to maintenance.   - lopressor      Soft tissue abscess  Gluteal abscess, self-draining  - doxycyline for 9 more days   - f/u with pcp next week    Hypothyroidism  Restart home medication    Lab test positive for detection of COVID-19 virus  -Covid positive on admit but without symptoms  -He is vaccinated and boosted  -Covid risk core elevated at 4  -Place in covid isolation.  -Will treat with remdesivir x 3 days due to elevated covid risk score and immunosuppression with chronic unexplained leukopenia.    Leukopenia  -This is chronic and he follows with Dr. Marquez - last seen 3/2021  -Etiology unclear after extensive workup including BM Bx.  -Continue to monitor      Near syncope  -Due to hypotension and aflutter w RVR  -Treatment as above.    Morbid obesity  Body mass index is 46.91 kg/m². Morbid obesity complicates all aspects of disease management from diagnostic modalities to treatment. Weight loss encouraged and health benefits explained to patient.     Diabetes mellitus, type 2  Continue home metformin    Hyperlipidemia  -Continue statin    Essential hypertension  Restart home arb on discharge      Final Active Diagnoses:    Diagnosis Date Noted POA     PRINCIPAL PROBLEM:  Atrial flutter with rapid ventricular response [I48.92] 08/08/2022 Yes    Soft tissue abscess [L02.91] 08/09/2022 Unknown    Hypotension [I95.9] 08/08/2022 Yes    History of DVT (deep vein thrombosis) [Z86.718] 08/08/2022 Not Applicable    Lab test positive for detection of COVID-19 virus [U07.1] 08/08/2022 Yes    Hypothyroidism [E03.9] 08/08/2022 Yes    Leukopenia [D72.819] 12/03/2020 Yes    Near syncope [R55] 09/16/2019 Yes    Diabetes mellitus, type 2 [E11.9] 10/17/2017 Yes    Essential hypertension [I10] 10/17/2017 Yes    Hyperlipidemia [E78.5] 10/17/2017 Yes    Morbid obesity [E66.01] 10/17/2017 Yes      Problems Resolved During this Admission:       Discharged Condition: fair    Disposition: Home or Self Care    Follow Up:   Follow-up Information     Viktor Casey MD Follow up in 1 week(s).    Specialty: Cardiology  Contact information:  [address]  [address]  [facility] [address]  [phone]                       Patient Instructions:      Diet Cardiac     Notify your health care provider if you experience any of the following:  temperature >100.4     Notify your health care provider if you experience any of the following:  persistent nausea and vomiting or diarrhea     Notify your health care provider if you experience any of the following:  severe uncontrolled pain     Notify your health care provider if you experience any of the following:  redness, tenderness, or signs of infection (pain, swelling, redness, odor or green/yellow discharge around incision site)     Notify your health care provider if you experience any of the following:  difficulty breathing or increased cough     Notify your health care provider if you experience any of the following:  severe persistent headache     Notify your health care provider if you experience any of the following:  worsening rash     Notify your health care provider if you experience any of the following:  persistent dizziness,  light-headedness, or visual disturbances     Notify your health care provider if you experience any of the following:  increased confusion or weakness     Activity as tolerated       Significant Diagnostic Studies: Labs: All labs within the past 24 hours have been reviewed    Pending Diagnostic Studies:     None         Medications:  Reconciled Home Medications:      Medication List      START taking these medications    amiodarone 400 MG tablet  Commonly known as: PACERONE  Take 1 tablet (400 mg total) by mouth 2 (two) times daily for 14 days     doxycycline 100 MG tablet  Commonly known as: VIBRA-TABS  Take 1 tablet (100 mg total) by mouth every 12 (twelve) hours.     metoprolol tartrate 25 MG tablet  Commonly known as: LOPRESSOR  Take 1 tablet (25 mg total) by mouth 2 (two) times daily.        CHANGE how you take these medications    ferrous sulfate 325 (65 FE) MG EC tablet  TAKE 1 TABLET EVERY DAY  What changed:   · how to take this  · when to take this        CONTINUE taking these medications    acetaminophen 500 MG tablet  Commonly known as: TYLENOL  Take 500 mg by mouth every 6 (six) hours as needed for Pain.     cholecalciferol (vitamin D3) 50 mcg (2,000 unit) Tab  Commonly known as: VITAMIN D3  Take by mouth once daily.     EPINEPHrine 0.3 mg/0.3 mL Atin  Commonly known as: EPIPEN     ergocalciferol 50,000 unit Cap  Commonly known as: ERGOCALCIFEROL  Take 50,000 Units by mouth every 7 days.     fish oil-omega-3 fatty acids 300-1,000 mg capsule  Take 1 capsule by mouth 2 (two) times a day.     gabapentin 600 MG tablet  Commonly known as: NEURONTIN  Take 600 mg by mouth 3 (three) times daily.     HYDROcodone-acetaminophen  mg per tablet  Commonly known as: NORCO     levothyroxine 200 MCG tablet  Commonly known as: SYNTHROID  Take 200 mcg by mouth before breakfast.     metFORMIN 1000 MG tablet  Commonly known as: GLUCOPHAGE  Take 1,000 mg by mouth 2 (two) times daily with meals.     MOVANTIK 25 mg  tablet  Generic drug: naloxegoL  Take 25 mg by mouth once daily.     olmesartan 40 MG tablet  Commonly known as: BENICAR  Take 1 tablet (40 mg total) by mouth once daily.     omeprazole 20 MG capsule  Commonly known as: PRILOSEC     rivaroxaban 20 mg Tab  Commonly known as: XARELTO  Take 1 tablet (20 mg total) by mouth daily with dinner or evening meal.     rosuvastatin 40 MG Tab  Commonly known as: CRESTOR  Take 10 mg by mouth every evening.     testosterone 200 mg Pllt  by Implant route.     triamcinolone acetonide 0.5% 0.5 % Crea  Commonly known as: KENALOG  APPLY TOPICALLY A SMALL AMOUNT TO THE AFFECTED AREA(S) ONCE DAILY     TRUE METRIX GLUCOSE TEST STRIP Strp  Generic drug: blood sugar diagnostic     TRUEPLUS LANCETS 33 gauge Misc  Generic drug: lancets        STOP taking these medications    lubiprostone 24 MCG Cap  Commonly known as: AMITIZA     meloxicam 15 MG tablet  Commonly known as: MOBIC     sotaloL 80 MG tablet  Commonly known as: BETAPACE            Indwelling Lines/Drains at time of discharge:   Lines/Drains/Airways     None                 Time spent on the discharge of patient: >35 minutes         Christian Roy III, MD  Department of Hospital Medicine  [facility]

## 2022-08-22 NOTE — PHYSICIAN QUERY
5PT Name: Russell Santos  MR #: [id number]    DOCUMENTATION CLARIFICATION     CDS/: April Garcia RN, CCDS             Contact information: [email or web address]  This form is a permanent document in the medical record.     Query Date: August 22, 2022    By submitting this query, we are merely seeking further clarification of documentation. Please utilize your independent clinical judgment when addressing the question(s) below.    The Medical Record contains the following:    Indicators Supporting Clinical Findings Location in Medical Record   X Atrial Flutter Atrial flutter with rapid ventricular response    A-flutter, hypotension     Atrial flutter with rapid ventricular response    admitted for atrial flutter. Started on amiodarone gtt, was later found to be in Afib w/ controlled rate 8/8 ed md note    8/8 cards note    8/8 h/p    8/9 prog note       X EKG Results Atrial flutter with variable A-V block  8/8 ekg   X Medication  treated with amiodarone in the emergency room   We will continue the amiodarone drip.     . Switched to po amiodarone. Metoprolol added 8/8 ed md note      8/10 prog note    Treatment     X Other Near syncope -Due to hypotension and aflutter w RVR    8/9/22 A-fib rates controlled. Echo with EF 55%. On xarelto 8/8 h/p      8/9 cards note     The clinical guidelines noted are only a system guideline. It does not replace the provider's clinical judgment.    Provider, please further specify the atrial flutter.    [ x  ] Typical (Type I) - Diagnosis based on morphological criteria on EKG when the cavo-tricuspid isthmus region is involved   [   ] Atypical (Type II) - Diagnosis based on morphological criteria on EKG (not meeting typical atrial flutter criteria because the cavo-tricuspid isthmus region is not involved)   [   ] Other cardiac diagnosis (please specify):_______________   [  ] Clinically Undetermined         Please document in your progress notes daily for the duration of treatment  until resolved, and include in your discharge summary.    Reference:    BENNY Cam MD, [facility], [facility], JOSE Ray MD, S, [facility], JOEL Rivas MD, [facility], [facility], & IVAN Méndez MD. (2019, May 27). Overview of atrial flutter (IGNACIO Lester MD, Ed.). Retrieved October 22, 2020, from [email or web address]-yp-uhpqgk-[email or web address]=[email or web address]%20flutter&source=search_result&selectedTitle=1~150&usage_type=default&display_rank=1    Form No. 26917

## 2022-08-30 ENCOUNTER — OFFICE VISIT (OUTPATIENT)
Dept: CARDIOLOGY | Facility: CLINIC | Age: 62
End: 2022-08-30
Payer: MEDICARE

## 2022-08-30 VITALS
HEIGHT: 69 IN | SYSTOLIC BLOOD PRESSURE: 130 MMHG | BODY MASS INDEX: 46.4 KG/M2 | RESPIRATION RATE: 18 BRPM | OXYGEN SATURATION: 98 % | HEART RATE: 65 BPM | WEIGHT: 313.25 LBS | DIASTOLIC BLOOD PRESSURE: 84 MMHG

## 2022-08-30 DIAGNOSIS — E78.2 MIXED HYPERLIPIDEMIA: ICD-10-CM

## 2022-08-30 DIAGNOSIS — I10 ESSENTIAL HYPERTENSION: ICD-10-CM

## 2022-08-30 DIAGNOSIS — I48.0 PAF (PAROXYSMAL ATRIAL FIBRILLATION): ICD-10-CM

## 2022-08-30 DIAGNOSIS — I48.92 ATRIAL FLUTTER WITH RAPID VENTRICULAR RESPONSE: Primary | ICD-10-CM

## 2022-08-30 DIAGNOSIS — R55 NEAR SYNCOPE: ICD-10-CM

## 2022-08-30 PROCEDURE — 1111F PR DISCHARGE MEDS RECONCILED W/ CURRENT OUTPATIENT MED LIST: ICD-10-PCS | Mod: CPTII,S$GLB,, | Performed by: INTERNAL MEDICINE

## 2022-08-30 PROCEDURE — 3008F BODY MASS INDEX DOCD: CPT | Mod: CPTII,S$GLB,, | Performed by: INTERNAL MEDICINE

## 2022-08-30 PROCEDURE — 99214 OFFICE O/P EST MOD 30 MIN: CPT | Mod: S$GLB,,, | Performed by: INTERNAL MEDICINE

## 2022-08-30 PROCEDURE — 1111F DSCHRG MED/CURRENT MED MERGE: CPT | Mod: CPTII,S$GLB,, | Performed by: INTERNAL MEDICINE

## 2022-08-30 PROCEDURE — 99999 PR PBB SHADOW E&M-EST. PATIENT-LVL IV: CPT | Mod: PBBFAC,,, | Performed by: INTERNAL MEDICINE

## 2022-08-30 PROCEDURE — 1159F PR MEDICATION LIST DOCUMENTED IN MEDICAL RECORD: ICD-10-PCS | Mod: CPTII,S$GLB,, | Performed by: INTERNAL MEDICINE

## 2022-08-30 PROCEDURE — 3044F PR MOST RECENT HEMOGLOBIN A1C LEVEL <7.0%: ICD-10-PCS | Mod: CPTII,S$GLB,, | Performed by: INTERNAL MEDICINE

## 2022-08-30 PROCEDURE — 93000 ELECTROCARDIOGRAM COMPLETE: CPT | Mod: S$GLB,,, | Performed by: INTERNAL MEDICINE

## 2022-08-30 PROCEDURE — 3075F PR MOST RECENT SYSTOLIC BLOOD PRESS GE 130-139MM HG: ICD-10-PCS | Mod: CPTII,S$GLB,, | Performed by: INTERNAL MEDICINE

## 2022-08-30 PROCEDURE — 99999 PR PBB SHADOW E&M-EST. PATIENT-LVL IV: ICD-10-PCS | Mod: PBBFAC,,, | Performed by: INTERNAL MEDICINE

## 2022-08-30 PROCEDURE — 93000 EKG 12-LEAD: ICD-10-PCS | Mod: S$GLB,,, | Performed by: INTERNAL MEDICINE

## 2022-08-30 PROCEDURE — 3008F PR BODY MASS INDEX (BMI) DOCUMENTED: ICD-10-PCS | Mod: CPTII,S$GLB,, | Performed by: INTERNAL MEDICINE

## 2022-08-30 PROCEDURE — 3079F DIAST BP 80-89 MM HG: CPT | Mod: CPTII,S$GLB,, | Performed by: INTERNAL MEDICINE

## 2022-08-30 PROCEDURE — 3079F PR MOST RECENT DIASTOLIC BLOOD PRESSURE 80-89 MM HG: ICD-10-PCS | Mod: CPTII,S$GLB,, | Performed by: INTERNAL MEDICINE

## 2022-08-30 PROCEDURE — 4010F PR ACE/ARB THEARPY RXD/TAKEN: ICD-10-PCS | Mod: CPTII,S$GLB,, | Performed by: INTERNAL MEDICINE

## 2022-08-30 PROCEDURE — 4010F ACE/ARB THERAPY RXD/TAKEN: CPT | Mod: CPTII,S$GLB,, | Performed by: INTERNAL MEDICINE

## 2022-08-30 PROCEDURE — 3044F HG A1C LEVEL LT 7.0%: CPT | Mod: CPTII,S$GLB,, | Performed by: INTERNAL MEDICINE

## 2022-08-30 PROCEDURE — 99214 PR OFFICE/OUTPT VISIT, EST, LEVL IV, 30-39 MIN: ICD-10-PCS | Mod: S$GLB,,, | Performed by: INTERNAL MEDICINE

## 2022-08-30 PROCEDURE — 1159F MED LIST DOCD IN RCRD: CPT | Mod: CPTII,S$GLB,, | Performed by: INTERNAL MEDICINE

## 2022-08-30 PROCEDURE — 3075F SYST BP GE 130 - 139MM HG: CPT | Mod: CPTII,S$GLB,, | Performed by: INTERNAL MEDICINE

## 2022-08-30 RX ORDER — AMIODARONE HYDROCHLORIDE 200 MG/1
200 TABLET ORAL DAILY
Qty: 90 TABLET | Refills: 3 | Status: SHIPPED | OUTPATIENT
Start: 2022-08-30 | End: 2023-08-30

## 2022-08-30 RX ORDER — SILDENAFIL 50 MG/1
50 TABLET, FILM COATED ORAL DAILY PRN
Qty: 10 TABLET | Refills: 11 | Status: SHIPPED | OUTPATIENT
Start: 2022-08-30 | End: 2023-08-30

## 2022-08-30 NOTE — PROGRESS NOTES
Subjective:    Patient ID:  Russell Santos is a 61 y.o. male who presents for follow-up of No chief complaint on file.      HPI    PAF(flutter) on xarelto and amiodarone, PE/DVT/IVC filter 2008, LARRY, HTN, HLD, DM    Admitted 8/8/22  61 year with obesity, history of DVT and PE (2008 with IVC filter), diabetes mellitus, hyperlipidemia, atrial fibrillation (on xarelto), LARRY (on cpap 16) and hypothyroidism who presented for evaluation of near syncope.  He states he felt fine when he went to bed last night but woke up feeling hot, sweaty  and light-headed.  He had had a mild sensation of chest pressure which is similar to the feeling of trapped air in his belly he feels most morning after using his cpap.  He said he felt like he was going to pass out and felt an irregular heart beat sensation which is unusual for him.  He denies fevers, chills, cough, shortness of breath, nausea, vomiting and diarrhea.  He has no new leg edema, claims medication and diet compliance.  He is vaccinated and boosted for covid-19.    61y M admitted for atrial flutter. Found to be covid positive. Started on amiodarone gtt, was later found to be in Afib w/ controlled rate. Switched to po amiodarone. Metoprolol added. Found to have small gluteal abscess, spontaneously draining and was started on antibiotics. Pt continued to have controlled heart rate overnight on po amio and metoprolol. Pt to be discharged home with close pcp follow up as well as a cardiology appointment in 1 week to transition patient from amiodarone loading dose to maintenance. Pt highly encouraged in medication compliance. Pt will continue to quarantine for a total of 5 days due to covid diagnosis unless he has a medical emergency.     Followed by Dr Harper at [facility]     BRI/CV 9/16/19  LVEF 40%, mild global HK  Normal RV size/fxn  Normal valves  Trace AI, mild MR, mild-mod TR  No LA/SONNY thrombus  Biatrial dilation  Mild aortic root dilatation, 4.1 cm.  Successful DCCV  AF-> NSR 200J x4 (last defib with replacement of pads to more of an interscapular location for the posterior pad).       Echo 8/8/22  The left ventricle is normal in size with concentric hypertrophy and normal systolic function.  The estimated ejection fraction is 55%.  Indeterminate left ventricular diastolic function.  Normal right ventricular size with normal right ventricular systolic function.  Moderate left atrial enlargement.  Mild right atrial enlargement.  Mild mitral regurgitation.  Intermediate central venous pressure (8 mmHg).  The estimated PA systolic pressure is 24 mmHg.     8/30/22 Feels better since discharge. Denies CP or SOB. Feels jittery and c/o ED  EKG NSR   BP controlled    Review of Systems   Constitutional: Negative for decreased appetite.   HENT:  Negative for ear discharge.    Eyes:  Negative for blurred vision.   Endocrine: Negative for polyphagia.   Skin:  Negative for nail changes.   Genitourinary:  Negative for bladder incontinence.   Neurological:  Negative for aphonia.   Psychiatric/Behavioral:  Negative for hallucinations.    Allergic/Immunologic: Negative for hives.      Objective:    Physical Exam  Constitutional:       Appearance: He is well-developed.   HENT:      Head: Normocephalic and atraumatic.   Eyes:      Conjunctiva/sclera: Conjunctivae normal.      Pupils: Pupils are equal, round, and reactive to light.   Cardiovascular:      Rate and Rhythm: Normal rate. Rhythm irregular.      Pulses: Intact distal pulses.      Heart sounds: Normal heart sounds.   Pulmonary:      Effort: Pulmonary effort is normal.      Breath sounds: Normal breath sounds.   Abdominal:      General: Bowel sounds are normal.      Palpations: Abdomen is soft.   Musculoskeletal:         General: Normal range of motion.      Cervical back: Normal range of motion and neck supple.   Skin:     General: Skin is warm and dry.   Neurological:      Mental Status: He is alert and oriented to person, place,  and time.         Assessment:       1. Atrial flutter with rapid ventricular response    2. Essential hypertension    3. PAF (paroxysmal atrial fibrillation)    4. Mixed hyperlipidemia    5. Near syncope         Plan:       Back in NSR - decrease amiodarone 200 qd  OV 3 months with CMP, TSH  Continue Rx for PAF, HTN, HLD, PE

## 2022-11-28 ENCOUNTER — LAB VISIT (OUTPATIENT)
Dept: LAB | Facility: HOSPITAL | Age: 62
End: 2022-11-28
Attending: INTERNAL MEDICINE
Payer: MEDICARE

## 2022-11-28 DIAGNOSIS — E78.2 MIXED HYPERLIPIDEMIA: ICD-10-CM

## 2022-11-28 DIAGNOSIS — R55 NEAR SYNCOPE: ICD-10-CM

## 2022-11-28 DIAGNOSIS — I48.0 PAF (PAROXYSMAL ATRIAL FIBRILLATION): ICD-10-CM

## 2022-11-28 DIAGNOSIS — I48.92 ATRIAL FLUTTER WITH RAPID VENTRICULAR RESPONSE: ICD-10-CM

## 2022-11-28 DIAGNOSIS — I10 ESSENTIAL HYPERTENSION: ICD-10-CM

## 2022-11-28 LAB
ALBUMIN SERPL BCP-MCNC: 4 G/DL (ref 3.5–5.2)
ALP SERPL-CCNC: 55 U/L (ref 55–135)
ALT SERPL W/O P-5'-P-CCNC: 16 U/L (ref 10–44)
ANION GAP SERPL CALC-SCNC: 9 MMOL/L (ref 8–16)
AST SERPL-CCNC: 22 U/L (ref 10–40)
BILIRUB SERPL-MCNC: 0.6 MG/DL (ref 0.1–1)
BUN SERPL-MCNC: 13 MG/DL (ref 6–20)
CALCIUM SERPL-MCNC: 8.9 MG/DL (ref 8.7–10.5)
CHLORIDE SERPL-SCNC: 105 MMOL/L (ref 95–110)
CO2 SERPL-SCNC: 28 MMOL/L (ref 23–29)
CREAT SERPL-MCNC: 0.8 MG/DL (ref 0.5–1.4)
EST. GFR  (NO RACE VARIABLE): >60 ML/MIN/1.73 M^2
GLUCOSE SERPL-MCNC: 99 MG/DL (ref 70–110)
POTASSIUM SERPL-SCNC: 3.8 MMOL/L (ref 3.5–5.1)
PROT SERPL-MCNC: 7.7 G/DL (ref 6–8.4)
SODIUM SERPL-SCNC: 142 MMOL/L (ref 136–145)
T4 FREE SERPL-MCNC: 1.49 NG/DL (ref 0.71–1.51)
TSH SERPL DL<=0.005 MIU/L-ACNC: 0.04 UIU/ML (ref 0.4–4)

## 2022-11-28 PROCEDURE — 84443 ASSAY THYROID STIM HORMONE: CPT | Performed by: INTERNAL MEDICINE

## 2022-11-28 PROCEDURE — 36415 COLL VENOUS BLD VENIPUNCTURE: CPT | Mod: PO | Performed by: INTERNAL MEDICINE

## 2022-11-28 PROCEDURE — 84439 ASSAY OF FREE THYROXINE: CPT | Performed by: INTERNAL MEDICINE

## 2022-11-28 PROCEDURE — 80053 COMPREHEN METABOLIC PANEL: CPT | Performed by: INTERNAL MEDICINE

## 2022-12-01 ENCOUNTER — OFFICE VISIT (OUTPATIENT)
Dept: CARDIOLOGY | Facility: CLINIC | Age: 62
End: 2022-12-01
Payer: MEDICARE

## 2022-12-01 VITALS
OXYGEN SATURATION: 98 % | RESPIRATION RATE: 18 BRPM | BODY MASS INDEX: 46.65 KG/M2 | HEART RATE: 76 BPM | DIASTOLIC BLOOD PRESSURE: 82 MMHG | SYSTOLIC BLOOD PRESSURE: 130 MMHG | HEIGHT: 69 IN | WEIGHT: 315 LBS

## 2022-12-01 DIAGNOSIS — I48.92 ATRIAL FLUTTER WITH RAPID VENTRICULAR RESPONSE: ICD-10-CM

## 2022-12-01 DIAGNOSIS — I48.0 PAF (PAROXYSMAL ATRIAL FIBRILLATION): ICD-10-CM

## 2022-12-01 DIAGNOSIS — E78.2 MIXED HYPERLIPIDEMIA: ICD-10-CM

## 2022-12-01 DIAGNOSIS — E11.01 TYPE 2 DIABETES MELLITUS WITH HYPEROSMOLAR COMA, WITHOUT LONG-TERM CURRENT USE OF INSULIN: ICD-10-CM

## 2022-12-01 DIAGNOSIS — I10 ESSENTIAL HYPERTENSION: Primary | ICD-10-CM

## 2022-12-01 PROCEDURE — 1159F MED LIST DOCD IN RCRD: CPT | Mod: CPTII,S$GLB,, | Performed by: INTERNAL MEDICINE

## 2022-12-01 PROCEDURE — 3079F PR MOST RECENT DIASTOLIC BLOOD PRESSURE 80-89 MM HG: ICD-10-PCS | Mod: CPTII,S$GLB,, | Performed by: INTERNAL MEDICINE

## 2022-12-01 PROCEDURE — 3044F HG A1C LEVEL LT 7.0%: CPT | Mod: CPTII,S$GLB,, | Performed by: INTERNAL MEDICINE

## 2022-12-01 PROCEDURE — 99214 OFFICE O/P EST MOD 30 MIN: CPT | Mod: S$GLB,,, | Performed by: INTERNAL MEDICINE

## 2022-12-01 PROCEDURE — 99214 PR OFFICE/OUTPT VISIT, EST, LEVL IV, 30-39 MIN: ICD-10-PCS | Mod: S$GLB,,, | Performed by: INTERNAL MEDICINE

## 2022-12-01 PROCEDURE — 1159F PR MEDICATION LIST DOCUMENTED IN MEDICAL RECORD: ICD-10-PCS | Mod: CPTII,S$GLB,, | Performed by: INTERNAL MEDICINE

## 2022-12-01 PROCEDURE — 3044F PR MOST RECENT HEMOGLOBIN A1C LEVEL <7.0%: ICD-10-PCS | Mod: CPTII,S$GLB,, | Performed by: INTERNAL MEDICINE

## 2022-12-01 PROCEDURE — 3008F PR BODY MASS INDEX (BMI) DOCUMENTED: ICD-10-PCS | Mod: CPTII,S$GLB,, | Performed by: INTERNAL MEDICINE

## 2022-12-01 PROCEDURE — 99999 PR PBB SHADOW E&M-EST. PATIENT-LVL IV: CPT | Mod: PBBFAC,,, | Performed by: INTERNAL MEDICINE

## 2022-12-01 PROCEDURE — 3075F PR MOST RECENT SYSTOLIC BLOOD PRESS GE 130-139MM HG: ICD-10-PCS | Mod: CPTII,S$GLB,, | Performed by: INTERNAL MEDICINE

## 2022-12-01 PROCEDURE — 4010F PR ACE/ARB THEARPY RXD/TAKEN: ICD-10-PCS | Mod: CPTII,S$GLB,, | Performed by: INTERNAL MEDICINE

## 2022-12-01 PROCEDURE — 3075F SYST BP GE 130 - 139MM HG: CPT | Mod: CPTII,S$GLB,, | Performed by: INTERNAL MEDICINE

## 2022-12-01 PROCEDURE — 93000 EKG 12-LEAD: ICD-10-PCS | Mod: S$GLB,,, | Performed by: INTERNAL MEDICINE

## 2022-12-01 PROCEDURE — 99999 PR PBB SHADOW E&M-EST. PATIENT-LVL IV: ICD-10-PCS | Mod: PBBFAC,,, | Performed by: INTERNAL MEDICINE

## 2022-12-01 PROCEDURE — 3008F BODY MASS INDEX DOCD: CPT | Mod: CPTII,S$GLB,, | Performed by: INTERNAL MEDICINE

## 2022-12-01 PROCEDURE — 4010F ACE/ARB THERAPY RXD/TAKEN: CPT | Mod: CPTII,S$GLB,, | Performed by: INTERNAL MEDICINE

## 2022-12-01 PROCEDURE — 93000 ELECTROCARDIOGRAM COMPLETE: CPT | Mod: S$GLB,,, | Performed by: INTERNAL MEDICINE

## 2022-12-01 PROCEDURE — 3079F DIAST BP 80-89 MM HG: CPT | Mod: CPTII,S$GLB,, | Performed by: INTERNAL MEDICINE

## 2022-12-01 NOTE — PROGRESS NOTES
Subjective:    Patient ID:  Russell Santos is a 62 y.o. male who presents for follow-up of No chief complaint on file.      HPI    PAF(flutter) on xarelto and amiodarone, PE/DVT/IVC filter 2008, LARRY, HTN, HLD, DM     Admitted 8/8/22   61 year with obesity, history of DVT and PE (2008 with IVC filter), diabetes mellitus, hyperlipidemia, atrial fibrillation (on xarelto), LARRY (on cpap 16) and hypothyroidism who presented for evaluation of near syncope. He states he felt fine when he went to bed last night but woke up feeling hot, sweaty and light-headed. He had had a mild sensation of chest pressure which is similar to the feeling of trapped air in his belly he feels most morning after using his cpap. He said he felt like he was going to pass out and felt an irregular heart beat sensation which is unusual for him. He denies fevers, chills, cough, shortness of breath, nausea, vomiting and diarrhea. He has no new leg edema, claims medication and diet compliance. He is vaccinated and boosted for covid-19.   61y M admitted for atrial flutter. Found to be covid positive. Started on amiodarone gtt, was later found to be in Afib w/ controlled rate. Switched to po amiodarone. Metoprolol added. Found to have small gluteal abscess, spontaneously draining and was started on antibiotics. Pt continued to have controlled heart rate overnight on po amio and metoprolol. Pt to be discharged home with close pcp follow up as well as a cardiology appointment in 1 week to transition patient from amiodarone loading dose to maintenance. Pt highly encouraged in medication compliance. Pt will continue to quarantine for a total of 5 days due to covid diagnosis unless he has a medical emergency.     Followed by Dr Harper at [facility]F     BRI/CV 9/16/19   LVEF 40%, mild global HK   Normal RV size/fxn   Normal valves   Trace AI, mild MR, mild-mod TR   No LA/SONNY thrombus   Biatrial dilation   Mild aortic root dilatation, 4.1 cm.   Successful  DCCV AF-> NSR 200J x4 (last defib with replacement of pads to more of an interscapular location for the posterior pad).     Echo 8/8/22   The left ventricle is normal in size with concentric hypertrophy and normal systolic function.   The estimated ejection fraction is 55%.   Indeterminate left ventricular diastolic function.   Normal right ventricular size with normal right ventricular systolic function.   Moderate left atrial enlargement.   Mild right atrial enlargement.   Mild mitral regurgitation.   Intermediate central venous pressure (8 mmHg).   The estimated PA systolic pressure is 24 mmHg.    8/30/22 Feels better since discharge. Denies CP or SOB. Feels jittery and c/o ED   EKG NSR    BP controlled  Back in NSR - decrease amiodarone 200 qd  OV 3 months with CMP, TSH  Continue Rx for PAF, HTN, HLD, PE      Labs 11/28/22  K 3.8  Cr 0.8  LFT ok  TSH 0.04 - FT4 1.49    12/1/22 Denies CP, mild LYNCH  EKG NSR QTc 446  BP controlled    Review of Systems   Constitutional: Negative for decreased appetite.   HENT:  Negative for ear discharge.    Eyes:  Negative for blurred vision.   Endocrine: Negative for polyphagia.   Skin:  Negative for nail changes.   Genitourinary:  Negative for bladder incontinence.   Neurological:  Negative for aphonia.   Psychiatric/Behavioral:  Negative for hallucinations.    Allergic/Immunologic: Negative for hives.      Objective:    Physical Exam  Constitutional:       Appearance: He is well-developed.   HENT:      Head: Normocephalic and atraumatic.   Eyes:      Conjunctiva/sclera: Conjunctivae normal.      Pupils: Pupils are equal, round, and reactive to light.   Cardiovascular:      Rate and Rhythm: Normal rate. Rhythm irregular.      Pulses: Intact distal pulses.      Heart sounds: Normal heart sounds.   Pulmonary:      Effort: Pulmonary effort is normal.      Breath sounds: Normal breath sounds.   Abdominal:      General: Bowel sounds are normal.      Palpations: Abdomen is soft.    Musculoskeletal:         General: Normal range of motion.      Cervical back: Normal range of motion and neck supple.   Skin:     General: Skin is warm and dry.   Neurological:      Mental Status: He is alert and oriented to person, place, and time.         Assessment:       1. Essential hypertension    2. Mixed hyperlipidemia    3. PAF (paroxysmal atrial fibrillation)    4. Atrial flutter with rapid ventricular response    5. Type 2 diabetes mellitus with hyperosmolar coma, without long-term current use of insulin         Plan:       Staying in NSR on amiodarone  TSH low - may need to decrease synthroid dose - will defer to PCP  OV 6 months with CMP, TSH  Continue Rx for PAF, HTN, HLD, PE

## 2023-05-16 NOTE — PROGRESS NOTES
Subjective:       Russell Santos is a 62 y.o. male who is a new patient who was referred by Dr. Radha Townsend*  for evaluation of hematuria.      Initially with gross hematuria, now microhematuria x 6 months. Reports gross hematuria occurred after intercourse. Initially treated for UTI due to dysuria. No UCx available. Denies significant LUTS other than frequency due to diuretic. +Xarelto (+PE). PSA reported to be normal, lab not included in referral records. Denies family history of prostate cancer. Nonsmoker. No prior nephrolithiasis.     Allergy to contrast.     UA micro >30 (4/23)    RENEE (12/22) DIS - normal kidneys, no stones/masses/hydro      The following portions of the patient's history were reviewed and updated as appropriate: allergies, current medications, past family history, past medical history, past social history, past surgical history and problem list.    Review of Systems  Twelve point review of systems completed. Pertinent positive and negatives listed in HPI.      Objective:    Vitals: Wt (!) 142.1 kg (313 lb 4.4 oz)   BMI 46.26 kg/m²     Physical Exam   General: well developed, well nourished in no acute distress  Head: normocephalic, atraumatic  Neck: supple, trachea midline, no obvious enlargement of thyroid  HEENT: EOMI, mucus membranes moist, sclera anicteric, no hearing impairment  Lungs: symmetric expansion, non-labored breathing  Skin: no rashes or lesions  Neuro: alert and oriented x 3, no gross deficits  Psych: normal judgment and insight, normal mood/affect and non-anxious  Genitourinary: deferred   YAJAIRA: deferred      Lab Review   Urine analysis today in clinic shows +30 protein, +50 blood     Lab Results   Component Value Date    WBC 2.84 (L) 08/08/2022    HGB 11.8 (L) 08/08/2022    HCT 38.7 (L) 08/08/2022    MCV 76 (L) 08/08/2022     08/08/2022     Lab Results   Component Value Date    CREATININE 0.8 11/28/2022    BUN 13 11/28/2022     No results found for: PSA  No  results found for: PSADIAG    Imaging  RENEE reviewed  Reports and images were personally reviewed by me and discussed with the patient today         Assessment/Plan:      1. Hematuria, gross    - Discussed etiology and workup of hematuria   - UCx - UA clear   - Cytology - not indicated   - CT urogram - unable to do 2/2 contrast allergy. RENEE already done.    - Office cystoscopy     2. Proteinuria, unspecified type    - Discussed. He will f/u with PCP. Consider nephrology consult.        Follow up for cysto

## 2023-05-29 ENCOUNTER — OFFICE VISIT (OUTPATIENT)
Dept: UROLOGY | Facility: CLINIC | Age: 63
End: 2023-05-29
Payer: MEDICARE

## 2023-05-29 VITALS — BODY MASS INDEX: 46.26 KG/M2 | WEIGHT: 313.25 LBS

## 2023-05-29 DIAGNOSIS — R31.0 HEMATURIA, GROSS: Primary | ICD-10-CM

## 2023-05-29 DIAGNOSIS — R80.9 PROTEINURIA, UNSPECIFIED TYPE: ICD-10-CM

## 2023-05-29 PROCEDURE — 99999 PR PBB SHADOW E&M-EST. PATIENT-LVL III: CPT | Mod: PBBFAC,,, | Performed by: UROLOGY

## 2023-05-29 PROCEDURE — 1159F MED LIST DOCD IN RCRD: CPT | Mod: CPTII,S$GLB,, | Performed by: UROLOGY

## 2023-05-29 PROCEDURE — 99999 PR PBB SHADOW E&M-EST. PATIENT-LVL III: ICD-10-PCS | Mod: PBBFAC,,, | Performed by: UROLOGY

## 2023-05-29 PROCEDURE — 3008F BODY MASS INDEX DOCD: CPT | Mod: CPTII,S$GLB,, | Performed by: UROLOGY

## 2023-05-29 PROCEDURE — 4010F ACE/ARB THERAPY RXD/TAKEN: CPT | Mod: CPTII,S$GLB,, | Performed by: UROLOGY

## 2023-05-29 PROCEDURE — 1160F RVW MEDS BY RX/DR IN RCRD: CPT | Mod: CPTII,S$GLB,, | Performed by: UROLOGY

## 2023-05-29 PROCEDURE — 99204 PR OFFICE/OUTPT VISIT, NEW, LEVL IV, 45-59 MIN: ICD-10-PCS | Mod: S$GLB,,, | Performed by: UROLOGY

## 2023-05-29 PROCEDURE — 99204 OFFICE O/P NEW MOD 45 MIN: CPT | Mod: S$GLB,,, | Performed by: UROLOGY

## 2023-05-29 PROCEDURE — 1159F PR MEDICATION LIST DOCUMENTED IN MEDICAL RECORD: ICD-10-PCS | Mod: CPTII,S$GLB,, | Performed by: UROLOGY

## 2023-05-29 PROCEDURE — 4010F PR ACE/ARB THEARPY RXD/TAKEN: ICD-10-PCS | Mod: CPTII,S$GLB,, | Performed by: UROLOGY

## 2023-05-29 PROCEDURE — 3008F PR BODY MASS INDEX (BMI) DOCUMENTED: ICD-10-PCS | Mod: CPTII,S$GLB,, | Performed by: UROLOGY

## 2023-05-29 PROCEDURE — 1160F PR REVIEW ALL MEDS BY PRESCRIBER/CLIN PHARMACIST DOCUMENTED: ICD-10-PCS | Mod: CPTII,S$GLB,, | Performed by: UROLOGY

## 2023-07-10 NOTE — PROGRESS NOTES
The patient is scheduled for 6/15/2020@ 10:15am with dr Luis. The patient was notified of the date and time.  
Vital Signs Last 24 Hrs  T(C): 36.9 (10 Jul 2023 02:31), Max: 36.9 (10 Jul 2023 02:31)  T(F): 98.42 (10 Jul 2023 02:31), Max: 98.42 (10 Jul 2023 02:31)  HR: 81 (10 Jul 2023 04:21) (75 - 89)  BP: 132/77 (10 Jul 2023 04:21) (132/77 - 153/92)  BP(mean): --  RR: 16 (10 Jul 2023 02:31) (16 - 16)  SpO2: 91% (10 Jul 2023 04:21) (87% - 96%)

## 2023-07-11 ENCOUNTER — PROCEDURE VISIT (OUTPATIENT)
Dept: UROLOGY | Facility: CLINIC | Age: 63
End: 2023-07-11
Payer: MEDICARE

## 2023-07-11 ENCOUNTER — LAB VISIT (OUTPATIENT)
Dept: LAB | Facility: HOSPITAL | Age: 63
End: 2023-07-11
Attending: INTERNAL MEDICINE
Payer: MEDICARE

## 2023-07-11 VITALS — WEIGHT: 313.38 LBS | BODY MASS INDEX: 46.28 KG/M2

## 2023-07-11 DIAGNOSIS — E78.2 MIXED HYPERLIPIDEMIA: ICD-10-CM

## 2023-07-11 DIAGNOSIS — I10 ESSENTIAL HYPERTENSION: ICD-10-CM

## 2023-07-11 DIAGNOSIS — I48.0 PAF (PAROXYSMAL ATRIAL FIBRILLATION): ICD-10-CM

## 2023-07-11 DIAGNOSIS — I48.92 ATRIAL FLUTTER WITH RAPID VENTRICULAR RESPONSE: ICD-10-CM

## 2023-07-11 DIAGNOSIS — E11.01 TYPE 2 DIABETES MELLITUS WITH HYPEROSMOLAR COMA, WITHOUT LONG-TERM CURRENT USE OF INSULIN: ICD-10-CM

## 2023-07-11 DIAGNOSIS — R31.0 HEMATURIA, GROSS: Primary | ICD-10-CM

## 2023-07-11 LAB
ALBUMIN SERPL BCP-MCNC: 3.9 G/DL (ref 3.5–5.2)
ALP SERPL-CCNC: 61 U/L (ref 55–135)
ALT SERPL W/O P-5'-P-CCNC: 32 U/L (ref 10–44)
ANION GAP SERPL CALC-SCNC: 8 MMOL/L (ref 8–16)
AST SERPL-CCNC: 23 U/L (ref 10–40)
BILIRUB SERPL-MCNC: 0.5 MG/DL (ref 0.1–1)
BUN SERPL-MCNC: 24 MG/DL (ref 6–20)
CALCIUM SERPL-MCNC: 9.1 MG/DL (ref 8.7–10.5)
CHLORIDE SERPL-SCNC: 108 MMOL/L (ref 95–110)
CO2 SERPL-SCNC: 27 MMOL/L (ref 23–29)
CREAT SERPL-MCNC: 0.9 MG/DL (ref 0.5–1.4)
EST. GFR  (NO RACE VARIABLE): >60 ML/MIN/1.73 M^2
GLUCOSE SERPL-MCNC: 113 MG/DL (ref 70–110)
POTASSIUM SERPL-SCNC: 4.8 MMOL/L (ref 3.5–5.1)
PROT SERPL-MCNC: 7.2 G/DL (ref 6–8.4)
SODIUM SERPL-SCNC: 143 MMOL/L (ref 136–145)
T4 FREE SERPL-MCNC: 1.41 NG/DL (ref 0.71–1.51)
TSH SERPL DL<=0.005 MIU/L-ACNC: 0.07 UIU/ML (ref 0.4–4)

## 2023-07-11 PROCEDURE — 52000 CYSTOSCOPY: ICD-10-PCS | Mod: S$GLB,,, | Performed by: UROLOGY

## 2023-07-11 PROCEDURE — 80053 COMPREHEN METABOLIC PANEL: CPT | Performed by: INTERNAL MEDICINE

## 2023-07-11 PROCEDURE — 36415 COLL VENOUS BLD VENIPUNCTURE: CPT | Performed by: INTERNAL MEDICINE

## 2023-07-11 PROCEDURE — 84443 ASSAY THYROID STIM HORMONE: CPT | Performed by: INTERNAL MEDICINE

## 2023-07-11 PROCEDURE — 52000 CYSTOURETHROSCOPY: CPT | Mod: S$GLB,,, | Performed by: UROLOGY

## 2023-07-11 PROCEDURE — 84439 ASSAY OF FREE THYROXINE: CPT | Performed by: INTERNAL MEDICINE

## 2023-07-11 NOTE — PROCEDURES
Cystoscopy    Date/Time: 7/11/2023 11:40 AM  Performed by: Nicolette Orellana MD  Authorized by: Nicolette Orellana MD     Consent Done?:  Yes (Written)  Timeout: prior to procedure the correct patient, procedure, and site was verified    Prep: patient was prepped and draped in usual sterile fashion    Anesthesia:  Lidocaine jelly  Indications: hematuria    Position:  Supine  Anesthesia:  Lidocaine jelly  Patient sedated?: No    Preparation: Patient was prepped and draped in usual sterile fashion    Scope type:  Flexible cystoscope  Stent inserted: No    Stent removed: No    External exam normal: Yes    Digital exam performed: No    Urethra normal: No    Urethral Internal Findings:  Stricture (Three areas of thin stricture noted in prox bulbar and membranous urethra. Able to passed with scope with minimal difficulty.)  Prostate normal: Yes (+BPH, kissing lateral lobes)    Bladder neck normal: Yes    Bladder normal: Yes     patient tolerated the procedure well with no immediate complications  Comments:        Thin urethral strictures, passed with scope. Bladder otherwise normal.     RTC 6 mths.

## 2023-07-27 ENCOUNTER — OFFICE VISIT (OUTPATIENT)
Dept: CARDIOLOGY | Facility: CLINIC | Age: 63
End: 2023-07-27
Payer: MEDICARE

## 2023-07-27 VITALS
HEIGHT: 69 IN | SYSTOLIC BLOOD PRESSURE: 120 MMHG | RESPIRATION RATE: 18 BRPM | BODY MASS INDEX: 46.17 KG/M2 | OXYGEN SATURATION: 97 % | HEART RATE: 60 BPM | WEIGHT: 311.75 LBS | DIASTOLIC BLOOD PRESSURE: 78 MMHG

## 2023-07-27 DIAGNOSIS — E66.01 MORBID OBESITY: ICD-10-CM

## 2023-07-27 DIAGNOSIS — E78.2 MIXED HYPERLIPIDEMIA: ICD-10-CM

## 2023-07-27 DIAGNOSIS — I48.0 PAF (PAROXYSMAL ATRIAL FIBRILLATION): ICD-10-CM

## 2023-07-27 DIAGNOSIS — I10 ESSENTIAL HYPERTENSION: Primary | ICD-10-CM

## 2023-07-27 DIAGNOSIS — R55 NEAR SYNCOPE: ICD-10-CM

## 2023-07-27 PROCEDURE — 3078F PR MOST RECENT DIASTOLIC BLOOD PRESSURE < 80 MM HG: ICD-10-PCS | Mod: CPTII,S$GLB,, | Performed by: INTERNAL MEDICINE

## 2023-07-27 PROCEDURE — 99999 PR PBB SHADOW E&M-EST. PATIENT-LVL V: ICD-10-PCS | Mod: PBBFAC,,, | Performed by: INTERNAL MEDICINE

## 2023-07-27 PROCEDURE — 3074F SYST BP LT 130 MM HG: CPT | Mod: CPTII,S$GLB,, | Performed by: INTERNAL MEDICINE

## 2023-07-27 PROCEDURE — 93000 EKG 12-LEAD: ICD-10-PCS | Mod: S$GLB,,, | Performed by: INTERNAL MEDICINE

## 2023-07-27 PROCEDURE — 99214 OFFICE O/P EST MOD 30 MIN: CPT | Mod: S$GLB,,, | Performed by: INTERNAL MEDICINE

## 2023-07-27 PROCEDURE — 99214 PR OFFICE/OUTPT VISIT, EST, LEVL IV, 30-39 MIN: ICD-10-PCS | Mod: S$GLB,,, | Performed by: INTERNAL MEDICINE

## 2023-07-27 PROCEDURE — 1159F PR MEDICATION LIST DOCUMENTED IN MEDICAL RECORD: ICD-10-PCS | Mod: CPTII,S$GLB,, | Performed by: INTERNAL MEDICINE

## 2023-07-27 PROCEDURE — 3008F PR BODY MASS INDEX (BMI) DOCUMENTED: ICD-10-PCS | Mod: CPTII,S$GLB,, | Performed by: INTERNAL MEDICINE

## 2023-07-27 PROCEDURE — 3074F PR MOST RECENT SYSTOLIC BLOOD PRESSURE < 130 MM HG: ICD-10-PCS | Mod: CPTII,S$GLB,, | Performed by: INTERNAL MEDICINE

## 2023-07-27 PROCEDURE — 4010F ACE/ARB THERAPY RXD/TAKEN: CPT | Mod: CPTII,S$GLB,, | Performed by: INTERNAL MEDICINE

## 2023-07-27 PROCEDURE — 4010F PR ACE/ARB THEARPY RXD/TAKEN: ICD-10-PCS | Mod: CPTII,S$GLB,, | Performed by: INTERNAL MEDICINE

## 2023-07-27 PROCEDURE — 3008F BODY MASS INDEX DOCD: CPT | Mod: CPTII,S$GLB,, | Performed by: INTERNAL MEDICINE

## 2023-07-27 PROCEDURE — 3078F DIAST BP <80 MM HG: CPT | Mod: CPTII,S$GLB,, | Performed by: INTERNAL MEDICINE

## 2023-07-27 PROCEDURE — 93000 ELECTROCARDIOGRAM COMPLETE: CPT | Mod: S$GLB,,, | Performed by: INTERNAL MEDICINE

## 2023-07-27 PROCEDURE — 1159F MED LIST DOCD IN RCRD: CPT | Mod: CPTII,S$GLB,, | Performed by: INTERNAL MEDICINE

## 2023-07-27 PROCEDURE — 99999 PR PBB SHADOW E&M-EST. PATIENT-LVL V: CPT | Mod: PBBFAC,,, | Performed by: INTERNAL MEDICINE

## 2023-07-27 NOTE — PROGRESS NOTES
Subjective:   Patient ID:  Russell Santos is a 62 y.o. male who presents for follow-up of Results      HPI    PAF(flutter) on xarelto and amiodarone, PE/DVT/IVC filter 2008, LARRY, HTN, HLD, DM      Admitted 8/8/22   61 year with obesity, history of DVT and PE (2008 with IVC filter), diabetes mellitus, hyperlipidemia, atrial fibrillation (on xarelto), LARRY (on cpap 16) and hypothyroidism who presented for evaluation of near syncope. He states he felt fine when he went to bed last night but woke up feeling hot, sweaty and light-headed. He had had a mild sensation of chest pressure which is similar to the feeling of trapped air in his belly he feels most morning after using his cpap. He said he felt like he was going to pass out and felt an irregular heart beat sensation which is unusual for him. He denies fevers, chills, cough, shortness of breath, nausea, vomiting and diarrhea. He has no new leg edema, claims medication and diet compliance. He is vaccinated and boosted for covid-19.   61y M admitted for atrial flutter. Found to be covid positive. Started on amiodarone gtt, was later found to be in Afib w/ controlled rate. Switched to po amiodarone. Metoprolol added. Found to have small gluteal abscess, spontaneously draining and was started on antibiotics. Pt continued to have controlled heart rate overnight on po amio and metoprolol. Pt to be discharged home with close pcp follow up as well as a cardiology appointment in 1 week to transition patient from amiodarone loading dose to maintenance. Pt highly encouraged in medication compliance. Pt will continue to quarantine for a total of 5 days due to covid diagnosis unless he has a medical emergency.      Followed by Dr Harper at [facility]   Hx PAF      BRI/CV 9/16/19   LVEF 40%, mild global HK   Normal RV size/fxn   Normal valves   Trace AI, mild MR, mild-mod TR   No LA/SONNY thrombus   Biatrial dilation   Mild aortic root dilatation, 4.1 cm.   Successful DCCV AF-> NSR 200J  x4 (last defib with replacement of pads to more of an interscapular location for the posterior pad).      Echo 8/8/22   The left ventricle is normal in size with concentric hypertrophy and normal systolic function.   The estimated ejection fraction is 55%.   Indeterminate left ventricular diastolic function.   Normal right ventricular size with normal right ventricular systolic function.   Moderate left atrial enlargement.   Mild right atrial enlargement.   Mild mitral regurgitation.   Intermediate central venous pressure (8 mmHg).   The estimated PA systolic pressure is 24 mmHg.     8/30/22 Feels better since discharge. Denies CP or SOB. Feels jittery and c/o ED   EKG NSR    BP controlled  Back in NSR - decrease amiodarone 200 qd  OV 3 months with CMP, TSH  Continue Rx for PAF, HTN, HLD, PE     12/1/22 Denies CP, mild LYNCH  EKG NSR QTc 446  BP controlled   Staying in NSR on amiodarone  TSH low - may need to decrease synthroid dose - will defer to PCP  OV 6 months with CMP, TSH  Continue Rx for PAF, HTN, HLD, PE    Labs 7/11/23  K 4.8  Cr 0.9  LFT ok  TSH 0.07 FT4 - 1.4    7/27/23 Denies CP, mild LYNCH  Denies palpitations  EKG NSR LVH        Review of Systems   Constitutional: Negative for decreased appetite.   HENT:  Negative for ear discharge.    Eyes:  Negative for blurred vision.   Endocrine: Negative for polyphagia.   Skin:  Negative for nail changes.   Genitourinary:  Negative for bladder incontinence.   Neurological:  Negative for aphonia.   Psychiatric/Behavioral:  Negative for hallucinations.    Allergic/Immunologic: Negative for hives.     Objective:   Physical Exam  Constitutional:       Appearance: He is well-developed.   HENT:      Head: Normocephalic and atraumatic.   Eyes:      Conjunctiva/sclera: Conjunctivae normal.      Pupils: Pupils are equal, round, and reactive to light.   Cardiovascular:      Rate and Rhythm: Normal rate. Rhythm irregular.      Pulses: Intact distal pulses.      Heart  sounds: Normal heart sounds.   Pulmonary:      Effort: Pulmonary effort is normal.      Breath sounds: Normal breath sounds.   Abdominal:      General: Bowel sounds are normal.      Palpations: Abdomen is soft.   Musculoskeletal:         General: Normal range of motion.      Cervical back: Normal range of motion and neck supple.   Skin:     General: Skin is warm and dry.   Neurological:      Mental Status: He is alert and oriented to person, place, and time.       Assessment:      1. Essential hypertension    2. Morbid obesity    3. PAF (paroxysmal atrial fibrillation)    4. Mixed hyperlipidemia    5. Near syncope        Plan:     TSH/FT4 stable on amiodarone  OV 6 months with CMP, TSH, echo  Continue Rx for PAF, HTN, HLD, PE

## 2023-10-13 ENCOUNTER — HOSPITAL ENCOUNTER (EMERGENCY)
Facility: HOSPITAL | Age: 63
Discharge: HOME | End: 2023-10-13
Attending: STUDENT IN AN ORGANIZED HEALTH CARE EDUCATION/TRAINING PROGRAM
Payer: MEDICARE

## 2023-10-13 VITALS
HEART RATE: 62 BPM | SYSTOLIC BLOOD PRESSURE: 132 MMHG | TEMPERATURE: 98 F | RESPIRATION RATE: 18 BRPM | DIASTOLIC BLOOD PRESSURE: 88 MMHG | BODY MASS INDEX: 46.65 KG/M2 | WEIGHT: 315 LBS | OXYGEN SATURATION: 99 % | HEIGHT: 69 IN

## 2023-10-13 DIAGNOSIS — H00.15 CHALAZION LEFT LOWER EYELID: Primary | ICD-10-CM

## 2023-10-13 LAB — POCT GLUCOSE: 77 MG/DL (ref 70–110)

## 2023-10-13 PROCEDURE — 82962 GLUCOSE BLOOD TEST: CPT

## 2023-10-13 PROCEDURE — 25000003 PHARM REV CODE 250: Performed by: PHYSICIAN ASSISTANT

## 2023-10-13 PROCEDURE — 99283 EMERGENCY DEPT VISIT LOW MDM: CPT

## 2023-10-13 RX ORDER — SULFAMETHOXAZOLE AND TRIMETHOPRIM 800; 160 MG/1; MG/1
1 TABLET ORAL 2 TIMES DAILY
Qty: 20 TABLET | Refills: 0 | Status: SHIPPED | OUTPATIENT
Start: 2023-10-13 | End: 2023-10-23

## 2023-10-13 RX ORDER — SULFAMETHOXAZOLE AND TRIMETHOPRIM 800; 160 MG/1; MG/1
1 TABLET ORAL
Status: COMPLETED | OUTPATIENT
Start: 2023-10-13 | End: 2023-10-13

## 2023-10-13 RX ADMIN — SULFAMETHOXAZOLE AND TRIMETHOPRIM 1 TABLET: 800; 160 TABLET ORAL at 02:10

## 2023-10-13 NOTE — DISCHARGE INSTRUCTIONS
Thank you for coming to our Emergency Department today. It is important to remember that some problems or medical conditions are difficult to diagnose and may not be found or addressed during your Emergency Department visit.  These conditions often start with non-specific symptoms and can only be diagnosed on follow up visits with your primary care physician or specialist when the symptoms continue or change. Please remember that all medical conditions can change, and we cannot predict how you will be feeling tomorrow or the next day. Return to the ER with any questions/concerns, new/concerning symptoms, worsening or failure to improve.       Be sure to follow up with your primary care doctor and review all labs/imaging/tests that were performed during your ER visit with them. It is very common for us to identify non-emergent incidental findings which must be followed up with your primary care physician.  Some labs/imaging/tests may be outside of the normal range, and require non-emergent follow-up and/or further investigation/treatment/procedures/testing to help diagnose/exclude/prevent complications or other potentially serious medical conditions. Some abnormalities may not have been discussed or addressed during your ER visit.     An ER visit does not replace a primary care visit, and many screening tests or follow-up tests cannot be ordered by an ER doctor or performed by the ER. Some tests may even require pre-approval.    If you do not have a primary care doctor, you may contact the one listed on your discharge paperwork or you may also call the [facility] Appointment Desk at [phone] , or [address] [place] [address] at  [phone] to schedule an appointment, or establish care with a primary care doctor or even a specialist and to obtain information about local resources. It is important to your health that you have a primary care doctor.    Please take all medications as directed. We have done our best to select  a medication for you that will treat your condition however, all medications may potentially have side-effects and it is impossible to predict which medications may give you side-effects or what those side-effects (if any) those medications may give you.  If you feel that you are having a negative effect or side-effect of any medication you should stop taking those medications immediately and seek medical attention. If you feel that you are having a life-threatening reaction call 911.        Do not drive, swim, climb to height, take a bath, operate heavy machinery, drink alcohol or take potentially sedating medications, sign any legal documents or make any important decisions for 24 hours if you have received any pain medications, sedatives or mood altering drugs during your ER visit or within 24 hours of taking them if they have been prescribed to you.     You can find additional resources for Dentists, hearing aids, durable medical equipment, low cost pharmacies and other resources at [email or web address]

## 2023-10-13 NOTE — ED PROVIDER NOTES
"Encounter Date: 10/13/2023    SCRIBE #1 NOTE: I, Ayesha Coleman, am scribing for, and in the presence of,  Alli Penny PA-C. I have scribed the following portions of the note - Other sections scribed: HPI, ROS.       History     Chief Complaint   Patient presents with    Eye Problem     PT presents to ED with c/o redness, swelling and "knot" to L lower eyelid worsening x 1 week. Was seen by PCP and given prescription for a "cream" with no improvement.      64 y/o male with PMHx of HTN, DM, HLD, Afib, who presents to the ED complaining of swelling to the left lower eyelid. Pt reports the swelling and pain began one week ago, noting both swelling has since decreased and pain has completely resolved. Pt denies any insect bites or trauma. Pt denies wearing contacts. Pt notes he is waiting or a call from his PCP to schedule an appointment with an ophthalmologist. Allergies include contrast media.     The history is provided by the patient. No  was used.     Review of patient's allergies indicates:   Allergen Reactions    Contrast media Hives     Past Medical History:   Diagnosis Date    Acquired hypothyroidism     Atrial fibrillation 09/16/2019    Diabetes mellitus     High cholesterol     History of DVT (deep vein thrombosis)     History of pulmonary embolism     Hypertension     LARRY (obstructive sleep apnea)     S/P IVC filter      Past Surgical History:   Procedure Laterality Date    THYROIDECTOMY, PARTIAL  2006    TONSILLECTOMY      TREATMENT OF CARDIAC ARRHYTHMIA  9/16/2019    Procedure: Cardioversion or Defibrillation;  Surgeon: Deven Vasquez MD;  Location[facility] CATH LAB;  Service: Cardiology;;     Family History   Family history unknown: Yes     Social History     Tobacco Use    Smoking status: Never    Smokeless tobacco: Never   Substance Use Topics    Alcohol use: Not Currently     Comment: occasionally    Drug use: No     Review of Systems   Constitutional:  Negative for fever. "   HENT:  Negative for congestion, sore throat and trouble swallowing.    Eyes:         (+) left lower eyelid swelling   Respiratory:  Negative for cough and shortness of breath.    Cardiovascular:  Negative for chest pain.   Gastrointestinal:  Negative for abdominal pain, constipation, diarrhea, nausea and vomiting.   Genitourinary:  Negative for dysuria, flank pain, frequency and urgency.   Musculoskeletal:  Negative for back pain, joint swelling and neck pain.   Skin:  Negative for color change, rash and wound.   Neurological:  Negative for numbness and headaches.   All other systems reviewed and are negative.      Physical Exam     Initial Vitals [10/13/23 1400]   BP Pulse Resp Temp SpO2   134/83 (!) 59 16 98.5 °F (36.9 °C) 95 %      MAP       --         Physical Exam    Nursing note and vitals reviewed.  Constitutional: He appears well-developed and well-nourished. He is not diaphoretic. No distress.   HENT:   Head: Atraumatic.   Right Ear: External ear normal.   Left Ear: External ear normal.   Eyes: Conjunctivae are normal.   Swelling and erythema to the left lower outer eyelid margin without tenderness or drainage.  Conjunctiva and epithelial layer of I normal.  Pupils equal and reactive.  No photophobia.  No periorbital swelling or tenderness.   Neck: No tracheal deviation present.   Normal range of motion.  Cardiovascular:  Normal rate and regular rhythm.           Pulmonary/Chest: No accessory muscle usage or stridor. No tachypnea. No respiratory distress.   Musculoskeletal:      Cervical back: Normal range of motion.     Neurological: He has normal strength. He displays no tremor. He displays no seizure activity. Coordination and gait normal.   Skin: Skin is intact. Capillary refill takes less than 2 seconds. No cyanosis.         ED Course   Procedures  Labs Reviewed   POCT GLUCOSE          Imaging Results    None          Medications   sulfamethoxazole-trimethoprim 800-160mg per tablet 1 tablet (1 tablet  Oral Given 10/13/23 1431)     Medical Decision Making  Presentation consistent with chalazion given absence of pain.  Overall appears to be decreasing in size per patient and will likely resolve spontaneously.  He is already been advised follow-up with Ophthalmology for further investigation as an outpatient; I agree with this management.  Given initial component of pain and degree of erythema, will place on antibiotics prophylactically although this seems more inflammatory than infectious today.  Specifically no evidence of abscess, ocular involvement, or orbital cellulitis.  No systemic symptoms.    Risk  Prescription drug management.             Scribe Attestation:   Scribe #1: I performed the above scribed service and the documentation accurately describes the services I performed. I attest to the accuracy of the note.        Scribe attestation: I, Alli Penny PA-C, personally performed the services described in this documentation. All medical record entries made by the scribe were at my direction and in my presence.  I have reviewed the chart and agree that the record reflects my personal performance and is accurate and complete.                 Clinical Impression:   Final diagnoses:  [H00.15] Chalazion left lower eyelid (Primary)        ED Disposition Condition    Discharge Stable          ED Prescriptions       Medication Sig Dispense Start Date End Date Auth. Provider    sulfamethoxazole-trimethoprim 800-160mg (BACTRIM DS) 800-160 mg Tab Take 1 tablet by mouth 2 (two) times daily. for 10 days 20 tablet 10/13/2023 10/23/2023 Alli Penny PA-C          Follow-up Information       Follow up With Specialties Details Why Contact Info    Adrián Lewis MD Ophthalmology Schedule an appointment as soon as possible for a visit in 1 day For further evaluation of your eye problem [address] [facility]  [place] [address]  [phone]      [facility] Emergency Dept Emergency Medicine Go to  If symptoms  worsen or new symptoms develop [facility] [place] [address]  [facility] [address]  [phone]             Alli Penny, PA-C  10/13/23 1879

## 2024-05-04 ENCOUNTER — HOSPITAL ENCOUNTER (INPATIENT)
Facility: HOSPITAL | Age: 64
LOS: 7 days | Discharge: HOME | DRG: 683 | End: 2024-05-11
Attending: STUDENT IN AN ORGANIZED HEALTH CARE EDUCATION/TRAINING PROGRAM | Admitting: STUDENT IN AN ORGANIZED HEALTH CARE EDUCATION/TRAINING PROGRAM
Payer: MEDICARE

## 2024-05-04 DIAGNOSIS — N17.9 AKI (ACUTE KIDNEY INJURY): ICD-10-CM

## 2024-05-04 DIAGNOSIS — R31.9 URINARY TRACT INFECTION WITH HEMATURIA, SITE UNSPECIFIED: ICD-10-CM

## 2024-05-04 DIAGNOSIS — R07.9 CHEST PAIN: ICD-10-CM

## 2024-05-04 DIAGNOSIS — N39.0 URINARY TRACT INFECTION WITH HEMATURIA, SITE UNSPECIFIED: ICD-10-CM

## 2024-05-04 DIAGNOSIS — K35.80 ACUTE APPENDICITIS, UNSPECIFIED ACUTE APPENDICITIS TYPE: ICD-10-CM

## 2024-05-04 DIAGNOSIS — D72.819 CHRONIC LEUKOPENIA: ICD-10-CM

## 2024-05-04 DIAGNOSIS — N17.9 ACUTE RENAL FAILURE, UNSPECIFIED ACUTE RENAL FAILURE TYPE: Primary | ICD-10-CM

## 2024-05-04 LAB
ALBUMIN SERPL BCP-MCNC: 3 G/DL (ref 3.5–5.2)
ALLENS TEST: ABNORMAL
ALP SERPL-CCNC: 52 U/L (ref 55–135)
ALT SERPL W/O P-5'-P-CCNC: 13 U/L (ref 10–44)
AMORPH CRY URNS QL MICRO: ABNORMAL
ANION GAP SERPL CALC-SCNC: 15 MMOL/L (ref 8–16)
ANION GAP SERPL CALC-SCNC: 15 MMOL/L (ref 8–16)
ANISOCYTOSIS BLD QL SMEAR: SLIGHT
ANISOCYTOSIS BLD QL SMEAR: SLIGHT
APTT PPP: 59.5 SEC (ref 21–32)
AST SERPL-CCNC: 16 U/L (ref 10–40)
BACTERIA #/AREA URNS HPF: ABNORMAL /HPF
BASOPHILS # BLD AUTO: ABNORMAL K/UL (ref 0–0.2)
BASOPHILS # BLD AUTO: ABNORMAL K/UL (ref 0–0.2)
BASOPHILS NFR BLD: 0 % (ref 0–1.9)
BASOPHILS NFR BLD: 0 % (ref 0–1.9)
BILIRUB SERPL-MCNC: 0.4 MG/DL (ref 0.1–1)
BILIRUB UR QL STRIP: NEGATIVE
BNP SERPL-MCNC: 123 PG/ML (ref 0–99)
BUN SERPL-MCNC: 64 MG/DL (ref 8–23)
BUN SERPL-MCNC: 65 MG/DL (ref 8–23)
BURR CELLS BLD QL SMEAR: ABNORMAL
BURR CELLS BLD QL SMEAR: ABNORMAL
CALCIUM SERPL-MCNC: 7.2 MG/DL (ref 8.7–10.5)
CALCIUM SERPL-MCNC: 7.6 MG/DL (ref 8.7–10.5)
CHLORIDE SERPL-SCNC: 100 MMOL/L (ref 95–110)
CHLORIDE SERPL-SCNC: 100 MMOL/L (ref 95–110)
CK SERPL-CCNC: 358 U/L (ref 20–200)
CLARITY UR: ABNORMAL
CO2 SERPL-SCNC: 15 MMOL/L (ref 23–29)
CO2 SERPL-SCNC: 15 MMOL/L (ref 23–29)
COLOR UR: YELLOW
CREAT SERPL-MCNC: 8.2 MG/DL (ref 0.5–1.4)
CREAT SERPL-MCNC: 8.3 MG/DL (ref 0.5–1.4)
DIFFERENTIAL METHOD BLD: ABNORMAL
DIFFERENTIAL METHOD BLD: ABNORMAL
EOSINOPHIL # BLD AUTO: ABNORMAL K/UL (ref 0–0.5)
EOSINOPHIL # BLD AUTO: ABNORMAL K/UL (ref 0–0.5)
EOSINOPHIL NFR BLD: 3 % (ref 0–8)
EOSINOPHIL NFR BLD: 7 % (ref 0–8)
ERYTHROCYTE [DISTWIDTH] IN BLOOD BY AUTOMATED COUNT: 17.9 % (ref 11.5–14.5)
ERYTHROCYTE [DISTWIDTH] IN BLOOD BY AUTOMATED COUNT: 18.1 % (ref 11.5–14.5)
EST. GFR  (NO RACE VARIABLE): 7 ML/MIN/1.73 M^2
EST. GFR  (NO RACE VARIABLE): 7 ML/MIN/1.73 M^2
GLUCOSE SERPL-MCNC: 84 MG/DL (ref 70–110)
GLUCOSE SERPL-MCNC: 89 MG/DL (ref 70–110)
GLUCOSE UR QL STRIP: NEGATIVE
HCO3 UR-SCNC: 18.1 MMOL/L (ref 24–28)
HCT VFR BLD AUTO: 30.5 % (ref 40–54)
HCT VFR BLD AUTO: 33.4 % (ref 40–54)
HGB BLD-MCNC: 10.1 G/DL (ref 14–18)
HGB BLD-MCNC: 9.6 G/DL (ref 14–18)
HGB UR QL STRIP: ABNORMAL
HYALINE CASTS #/AREA URNS LPF: 6 /LPF
IMM GRANULOCYTES # BLD AUTO: ABNORMAL K/UL (ref 0–0.04)
IMM GRANULOCYTES # BLD AUTO: ABNORMAL K/UL (ref 0–0.04)
IMM GRANULOCYTES NFR BLD AUTO: ABNORMAL % (ref 0–0.5)
IMM GRANULOCYTES NFR BLD AUTO: ABNORMAL % (ref 0–0.5)
INR PPP: 1.2 (ref 0.8–1.2)
KETONES UR QL STRIP: NEGATIVE
LACTATE SERPL-SCNC: 1.4 MMOL/L (ref 0.5–2.2)
LEUKOCYTE ESTERASE UR QL STRIP: ABNORMAL
LIPASE SERPL-CCNC: 32 U/L (ref 4–60)
LYMPHOCYTES # BLD AUTO: ABNORMAL K/UL (ref 1–4.8)
LYMPHOCYTES # BLD AUTO: ABNORMAL K/UL (ref 1–4.8)
LYMPHOCYTES NFR BLD: 45 % (ref 18–48)
LYMPHOCYTES NFR BLD: 46 % (ref 18–48)
MAGNESIUM SERPL-MCNC: 1.9 MG/DL (ref 1.6–2.6)
MCH RBC QN AUTO: 22.5 PG (ref 27–31)
MCH RBC QN AUTO: 22.7 PG (ref 27–31)
MCHC RBC AUTO-ENTMCNC: 30.2 G/DL (ref 32–36)
MCHC RBC AUTO-ENTMCNC: 31.5 G/DL (ref 32–36)
MCV RBC AUTO: 72 FL (ref 82–98)
MCV RBC AUTO: 74 FL (ref 82–98)
MICROSCOPIC COMMENT: ABNORMAL
MONOCYTES # BLD AUTO: ABNORMAL K/UL (ref 0.3–1)
MONOCYTES # BLD AUTO: ABNORMAL K/UL (ref 0.3–1)
MONOCYTES NFR BLD: 0 % (ref 4–15)
MONOCYTES NFR BLD: 1 % (ref 4–15)
NEUTROPHILS # BLD AUTO: ABNORMAL K/UL (ref 1.8–7.7)
NEUTROPHILS NFR BLD: 47 % (ref 38–73)
NEUTROPHILS NFR BLD: 51 % (ref 38–73)
NITRITE UR QL STRIP: NEGATIVE
NRBC BLD-RTO: 0 /100 WBC
NRBC BLD-RTO: 0 /100 WBC
OVALOCYTES BLD QL SMEAR: ABNORMAL
OVALOCYTES BLD QL SMEAR: ABNORMAL
PCO2 BLDA: 42.3 MMHG (ref 35–45)
PH SMN: 7.24 [PH] (ref 7.35–7.45)
PH UR STRIP: 5 [PH] (ref 5–8)
PLATELET # BLD AUTO: 249 K/UL (ref 150–450)
PLATELET # BLD AUTO: 280 K/UL (ref 150–450)
PLATELET BLD QL SMEAR: ABNORMAL
PLATELET BLD QL SMEAR: ABNORMAL
PMV BLD AUTO: 9 FL (ref 9.2–12.9)
PMV BLD AUTO: 9.7 FL (ref 9.2–12.9)
PO2 BLDA: 14 MMHG (ref 40–60)
POC BE: -9 MMOL/L
POC SATURATED O2: 13 % (ref 95–100)
POC TCO2: 19 MMOL/L (ref 24–29)
POCT GLUCOSE: 76 MG/DL (ref 70–110)
POIKILOCYTOSIS BLD QL SMEAR: ABNORMAL
POIKILOCYTOSIS BLD QL SMEAR: SLIGHT
POTASSIUM SERPL-SCNC: 4.7 MMOL/L (ref 3.5–5.1)
POTASSIUM SERPL-SCNC: 5 MMOL/L (ref 3.5–5.1)
PROT SERPL-MCNC: 6.9 G/DL (ref 6–8.4)
PROT UR QL STRIP: ABNORMAL
PROTHROMBIN TIME: 12.8 SEC (ref 9–12.5)
RBC # BLD AUTO: 4.22 M/UL (ref 4.6–6.2)
RBC # BLD AUTO: 4.49 M/UL (ref 4.6–6.2)
RBC #/AREA URNS HPF: 46 /HPF (ref 0–4)
SAMPLE: ABNORMAL
SITE: ABNORMAL
SODIUM SERPL-SCNC: 130 MMOL/L (ref 136–145)
SODIUM SERPL-SCNC: 130 MMOL/L (ref 136–145)
SP GR UR STRIP: 1.01 (ref 1–1.03)
SQUAMOUS #/AREA URNS HPF: 3 /HPF
URN SPEC COLLECT METH UR: ABNORMAL
UROBILINOGEN UR STRIP-ACNC: NEGATIVE EU/DL
WBC # BLD AUTO: 1.31 K/UL (ref 3.9–12.7)
WBC # BLD AUTO: 1.47 K/UL (ref 3.9–12.7)
WBC #/AREA URNS HPF: 42 /HPF (ref 0–5)
YEAST URNS QL MICRO: ABNORMAL

## 2024-05-04 PROCEDURE — 82803 BLOOD GASES ANY COMBINATION: CPT

## 2024-05-04 PROCEDURE — 99285 EMERGENCY DEPT VISIT HI MDM: CPT | Mod: 25

## 2024-05-04 PROCEDURE — 27000190 HC CPAP FULL FACE MASK W/VALVE

## 2024-05-04 PROCEDURE — 87077 CULTURE AEROBIC IDENTIFY: CPT | Performed by: STUDENT IN AN ORGANIZED HEALTH CARE EDUCATION/TRAINING PROGRAM

## 2024-05-04 PROCEDURE — 63600175 PHARM REV CODE 636 W HCPCS: Performed by: STUDENT IN AN ORGANIZED HEALTH CARE EDUCATION/TRAINING PROGRAM

## 2024-05-04 PROCEDURE — 11000001 HC ACUTE MED/SURG PRIVATE ROOM

## 2024-05-04 PROCEDURE — 99900035 HC TECH TIME PER 15 MIN (STAT)

## 2024-05-04 PROCEDURE — 87086 URINE CULTURE/COLONY COUNT: CPT | Performed by: STUDENT IN AN ORGANIZED HEALTH CARE EDUCATION/TRAINING PROGRAM

## 2024-05-04 PROCEDURE — 83690 ASSAY OF LIPASE: CPT | Performed by: STUDENT IN AN ORGANIZED HEALTH CARE EDUCATION/TRAINING PROGRAM

## 2024-05-04 PROCEDURE — 85027 COMPLETE CBC AUTOMATED: CPT | Mod: 91 | Performed by: STUDENT IN AN ORGANIZED HEALTH CARE EDUCATION/TRAINING PROGRAM

## 2024-05-04 PROCEDURE — 80053 COMPREHEN METABOLIC PANEL: CPT | Performed by: STUDENT IN AN ORGANIZED HEALTH CARE EDUCATION/TRAINING PROGRAM

## 2024-05-04 PROCEDURE — 25000003 PHARM REV CODE 250

## 2024-05-04 PROCEDURE — 83880 ASSAY OF NATRIURETIC PEPTIDE: CPT | Performed by: STUDENT IN AN ORGANIZED HEALTH CARE EDUCATION/TRAINING PROGRAM

## 2024-05-04 PROCEDURE — 85007 BL SMEAR W/DIFF WBC COUNT: CPT | Performed by: STUDENT IN AN ORGANIZED HEALTH CARE EDUCATION/TRAINING PROGRAM

## 2024-05-04 PROCEDURE — 81000 URINALYSIS NONAUTO W/SCOPE: CPT | Performed by: STUDENT IN AN ORGANIZED HEALTH CARE EDUCATION/TRAINING PROGRAM

## 2024-05-04 PROCEDURE — 87186 SC STD MICRODIL/AGAR DIL: CPT | Performed by: STUDENT IN AN ORGANIZED HEALTH CARE EDUCATION/TRAINING PROGRAM

## 2024-05-04 PROCEDURE — 25000003 PHARM REV CODE 250: Performed by: STUDENT IN AN ORGANIZED HEALTH CARE EDUCATION/TRAINING PROGRAM

## 2024-05-04 PROCEDURE — 83970 ASSAY OF PARATHORMONE: CPT | Performed by: STUDENT IN AN ORGANIZED HEALTH CARE EDUCATION/TRAINING PROGRAM

## 2024-05-04 PROCEDURE — 85027 COMPLETE CBC AUTOMATED: CPT | Performed by: STUDENT IN AN ORGANIZED HEALTH CARE EDUCATION/TRAINING PROGRAM

## 2024-05-04 PROCEDURE — 87088 URINE BACTERIA CULTURE: CPT | Performed by: STUDENT IN AN ORGANIZED HEALTH CARE EDUCATION/TRAINING PROGRAM

## 2024-05-04 PROCEDURE — 83735 ASSAY OF MAGNESIUM: CPT | Performed by: STUDENT IN AN ORGANIZED HEALTH CARE EDUCATION/TRAINING PROGRAM

## 2024-05-04 PROCEDURE — 82550 ASSAY OF CK (CPK): CPT | Performed by: STUDENT IN AN ORGANIZED HEALTH CARE EDUCATION/TRAINING PROGRAM

## 2024-05-04 PROCEDURE — 80048 BASIC METABOLIC PNL TOTAL CA: CPT | Performed by: STUDENT IN AN ORGANIZED HEALTH CARE EDUCATION/TRAINING PROGRAM

## 2024-05-04 PROCEDURE — 85610 PROTHROMBIN TIME: CPT | Performed by: STUDENT IN AN ORGANIZED HEALTH CARE EDUCATION/TRAINING PROGRAM

## 2024-05-04 PROCEDURE — 83605 ASSAY OF LACTIC ACID: CPT | Performed by: STUDENT IN AN ORGANIZED HEALTH CARE EDUCATION/TRAINING PROGRAM

## 2024-05-04 PROCEDURE — 85007 BL SMEAR W/DIFF WBC COUNT: CPT | Mod: 91 | Performed by: STUDENT IN AN ORGANIZED HEALTH CARE EDUCATION/TRAINING PROGRAM

## 2024-05-04 PROCEDURE — 96365 THER/PROPH/DIAG IV INF INIT: CPT

## 2024-05-04 PROCEDURE — 85730 THROMBOPLASTIN TIME PARTIAL: CPT | Performed by: STUDENT IN AN ORGANIZED HEALTH CARE EDUCATION/TRAINING PROGRAM

## 2024-05-04 PROCEDURE — 5A09457 ASSISTANCE WITH RESPIRATORY VENTILATION, 24-96 CONSECUTIVE HOURS, CONTINUOUS POSITIVE AIRWAY PRESSURE: ICD-10-PCS | Performed by: STUDENT IN AN ORGANIZED HEALTH CARE EDUCATION/TRAINING PROGRAM

## 2024-05-04 RX ORDER — ALUMINUM HYDROXIDE, MAGNESIUM HYDROXIDE, AND SIMETHICONE 1200; 120; 1200 MG/30ML; MG/30ML; MG/30ML
30 SUSPENSION ORAL 4 TIMES DAILY PRN
Status: DISCONTINUED | OUTPATIENT
Start: 2024-05-04 | End: 2024-05-11 | Stop reason: HOSPADM

## 2024-05-04 RX ORDER — HEPARIN SODIUM,PORCINE/D5W 25000/250
0-40 INTRAVENOUS SOLUTION INTRAVENOUS CONTINUOUS
Status: DISCONTINUED | OUTPATIENT
Start: 2024-05-04 | End: 2024-05-07

## 2024-05-04 RX ORDER — ACETAMINOPHEN 325 MG/1
650 TABLET ORAL EVERY 8 HOURS PRN
Status: DISCONTINUED | OUTPATIENT
Start: 2024-05-04 | End: 2024-05-11 | Stop reason: HOSPADM

## 2024-05-04 RX ORDER — SODIUM,POTASSIUM PHOSPHATES 280-250MG
2 POWDER IN PACKET (EA) ORAL
Status: DISCONTINUED | OUTPATIENT
Start: 2024-05-04 | End: 2024-05-11 | Stop reason: HOSPADM

## 2024-05-04 RX ORDER — METRONIDAZOLE 500 MG/100ML
500 INJECTION, SOLUTION INTRAVENOUS
Status: DISCONTINUED | OUTPATIENT
Start: 2024-05-04 | End: 2024-05-06

## 2024-05-04 RX ORDER — IBUPROFEN 200 MG
24 TABLET ORAL
Status: DISCONTINUED | OUTPATIENT
Start: 2024-05-04 | End: 2024-05-11 | Stop reason: HOSPADM

## 2024-05-04 RX ORDER — ONDANSETRON HYDROCHLORIDE 2 MG/ML
4 INJECTION, SOLUTION INTRAVENOUS EVERY 8 HOURS PRN
Status: DISCONTINUED | OUTPATIENT
Start: 2024-05-04 | End: 2024-05-11 | Stop reason: HOSPADM

## 2024-05-04 RX ORDER — METOPROLOL TARTRATE 25 MG/1
12.5 TABLET ORAL 2 TIMES DAILY
Status: DISCONTINUED | OUTPATIENT
Start: 2024-05-04 | End: 2024-05-11 | Stop reason: HOSPADM

## 2024-05-04 RX ORDER — ATORVASTATIN CALCIUM 40 MG/1
80 TABLET, FILM COATED ORAL DAILY
Status: DISCONTINUED | OUTPATIENT
Start: 2024-05-05 | End: 2024-05-11 | Stop reason: HOSPADM

## 2024-05-04 RX ORDER — SODIUM CHLORIDE 0.9 % (FLUSH) 0.9 %
10 SYRINGE (ML) INJECTION EVERY 12 HOURS PRN
Status: DISCONTINUED | OUTPATIENT
Start: 2024-05-04 | End: 2024-05-11 | Stop reason: HOSPADM

## 2024-05-04 RX ORDER — LEVOTHYROXINE SODIUM 100 UG/1
200 TABLET ORAL
Status: DISCONTINUED | OUTPATIENT
Start: 2024-05-05 | End: 2024-05-11 | Stop reason: HOSPADM

## 2024-05-04 RX ORDER — AMIODARONE HYDROCHLORIDE 200 MG/1
200 TABLET ORAL DAILY
Status: DISCONTINUED | OUTPATIENT
Start: 2024-05-05 | End: 2024-05-11 | Stop reason: HOSPADM

## 2024-05-04 RX ORDER — LANOLIN ALCOHOL/MO/W.PET/CERES
800 CREAM (GRAM) TOPICAL
Status: DISCONTINUED | OUTPATIENT
Start: 2024-05-04 | End: 2024-05-11 | Stop reason: HOSPADM

## 2024-05-04 RX ORDER — NALOXONE HCL 0.4 MG/ML
0.02 VIAL (ML) INJECTION
Status: DISCONTINUED | OUTPATIENT
Start: 2024-05-04 | End: 2024-05-11 | Stop reason: HOSPADM

## 2024-05-04 RX ORDER — TALC
6 POWDER (GRAM) TOPICAL NIGHTLY PRN
Status: DISCONTINUED | OUTPATIENT
Start: 2024-05-04 | End: 2024-05-11 | Stop reason: HOSPADM

## 2024-05-04 RX ORDER — GABAPENTIN 300 MG/1
600 CAPSULE ORAL 3 TIMES DAILY PRN
Status: DISCONTINUED | OUTPATIENT
Start: 2024-05-04 | End: 2024-05-11 | Stop reason: HOSPADM

## 2024-05-04 RX ORDER — SIMETHICONE 80 MG
1 TABLET,CHEWABLE ORAL 4 TIMES DAILY PRN
Status: DISCONTINUED | OUTPATIENT
Start: 2024-05-04 | End: 2024-05-11 | Stop reason: HOSPADM

## 2024-05-04 RX ORDER — IBUPROFEN 200 MG
16 TABLET ORAL
Status: DISCONTINUED | OUTPATIENT
Start: 2024-05-04 | End: 2024-05-11 | Stop reason: HOSPADM

## 2024-05-04 RX ORDER — IPRATROPIUM BROMIDE AND ALBUTEROL SULFATE 2.5; .5 MG/3ML; MG/3ML
3 SOLUTION RESPIRATORY (INHALATION) EVERY 4 HOURS PRN
Status: DISCONTINUED | OUTPATIENT
Start: 2024-05-04 | End: 2024-05-11 | Stop reason: HOSPADM

## 2024-05-04 RX ORDER — PROCHLORPERAZINE EDISYLATE 5 MG/ML
5 INJECTION INTRAMUSCULAR; INTRAVENOUS EVERY 6 HOURS PRN
Status: DISCONTINUED | OUTPATIENT
Start: 2024-05-04 | End: 2024-05-11 | Stop reason: HOSPADM

## 2024-05-04 RX ORDER — SODIUM CHLORIDE 9 MG/ML
INJECTION, SOLUTION INTRAVENOUS CONTINUOUS
Status: DISCONTINUED | OUTPATIENT
Start: 2024-05-04 | End: 2024-05-05

## 2024-05-04 RX ORDER — ACETAMINOPHEN 325 MG/1
650 TABLET ORAL EVERY 4 HOURS PRN
Status: DISCONTINUED | OUTPATIENT
Start: 2024-05-04 | End: 2024-05-11 | Stop reason: HOSPADM

## 2024-05-04 RX ORDER — INSULIN ASPART 100 [IU]/ML
0-5 INJECTION, SOLUTION INTRAVENOUS; SUBCUTANEOUS
Status: DISCONTINUED | OUTPATIENT
Start: 2024-05-04 | End: 2024-05-11 | Stop reason: HOSPADM

## 2024-05-04 RX ORDER — GLUCAGON 1 MG
1 KIT INJECTION
Status: DISCONTINUED | OUTPATIENT
Start: 2024-05-04 | End: 2024-05-11 | Stop reason: HOSPADM

## 2024-05-04 RX ORDER — POLYETHYLENE GLYCOL 3350 17 G/17G
17 POWDER, FOR SOLUTION ORAL DAILY
Status: DISCONTINUED | OUTPATIENT
Start: 2024-05-05 | End: 2024-05-04

## 2024-05-04 RX ORDER — POLYETHYLENE GLYCOL 3350 17 G/17G
17 POWDER, FOR SOLUTION ORAL DAILY
Status: DISCONTINUED | OUTPATIENT
Start: 2024-05-04 | End: 2024-05-11 | Stop reason: HOSPADM

## 2024-05-04 RX ADMIN — POLYETHYLENE GLYCOL 3350 17 G: 17 POWDER, FOR SOLUTION ORAL at 11:05

## 2024-05-04 RX ADMIN — HEPARIN SODIUM 12 UNITS/KG/HR: 10000 INJECTION, SOLUTION INTRAVENOUS at 09:05

## 2024-05-04 RX ADMIN — METRONIDAZOLE 500 MG: 500 INJECTION, SOLUTION INTRAVENOUS at 07:05

## 2024-05-04 RX ADMIN — CEFTRIAXONE 1 G: 1 INJECTION, POWDER, FOR SOLUTION INTRAMUSCULAR; INTRAVENOUS at 04:05

## 2024-05-04 RX ADMIN — SIMETHICONE 80 MG: 80 TABLET, CHEWABLE ORAL at 11:05

## 2024-05-04 RX ADMIN — SODIUM CHLORIDE: 9 INJECTION, SOLUTION INTRAVENOUS at 08:05

## 2024-05-04 RX ADMIN — SODIUM CHLORIDE, POTASSIUM CHLORIDE, SODIUM LACTATE AND CALCIUM CHLORIDE 500 ML: 600; 310; 30; 20 INJECTION, SOLUTION INTRAVENOUS at 05:05

## 2024-05-04 NOTE — ASSESSMENT & PLAN NOTE
Patient with acute kidney injury/acute renal failure likely due to pre-renal azotemia due to IVVD MICHELLE is currently stable. Baseline creatinine  0.9  - Labs reviewed- Renal function/electrolytes with Estimated Creatinine Clearance: 12.9 mL/min (A) (based on SCr of 8.3 mg/dL (H)). according to latest data. Monitor urine output and serial BMP and adjust therapy as needed. Avoid nephrotoxins and renally dose meds for GFR listed above.

## 2024-05-04 NOTE — ASSESSMENT & PLAN NOTE
Chronic, controlled. Latest blood pressure and vitals reviewed-     Temp:  [98.4 °F (36.9 °C)]   Pulse:  [58-68]   Resp:  [18-19]   BP: (121-137)/(54-77)   SpO2:  [99 %-100 %] .   Home meds for hypertension were reviewed and noted below.   Hypertension Medications               metoprolol tartrate (LOPRESSOR) 25 MG tablet Take 1 tablet (25 mg total) by mouth 2 (two) times daily.    olmesartan (BENICAR) 40 MG tablet Take 1 tablet (40 mg total) by mouth once daily.            While in the hospital, will manage blood pressure as follows; Adjust home antihypertensive regimen as follows- hold nephrotoxic medications, monitor BP    Will utilize p.r.n. blood pressure medication only if patient's blood pressure greater than 180/110 and he develops symptoms such as worsening chest pain or shortness of breath.

## 2024-05-04 NOTE — ADMISSIONCARE
AdmissionCare    Guideline: Renal Failure (Acute) - INPT, Inpatient    Based on the indications selected for the patient, the bed status of Inpatient was determined to be MET    The following indications were selected as present at the time of evaluation of the patient:      - Acute renal failure (stage 3 acute kidney injury), as indicated by 1 or more of the following:    - Serum creatinine greater than 4 mg/dL (354 micromoles/L) with acute rise greater than 0.5 mg/dL (44.2 micromoles/L)    AdmissionCare documentation entered by: Digna Colbert    [facility], 27th edition, Copyright © 2023 [facility], [facility] All Rights Reserved.  [id number]:19:38-05:00

## 2024-05-04 NOTE — ASSESSMENT & PLAN NOTE
Body mass index is 44.89 kg/m². Morbid obesity complicates all aspects of disease management from diagnostic modalities to treatment. Weight loss encouraged and health benefits explained to patient.

## 2024-05-04 NOTE — ED PROVIDER NOTES
"Encounter Date: 5/4/2024    SCRIBE #1 NOTE: I, Flor Ramos, am scribing for, and in the presence of,  Leia Cruz MD. I have scribed the following portions of the note - Other sections scribed: HPI, ROS, PE.       History     Chief Complaint   Patient presents with    Abdominal Pain    Urinary Retention     Pt c/o lower right side abdominal pain and "No urine output since yesterday"     This is a 63 year old male, with a PMHx of HTN, DM (on Metformin), HLD, Afib, DVT/PE on xarelto, chronic leukopenia, who presents to the ED complaining of Lower Right Abdominal Pain, symptoms onset four days ago, with associated decreased urination, lower right back pain, and myalgias in LE. Patient states since Tuesday he has had no urine output and is having difficulty urinating though he has been having bowel movements; had a normal bowel movement yesterday, well-formed.  Had several episodes of diarrhea on Tuesday after eating colesaw, after which he has had decreased urination with dark brown urine on Tuesday, urinated 1 time in the last 4 days.    Patient denies Hx of kidney stones or Kidney disease. Patient also denies headache, rash, fever, shortness of breath, or other associated symptoms. No other alleviating or exacerbating factors. This is the extent of the patient's complaints in the ED.        -Chronic leukopenia with no malignancy found on bone marrow biopsy a few years ago per chart review.                The history is provided by the patient. No  was used.     Review of patient's allergies indicates:   Allergen Reactions    Contrast media Hives     Past Medical History:   Diagnosis Date    Acquired hypothyroidism     Atrial fibrillation 09/16/2019    Diabetes mellitus     High cholesterol     History of DVT (deep vein thrombosis)     History of pulmonary embolism     Hypertension     LARRY (obstructive sleep apnea)     S/P IVC filter      Past Surgical History:   Procedure Laterality Date    " THYROIDECTOMY, PARTIAL  2006    TONSILLECTOMY      TREATMENT OF CARDIAC ARRHYTHMIA  9/16/2019    Procedure: Cardioversion or Defibrillation;  Surgeon: Deven Vasquez MD;  Location: [facility] CATH LAB;  Service: Cardiology;;     Family History   Family history unknown: Yes     Social History     Tobacco Use    Smoking status: Never    Smokeless tobacco: Never   Substance Use Topics    Alcohol use: Not Currently     Comment: occasionally    Drug use: No     Review of Systems   Constitutional:  Negative for fever.   HENT:  Negative for sore throat.    Respiratory:  Negative for cough and shortness of breath.    Cardiovascular:  Negative for chest pain and leg swelling.   Gastrointestinal:  Positive for abdominal pain. Negative for diarrhea, nausea and vomiting.   Genitourinary:  Negative for dysuria and hematuria.        (+) urinary retention    Musculoskeletal:  Positive for back pain (lower right). Negative for neck pain.   Skin:  Negative for rash.   Neurological:  Positive for weakness (bilateral legs). Negative for syncope and headaches.   Hematological:  Bruises/bleeds easily.       Physical Exam     Initial Vitals [05/04/24 1456]   BP Pulse Resp Temp SpO2   121/77 (!) 58 18 98.4 °F (36.9 °C) 99 %      MAP       --         Physical Exam    Nursing note and vitals reviewed.  Constitutional: He appears well-developed and well-nourished.  Non-toxic appearance. No distress.   HENT:   Head: Normocephalic and atraumatic.   Eyes: Conjunctivae and EOM are normal.   Cardiovascular:  Normal rate, regular rhythm, normal heart sounds and intact distal pulses.           No murmur heard.  Pulmonary/Chest: Effort normal and breath sounds normal. No respiratory distress. He has no wheezes. He has no rhonchi.   Abdominal: Abdomen is soft. He exhibits no distension. There is abdominal tenderness.   Right CVA Tenderness, RLQ tenderness  There is no guarding.   Musculoskeletal:         General: No tenderness or edema.      Comments:  No pitting edema bilaterally.      Neurological: He is alert and oriented to person, place, and time.   Skin: Skin is warm and dry.   Psychiatric: He has a normal mood and affect.         ED Course   Procedures  Labs Reviewed   URINALYSIS, REFLEX TO URINE CULTURE - Abnormal; Notable for the following components:       Result Value    Appearance, UA Hazy (*)     Protein, UA 1+ (*)     Occult Blood UA 2+ (*)     Leukocytes, UA 3+ (*)     All other components within normal limits    Narrative:     Specimen Source->Urine   CBC W/ AUTO DIFFERENTIAL - Abnormal; Notable for the following components:    WBC 1.47 (*)     RBC 4.22 (*)     Hemoglobin 9.6 (*)     Hematocrit 30.5 (*)     MCV 72 (*)     MCH 22.7 (*)     MCHC 31.5 (*)     RDW 18.1 (*)     Mono % 0.0 (*)     All other components within normal limits    Narrative:     WBC critical result(s) called and verbal readback obtained from   Jennifer Lock 5/4/24 by PHILIP 05/04/2024 16:40   COMPREHENSIVE METABOLIC PANEL - Abnormal; Notable for the following components:    Sodium 130 (*)     CO2 15 (*)     BUN 65 (*)     Creatinine 8.3 (*)     Calcium 7.6 (*)     Albumin 3.0 (*)     Alkaline Phosphatase 52 (*)     eGFR 7 (*)     All other components within normal limits   B-TYPE NATRIURETIC PEPTIDE - Abnormal; Notable for the following components:     (*)     All other components within normal limits   URINALYSIS MICROSCOPIC - Abnormal; Notable for the following components:    RBC, UA 46 (*)     WBC, UA 42 (*)     Bacteria Many (*)     Yeast, UA Rare (*)     Hyaline Casts, UA 6 (*)     All other components within normal limits    Narrative:     Specimen Source->Urine   CK - Abnormal; Notable for the following components:     (*)     All other components within normal limits   ISTAT PROCEDURE - Abnormal; Notable for the following components:    POC PH 7.241 (*)     POC PO2 14 (*)     POC HCO3 18.1 (*)     POC BE -9 (*)     POC TCO2 19 (*)     All other components  within normal limits   CULTURE, URINE   LIPASE   MAGNESIUM   LACTIC ACID, PLASMA   CK   PTH, INTACT   SODIUM, URINE, RANDOM   CREATININE, URINE, RANDOM   UREA NITROGEN, URINE, RANDOM   PTH, INTACT   POCT GLUCOSE, HAND-HELD DEVICE          Imaging Results               CT Abdomen Pelvis  Without Contrast (Final result)  Result time 05/04/24 17:44:40      Final result by Raulito Keys MD (05/04/24 17:44:40)                   Impression:      1. Dilated appendix measuring 12 mm with surrounding periappendiceal stranding/inflammatory change.  Findings are suggestive for acute appendicitis.  No evidence of abscess or perforation.  Surgical consultation is recommended.  2. Fairly symmetric appearing bilateral perinephric stranding which extends inferiorly along the ureteral courses and in the right lower quadrant periappendiceal region.  Uncertain if this may relate to chronic change versus acute infectious/inflammatory process as no prior studies are available for comparison.  Potential ascending urinary tract infection could present with such appearance in the right clinical setting.  Correlate with clinical symptoms and urinalysis.  3. Single nonobstructing right renal stone.  4. Cholelithiasis.  5. Additional findings as detailed above.  This report was flagged in Epic as abnormal.      Electronically signed by: Raulito Keys MD  Date:    05/04/2024  Time:    17:44               Narrative:    EXAMINATION:  CT ABDOMEN PELVIS WITHOUT CONTRAST    CLINICAL HISTORY:  Abdominal pain, acute, nonlocalized;    TECHNIQUE:  Low dose axial images, sagittal and coronal reformations were obtained from the lung bases to the pubic symphysis.  Oral contrast was not administered.    COMPARISON:  None    FINDINGS:  The visualized portion of the heart is unremarkable.  Mild bibasilar atelectatic changes are seen.  No evidence of focal consolidation or pleural effusion.    No significant hepatic abnormality seen on this noncontrast  exam.  There is no intra-or extrahepatic biliary ductal dilatation.  There is cholelithiasis.  The stomach, pancreas, spleen, and adrenal glands are unremarkable.    Single nonobstructing right renal stone is seen.  No evidence of left-sided renal stones.  No right or left-sided hydronephrosis is seen.  No stones are seen along the ureteral courses.  There is bilateral fairly symmetric appearing perinephric stranding which extends inferiorly along the ureteral courses and retroperitoneal region.  Urinary bladder is nondistended.  Prostate is unremarkable.    Appendix is dilated measuring 12 mm with periappendiceal stranding/inflammatory change, also noting aforementioned findings extending near this region.  No evidence of bowel obstruction.  No free air or free fluid.    Aorta tapers normally with mild atherosclerosis.    No acute osseous abnormality identified.  Degenerative changes and prominent anterior bridging osteophytes are seen throughout the spine.  There is mild grade 1 anterolisthesis of L5 on S1 secondary to bilateral L5 pars defects.  Subcutaneous soft tissues show no significant abnormalities.                                       Medications   metronidazole IVPB 500 mg (0 mg Intravenous Stopped 5/4/24 2043)   0.9%  NaCl infusion ( Intravenous New Bag 5/4/24 2054)   cefTRIAXone (ROCEPHIN) 2 g in dextrose 5 % in water (D5W) 100 mL IVPB (MB+) (has no administration in time range)   amiodarone tablet 200 mg (has no administration in time range)   gabapentin capsule 600 mg (has no administration in time range)   levothyroxine tablet 200 mcg (has no administration in time range)   metoprolol tartrate (LOPRESSOR) split tablet 12.5 mg (12.5 mg Oral Not Given 5/4/24 2100)   atorvastatin tablet 80 mg (has no administration in time range)   sodium chloride 0.9% flush 10 mL (has no administration in time range)   albuterol-ipratropium 2.5 mg-0.5 mg/3 mL nebulizer solution 3 mL (has no administration in time  range)   melatonin tablet 6 mg (has no administration in time range)   ondansetron injection 4 mg (has no administration in time range)   prochlorperazine injection Soln 5 mg (has no administration in time range)   acetaminophen tablet 650 mg (has no administration in time range)   simethicone chewable tablet 80 mg (80 mg Oral Given 5/4/24 4626)   aluminum-magnesium hydroxide-simethicone 200-200-20 mg/5 mL suspension 30 mL (has no administration in time range)   acetaminophen tablet 650 mg (has no administration in time range)   naloxone 0.4 mg/mL injection 0.02 mg (has no administration in time range)   potassium bicarbonate disintegrating tablet 50 mEq (has no administration in time range)   potassium bicarbonate disintegrating tablet 35 mEq (has no administration in time range)   potassium bicarbonate disintegrating tablet 60 mEq (has no administration in time range)   magnesium oxide tablet 800 mg (has no administration in time range)   magnesium oxide tablet 800 mg (has no administration in time range)   potassium, sodium phosphates 280-160-250 mg packet 2 packet (has no administration in time range)   potassium, sodium phosphates 280-160-250 mg packet 2 packet (has no administration in time range)   potassium, sodium phosphates 280-160-250 mg packet 2 packet (has no administration in time range)   glucose chewable tablet 16 g (has no administration in time range)   glucose chewable tablet 24 g (has no administration in time range)   glucagon (human recombinant) injection 1 mg (has no administration in time range)   insulin aspart U-100 pen 0-5 Units (has no administration in time range)   dextrose 10% bolus 125 mL 125 mL (has no administration in time range)   dextrose 10% bolus 250 mL 250 mL (has no administration in time range)   heparin 25,000 units in dextrose 5% 250 mL (100 units/mL) infusion LOW INTENSITY nomogram - [facility] (12 Units/kg/hr × 97.6 kg (Adjusted) Intravenous New Bag 5/4/24 9851)   heparin  25,000 units in dextrose 5% (100 units/ml) IV bolus from bag LOW INTENSITY nomogram - [facility] (has no administration in time range)   heparin 25,000 units in dextrose 5% (100 units/ml) IV bolus from bag LOW INTENSITY nomogram - [facility] (has no administration in time range)   polyethylene glycol packet 17 g (17 g Oral Given 5/4/24 2310)   cefTRIAXone (Rocephin) 1 g in dextrose 5 % in water (D5W) 100 mL IVPB (MB+) (0 g Intravenous Stopped 5/4/24 1802)   lactated ringers bolus 500 mL (0 mLs Intravenous Stopped 5/4/24 1816)   heparin 25,000 units in dextrose 5% (100 units/ml) IV bolus from bag LOW INTENSITY nomogram - [facility] (5,850 Units Intravenous Bolus from Bag 5/4/24 2135)     Medical Decision Making  63 year old male, with HTN, DM (on Metformin), HLD, Afib, DVT/PE on xarelto, chronic leukopenia, who presents to the ED complaining of Lower Right Abdominal Pain, symptoms onset four days ago, with associated decreased urination, lower right back pain, and myalgias in LE.     Differential diagnosis include but are not limited to:  UTI, renal stone, MICHELLE, urinary retention, appendicitis      Labs found to be significant for acute renal failure, with creatinine of 8.3, BUN 65.  Unclear etiology of acute renal failure, may have been prerenal MICHELLE/dehydration after episode of gastroenteritis 4 days ago, versus ATN. Was able to urinate 200 cc by himself in the ED, found to have many RBC, WBC, bacteria in the urine, given 1 g of ceftriaxone.  Will gently hydrate, monitoring for fluid overload or respiratory distress, will need admission for acute renal failure.  No hyperkalemia, no pulmonary edema, satting 100% on room air, currently no indication for emergent dialysis.    Bicarb is 15, consistent with renal failure, patient is currently taking metformin daily, will order a lactate which may be elevated given current MICHELLE and decreased renal clearance.  No bradycardia in the ED. Discussed case with nephrology. Patient is on  amiodarone    CTAP performed with no contrast, with findings of acute appendicitis 12mm, dilated, with periappendiceal stranding. Discussed with gen surg, recommended medical management. Also with R renal stone, nonobstructing, with no hydronephrosis.    After review of the patient's physical exam, ED testing, and history/symptoms, the patient requires additional care in the hospital overnight. The hospital medicine service will accept the patient and relevant labs, imaging, or procedures were discussed and they were made aware of any pending labs/imaging/interventions. The diagnosis, treatment and plan were discussed with the patient. All questions and/or concerns have been addressed to the best of my ability.      Please put in 30 minutes of critical care due to patient having acute renal failure, risk of CV/respiratory failure.  Separate from teaching and exclusive of procedure and ekg time  Includes:  Time at bedside  Time reviewing test results  Time discussing case with staff  Time documenting the medical record  Time spent with family members  Time spent with consults  Management        Amount and/or Complexity of Data Reviewed  Labs: ordered. Decision-making details documented in ED Course.  Radiology: ordered.    Risk  Prescription drug management.  Decision regarding hospitalization.            Scribe Attestation:   Scribe #1: I performed the above scribed service and the documentation accurately describes the services I performed. I attest to the accuracy of the note.        ED Course as of 05/05/24 0054   Sat May 04, 2024   1615 Creatinine(!): 8.3  Acute renal failure from unclear etiology, urine is straw colored, clear, urinated 200 cc in ED [LF]   1623 Bacteria, UA(!): Many [LF]   1642 WBC(!!): 1.47  Has unknown cause of leukopenia at baseline, most recent WBC count was 3, had bone marrow biopsy in the past with heme/onc with no clear diagnosis per chart review [LF]   1709 Potassium: 5.0  No  hyperkalemia [LF]   1750 Lactic Acid Level: 1.4  No significant lactic acidosis [LF]   1802 Given unclear etiology of renal failure, will avoid nephrotoxic antibiotics for acute appendicitis, patient was given ceftriaxone for UTI, will add on flagyl [LF]   1806 Discussed case with surgery, who recommended medical management given patient's current renal failure picture admission to hospital medicine [LF]      ED Course User Index  [LF] Leia Cruz MD         I, Leia Cruz, personally performed the services described in this documentation. All medical record entries made by the scribe were at my direction and in my presence.  I have reviewed the chart and agree that the record reflects my personal performance and is accurate and complete.                      Clinical Impression:  Final diagnoses:  [N17.9] Acute renal failure, unspecified acute renal failure type (Primary)  [N17.9] MICHELLE (acute kidney injury)  [N39.0, R31.9] Urinary tract infection with hematuria, site unspecified  [K35.80] Acute appendicitis, unspecified acute appendicitis type  [D72.819] Chronic leukopenia          ED Disposition Condition    Admit                 Leia Cruz MD  05/05/24 0054

## 2024-05-04 NOTE — ASSESSMENT & PLAN NOTE
[id number] Score: 1.  Continue Lopressor. Hold Xarelto given potential surgical intervention. Heparin ggt

## 2024-05-04 NOTE — ASSESSMENT & PLAN NOTE
"Patient's FSGs are controlled on current medication regimen.  Last A1c reviewed-   Lab Results   Component Value Date    HGBA1C 6.3 (H) 08/08/2022     Most recent fingerstick glucose reviewed- No results for input(s): "POCTGLUCOSE" in the last 24 hours.  Current correctional scale  Low  Maintain anti-hyperglycemic dose as follows-   Antihyperglycemics (From admission, onward)      Start     Stop Route Frequency Ordered    05/04/24 2049  insulin aspart U-100 pen 0-5 Units         -- SubQ Before meals & nightly PRN 05/04/24 1949          Hold Oral hypoglycemics while patient is in the hospital.  "

## 2024-05-04 NOTE — ASSESSMENT & PLAN NOTE
CT abdomen and pelvis showed findings suggestive of acute appendicitis, with bilateral perinephric stranding/possible ascending UTI.  General surgery recommended medical management  Continue ceftriaxone/Flagyl  Symptomatic control

## 2024-05-04 NOTE — ASSESSMENT & PLAN NOTE
History noted. Chronic but worsening, likely in the setting of infection   Patient had a bone marrow biopsy in 2020, which was unrevealing.   Monitor

## 2024-05-04 NOTE — ED TRIAGE NOTES
Pt presents to ED with complaint of right sided abdominal pain accompaned by urinary retention since yesterday. Pt states that he been staining to use the bathroom so often, that hs abdomen has been hurting. Pt is currently on blood thinners. Pt denies chest pain or sob.

## 2024-05-05 PROBLEM — E83.39 HYPERPHOSPHATEMIA: Status: ACTIVE | Noted: 2024-05-05

## 2024-05-05 PROBLEM — N12 PYELONEPHRITIS: Status: ACTIVE | Noted: 2024-05-05

## 2024-05-05 LAB
ACANTHOCYTES BLD QL SMEAR: PRESENT
ALBUMIN SERPL BCP-MCNC: 2.7 G/DL (ref 3.5–5.2)
ALP SERPL-CCNC: 47 U/L (ref 55–135)
ALT SERPL W/O P-5'-P-CCNC: 11 U/L (ref 10–44)
ANION GAP SERPL CALC-SCNC: 13 MMOL/L (ref 8–16)
ANISOCYTOSIS BLD QL SMEAR: SLIGHT
APTT PPP: 129.2 SEC (ref 21–32)
APTT PPP: 138.6 SEC (ref 21–32)
APTT PPP: 63.9 SEC (ref 21–32)
APTT PPP: 68.9 SEC (ref 21–32)
AST SERPL-CCNC: 15 U/L (ref 10–40)
BASOPHILS # BLD AUTO: 0.01 K/UL (ref 0–0.2)
BASOPHILS NFR BLD: 0.9 % (ref 0–1.9)
BILIRUB SERPL-MCNC: 0.4 MG/DL (ref 0.1–1)
BUN SERPL-MCNC: 65 MG/DL (ref 8–23)
BURR CELLS BLD QL SMEAR: ABNORMAL
CALCIUM SERPL-MCNC: 6.8 MG/DL (ref 8.7–10.5)
CHLORIDE SERPL-SCNC: 102 MMOL/L (ref 95–110)
CO2 SERPL-SCNC: 16 MMOL/L (ref 23–29)
CREAT SERPL-MCNC: 8.3 MG/DL (ref 0.5–1.4)
CREAT UR-MCNC: 54 MG/DL (ref 23–375)
DIFFERENTIAL METHOD BLD: ABNORMAL
EOSINOPHIL # BLD AUTO: 0.1 K/UL (ref 0–0.5)
EOSINOPHIL NFR BLD: 10.5 % (ref 0–8)
ERYTHROCYTE [DISTWIDTH] IN BLOOD BY AUTOMATED COUNT: 18 % (ref 11.5–14.5)
EST. GFR  (NO RACE VARIABLE): 7 ML/MIN/1.73 M^2
GLUCOSE SERPL-MCNC: 98 MG/DL (ref 70–110)
HCT VFR BLD AUTO: 29.8 % (ref 40–54)
HGB BLD-MCNC: 9.3 G/DL (ref 14–18)
IMM GRANULOCYTES # BLD AUTO: 0 K/UL (ref 0–0.04)
IMM GRANULOCYTES NFR BLD AUTO: 0 % (ref 0–0.5)
IRON SERPL-MCNC: 54 UG/DL (ref 45–160)
LYMPHOCYTES # BLD AUTO: 0.5 K/UL (ref 1–4.8)
LYMPHOCYTES NFR BLD: 45.6 % (ref 18–48)
MAGNESIUM SERPL-MCNC: 1.8 MG/DL (ref 1.6–2.6)
MCH RBC QN AUTO: 23 PG (ref 27–31)
MCHC RBC AUTO-ENTMCNC: 31.2 G/DL (ref 32–36)
MCV RBC AUTO: 74 FL (ref 82–98)
MONOCYTES # BLD AUTO: 0 K/UL (ref 0.3–1)
MONOCYTES NFR BLD: 1.8 % (ref 4–15)
NEUTROPHILS # BLD AUTO: 0.5 K/UL (ref 1.8–7.7)
NEUTROPHILS NFR BLD: 41.2 % (ref 38–73)
NRBC BLD-RTO: 0 /100 WBC
OVALOCYTES BLD QL SMEAR: ABNORMAL
PHOSPHATE SERPL-MCNC: 5.8 MG/DL (ref 2.7–4.5)
PLATELET # BLD AUTO: 234 K/UL (ref 150–450)
PLATELET BLD QL SMEAR: ABNORMAL
PMV BLD AUTO: 8.9 FL (ref 9.2–12.9)
POCT GLUCOSE: 105 MG/DL (ref 70–110)
POCT GLUCOSE: 107 MG/DL (ref 70–110)
POCT GLUCOSE: 123 MG/DL (ref 70–110)
POCT GLUCOSE: 95 MG/DL (ref 70–110)
POIKILOCYTOSIS BLD QL SMEAR: ABNORMAL
POTASSIUM SERPL-SCNC: 5.1 MMOL/L (ref 3.5–5.1)
PROT SERPL-MCNC: 6.1 G/DL (ref 6–8.4)
RBC # BLD AUTO: 4.04 M/UL (ref 4.6–6.2)
SATURATED IRON: 23 % (ref 20–50)
SODIUM SERPL-SCNC: 131 MMOL/L (ref 136–145)
SODIUM UR-SCNC: 29 MMOL/L (ref 20–250)
TOTAL IRON BINDING CAPACITY: 235 UG/DL (ref 250–450)
TRANSFERRIN SERPL-MCNC: 159 MG/DL (ref 200–375)
URATE SERPL-MCNC: 9 MG/DL (ref 3.4–7)
UUN UR-MCNC: 181 MG/DL (ref 140–1050)
WBC # BLD AUTO: 1.14 K/UL (ref 3.9–12.7)

## 2024-05-05 PROCEDURE — 84300 ASSAY OF URINE SODIUM: CPT | Performed by: STUDENT IN AN ORGANIZED HEALTH CARE EDUCATION/TRAINING PROGRAM

## 2024-05-05 PROCEDURE — 11000001 HC ACUTE MED/SURG PRIVATE ROOM

## 2024-05-05 PROCEDURE — 25000003 PHARM REV CODE 250: Performed by: STUDENT IN AN ORGANIZED HEALTH CARE EDUCATION/TRAINING PROGRAM

## 2024-05-05 PROCEDURE — 85730 THROMBOPLASTIN TIME PARTIAL: CPT | Mod: 91 | Performed by: STUDENT IN AN ORGANIZED HEALTH CARE EDUCATION/TRAINING PROGRAM

## 2024-05-05 PROCEDURE — 84540 ASSAY OF URINE/UREA-N: CPT | Performed by: STUDENT IN AN ORGANIZED HEALTH CARE EDUCATION/TRAINING PROGRAM

## 2024-05-05 PROCEDURE — 84100 ASSAY OF PHOSPHORUS: CPT | Performed by: STUDENT IN AN ORGANIZED HEALTH CARE EDUCATION/TRAINING PROGRAM

## 2024-05-05 PROCEDURE — 99223 1ST HOSP IP/OBS HIGH 75: CPT | Mod: ,,, | Performed by: SURGERY

## 2024-05-05 PROCEDURE — 63600175 PHARM REV CODE 636 W HCPCS: Mod: JZ,JG | Performed by: STUDENT IN AN ORGANIZED HEALTH CARE EDUCATION/TRAINING PROGRAM

## 2024-05-05 PROCEDURE — 87040 BLOOD CULTURE FOR BACTERIA: CPT | Mod: 59 | Performed by: STUDENT IN AN ORGANIZED HEALTH CARE EDUCATION/TRAINING PROGRAM

## 2024-05-05 PROCEDURE — 99900035 HC TECH TIME PER 15 MIN (STAT)

## 2024-05-05 PROCEDURE — 85730 THROMBOPLASTIN TIME PARTIAL: CPT | Performed by: STUDENT IN AN ORGANIZED HEALTH CARE EDUCATION/TRAINING PROGRAM

## 2024-05-05 PROCEDURE — 63600175 PHARM REV CODE 636 W HCPCS: Performed by: STUDENT IN AN ORGANIZED HEALTH CARE EDUCATION/TRAINING PROGRAM

## 2024-05-05 PROCEDURE — 84550 ASSAY OF BLOOD/URIC ACID: CPT | Performed by: INTERNAL MEDICINE

## 2024-05-05 PROCEDURE — 36415 COLL VENOUS BLD VENIPUNCTURE: CPT | Mod: XB | Performed by: INTERNAL MEDICINE

## 2024-05-05 PROCEDURE — 82570 ASSAY OF URINE CREATININE: CPT | Performed by: STUDENT IN AN ORGANIZED HEALTH CARE EDUCATION/TRAINING PROGRAM

## 2024-05-05 PROCEDURE — 83970 ASSAY OF PARATHORMONE: CPT | Performed by: INTERNAL MEDICINE

## 2024-05-05 PROCEDURE — 36415 COLL VENOUS BLD VENIPUNCTURE: CPT | Performed by: STUDENT IN AN ORGANIZED HEALTH CARE EDUCATION/TRAINING PROGRAM

## 2024-05-05 PROCEDURE — 85025 COMPLETE CBC W/AUTO DIFF WBC: CPT | Performed by: STUDENT IN AN ORGANIZED HEALTH CARE EDUCATION/TRAINING PROGRAM

## 2024-05-05 PROCEDURE — 80053 COMPREHEN METABOLIC PANEL: CPT | Performed by: STUDENT IN AN ORGANIZED HEALTH CARE EDUCATION/TRAINING PROGRAM

## 2024-05-05 PROCEDURE — 94761 N-INVAS EAR/PLS OXIMETRY MLT: CPT

## 2024-05-05 PROCEDURE — 83540 ASSAY OF IRON: CPT | Performed by: STUDENT IN AN ORGANIZED HEALTH CARE EDUCATION/TRAINING PROGRAM

## 2024-05-05 PROCEDURE — 94660 CPAP INITIATION&MGMT: CPT

## 2024-05-05 PROCEDURE — 83735 ASSAY OF MAGNESIUM: CPT | Performed by: STUDENT IN AN ORGANIZED HEALTH CARE EDUCATION/TRAINING PROGRAM

## 2024-05-05 RX ORDER — SODIUM CHLORIDE 9 MG/ML
INJECTION, SOLUTION INTRAVENOUS CONTINUOUS
Status: DISCONTINUED | OUTPATIENT
Start: 2024-05-05 | End: 2024-05-06

## 2024-05-05 RX ADMIN — LEVOTHYROXINE SODIUM 200 MCG: 0.1 TABLET ORAL at 05:05

## 2024-05-05 RX ADMIN — SODIUM CHLORIDE: 9 INJECTION, SOLUTION INTRAVENOUS at 03:05

## 2024-05-05 RX ADMIN — AMIODARONE HYDROCHLORIDE 200 MG: 200 TABLET ORAL at 08:05

## 2024-05-05 RX ADMIN — HEPARIN SODIUM 8 UNITS/KG/HR: 10000 INJECTION, SOLUTION INTRAVENOUS at 05:05

## 2024-05-05 RX ADMIN — METRONIDAZOLE 500 MG: 500 INJECTION, SOLUTION INTRAVENOUS at 05:05

## 2024-05-05 RX ADMIN — METRONIDAZOLE 500 MG: 500 INJECTION, SOLUTION INTRAVENOUS at 02:05

## 2024-05-05 RX ADMIN — METRONIDAZOLE 500 MG: 500 INJECTION, SOLUTION INTRAVENOUS at 10:05

## 2024-05-05 RX ADMIN — SODIUM CHLORIDE: 9 INJECTION, SOLUTION INTRAVENOUS at 06:05

## 2024-05-05 RX ADMIN — CEFTRIAXONE 2 G: 2 INJECTION, POWDER, FOR SOLUTION INTRAMUSCULAR; INTRAVENOUS at 03:05

## 2024-05-05 RX ADMIN — ATORVASTATIN CALCIUM 80 MG: 40 TABLET, FILM COATED ORAL at 08:05

## 2024-05-05 NOTE — H&P
"  [facility] [place] - Emergency Dept  [facility] Medicine  History & Physical    Patient Name: Russell Santos  MRN: [id number]  Patient Class: IP- Inpatient  Admission Date: 5/4/2024  Attending Physician: Keri Porras-Ana PEARSON DO   Primary Care Provider: Roxanne Phillip -         Patient information was obtained from patient and ER records.     Subjective:     Principal Problem:MICHELLE (acute kidney injury)    Chief Complaint:   Chief Complaint   Patient presents with    Abdominal Pain    Urinary Retention     Pt c/o lower right side abdominal pain and "No urine output since yesterday"        HPI: This is a 63-year-old male with a past medical history of AFib (on Xarelto), hypertension, type 2 diabetes, hyperlipidemia, hypothyroidism, chronic leukopenia, morbid obesity, who presents with abdominal pain.    Patient presents with abdominal pain that started 4 days prior to presentation.  He reports that his symptoms started after eating [employer] coleslaw.  He endorsed diarrhea, and right lower quadrant abdominal pain.  Associated symptoms include decreased p.o. intake, decreased appetite, generalized weakness, leg cramping, nausea and vomiting.  Additionally, he endorses decreased urination and intermittent dysuria.    In the ED, the patient was hemodynamically stable.  Labs remarkable for an elevated creatinine (8.3 baseline of 0.9), leukopenia (1.4- below baseline), microcytic anemia (9.6), elevated BNP (123).  VBG showed a pH of 7.24, HC03 of 18.1.  UA showed +3 leukocyte esterase, 46 RBCs, 42 WBCs, +1 protein, and many bacteria.  CT abdomen and pelvis showed findings suggestive of acute appendicitis, with bilateral perinephric stranding/possible ascending UTI without evidence of hydronephrosis. Surgery recommended medical management.  Patient was given 500 mL of LR, ceftriaxone, Flagyl.  He was admitted for further management.    Past Medical History:   Diagnosis Date    Acquired hypothyroidism     Atrial fibrillation 09/16/2019    " Diabetes mellitus     High cholesterol     History of DVT (deep vein thrombosis)     History of pulmonary embolism     Hypertension     LARRY (obstructive sleep apnea)     S/P IVC filter        Past Surgical History:   Procedure Laterality Date    THYROIDECTOMY, PARTIAL  2006    TONSILLECTOMY      TREATMENT OF CARDIAC ARRHYTHMIA  9/16/2019    Procedure: Cardioversion or Defibrillation;  Surgeon: Deven Vasquez MD;  Location: [facility] CATH LAB;  Service: Cardiology;;       Review of patient's allergies indicates:   Allergen Reactions    Contrast media Hives       Current Facility-Administered Medications   Medication Dose Route Frequency Provider Last Rate Last Admin    0.9%  NaCl infusion   Intravenous Continuous Chaka Meza MD        [START ON 5/5/2024] cefTRIAXone (ROCEPHIN) 2 g in dextrose 5 % in water (D5W) 100 mL IVPB (MB+)  2 g Intravenous Q24H Chaka Meza MD        metronidazole IVPB 500 mg  500 mg Intravenous Q8H Leia Cruz  mL/hr at 05/04/24 1943 500 mg at 05/04/24 1943     Current Outpatient Medications   Medication Sig Dispense Refill    acetaminophen (TYLENOL) 500 MG tablet Take 500 mg by mouth every 6 (six) hours as needed for Pain.      amiodarone (PACERONE) 200 MG Tab Take 1 tablet (200 mg total) by mouth once daily. 90 tablet 3    cholecalciferol, vitamin D3, (VITAMIN D3) 50 mcg (2,000 unit) Tab Take by mouth once daily.      doxycycline (VIBRA-TABS) 100 MG tablet Take 1 tablet (100 mg total) by mouth every 12 (twelve) hours. 18 tablet 0    EPINEPHrine (EPIPEN) 0.3 mg/0.3 mL AtIn       ergocalciferol (ERGOCALCIFEROL) 50,000 unit Cap Take 50,000 Units by mouth every 7 days.      ferrous sulfate 325 (65 FE) MG EC tablet TAKE 1 TABLET EVERY DAY (Patient taking differently: 325 mg.) 30 tablet 0    gabapentin (NEURONTIN) 600 MG tablet Take 600 mg by mouth 3 (three) times daily.      HYDROcodone-acetaminophen (NORCO)  mg per tablet       levothyroxine (SYNTHROID) 200 MCG tablet Take 200  mcg by mouth before breakfast.      metformin (GLUCOPHAGE) 1000 MG tablet Take 1,000 mg by mouth 2 (two) times daily with meals.      metoprolol tartrate (LOPRESSOR) 25 MG tablet Take 1 tablet (25 mg total) by mouth 2 (two) times daily. 60 tablet 11    MOVANTIK 25 mg tablet Take 25 mg by mouth once daily.      olmesartan (BENICAR) 40 MG tablet Take 1 tablet (40 mg total) by mouth once daily. 90 tablet 3    omega-3 fatty acids/fish oil (FISH OIL-OMEGA-3 FATTY ACIDS) 300-1,000 mg capsule Take 1 capsule by mouth 2 (two) times a day.      omeprazole (PRILOSEC) 20 MG capsule       rivaroxaban (XARELTO) 20 mg Tab Take 1 tablet (20 mg total) by mouth daily with dinner or evening meal. 30 tablet 6    rosuvastatin (CRESTOR) 40 MG Tab Take 10 mg by mouth every evening.      sildenafiL (VIAGRA) 50 MG tablet Take 1 tablet (50 mg total) by mouth daily as needed for Erectile Dysfunction. 10 tablet 11    testosterone 200 mg Pllt by Implant route.      triamcinolone acetonide 0.5% (KENALOG) 0.5 % Crea APPLY TOPICALLY A SMALL AMOUNT TO THE AFFECTED AREA(S) ONCE DAILY      TRUE METRIX GLUCOSE TEST STRIP Strp       TRUEPLUS LANCETS 33 gauge Misc        Family History    Family history is unknown by patient.       Tobacco Use    Smoking status: Never    Smokeless tobacco: Never   Substance and Sexual Activity    Alcohol use: Not Currently     Comment: occasionally    Drug use: No    Sexual activity: Not Currently     Review of Systems   Constitutional:  Positive for activity change and appetite change.   HENT: Negative.     Eyes: Negative.    Respiratory: Negative.     Cardiovascular: Negative.    Gastrointestinal:  Positive for abdominal distention, abdominal pain, diarrhea, nausea and vomiting.   Endocrine: Negative.    Genitourinary:  Positive for difficulty urinating and dysuria.   Musculoskeletal: Negative.    Skin: Negative.    Allergic/Immunologic: Negative.    Neurological:  Positive for weakness (Generalized).    Psychiatric/Behavioral: Negative.       Objective:     Vital Signs (Most Recent):  Temp: 98.4 °F (36.9 °C) (05/04/24 1456)  Pulse: (!) 58 (05/04/24 1715)  Resp: 19 (05/04/24 1715)  BP: (!) 137/54 (05/04/24 1715)  SpO2: 100 % (05/04/24 1715) Vital Signs (24h Range):  Temp:  [98.4 °F (36.9 °C)] 98.4 °F (36.9 °C)  Pulse:  [58-68] 58  Resp:  [18-19] 19  SpO2:  [99 %-100 %] 100 %  BP: (121-137)/(54-77) 137/54     Weight: (!) 137.9 kg (304 lb)  Body mass index is 44.89 kg/m².     Physical Exam  Vitals and nursing note reviewed.   Constitutional:       General: He is not in acute distress.     Appearance: Normal appearance. He is not ill-appearing.   HENT:      Head: Normocephalic and atraumatic.      Nose: Nose normal.      Mouth/Throat:      Mouth: Mucous membranes are moist.   Eyes:      Extraocular Movements: Extraocular movements intact.   Cardiovascular:      Rate and Rhythm: Normal rate.      Pulses: Normal pulses.      Heart sounds: No murmur heard.  Pulmonary:      Effort: Pulmonary effort is normal. No respiratory distress.   Abdominal:      General: Abdomen is flat. There is distension.      Palpations: Abdomen is soft.      Tenderness: There is no abdominal tenderness (minimal to no tenderness).   Musculoskeletal:      Right lower leg: No edema.      Left lower leg: No edema.   Skin:     General: Skin is warm.      Capillary Refill: Capillary refill takes less than 2 seconds.   Neurological:      General: No focal deficit present.      Mental Status: He is alert.   Psychiatric:         Mood and Affect: Mood normal.                Significant Labs: All pertinent labs within the past 24 hours have been reviewed.    Significant Imaging: I have reviewed all pertinent imaging results/findings within the past 24 hours.  Assessment/Plan:     * MICHELLE (acute kidney injury)  Patient with acute kidney injury/acute renal failure likely due to pre-renal azotemia due to IVVD MICHELLE is currently stable. Baseline creatinine  0.9   "- Labs reviewed- Renal function/electrolytes with Estimated Creatinine Clearance: 12.9 mL/min (A) (based on SCr of 8.3 mg/dL (H)). according to latest data. Monitor urine output and serial BMP and adjust therapy as needed. Avoid nephrotoxins and renally dose meds for GFR listed above.    Acute appendicitis  CT abdomen and pelvis showed findings suggestive of acute appendicitis, with bilateral perinephric stranding/possible ascending UTI.  General surgery recommended medical management  Continue ceftriaxone/Flagyl  Symptomatic control    Hypothyroidism  Continue home medications         History of DVT (deep vein thrombosis)  Hold Xarelto given potential surgical intervention  Heparin ggt     Leukopenia  History noted. Chronic but worsening, likely in the setting of infection   Patient had a bone marrow biopsy in 2020, which was unrevealing.   Monitor       PAF (paroxysmal atrial fibrillation)  [id number] Score: 1.  Continue Lopressor. Hold Xarelto given potential surgical intervention. Heparin ggt     Morbid obesity  Body mass index is 44.89 kg/m². Morbid obesity complicates all aspects of disease management from diagnostic modalities to treatment. Weight loss encouraged and health benefits explained to patient.         Diabetes mellitus, type 2  Patient's FSGs are controlled on current medication regimen.  Last A1c reviewed-   Lab Results   Component Value Date    HGBA1C 6.3 (H) 08/08/2022     Most recent fingerstick glucose reviewed- No results for input(s): "POCTGLUCOSE" in the last 24 hours.  Current correctional scale  Low  Maintain anti-hyperglycemic dose as follows-   Antihyperglycemics (From admission, onward)      Start     Stop Route Frequency Ordered    05/04/24 2049  insulin aspart U-100 pen 0-5 Units         -- SubQ Before meals & nightly PRN 05/04/24 1949          Hold Oral hypoglycemics while patient is in the hospital.    Hyperlipidemia  Continue home medications       Essential hypertension  Chronic, " controlled. Latest blood pressure and vitals reviewed-     Temp:  [98.4 °F (36.9 °C)]   Pulse:  [58-68]   Resp:  [18-19]   BP: (121-137)/(54-77)   SpO2:  [99 %-100 %] .   Home meds for hypertension were reviewed and noted below.   Hypertension Medications               metoprolol tartrate (LOPRESSOR) 25 MG tablet Take 1 tablet (25 mg total) by mouth 2 (two) times daily.    olmesartan (BENICAR) 40 MG tablet Take 1 tablet (40 mg total) by mouth once daily.            While in the hospital, will manage blood pressure as follows; Adjust home antihypertensive regimen as follows- hold nephrotoxic medications, monitor BP    Will utilize p.r.n. blood pressure medication only if patient's blood pressure greater than 180/110 and he develops symptoms such as worsening chest pain or shortness of breath.      VTE Risk Mitigation (From admission, onward)           Ordered     heparin 25,000 units in dextrose 5% (100 units/ml) IV bolus from bag LOW INTENSITY nomogram - [facility]  As needed (PRN)        Question:  Heparin Infusion Adjustment (DO NOT MODIFY ANSWER)  Answer:  \[email or web address]\epic\Images\Pharmacy\HeparinInfusions\heparin LOW INTENSITY nomogram for [facility] [id number]    05/04/24 1951     heparin 25,000 units in dextrose 5% (100 units/ml) IV bolus from bag LOW INTENSITY nomogram - [facility]  As needed (PRN)        Question:  Heparin Infusion Adjustment (DO NOT MODIFY ANSWER)  Answer:  \[email or web address]\epic\Images\Pharmacy\HeparinInfusions\heparin LOW INTENSITY nomogram for [facility] [id number]    05/04/24 1951     heparin 25,000 units in dextrose 5% (100 units/ml) IV bolus from bag LOW INTENSITY nomogram - [facility]  Once        Question:  Heparin Infusion Adjustment (DO NOT MODIFY ANSWER)  Answer:  \[email or web address]\epic\Images\Pharmacy\HeparinInfusions\heparin LOW INTENSITY nomogram for [facility] [id number].pdf    05/04/24 1951     heparin 25,000 units in dextrose 5% 250 mL (100 units/mL) infusion LOW INTENSITY nomogram - [facility]  Continuous        Question:  Begin at  (units/kg/hr)  Answer:  12    05/04/24 1951     IP VTE HIGH RISK PATIENT  Once         05/04/24 1949     Place sequential compression device  Until discontinued         05/04/24 1949                               AdmissionCare    Guideline: Renal Failure (Acute) - INPT, Inpatient    Based on the indications selected for the patient, the bed status of Inpatient was determined to be MET    The following indications were selected as present at the time of evaluation of the patient:      - Acute renal failure (stage 3 acute kidney injury), as indicated by 1 or more of the following:    - Serum creatinine greater than 4 mg/dL (354 micromoles/L) with acute rise greater than 0.5 mg/dL (44.2 micromoles/L)    AdmissionCare documentation entered by: Digna Colbert    [facility] [employer], 27th edition, Copyright © 2023 [facility] [employer], [facility] All Rights Reserved.  [id number]:19:38-05:00    Chaka Meza MD  Department of Hospital [facility]  [facility]

## 2024-05-05 NOTE — ASSESSMENT & PLAN NOTE
Patient with acute kidney injury/acute renal failure likely due to pre-renal azotemia due to IVVD MICHELLE is currently stable. Baseline creatinine  0.9  - Labs reviewed- Renal function/electrolytes with Estimated Creatinine Clearance: 12.9 mL/min (A) (based on SCr of 8.3 mg/dL (H)). according to latest data. Monitor urine output and serial BMP and adjust therapy as needed. Avoid nephrotoxins and renally dose meds for GFR listed above.    -Presented with MICHELLE  -Cr on admit 8.3  -Baseline Cr 0.9  -Suspect due to infection and decreased PO intake  -Nephrology consulted  -IVF  -Avoid nephrotoxins, renal dose all meds.   -Monitor Is/Os  -Monitor

## 2024-05-05 NOTE — SUBJECTIVE & OBJECTIVE
Interval History:  No acute overnight events.  Patient remained afebrile.  Continues to abdominal pain diffusely, but more localized near right lower quadrant.  Admits increasing urinary urgency and frequency, and dysuria.  Still having multiple bowel movements, No blood in his stools    Review of Systems   Constitutional:  Positive for activity change, appetite change and fatigue. Negative for fever.   Respiratory:  Negative for cough and shortness of breath.    Cardiovascular:  Positive for chest pain. Negative for palpitations.   Gastrointestinal:  Positive for abdominal distention, abdominal pain, diarrhea and nausea. Negative for vomiting.   Endocrine: Negative.    Genitourinary:  Positive for difficulty urinating and dysuria.   Skin: Negative.    Allergic/Immunologic: Negative.    Neurological:  Positive for weakness (Generalized).     Objective:     Vital Signs (Most Recent):  Temp: 97.5 °F (36.4 °C) (05/05/24 1106)  Pulse: (!) 59 (05/05/24 1106)  Resp: 20 (05/05/24 1106)  BP: 120/75 (05/05/24 1106)  SpO2: 97 % (05/05/24 1106) Vital Signs (24h Range):  Temp:  [97.5 °F (36.4 °C)-97.9 °F (36.6 °C)] 97.5 °F (36.4 °C)  Pulse:  [56-68] 59  Resp:  [17-20] 20  SpO2:  [97 %-100 %] 97 %  BP: ()/(54-78) 120/75     Weight: (!) 137.3 kg (302 lb 11.1 oz)  Body mass index is 44.7 kg/m².    Intake/Output Summary (Last 24 hours) at 5/5/2024 1506  Last data filed at 5/5/2024 1333  Gross per 24 hour   Intake 2108.55 ml   Output 550 ml   Net 1558.55 ml         Physical Exam  Vitals and nursing note reviewed.   Constitutional:       General: He is not in acute distress.     Appearance: He is morbidly obese. He is ill-appearing.   HENT:      Mouth/Throat:      Mouth: Mucous membranes are dry.   Eyes:      Extraocular Movements: Extraocular movements intact.   Cardiovascular:      Rate and Rhythm: Normal rate and regular rhythm.      Heart sounds: No murmur heard.     No gallop.   Pulmonary:      Effort: Pulmonary effort is  normal. No respiratory distress.      Breath sounds: Normal breath sounds. No wheezing.   Abdominal:      General: There is distension.      Palpations: Abdomen is soft.      Tenderness: There is abdominal tenderness (near RLQ). There is no guarding or rebound.      Comments: No local or generalized peritonitis.  Bilateral CVA tenderness, very mild   Musculoskeletal:         General: No swelling or tenderness.   Skin:     General: Skin is warm and dry.   Neurological:      General: No focal deficit present.      Mental Status: He is alert and oriented to person, place, and time.   Psychiatric:         Mood and Affect: Mood normal.         Thought Content: Thought content normal.             Significant Labs: All pertinent labs within the past 24 hours have been reviewed.    Significant Imaging: I have reviewed all pertinent imaging results/findings within the past 24 hours.

## 2024-05-05 NOTE — PLAN OF CARE
[place] - Med Surg  Initial Discharge Assessment       Primary Care Provider: Roxanne Phillip -  Case Management Assessment     PCP: See above  Pharmacy: [facility]    Patient Arrived From: home with spouse  Existing Help at Home: spouse    Barriers to Discharge: none    Discharge Plan:    A. home   B. home with family      Discharge planning assessment completed with patient's assistance.  Patient is from home with spouse.  When medically cleared, patient will discharge to home with spouse.  He stated that his daughter, Kasandra, will transport him at discharge.  Patient has an appt with PCP scheduled for Thursday.        Admission Diagnosis: MICHELLE (acute kidney injury) [N17.9]  Chest pain [R07.9]  Acute renal failure, unspecified acute renal failure type [N17.9]    Admission Date: 5/4/2024  Expected Discharge Date:     Transition of Care Barriers: None    Payor: HU[employer] MANAGED MEDICARE / Plan: HU[employer] MEDICARE HMO / Product Type: Capitation /     Extended Emergency Contact Information  Primary Emergency Contact: Sara Santos   [facility]  Home Phone: [phone]  Relation: Spouse    Discharge Plan A: Home  Discharge Plan B: Home with family      [facility] [place] - [address]  [address]  [place] [address]  Phone: [phone] Fax: [phone]    [facility] [address] [facility]  [address]   [place] [address]  Phone: [phone] Fax: [phone]    [facility] - [facility] [address] [facility]  [address] [facility] [address]  Phone: [phone] Fax: [phone]      Initial Assessment (most recent)       Adult Discharge Assessment - 05/05/24 1812          Discharge Assessment    Assessment Type Discharge Planning Assessment     Confirmed/corrected address, phone number and insurance Yes     Confirmed Demographics Correct on Facesheet     Source of Information patient     When was your  last doctors appointment? --   Did not make last scheduled appointment but has an appointment scheduled for this coming Thursday.    Communicated CASA with patient/caregiver No     Reason For Admission MICHELLE     People in Home spouse     Facility Arrived From: Home with spouse     Do you expect to return to your current living situation? Yes     Do you have help at home or someone to help you manage your care at home? Yes     Who are your caregiver(s) and their phone number(s)? wife:  Sara Santos;  [phone]     Prior to hospitilization cognitive status: Alert/Oriented     Current cognitive status: Alert/Oriented     Walking or Climbing Stairs Difficulty no     Dressing/Bathing Difficulty no     Equipment Currently Used at Home CPAP;glucometer     Readmission within 30 days? No     Patient currently being followed by outpatient case management? No     Do you currently have service(s) that help you manage your care at home? No     Do you take prescription medications? Yes     Do you have prescription coverage? Yes     Coverage Payor: HU[employer] MANAGED MEDICARE - HU[employer] MEDICARE HMO -     Do you have any problems affording any of your prescribed medications? No     Is the patient taking medications as prescribed? yes     Who is going to help you get home at discharge? Daughter :  Kasandra     How do you get to doctors appointments? car, drives self     Are you on dialysis? No     Do you take coumadin? No     Discharge Plan A Home     Discharge Plan B Home with family     DME Needed Upon Discharge  none     Discharge Plan discussed with: Patient     Transition of Care Barriers None

## 2024-05-05 NOTE — PROGRESS NOTES
[facility]  Progress Note    Patient Name: Russell Santos  MRN: [id number]  Patient Class: IP- Inpatient   Admission Date: 5/4/2024  Length of Stay: 1 days  Attending Physician: Keri Porras-Ana PEARSON DO  Primary Care Provider: Roxanne Phillip -        Subjective:     Principal Problem:MICHELLE (acute kidney injury)        HPI:  This is a 63-year-old male with a past medical history of AFib (on Xarelto), hypertension, type 2 diabetes, hyperlipidemia, hypothyroidism, chronic leukopenia, morbid obesity, who presents with abdominal pain.    Patient presents with abdominal pain that started 4 days prior to presentation.  He reports that his symptoms started after eating [employer] coleslaw.  He endorsed diarrhea, and right lower quadrant abdominal pain.  Associated symptoms include decreased p.o. intake, decreased appetite, generalized weakness, leg cramping, nausea and vomiting.  Additionally, he endorses decreased urination and intermittent dysuria.    In the ED, the patient was hemodynamically stable.  Labs remarkable for an elevated creatinine (8.3 baseline of 0.9), leukopenia (1.4- below baseline), microcytic anemia (9.6), elevated BNP (123).  VBG showed a pH of 7.24, HC03 of 18.1.  UA showed +3 leukocyte esterase, 46 RBCs, 42 WBCs, +1 protein, and many bacteria.  CT abdomen and pelvis showed findings suggestive of acute appendicitis, with bilateral perinephric stranding/possible ascending UTI without evidence of hydronephrosis. Surgery recommended medical management.  Patient was given 500 mL of LR, ceftriaxone, Flagyl.  He was admitted for further management.    Overview/Hospital Course:  63-year-old male with a past medical history of AFib (on Xarelto), hypertension, type 2 diabetes, hyperlipidemia, hypothyroidism, chronic leukopenia, morbid obesity admitted on 05/04/2024 for further evaluation of abdominal pain associated with nausea, vomiting, diarrhea, and decreased p.o. intake.  Admits  generalized weakness.  Patient found to have  hyperphosphatemia, acute renal failure, possible appendicitis, and UTI/pyelonephritis.  CT abdomen with Dilated appendix measuring 12 mm with surrounding periappendiceal stranding/inflammatory change, concern for acute appendicitis, no evidence of abscess or perforation.  Also noted bilateral perinephric stranding, potential ascending urinary tract infection.  Creatinine 8.3 on admission.  Urinalysis red blood, many bacteria, and WBCs.  Blood culture ordered.  Urine culture with Enterococcus species, susceptibilities pending.  General surgery consulted-no plans for acute surgical intervention.  Agree with IV antibiotics.  Nephrology consulted    Interval History:  No acute overnight events.  Patient remained afebrile.  Continues to abdominal pain diffusely, but more localized near right lower quadrant.  Admits increasing urinary urgency and frequency, and dysuria.  Still having multiple bowel movements, No blood in his stools    Review of Systems   Constitutional:  Positive for activity change, appetite change and fatigue. Negative for fever.   Respiratory:  Negative for cough and shortness of breath.    Cardiovascular:  Positive for chest pain. Negative for palpitations.   Gastrointestinal:  Positive for abdominal distention, abdominal pain, diarrhea and nausea. Negative for vomiting.   Endocrine: Negative.    Genitourinary:  Positive for difficulty urinating and dysuria.   Skin: Negative.    Allergic/Immunologic: Negative.    Neurological:  Positive for weakness (Generalized).     Objective:     Vital Signs (Most Recent):  Temp: 97.5 °F (36.4 °C) (05/05/24 1106)  Pulse: (!) 59 (05/05/24 1106)  Resp: 20 (05/05/24 1106)  BP: 120/75 (05/05/24 1106)  SpO2: 97 % (05/05/24 1106) Vital Signs (24h Range):  Temp:  [97.5 °F (36.4 °C)-97.9 °F (36.6 °C)] 97.5 °F (36.4 °C)  Pulse:  [56-68] 59  Resp:  [17-20] 20  SpO2:  [97 %-100 %] 97 %  BP: ()/(54-78) 120/75     Weight: (!)  137.3 kg (302 lb 11.1 oz)  Body mass index is 44.7 kg/m².    Intake/Output Summary (Last 24 hours) at 5/5/2024 1506  Last data filed at 5/5/2024 1333  Gross per 24 hour   Intake 2108.55 ml   Output 550 ml   Net 1558.55 ml         Physical Exam  Vitals and nursing note reviewed.   Constitutional:       General: He is not in acute distress.     Appearance: He is morbidly obese. He is ill-appearing.   HENT:      Mouth/Throat:      Mouth: Mucous membranes are dry.   Eyes:      Extraocular Movements: Extraocular movements intact.   Cardiovascular:      Rate and Rhythm: Normal rate and regular rhythm.      Heart sounds: No murmur heard.     No gallop.   Pulmonary:      Effort: Pulmonary effort is normal. No respiratory distress.      Breath sounds: Normal breath sounds. No wheezing.   Abdominal:      General: There is distension.      Palpations: Abdomen is soft.      Tenderness: There is abdominal tenderness (near RLQ). There is no guarding or rebound.      Comments: No local or generalized peritonitis.  Bilateral CVA tenderness, very mild   Musculoskeletal:         General: No swelling or tenderness.   Skin:     General: Skin is warm and dry.   Neurological:      General: No focal deficit present.      Mental Status: He is alert and oriented to person, place, and time.   Psychiatric:         Mood and Affect: Mood normal.         Thought Content: Thought content normal.             Significant Labs: All pertinent labs within the past 24 hours have been reviewed.    Significant Imaging: I have reviewed all pertinent imaging results/findings within the past 24 hours.    Assessment/Plan:      * MICHELLE (acute kidney injury)  Patient with acute kidney injury/acute renal failure likely due to pre-renal azotemia due to IVVD MICHELLE is currently stable. Baseline creatinine  0.9  - Labs reviewed- Renal function/electrolytes with Estimated Creatinine Clearance: 12.9 mL/min (A) (based on SCr of 8.3 mg/dL (H)). according to latest data.  Monitor urine output and serial BMP and adjust therapy as needed. Avoid nephrotoxins and renally dose meds for GFR listed above.    -Presented with MICHELLE  -Cr on admit 8.3  -Baseline Cr 0.9  -Suspect due to infection and decreased PO intake  -Nephrology consulted  -IVF  -Avoid nephrotoxins, renal dose all meds.   -Monitor Is/Os  -Monitor       Pyelonephritis  -patient with complaints difficulty urinating and dysuria with MICHELLE on likely CKD  -CT abdomen showed bilateral perinephric stranding which extends inferiorly along the ureteral courses and in the right lower quadrant periappendiceal region. Potential ascending urinary tract infection  -Urine culture with Enterococcus species, identification speciation pending   -continue IV ceftriaxone    Acute appendicitis  -CT abdomen and pelvis showed findings suggestive of acute appendicitis, with bilateral perinephric stranding/possible ascending UTI.  -General surgery recommended medical management  -Continue ceftriaxone/Flagyl  -Symptomatic control    Hyperphosphatemia  -phosphorus level elevated at 5.8 on admission   -likely elevated in the setting of acute renal failure   -nephrology consulted   -monitor level      Leukopenia  History noted. Chronic but worsening, likely in the setting of infection   Patient had a bone marrow biopsy in 2020, which was unrevealing.   Monitor       PAF (paroxysmal atrial fibrillation)  [id number] Score: 1.  Continue Lopressor. Hold Xarelto given potential surgical intervention. Heparin ggt     Essential hypertension  Chronic, controlled. Latest blood pressure and vitals reviewed-     Temp:  [97.5 °F (36.4 °C)-97.9 °F (36.6 °C)]   Pulse:  [56-60]   Resp:  [17-20]   BP: ()/(54-78)   SpO2:  [97 %-100 %] .   Home meds for hypertension were reviewed and noted below.   Hypertension Medications               metoprolol tartrate (LOPRESSOR) 25 MG tablet Take 1 tablet (25 mg total) by mouth 2 (two) times daily.    olmesartan (BENICAR) 40 MG  tablet Take 1 tablet (40 mg total) by mouth once daily.            While in the hospital, will manage blood pressure as follows; Adjust home antihypertensive regimen as follows- hold nephrotoxic medications, monitor BP    Will utilize p.r.n. blood pressure medication only if patient's blood pressure greater than 180/110 and he develops symptoms such as worsening chest pain or shortness of breath.    Hyperlipidemia  Continue home medications       Diabetes mellitus, type 2  Patient's FSGs are controlled on current medication regimen.  Last A1c reviewed-   Lab Results   Component Value Date    HGBA1C 6.3 (H) 08/08/2022     Most recent fingerstick glucose reviewed-   Recent Labs   Lab 05/04/24 2112 05/05/24  0724 05/05/24  1106   POCTGLUCOSE 76 107 95     Current correctional scale  Low  Maintain anti-hyperglycemic dose as follows-   Antihyperglycemics (From admission, onward)      Start     Stop Route Frequency Ordered    05/04/24 2049  insulin aspart U-100 pen 0-5 Units         -- SubQ Before meals & nightly PRN 05/04/24 1949          Hold Oral hypoglycemics while patient is in the hospital.    Hypothyroidism  Continue home Synthroid        History of DVT (deep vein thrombosis)  Hold Xarelto given potential surgical intervention  Heparin ggt     Morbid obesity  Body mass index is 44.7 kg/m². Morbid obesity complicates all aspects of disease management from diagnostic modalities to treatment. Weight loss encouraged and health benefits explained to patient.           VTE Risk Mitigation (From admission, onward)           Ordered     heparin 25,000 units in dextrose 5% (100 units/ml) IV bolus from bag LOW INTENSITY nomogram - [facility]  As needed (PRN)        Question:  Heparin Infusion Adjustment (DO NOT MODIFY ANSWER)  Answer:  [email or web address]\epic\Images\Pharmacy\HeparinInfusions\heparin LOW INTENSITY nomogram for [facility] [id number]    05/04/24 1951     heparin 25,000 units in dextrose 5% (100 units/ml) IV bolus from bag LOW  INTENSITY nomogram - [facility]  As needed (PRN)        Question:  Heparin Infusion Adjustment (DO NOT MODIFY ANSWER)  Answer:  [email or web address]\epic\Images\Pharmacy\HeparinInfusions\heparin LOW INTENSITY nomogram for [facility] [id number]    05/04/24 1951     heparin 25,000 units in dextrose 5% 250 mL (100 units/mL) infusion LOW INTENSITY nomogram - [facility]  Continuous        Question:  Begin at (units/kg/hr)  Answer:  12    05/04/24 1951     IP VTE HIGH RISK PATIENT  Once         05/04/24 1949     Place sequential compression device  Until discontinued         05/04/24 1949                    Discharge Planning   CASA:      Code Status: Full Code   Is the patient medically ready for discharge?:     Reason for patient still in hospital (select all that apply): Patient trending condition, Treatment, and Consult recommendations                     Maureen Porras DO  Department of Hospital Medicine   [facility]

## 2024-05-05 NOTE — PLAN OF CARE
Problem: Infection  Goal: Absence of Infection Signs and Symptoms  Outcome: Progressing  Intervention: Prevent or Manage Infection  Flowsheets (Taken 5/5/2024 1306)  Infection Management: aseptic technique maintained     Problem: Adult Inpatient Plan of Care  Goal: Plan of Care Review  Outcome: Progressing  Flowsheets (Taken 5/5/2024 1306)  Plan of Care Reviewed With: patient  Goal: Patient-Specific Goal (Individualized)  Outcome: Progressing  Goal: Absence of Hospital-Acquired Illness or Injury  Outcome: Progressing  Intervention: Identify and Manage Fall Risk  Flowsheets (Taken 5/5/2024 1306)  Safety Promotion/Fall Prevention:   assistive device/personal item within reach   bedside commode chair   side rails raised x 2   medications reviewed   Fall Risk reviewed with patient/family   room near unit station  Intervention: Prevent Skin Injury  Flowsheets (Taken 5/5/2024 1306)  Body Position: position changed independently  Intervention: Prevent and Manage VTE (Venous Thromboembolism) Risk  Flowsheets (Taken 5/5/2024 1306)  VTE Prevention/Management: intravenous hydration  Intervention: Prevent Infection  Flowsheets (Taken 5/5/2024 1306)  Infection Prevention: single patient room provided  Goal: Optimal Comfort and Wellbeing  Outcome: Progressing  Intervention: Monitor Pain and Promote Comfort  Flowsheets (Taken 5/5/2024 1306)  Pain Management Interventions: pain management plan reviewed with patient/caregiver  Intervention: Provide Person-Centered Care  Flowsheets (Taken 5/5/2024 1306)  Trust Relationship/Rapport: care explained  Goal: Readiness for Transition of Care  Outcome: Progressing     Problem: Diabetes Comorbidity  Goal: Blood Glucose Level Within Targeted Range  Outcome: Progressing  Intervention: Monitor and Manage Glycemia  Flowsheets (Taken 5/5/2024 1306)  Glycemic Management: blood glucose monitored     Problem: Acute Kidney Injury/Impairment  Goal: Fluid and Electrolyte Balance  Outcome:  Progressing  Intervention: Monitor and Manage Fluid and Electrolyte Balance  Flowsheets (Taken 5/5/2024 1306)  Fluid/Electrolyte Management: fluids provided  Goal: Improved Oral Intake  Outcome: Progressing  Intervention: Promote and Optimize Oral Intake  Flowsheets (Taken 5/5/2024 1306)  Oral Nutrition Promotion: rest periods promoted  Nutrition Interventions: diet advanced  Goal: Effective Renal Function  Outcome: Progressing  Intervention: Monitor and Support Renal Function  Flowsheets (Taken 5/5/2024 1306)  Medication Review/Management: medications reviewed

## 2024-05-05 NOTE — ASSESSMENT & PLAN NOTE
[id number] Score: 1.  Continue Lopressor. Hold Xarelto given potential surgical intervention. Heparin ggt

## 2024-05-05 NOTE — HOSPITAL COURSE
63-year-old male with a past medical history of AFib (on Xarelto), hypertension, type 2 diabetes, hyperlipidemia, hypothyroidism, chronic leukopenia, morbid obesity admitted on 05/04/2024 for further evaluation of abdominal pain associated with nausea, vomiting, diarrhea, and decreased p.o. intake.  Admits generalized weakness.  Patient found to have  hyperphosphatemia, acute renal failure, possible appendicitis, and UTI/pyelonephritis.  CT abdomen with Dilated appendix measuring 12 mm with surrounding periappendiceal stranding/inflammatory change, concern for acute appendicitis, no evidence of abscess or perforation.  Also noted bilateral perinephric stranding, potential ascending urinary tract infection.  Creatinine 8.3 on admission.  Urinalysis red blood, many bacteria, and WBCs.  Blood culture ordered, NGTD.  Urine culture with Enterococcus species, susceptibilities pending.  General surgery consulted-no plans for acute surgical intervention.  Agree with IV antibiotics.  ID consulted. Nephrology consulted-continue on bicarb gtt. Pt now with worsening leukopenia, hematology consulted.

## 2024-05-05 NOTE — CONSULTS
"  General Surgery    Reason for Consult: Concern for acute appendicitis     History of Present Illness:  63 y.o. male with AFib (on Xarelto), BMI 45, type 2 diabetes, hyperlipidemia, hypothyroidism, chronic leukopenia, and HTN who presented from home with multiple symptoms and was found to be in acute renal failure. He reports that he has had decreased appetite and weight loss for "quite sometime" with gradual onset. This has caused him to alter his diet. On Tuesday, he ate some bad coleslaw alone for a meal and developed nausea/vomiting, diarrhea, and weakness. He wasn't able to do much at home due to these symptoms and he noted his urine output dropped off. He has had some RLQ pain which started at least a day or two after the onset of these symptoms. The pain has not worsened and is a "soreness." He denies fevers.    In the ED, he was found to be leukopenic, in acute renal failure with a Cr of 8.3, urine consistent with a UTI, and CT evidence of pyelonephritis. On this scan, his appendix was mildly dilated with surrounding inflammation and without a fecalith.     Allergies: Contrast  PMHx: Above. On Xarelto at home  PSHx: Open umbilical hernia repair in ~2008.    ROS  per HPI    Vital Signs (Most Recent)  Temp: 97.9 °F (36.6 °C) (05/05/24 0724)  Pulse: (!) 57 (05/05/24 0758)  Resp: 17 (05/05/24 0758)  BP: 99/67 (05/05/24 0724)  SpO2: 98 % (05/05/24 0758)    Physical Exam  Vitals reviewed.   Constitutional:       General: He is not in acute distress.     Appearance: Normal appearance. He is obese. He is ill-appearing.   HENT:      Head: Normocephalic.   Eyes:      Extraocular Movements: Extraocular movements intact.      Conjunctiva/sclera: Conjunctivae normal.   Cardiovascular:      Rate and Rhythm: Normal rate and regular rhythm.   Pulmonary:      Effort: Pulmonary effort is normal. No respiratory distress.   Abdominal:      Comments: Obese. Mild tenderness in the RLQ. No local or generalized peritonitis. No " rebound or guarding. Nontender abdomen elsewhere. Mild tenderness in the flanks bilaterally.    Musculoskeletal:         General: No signs of injury.      Cervical back: Normal range of motion and neck supple.      Right lower leg: No edema.      Left lower leg: No edema.   Skin:     General: Skin is warm and dry.   Neurological:      General: No focal deficit present.      Mental Status: He is alert and oriented to person, place, and time.           Labs-   -Cr stable at 8.3 without improvement  -Still leukopenic  -Therapeutic PTT (was supra overnight)    Imaging-   -appendix is mildly dilated with surrounding inflammation and without a fecalith. There are inflammatory changes surrounding the kidney bilaterally.    Assessment and Plan:  63 y.o. male AFib (on Xarelto at home, hep gtt here), BMI 45, type 2 diabetes, hyperlipidemia, hypothyroidism, chronic leukopenia, and HTN who presented from home with multiple symptoms and was found to be in acute renal failure. There was concern for acute appendicitis on imaging. While he does have some RLQ tenderness, I believe that this is better explained by reactive changes from his ongoing ascending UTI. This could explain the imaging changes, his atypical/mild pain/tenderness, and atypical duration of symptoms. He does not have a fecalith on exam, so even if this were acute appendicitis superimposed on his UTI, this should improve/resolve with IV Abx. We will continue to monitor him to ensure continued improvement. He is a suboptimal surgical candidate in his current condition.    -Agree with IV Abx  -OK for diet as tolerated from our standpoint  -OK to continue heparin gtt.  -Remainder of care per primary    We will continue to follow.    Joseluis Honeycutt MD  General Surgery PGY-4  05/05/2024

## 2024-05-05 NOTE — NURSING
Pt Aptt 138.6, stopped the infusion, ordered for a STAT re draw per nomogram. VS stable, no any signs of bleeding and pt has no any complaint. Informed MD Derrick.

## 2024-05-05 NOTE — ASSESSMENT & PLAN NOTE
-patient with complaints difficulty urinating and dysuria with MICHELLE on likely CKD  -CT abdomen showed bilateral perinephric stranding which extends inferiorly along the ureteral courses and in the right lower quadrant periappendiceal region. Potential ascending urinary tract infection  -Urine culture with Enterococcus species, identification speciation pending   -continue IV ceftriaxone

## 2024-05-05 NOTE — ASSESSMENT & PLAN NOTE
Body mass index is 44.7 kg/m². Morbid obesity complicates all aspects of disease management from diagnostic modalities to treatment. Weight loss encouraged and health benefits explained to patient.

## 2024-05-05 NOTE — ASSESSMENT & PLAN NOTE
Patient's FSGs are controlled on current medication regimen.  Last A1c reviewed-   Lab Results   Component Value Date    HGBA1C 6.3 (H) 08/08/2022     Most recent fingerstick glucose reviewed-   Recent Labs   Lab 05/04/24 2112 05/05/24 0724 05/05/24  1106   POCTGLUCOSE 76 107 95     Current correctional scale  Low  Maintain anti-hyperglycemic dose as follows-   Antihyperglycemics (From admission, onward)    Start     Stop Route Frequency Ordered    05/04/24 2049  insulin aspart U-100 pen 0-5 Units         -- SubQ Before meals & nightly PRN 05/04/24 1949        Hold Oral hypoglycemics while patient is in the hospital.

## 2024-05-05 NOTE — SUBJECTIVE & OBJECTIVE
Past Medical History:   Diagnosis Date    Acquired hypothyroidism     Atrial fibrillation 09/16/2019    Diabetes mellitus     High cholesterol     History of DVT (deep vein thrombosis)     History of pulmonary embolism     Hypertension     LARRY (obstructive sleep apnea)     S/P IVC filter        Past Surgical History:   Procedure Laterality Date    THYROIDECTOMY, PARTIAL  2006    TONSILLECTOMY      TREATMENT OF CARDIAC ARRHYTHMIA  9/16/2019    Procedure: Cardioversion or Defibrillation;  Surgeon: Deven Vasquez MD;  Location: [facility] CATH LAB;  Service: Cardiology;;       Review of patient's allergies indicates:   Allergen Reactions    Contrast media Hives       Current Facility-Administered Medications   Medication Dose Route Frequency Provider Last Rate Last Admin    0.9%  NaCl infusion   Intravenous Continuous Chaka Meza MD        [START ON 5/5/2024] cefTRIAXone (ROCEPHIN) 2 g in dextrose 5 % in water (D5W) 100 mL IVPB (MB+)  2 g Intravenous Q24H Chaka Meza MD        metronidazole IVPB 500 mg  500 mg Intravenous Q8H Leia Cruz  mL/hr at 05/04/24 1943 500 mg at 05/04/24 1943     Current Outpatient Medications   Medication Sig Dispense Refill    acetaminophen (TYLENOL) 500 MG tablet Take 500 mg by mouth every 6 (six) hours as needed for Pain.      amiodarone (PACERONE) 200 MG Tab Take 1 tablet (200 mg total) by mouth once daily. 90 tablet 3    cholecalciferol, vitamin D3, (VITAMIN D3) 50 mcg (2,000 unit) Tab Take by mouth once daily.      doxycycline (VIBRA-TABS) 100 MG tablet Take 1 tablet (100 mg total) by mouth every 12 (twelve) hours. 18 tablet 0    EPINEPHrine (EPIPEN) 0.3 mg/0.3 mL AtIn       ergocalciferol (ERGOCALCIFEROL) 50,000 unit Cap Take 50,000 Units by mouth every 7 days.      ferrous sulfate 325 (65 FE) MG EC tablet TAKE 1 TABLET EVERY DAY (Patient taking differently: 325 mg.) 30 tablet 0    gabapentin (NEURONTIN) 600 MG tablet Take 600 mg by mouth 3 (three) times daily.       HYDROcodone-acetaminophen (NORCO)  mg per tablet       levothyroxine (SYNTHROID) 200 MCG tablet Take 200 mcg by mouth before breakfast.      metformin (GLUCOPHAGE) 1000 MG tablet Take 1,000 mg by mouth 2 (two) times daily with meals.      metoprolol tartrate (LOPRESSOR) 25 MG tablet Take 1 tablet (25 mg total) by mouth 2 (two) times daily. 60 tablet 11    MOVANTIK 25 mg tablet Take 25 mg by mouth once daily.      olmesartan (BENICAR) 40 MG tablet Take 1 tablet (40 mg total) by mouth once daily. 90 tablet 3    omega-3 fatty acids/fish oil (FISH OIL-OMEGA-3 FATTY ACIDS) 300-1,000 mg capsule Take 1 capsule by mouth 2 (two) times a day.      omeprazole (PRILOSEC) 20 MG capsule       rivaroxaban (XARELTO) 20 mg Tab Take 1 tablet (20 mg total) by mouth daily with dinner or evening meal. 30 tablet 6    rosuvastatin (CRESTOR) 40 MG Tab Take 10 mg by mouth every evening.      sildenafiL (VIAGRA) 50 MG tablet Take 1 tablet (50 mg total) by mouth daily as needed for Erectile Dysfunction. 10 tablet 11    testosterone 200 mg Pllt by Implant route.      triamcinolone acetonide 0.5% (KENALOG) 0.5 % Crea APPLY TOPICALLY A SMALL AMOUNT TO THE AFFECTED AREA(S) ONCE DAILY      TRUE METRIX GLUCOSE TEST STRIP Strp       TRUEPLUS LANCETS 33 gauge Misc        Family History    Family history is unknown by patient.       Tobacco Use    Smoking status: Never    Smokeless tobacco: Never   Substance and Sexual Activity    Alcohol use: Not Currently     Comment: occasionally    Drug use: No    Sexual activity: Not Currently     Review of Systems   Constitutional:  Positive for activity change and appetite change.   HENT: Negative.     Eyes: Negative.    Respiratory: Negative.     Cardiovascular: Negative.    Gastrointestinal:  Positive for abdominal distention, abdominal pain, diarrhea, nausea and vomiting.   Endocrine: Negative.    Genitourinary:  Positive for difficulty urinating and dysuria.   Musculoskeletal: Negative.    Skin:  Negative.    Allergic/Immunologic: Negative.    Neurological:  Positive for weakness (Generalized).   Psychiatric/Behavioral: Negative.       Objective:     Vital Signs (Most Recent):  Temp: 98.4 °F (36.9 °C) (05/04/24 1456)  Pulse: (!) 58 (05/04/24 1715)  Resp: 19 (05/04/24 1715)  BP: (!) 137/54 (05/04/24 1715)  SpO2: 100 % (05/04/24 1715) Vital Signs (24h Range):  Temp:  [98.4 °F (36.9 °C)] 98.4 °F (36.9 °C)  Pulse:  [58-68] 58  Resp:  [18-19] 19  SpO2:  [99 %-100 %] 100 %  BP: (121-137)/(54-77) 137/54     Weight: (!) 137.9 kg (304 lb)  Body mass index is 44.89 kg/m².     Physical Exam  Vitals and nursing note reviewed.   Constitutional:       General: He is not in acute distress.     Appearance: Normal appearance. He is not ill-appearing.   HENT:      Head: Normocephalic and atraumatic.      Nose: Nose normal.      Mouth/Throat:      Mouth: Mucous membranes are moist.   Eyes:      Extraocular Movements: Extraocular movements intact.   Cardiovascular:      Rate and Rhythm: Normal rate.      Pulses: Normal pulses.      Heart sounds: No murmur heard.  Pulmonary:      Effort: Pulmonary effort is normal. No respiratory distress.   Abdominal:      General: Abdomen is flat. There is distension.      Palpations: Abdomen is soft.      Tenderness: There is no abdominal tenderness (minimal to no tenderness).   Musculoskeletal:      Right lower leg: No edema.      Left lower leg: No edema.   Skin:     General: Skin is warm.      Capillary Refill: Capillary refill takes less than 2 seconds.   Neurological:      General: No focal deficit present.      Mental Status: He is alert.   Psychiatric:         Mood and Affect: Mood normal.                Significant Labs: All pertinent labs within the past 24 hours have been reviewed.    Significant Imaging: I have reviewed all pertinent imaging results/findings within the past 24 hours.

## 2024-05-05 NOTE — HPI
This is a 63-year-old male with a past medical history of AFib (on Xarelto), hypertension, type 2 diabetes, hyperlipidemia, hypothyroidism, chronic leukopenia, morbid obesity, who presents with abdominal pain.    Patient presents with abdominal pain that started 4 days prior to presentation.  He reports that his symptoms started after eating [employer] coleslaw.  He endorsed diarrhea, and right lower quadrant abdominal pain.  Associated symptoms include decreased p.o. intake, decreased appetite, generalized weakness, leg cramping, nausea and vomiting.  Additionally, he endorses decreased urination and intermittent dysuria.    In the ED, the patient was hemodynamically stable.  Labs remarkable for an elevated creatinine (8.3 baseline of 0.9), leukopenia (1.4- below baseline), microcytic anemia (9.6), elevated BNP (123).  VBG showed a pH of 7.24, HC03 of 18.1.  UA showed +3 leukocyte esterase, 46 RBCs, 42 WBCs, +1 protein, and many bacteria.  CT abdomen and pelvis showed findings suggestive of acute appendicitis, with bilateral perinephric stranding/possible ascending UTI without evidence of hydronephrosis. Surgery recommended medical management.  Patient was given 500 mL of LR, ceftriaxone, Flagyl.  He was admitted for further management.

## 2024-05-05 NOTE — CONSULTS
63 y o m with hx of dm htn a fib DVT/PE LARRY BPH chronic leukopenia comes in with abd pain. HAs hx of poor or no uo x 3-4 days. Denies use of NSAID or any otc meds. Denies dysuria (prior to admit now that he has a brandt he has dysuria)    Renal input requested to help eval and treat rising bun and creat. His baseline creat is @ 0.9 7/23 now as high as 8.3    His ua has 1 + prot pos LE and rbc/wbc    VVG 14/42/7.24     On admission it was noted that he was opn  metformin and benicar     Denies CNS ENT CP GI RHEUM OR DERM SX  Past Medical History:   Diagnosis Date    Acquired hypothyroidism     Atrial fibrillation 09/16/2019    Diabetes mellitus     High cholesterol     History of DVT (deep vein thrombosis)     History of pulmonary embolism     Hypertension     LARRY (obstructive sleep apnea)     S/P IVC filter      Past Surgical History:   Procedure Laterality Date    THYROIDECTOMY, PARTIAL  2006    TONSILLECTOMY      TREATMENT OF CARDIAC ARRHYTHMIA  9/16/2019    Procedure: Cardioversion or Defibrillation;  Surgeon: Deven Vasquez MD;  Location: [facility] CATH LAB;  Service: Cardiology;;     Review of patient's allergies indicates:   Allergen Reactions    Contrast media Hives       Current Facility-Administered Medications   Medication Dose Route Frequency Provider Last Rate Last Admin    0.9%  NaCl infusion   Intravenous Continuous Keri Porras-Ana PEARSON,  mL/hr at 05/05/24 0648 New Bag at 05/05/24 0648    acetaminophen tablet 650 mg  650 mg Oral Q8H PRN Chaka Meza MD        acetaminophen tablet 650 mg  650 mg Oral Q4H PRN Chaka Meza MD        albuterol-ipratropium 2.5 mg-0.5 mg/3 mL nebulizer solution 3 mL  3 mL Nebulization Q4H PRN Chaka Meza MD        aluminum-magnesium hydroxide-simethicone 200-200-20 mg/5 mL suspension 30 mL  30 mL Oral QID PRN Chaka Meza MD        amiodarone tablet 200 mg  200 mg Oral Daily Chaka Meza MD   200 mg at 05/05/24 0850    atorvastatin tablet 80 mg  80 mg Oral Daily  Chaka Meza MD   80 mg at 05/05/24 0850    cefTRIAXone (ROCEPHIN) 2 g in dextrose 5 % in water (D5W) 100 mL IVPB (MB+)  2 g Intravenous Q24H Chaka Meza MD        dextrose 10% bolus 125 mL 125 mL  12.5 g Intravenous PRN Chaka Meza MD        dextrose 10% bolus 250 mL 250 mL  25 g Intravenous PRN Chaka Meza MD        gabapentin capsule 600 mg  600 mg Oral TID PRN Chaka Meza MD        glucagon (human recombinant) injection 1 mg  1 mg Intramuscular PRN Chaka Meza MD        glucose chewable tablet 16 g  16 g Oral PRN Chaka Meza MD        glucose chewable tablet 24 g  24 g Oral PRN Chaka Meza MD        heparin 25,000 units in dextrose 5% (100 units/ml) IV bolus from bag LOW INTENSITY nomogram - [facility]  60 Units/kg (Adjusted) Intravenous PRN Chaka Meza MD        heparin 25,000 units in dextrose 5% (100 units/ml) IV bolus from bag LOW INTENSITY nomogram - [facility]  30 Units/kg (Adjusted) Intravenous PRN Chaka Meza MD        heparin 25,000 units in dextrose 5% 250 mL (100 units/mL) infusion LOW INTENSITY nomogram - [facility]  0-40 Units/kg/hr (Adjusted) Intravenous Continuous Chaka Meza MD 7.8 mL/hr at 05/05/24 0645 8 Units/kg/hr at 05/05/24 0645    insulin aspart U-100 pen 0-5 Units  0-5 Units Subcutaneous QID (AC + HS) PRN Chaka Meza MD        levothyroxine tablet 200 mcg  200 mcg Oral Before breakfast Chaka Meza MD   200 mcg at 05/05/24 0556    magnesium oxide tablet 800 mg  800 mg Oral PRN Chaka Meza MD        magnesium oxide tablet 800 mg  800 mg Oral PRN Chaka Meza MD        melatonin tablet 6 mg  6 mg Oral Nightly PRN Chaka Meza MD        metoprolol tartrate (LOPRESSOR) split tablet 12.5 mg  12.5 mg Oral BID Chaka Meza MD        metronidazole IVPB 500 mg  500 mg Intravenous Q8H Leia Cruz MD   Stopped at 05/05/24 0323    naloxone 0.4 mg/mL injection 0.02 mg  0.02 mg Intravenous PRN Chaka Meza MD        ondansetron injection 4 mg  4 mg Intravenous Q8H PRN Chaka Meza MD         polyethylene glycol packet 17 g  17 g Oral Daily Yesenia Cates PA-C   17 g at 05/04/24 2310    potassium bicarbonate disintegrating tablet 35 mEq  35 mEq Oral PRN Chaka Meza MD        potassium bicarbonate disintegrating tablet 50 mEq  50 mEq Oral PRN Chaka Meza MD        potassium bicarbonate disintegrating tablet 60 mEq  60 mEq Oral PRN Chaka Meza MD        potassium, sodium phosphates 280-160-250 mg packet 2 packet  2 packet Oral PRN Chaka Meza MD        potassium, sodium phosphates 280-160-250 mg packet 2 packet  2 packet Oral PRN SalChaka cullen MD        potassium, sodium phosphates 280-160-250 mg packet 2 packet  2 packet Oral PRN Chaka Meza MD        prochlorperazine injection Soln 5 mg  5 mg Intravenous Q6H PRChaka Wiley MD        simethicone chewable tablet 80 mg  1 tablet Oral QID PRN Chaka Meza MD   80 mg at 05/04/24 2310    sodium chloride 0.9% flush 10 mL  10 mL Intravenous Q12H PRN Chaka Meza MD           LABS    Recent Results (from the past 24 hour(s))   CBC auto differential    Collection Time: 05/04/24  3:37 PM   Result Value Ref Range    WBC 1.47 (LL) 3.90 - 12.70 K/uL    RBC 4.22 (L) 4.60 - 6.20 M/uL    Hemoglobin 9.6 (L) 14.0 - 18.0 g/dL    Hematocrit 30.5 (L) 40.0 - 54.0 %    MCV 72 (L) 82 - 98 fL    MCH 22.7 (L) 27.0 - 31.0 pg    MCHC 31.5 (L) 32.0 - 36.0 g/dL    RDW 18.1 (H) 11.5 - 14.5 %    Platelets 249 150 - 450 K/uL    MPV 9.7 9.2 - 12.9 fL    Immature Granulocytes Test Not Performed 0.0 - 0.5 %    Gran # (ANC) Test Not Performed 1.8 - 7.7 K/uL    Immature Grans (Abs) Test Not Performed 0.00 - 0.04 K/uL    Lymph # Test Not Performed 1.0 - 4.8 K/uL    Mono # Test Not Performed 0.3 - 1.0 K/uL    Eos # Test Not Performed 0.0 - 0.5 K/uL    Baso # Test Not Performed 0.00 - 0.20 K/uL    nRBC 0 0 /100 WBC    Gran % 51.0 38.0 - 73.0 %    Lymph % 46.0 18.0 - 48.0 %    Mono % 0.0 (L) 4.0 - 15.0 %    Eosinophil % 3.0 0.0 - 8.0 %    Basophil % 0.0 0.0 - 1.9 %     Platelet Estimate Appears normal     Aniso Slight     Poik Slight     Ovalocytes Occasional     Mandi Cells Occasional     Differential Method Manual    Comprehensive metabolic panel    Collection Time: 05/04/24  3:37 PM   Result Value Ref Range    Sodium 130 (L) 136 - 145 mmol/L    Potassium 5.0 3.5 - 5.1 mmol/L    Chloride 100 95 - 110 mmol/L    CO2 15 (L) 23 - 29 mmol/L    Glucose 89 70 - 110 mg/dL    BUN 65 (H) 8 - 23 mg/dL    Creatinine 8.3 (H) 0.5 - 1.4 mg/dL    Calcium 7.6 (L) 8.7 - 10.5 mg/dL    Total Protein 6.9 6.0 - 8.4 g/dL    Albumin 3.0 (L) 3.5 - 5.2 g/dL    Total Bilirubin 0.4 0.1 - 1.0 mg/dL    Alkaline Phosphatase 52 (L) 55 - 135 U/L    AST 16 10 - 40 U/L    ALT 13 10 - 44 U/L    eGFR 7 (A) >60 mL/min/1.73 m^2    Anion Gap 15 8 - 16 mmol/L   Brain natriuretic peptide    Collection Time: 05/04/24  3:37 PM   Result Value Ref Range     (H) 0 - 99 pg/mL   Lipase    Collection Time: 05/04/24  3:37 PM   Result Value Ref Range    Lipase 32 4 - 60 U/L   Magnesium    Collection Time: 05/04/24  3:37 PM   Result Value Ref Range    Magnesium 1.9 1.6 - 2.6 mg/dL   CK    Collection Time: 05/04/24  3:37 PM   Result Value Ref Range     (H) 20 - 200 U/L   Urinalysis, Reflex to Urine Culture Urine, Clean Catch    Collection Time: 05/04/24  3:41 PM    Specimen: Urine, Clean Catch   Result Value Ref Range    Specimen UA Urine, Clean Catch     Color, UA Yellow Yellow, Straw, [place]    Appearance, UA Hazy (A) Clear    pH, UA 5.0 5.0 - 8.0    Specific Gravity, UA 1.010 1.005 - 1.030    Protein, UA 1+ (A) Negative    Glucose, UA Negative Negative    Ketones, UA Negative Negative    Bilirubin (UA) Negative Negative    Occult Blood UA 2+ (A) Negative    Nitrite, UA Negative Negative    Urobilinogen, UA Negative <2.0 EU/dL    Leukocytes, UA 3+ (A) Negative   Urinalysis Microscopic    Collection Time: 05/04/24  3:41 PM   Result Value Ref Range    RBC, UA 46 (H) 0 - 4 /hpf    WBC, UA 42 (H) 0 - 5 /hpf    Bacteria  Many (A) None-Occ /hpf    Yeast, UA Rare (A) None    Squam Epithel, UA 3 /hpf    Hyaline Casts, UA 6 (A) 0-1/lpf /lpf    Amorphous, UA Rare None-Moderate    Microscopic Comment SEE COMMENT    Lactic acid, plasma    Collection Time: 05/04/24  5:24 PM   Result Value Ref Range    Lactate (Lactic Acid) 1.4 0.5 - 2.2 mmol/L   ISTAT PROCEDURE    Collection Time: 05/04/24  5:28 PM   Result Value Ref Range    POC PH 7.241 (LL) 7.35 - 7.45    POC PCO2 42.3 35 - 45 mmHg    POC PO2 14 (LL) 40 - 60 mmHg    POC HCO3 18.1 (L) 24 - 28 mmol/L    POC BE -9 (L) -2 to 2 mmol/L    POC SATURATED O2 13 95 - 100 %    POC TCO2 19 (L) 24 - 29 mmol/L    Sample VENOUS     Site Other     Allens Test N/A    POCT glucose    Collection Time: 05/04/24  9:12 PM   Result Value Ref Range    POCT Glucose 76 70 - 110 mg/dL   APTT    Collection Time: 05/04/24  9:22 PM   Result Value Ref Range    aPTT 59.5 (H) 21.0 - 32.0 sec   Protime-INR    Collection Time: 05/04/24  9:22 PM   Result Value Ref Range    Prothrombin Time 12.8 (H) 9.0 - 12.5 sec    INR 1.2 0.8 - 1.2   CBC auto differential    Collection Time: 05/04/24  9:22 PM   Result Value Ref Range    WBC 1.31 (LL) 3.90 - 12.70 K/uL    RBC 4.49 (L) 4.60 - 6.20 M/uL    Hemoglobin 10.1 (L) 14.0 - 18.0 g/dL    Hematocrit 33.4 (L) 40.0 - 54.0 %    MCV 74 (L) 82 - 98 fL    MCH 22.5 (L) 27.0 - 31.0 pg    MCHC 30.2 (L) 32.0 - 36.0 g/dL    RDW 17.9 (H) 11.5 - 14.5 %    Platelets 280 150 - 450 K/uL    MPV 9.0 (L) 9.2 - 12.9 fL    Immature Granulocytes CANCELED 0.0 - 0.5 %    Immature Grans (Abs) CANCELED 0.00 - 0.04 K/uL    Lymph # CANCELED 1.0 - 4.8 K/uL    Mono # CANCELED 0.3 - 1.0 K/uL    Eos # CANCELED 0.0 - 0.5 K/uL    Baso # CANCELED 0.00 - 0.20 K/uL    nRBC 0 0 /100 WBC    Gran % 47.0 38.0 - 73.0 %    Lymph % 45.0 18.0 - 48.0 %    Mono % 1.0 (L) 4.0 - 15.0 %    Eosinophil % 7.0 0.0 - 8.0 %    Basophil % 0.0 0.0 - 1.9 %    Platelet Estimate Appears normal     Aniso Slight     Poik Moderate     Ovalocytes  Occasional     [place] Cells Moderate     Differential Method Manual    Basic metabolic panel    Collection Time: 05/04/24  9:22 PM   Result Value Ref Range    Sodium 130 (L) 136 - 145 mmol/L    Potassium 4.7 3.5 - 5.1 mmol/L    Chloride 100 95 - 110 mmol/L    CO2 15 (L) 23 - 29 mmol/L    Glucose 84 70 - 110 mg/dL    BUN 64 (H) 8 - 23 mg/dL    Creatinine 8.2 (H) 0.5 - 1.4 mg/dL    Calcium 7.2 (L) 8.7 - 10.5 mg/dL    Anion Gap 15 8 - 16 mmol/L    eGFR 7 (A) >60 mL/min/1.73 m^2   APTT    Collection Time: 05/05/24  3:30 AM   Result Value Ref Range    aPTT 138.6 (H) 21.0 - 32.0 sec   CBC auto differential    Collection Time: 05/05/24  3:30 AM   Result Value Ref Range    WBC 1.14 (LL) 3.90 - 12.70 K/uL    RBC 4.04 (L) 4.60 - 6.20 M/uL    Hemoglobin 9.3 (L) 14.0 - 18.0 g/dL    Hematocrit 29.8 (L) 40.0 - 54.0 %    MCV 74 (L) 82 - 98 fL    MCH 23.0 (L) 27.0 - 31.0 pg    MCHC 31.2 (L) 32.0 - 36.0 g/dL    RDW 18.0 (H) 11.5 - 14.5 %    Platelets 234 150 - 450 K/uL    MPV 8.9 (L) 9.2 - 12.9 fL    Immature Granulocytes 0.0 0.0 - 0.5 %    Gran # (ANC) 0.5 (L) 1.8 - 7.7 K/uL    Immature Grans (Abs) 0.00 0.00 - 0.04 K/uL    Lymph # 0.5 (L) 1.0 - 4.8 K/uL    Mono # 0.0 (L) 0.3 - 1.0 K/uL    Eos # 0.1 0.0 - 0.5 K/uL    Baso # 0.01 0.00 - 0.20 K/uL    nRBC 0 0 /100 WBC    Gran % 41.2 38.0 - 73.0 %    Lymph % 45.6 18.0 - 48.0 %    Mono % 1.8 (L) 4.0 - 15.0 %    Eosinophil % 10.5 (H) 0.0 - 8.0 %    Basophil % 0.9 0.0 - 1.9 %    Platelet Estimate Appears normal     Aniso Slight     Poik Moderate     Ovalocytes Moderate     Mandi Cells Moderate     Acanthocytes Present     Differential Method Automated    Phosphorus    Collection Time: 05/05/24  3:30 AM   Result Value Ref Range    Phosphorus 5.8 (H) 2.7 - 4.5 mg/dL   Magnesium    Collection Time: 05/05/24  3:30 AM   Result Value Ref Range    Magnesium 1.8 1.6 - 2.6 mg/dL   Comprehensive Metabolic Panel    Collection Time: 05/05/24  3:30 AM   Result Value Ref Range    Sodium 131 (L) 136 - 145  "mmol/L    Potassium 5.1 3.5 - 5.1 mmol/L    Chloride 102 95 - 110 mmol/L    CO2 16 (L) 23 - 29 mmol/L    Glucose 98 70 - 110 mg/dL    BUN 65 (H) 8 - 23 mg/dL    Creatinine 8.3 (H) 0.5 - 1.4 mg/dL    Calcium 6.8 (LL) 8.7 - 10.5 mg/dL    Total Protein 6.1 6.0 - 8.4 g/dL    Albumin 2.7 (L) 3.5 - 5.2 g/dL    Total Bilirubin 0.4 0.1 - 1.0 mg/dL    Alkaline Phosphatase 47 (L) 55 - 135 U/L    AST 15 10 - 40 U/L    ALT 11 10 - 44 U/L    eGFR 7 (A) >60 mL/min/1.73 m^2    Anion Gap 13 8 - 16 mmol/L   Sodium, urine, random    Collection Time: 05/05/24  4:20 AM   Result Value Ref Range    Sodium, Urine 29 20 - 250 mmol/L   Creatinine, urine, random    Collection Time: 05/05/24  4:20 AM   Result Value Ref Range    Creatinine, Urine 54.0 23.0 - 375.0 mg/dL   APTT    Collection Time: 05/05/24  4:32 AM   Result Value Ref Range    aPTT 129.2 (H) 21.0 - 32.0 sec   Iron and TIBC    Collection Time: 05/05/24  4:32 AM   Result Value Ref Range    Iron 54 45 - 160 ug/dL    Transferrin 159 (L) 200 - 375 mg/dL    TIBC 235 (L) 250 - 450 ug/dL    Saturated Iron 23 20 - 50 %   APTT    Collection Time: 05/05/24  6:06 AM   Result Value Ref Range    aPTT 68.9 (H) 21.0 - 32.0 sec   POCT glucose    Collection Time: 05/05/24  7:24 AM   Result Value Ref Range    POCT Glucose 107 70 - 110 mg/dL   ]    I/O last 3 completed shifts:  In: 240 [P.O.:240]  Out: 550 [Urine:550]    Vitals:    05/05/24 0420 05/05/24 0724 05/05/24 0758 05/05/24 0835   BP: (!) 99/58 99/67     Pulse: (!) 57 (!) 56 (!) 57    Resp: 18 18 17    Temp: 97.7 °F (36.5 °C) 97.9 °F (36.6 °C)     TempSrc: Oral Oral     SpO2: 98% 99% 98%    Weight:    (!) 137.3 kg (302 lb 11.1 oz)   Height:    5' 9" (1.753 m)       No Jvd, Thyromegaly or Lymphadenopathy  Lungs: Fairly clear anteriorly and laterally  Cor: RRR no G or rubs  Abd: Soft benign good bowel sounds non tender  Ext: Pos edema     A)    MICHELLE on probably ckd  Abd normal us suggesting infection   Low bp was on benicar  Met acidosis and " midl resp acidosis   Dm  Htn by hx  Venkata  Afib  Hx of dvt  Obesity   No urinary obstruction as per ct abd     P)    Rehydrate  Hold metformin and benicar  Renal Diet  Home meds  Protect access  HD might be needed it   EPO prn   Binders prn  Check uric acid and pthi treat as needed   Adjust all meds to the degree of renal fx  Close follow up I/O and weights  Maintain Hydration   Culture and antibiotx recommended

## 2024-05-05 NOTE — ASSESSMENT & PLAN NOTE
-CT abdomen and pelvis showed findings suggestive of acute appendicitis, with bilateral perinephric stranding/possible ascending UTI.  -General surgery recommended medical management  -Continue ceftriaxone/Flagyl  -Symptomatic control

## 2024-05-05 NOTE — NURSING
[facility] [place]  Nurses Note -- 4 Eyes      5/4/2024       Skin assessed on: Admit      [x] No Pressure Injuries Present    []Prevention Measures Documented    [] Yes LDA  for Pressure Injury Previously documented     [] Yes New Pressure Injury Discovered   [] LDA for New Pressure Injury Added      Attending RN:  Bonifacio Mcclelland RN     Second RN:  LAM Cm

## 2024-05-05 NOTE — PLAN OF CARE
Problem: Adult Inpatient Plan of Care  Goal: Absence of Hospital-Acquired Illness or Injury  Outcome: Progressing  Intervention: Identify and Manage Fall Risk  Flowsheets (Taken 5/5/2024 0411)  Safety Promotion/Fall Prevention:   assistive device/personal item within reach   medications reviewed   lighting adjusted   side rails raised x 2   nonskid shoes/socks when out of bed   room near unit station  Goal: Optimal Comfort and Wellbeing  Outcome: Progressing  Intervention: Monitor Pain and Promote Comfort  Flowsheets (Taken 5/5/2024 0411)  Pain Management Interventions: medication offered     Problem: Bariatric Environmental Safety  Goal: Safety Maintained with Care  Outcome: Progressing     Problem: Diabetes Comorbidity  Goal: Blood Glucose Level Within Targeted Range  Outcome: Progressing  Intervention: Monitor and Manage Glycemia  Flowsheets (Taken 5/5/2024 0411)  Glycemic Management: blood glucose monitored

## 2024-05-05 NOTE — ASSESSMENT & PLAN NOTE
Chronic, controlled. Latest blood pressure and vitals reviewed-     Temp:  [97.5 °F (36.4 °C)-97.9 °F (36.6 °C)]   Pulse:  [56-60]   Resp:  [17-20]   BP: ()/(54-78)   SpO2:  [97 %-100 %] .   Home meds for hypertension were reviewed and noted below.   Hypertension Medications               metoprolol tartrate (LOPRESSOR) 25 MG tablet Take 1 tablet (25 mg total) by mouth 2 (two) times daily.    olmesartan (BENICAR) 40 MG tablet Take 1 tablet (40 mg total) by mouth once daily.            While in the hospital, will manage blood pressure as follows; Adjust home antihypertensive regimen as follows- hold nephrotoxic medications, monitor BP    Will utilize p.r.n. blood pressure medication only if patient's blood pressure greater than 180/110 and he develops symptoms such as worsening chest pain or shortness of breath.

## 2024-05-05 NOTE — NURSING
Admitted pt from ER, AAOx4, transferred to bed independently. Vital signs stable. No complaints of Nausea and Vomiting, Pain nor lightheadedness. Morse catheter on gravity. Heparin started at 12units/kg, infusing. Bed in low position, wheels locked.Side rails up. Call light within reach. Instructed NPO after midnight.

## 2024-05-06 ENCOUNTER — TELEPHONE (OUTPATIENT)
Dept: HEMATOLOGY/ONCOLOGY | Facility: CLINIC | Age: 64
End: 2024-05-06
Payer: MEDICARE

## 2024-05-06 LAB
ACANTHOCYTES BLD QL SMEAR: PRESENT
ACANTHOCYTES BLD QL SMEAR: PRESENT
ALBUMIN SERPL BCP-MCNC: 2.6 G/DL (ref 3.5–5.2)
ANION GAP SERPL CALC-SCNC: 13 MMOL/L (ref 8–16)
ANISOCYTOSIS BLD QL SMEAR: SLIGHT
ANISOCYTOSIS BLD QL SMEAR: SLIGHT
APTT PPP: 49.8 SEC (ref 21–32)
BACTERIA UR CULT: ABNORMAL
BASOPHILS # BLD AUTO: 0 K/UL (ref 0–0.2)
BASOPHILS # BLD AUTO: 0.01 K/UL (ref 0–0.2)
BASOPHILS NFR BLD: 0 % (ref 0–1.9)
BASOPHILS NFR BLD: 1 % (ref 0–1.9)
BUN SERPL-MCNC: 61 MG/DL (ref 8–23)
BURR CELLS BLD QL SMEAR: ABNORMAL
BURR CELLS BLD QL SMEAR: ABNORMAL
CA-I BLDV-SCNC: 0.89 MMOL/L (ref 1.06–1.42)
CALCIUM SERPL-MCNC: 6.4 MG/DL (ref 8.7–10.5)
CHLORIDE SERPL-SCNC: 107 MMOL/L (ref 95–110)
CO2 SERPL-SCNC: 14 MMOL/L (ref 23–29)
CREAT SERPL-MCNC: 7.4 MG/DL (ref 0.5–1.4)
DIFFERENTIAL METHOD BLD: ABNORMAL
DIFFERENTIAL METHOD BLD: ABNORMAL
EOSINOPHIL # BLD AUTO: 0.1 K/UL (ref 0–0.5)
EOSINOPHIL # BLD AUTO: 0.1 K/UL (ref 0–0.5)
EOSINOPHIL NFR BLD: 4.5 % (ref 0–8)
EOSINOPHIL NFR BLD: 5.9 % (ref 0–8)
ERYTHROCYTE [DISTWIDTH] IN BLOOD BY AUTOMATED COUNT: 17.9 % (ref 11.5–14.5)
ERYTHROCYTE [DISTWIDTH] IN BLOOD BY AUTOMATED COUNT: 18.1 % (ref 11.5–14.5)
EST. GFR  (NO RACE VARIABLE): 8 ML/MIN/1.73 M^2
FOLATE SERPL-MCNC: 7.2 NG/ML (ref 4–24)
GLUCOSE SERPL-MCNC: 74 MG/DL (ref 70–110)
HAV IGM SERPL QL IA: NORMAL
HBV CORE AB SERPL QL IA: NORMAL
HBV CORE IGM SERPL QL IA: NORMAL
HBV SURFACE AG SERPL QL IA: NORMAL
HCT VFR BLD AUTO: 28.4 % (ref 40–54)
HCT VFR BLD AUTO: 29.7 % (ref 40–54)
HCV AB SERPL QL IA: NORMAL
HGB BLD-MCNC: 8.6 G/DL (ref 14–18)
HGB BLD-MCNC: 9.2 G/DL (ref 14–18)
HIV 1+2 AB+HIV1 P24 AG SERPL QL IA: NORMAL
IMM GRANULOCYTES # BLD AUTO: 0 K/UL (ref 0–0.04)
IMM GRANULOCYTES # BLD AUTO: 0 K/UL (ref 0–0.04)
IMM GRANULOCYTES NFR BLD AUTO: 0 % (ref 0–0.5)
IMM GRANULOCYTES NFR BLD AUTO: 0 % (ref 0–0.5)
LYMPHOCYTES # BLD AUTO: 0.5 K/UL (ref 1–4.8)
LYMPHOCYTES # BLD AUTO: 0.6 K/UL (ref 1–4.8)
LYMPHOCYTES NFR BLD: 49.5 % (ref 18–48)
LYMPHOCYTES NFR BLD: 51.8 % (ref 18–48)
MAGNESIUM SERPL-MCNC: 1.7 MG/DL (ref 1.6–2.6)
MCH RBC QN AUTO: 22.6 PG (ref 27–31)
MCH RBC QN AUTO: 22.8 PG (ref 27–31)
MCHC RBC AUTO-ENTMCNC: 30.3 G/DL (ref 32–36)
MCHC RBC AUTO-ENTMCNC: 31 G/DL (ref 32–36)
MCV RBC AUTO: 74 FL (ref 82–98)
MCV RBC AUTO: 75 FL (ref 82–98)
MONOCYTES # BLD AUTO: 0 K/UL (ref 0.3–1)
MONOCYTES # BLD AUTO: 0 K/UL (ref 0.3–1)
MONOCYTES NFR BLD: 1 % (ref 4–15)
MONOCYTES NFR BLD: 1.8 % (ref 4–15)
NEUTROPHILS # BLD AUTO: 0.4 K/UL (ref 1.8–7.7)
NEUTROPHILS # BLD AUTO: 0.5 K/UL (ref 1.8–7.7)
NEUTROPHILS NFR BLD: 41.9 % (ref 38–73)
NEUTROPHILS NFR BLD: 42.6 % (ref 38–73)
NRBC BLD-RTO: 0 /100 WBC
NRBC BLD-RTO: 0 /100 WBC
OVALOCYTES BLD QL SMEAR: ABNORMAL
OVALOCYTES BLD QL SMEAR: ABNORMAL
PATH REV BLD -IMP: NORMAL
PHOSPHATE SERPL-MCNC: 5.4 MG/DL (ref 2.7–4.5)
PLATELET # BLD AUTO: 261 K/UL (ref 150–450)
PLATELET # BLD AUTO: 281 K/UL (ref 150–450)
PLATELET BLD QL SMEAR: ABNORMAL
PLATELET BLD QL SMEAR: ABNORMAL
PMV BLD AUTO: 9.4 FL (ref 9.2–12.9)
PMV BLD AUTO: 9.7 FL (ref 9.2–12.9)
POCT GLUCOSE: 103 MG/DL (ref 70–110)
POCT GLUCOSE: 151 MG/DL (ref 70–110)
POCT GLUCOSE: 75 MG/DL (ref 70–110)
POCT GLUCOSE: 79 MG/DL (ref 70–110)
POIKILOCYTOSIS BLD QL SMEAR: ABNORMAL
POIKILOCYTOSIS BLD QL SMEAR: SLIGHT
POTASSIUM SERPL-SCNC: 4.8 MMOL/L (ref 3.5–5.1)
PTH-INTACT SERPL-MCNC: 129.6 PG/ML (ref 9–77)
PTH-INTACT SERPL-MCNC: 131.3 PG/ML (ref 9–77)
PTH-INTACT SERPL-MCNC: 182.8 PG/ML (ref 9–77)
RBC # BLD AUTO: 3.81 M/UL (ref 4.6–6.2)
RBC # BLD AUTO: 4.03 M/UL (ref 4.6–6.2)
SODIUM SERPL-SCNC: 134 MMOL/L (ref 136–145)
VIT B12 SERPL-MCNC: 226 PG/ML (ref 210–950)
WBC # BLD AUTO: 1.01 K/UL (ref 3.9–12.7)
WBC # BLD AUTO: 1.1 K/UL (ref 3.9–12.7)

## 2024-05-06 PROCEDURE — 94761 N-INVAS EAR/PLS OXIMETRY MLT: CPT

## 2024-05-06 PROCEDURE — 82330 ASSAY OF CALCIUM: CPT | Performed by: STUDENT IN AN ORGANIZED HEALTH CARE EDUCATION/TRAINING PROGRAM

## 2024-05-06 PROCEDURE — 63600175 PHARM REV CODE 636 W HCPCS: Mod: JZ,JG | Performed by: STUDENT IN AN ORGANIZED HEALTH CARE EDUCATION/TRAINING PROGRAM

## 2024-05-06 PROCEDURE — 25000003 PHARM REV CODE 250: Performed by: STUDENT IN AN ORGANIZED HEALTH CARE EDUCATION/TRAINING PROGRAM

## 2024-05-06 PROCEDURE — 82607 VITAMIN B-12: CPT | Performed by: STUDENT IN AN ORGANIZED HEALTH CARE EDUCATION/TRAINING PROGRAM

## 2024-05-06 PROCEDURE — 25000003 PHARM REV CODE 250: Performed by: INTERNAL MEDICINE

## 2024-05-06 PROCEDURE — 99232 SBSQ HOSP IP/OBS MODERATE 35: CPT | Mod: ,,, | Performed by: SURGERY

## 2024-05-06 PROCEDURE — 85060 BLOOD SMEAR INTERPRETATION: CPT | Mod: ,,, | Performed by: PATHOLOGY

## 2024-05-06 PROCEDURE — 86704 HEP B CORE ANTIBODY TOTAL: CPT | Performed by: STUDENT IN AN ORGANIZED HEALTH CARE EDUCATION/TRAINING PROGRAM

## 2024-05-06 PROCEDURE — 82040 ASSAY OF SERUM ALBUMIN: CPT | Performed by: STUDENT IN AN ORGANIZED HEALTH CARE EDUCATION/TRAINING PROGRAM

## 2024-05-06 PROCEDURE — 85025 COMPLETE CBC W/AUTO DIFF WBC: CPT | Mod: 91 | Performed by: STUDENT IN AN ORGANIZED HEALTH CARE EDUCATION/TRAINING PROGRAM

## 2024-05-06 PROCEDURE — 83970 ASSAY OF PARATHORMONE: CPT | Performed by: STUDENT IN AN ORGANIZED HEALTH CARE EDUCATION/TRAINING PROGRAM

## 2024-05-06 PROCEDURE — 80074 ACUTE HEPATITIS PANEL: CPT | Performed by: STUDENT IN AN ORGANIZED HEALTH CARE EDUCATION/TRAINING PROGRAM

## 2024-05-06 PROCEDURE — 99900035 HC TECH TIME PER 15 MIN (STAT)

## 2024-05-06 PROCEDURE — 11000001 HC ACUTE MED/SURG PRIVATE ROOM

## 2024-05-06 PROCEDURE — 82746 ASSAY OF FOLIC ACID SERUM: CPT | Performed by: STUDENT IN AN ORGANIZED HEALTH CARE EDUCATION/TRAINING PROGRAM

## 2024-05-06 PROCEDURE — 80048 BASIC METABOLIC PNL TOTAL CA: CPT | Performed by: STUDENT IN AN ORGANIZED HEALTH CARE EDUCATION/TRAINING PROGRAM

## 2024-05-06 PROCEDURE — 85025 COMPLETE CBC W/AUTO DIFF WBC: CPT | Performed by: STUDENT IN AN ORGANIZED HEALTH CARE EDUCATION/TRAINING PROGRAM

## 2024-05-06 PROCEDURE — 36415 COLL VENOUS BLD VENIPUNCTURE: CPT | Performed by: STUDENT IN AN ORGANIZED HEALTH CARE EDUCATION/TRAINING PROGRAM

## 2024-05-06 PROCEDURE — 25000003 PHARM REV CODE 250

## 2024-05-06 PROCEDURE — 83735 ASSAY OF MAGNESIUM: CPT | Performed by: STUDENT IN AN ORGANIZED HEALTH CARE EDUCATION/TRAINING PROGRAM

## 2024-05-06 PROCEDURE — 84100 ASSAY OF PHOSPHORUS: CPT | Performed by: STUDENT IN AN ORGANIZED HEALTH CARE EDUCATION/TRAINING PROGRAM

## 2024-05-06 PROCEDURE — 99223 1ST HOSP IP/OBS HIGH 75: CPT | Mod: ,,, | Performed by: STUDENT IN AN ORGANIZED HEALTH CARE EDUCATION/TRAINING PROGRAM

## 2024-05-06 PROCEDURE — 27000207 HC ISOLATION

## 2024-05-06 PROCEDURE — 94660 CPAP INITIATION&MGMT: CPT

## 2024-05-06 PROCEDURE — 36415 COLL VENOUS BLD VENIPUNCTURE: CPT | Mod: XB | Performed by: STUDENT IN AN ORGANIZED HEALTH CARE EDUCATION/TRAINING PROGRAM

## 2024-05-06 PROCEDURE — 83921 ORGANIC ACID SINGLE QUANT: CPT | Performed by: STUDENT IN AN ORGANIZED HEALTH CARE EDUCATION/TRAINING PROGRAM

## 2024-05-06 PROCEDURE — 99223 1ST HOSP IP/OBS HIGH 75: CPT | Mod: ,,, | Performed by: INTERNAL MEDICINE

## 2024-05-06 PROCEDURE — 63600175 PHARM REV CODE 636 W HCPCS: Performed by: INTERNAL MEDICINE

## 2024-05-06 PROCEDURE — 87389 HIV-1 AG W/HIV-1&-2 AB AG IA: CPT | Performed by: STUDENT IN AN ORGANIZED HEALTH CARE EDUCATION/TRAINING PROGRAM

## 2024-05-06 PROCEDURE — 85730 THROMBOPLASTIN TIME PARTIAL: CPT | Performed by: STUDENT IN AN ORGANIZED HEALTH CARE EDUCATION/TRAINING PROGRAM

## 2024-05-06 RX ORDER — METRONIDAZOLE 500 MG/1
500 TABLET ORAL EVERY 8 HOURS
Status: DISCONTINUED | OUTPATIENT
Start: 2024-05-06 | End: 2024-05-06

## 2024-05-06 RX ORDER — MUPIROCIN 20 MG/G
OINTMENT TOPICAL 2 TIMES DAILY
Status: DISPENSED | OUTPATIENT
Start: 2024-05-06 | End: 2024-05-11

## 2024-05-06 RX ADMIN — SODIUM BICARBONATE: 84 INJECTION, SOLUTION INTRAVENOUS at 12:05

## 2024-05-06 RX ADMIN — MUPIROCIN 1 G: 20 OINTMENT TOPICAL at 08:05

## 2024-05-06 RX ADMIN — ATORVASTATIN CALCIUM 80 MG: 40 TABLET, FILM COATED ORAL at 09:05

## 2024-05-06 RX ADMIN — METRONIDAZOLE 500 MG: 500 INJECTION, SOLUTION INTRAVENOUS at 02:05

## 2024-05-06 RX ADMIN — SODIUM CHLORIDE: 9 INJECTION, SOLUTION INTRAVENOUS at 02:05

## 2024-05-06 RX ADMIN — LEVOTHYROXINE SODIUM 200 MCG: 0.1 TABLET ORAL at 05:05

## 2024-05-06 RX ADMIN — METOPROLOL TARTRATE 12.5 MG: 25 TABLET, FILM COATED ORAL at 08:05

## 2024-05-06 RX ADMIN — MUPIROCIN: 20 OINTMENT TOPICAL at 10:05

## 2024-05-06 RX ADMIN — SODIUM BICARBONATE: 84 INJECTION, SOLUTION INTRAVENOUS at 09:05

## 2024-05-06 RX ADMIN — POLYETHYLENE GLYCOL 3350 17 G: 17 POWDER, FOR SOLUTION ORAL at 09:05

## 2024-05-06 RX ADMIN — AMIODARONE HYDROCHLORIDE 200 MG: 200 TABLET ORAL at 09:05

## 2024-05-06 RX ADMIN — SODIUM BICARBONATE: 84 INJECTION, SOLUTION INTRAVENOUS at 10:05

## 2024-05-06 RX ADMIN — PIPERACILLIN SODIUM AND TAZOBACTAM SODIUM 4.5 G: 4; .5 INJECTION, POWDER, LYOPHILIZED, FOR SOLUTION INTRAVENOUS at 12:05

## 2024-05-06 NOTE — PROGRESS NOTES
[facility] Surg  General Surgery  Progress Note    Subjective:     History of Present Illness:  No notes on file    Post-Op Info:  * No surgery found *         Interval History: Pain better but still with persistent pain. Cr getting better.     Medications:  Continuous Infusions:  Current Facility-Administered Medications   Medication Dose Route Frequency Last Rate Last Admin    heparin (porcine) in D5W  0-40 Units/kg/hr (Adjusted) Intravenous Continuous 7.8 mL/hr at 05/05/24 1824 8 Units/kg/hr at 05/05/24 1824    sodium bicarbonate 150 mEq in dextrose 5 % (D5W) 1,000 mL infusion   Intravenous Continuous         Scheduled Meds:  Current Facility-Administered Medications   Medication Dose Route Frequency    amiodarone  200 mg Oral Daily    atorvastatin  80 mg Oral Daily    cefTRIAXone (Rocephin) IV (PEDS and ADULTS)  2 g Intravenous Q24H    levothyroxine  200 mcg Oral Before breakfast    metoprolol tartrate  12.5 mg Oral BID    metroNIDAZOLE  500 mg Oral Q8H    mupirocin   Nasal BID    polyethylene glycol  17 g Oral Daily     PRN Meds:  Current Facility-Administered Medications:     acetaminophen, 650 mg, Oral, Q8H PRN    acetaminophen, 650 mg, Oral, Q4H PRN    albuterol-ipratropium, 3 mL, Nebulization, Q4H PRN    aluminum-magnesium hydroxide-simethicone, 30 mL, Oral, QID PRN    dextrose 10%, 12.5 g, Intravenous, PRN    dextrose 10%, 25 g, Intravenous, PRN    gabapentin, 600 mg, Oral, TID PRN    glucagon (human recombinant), 1 mg, Intramuscular, PRN    glucose, 16 g, Oral, PRN    glucose, 24 g, Oral, PRN    heparin (PORCINE), 60 Units/kg (Adjusted), Intravenous, PRN    heparin (PORCINE), 30 Units/kg (Adjusted), Intravenous, PRN    insulin aspart U-100, 0-5 Units, Subcutaneous, QID (AC + HS) PRN    magnesium oxide, 800 mg, Oral, PRN    magnesium oxide, 800 mg, Oral, PRN    melatonin, 6 mg, Oral, Nightly PRN    naloxone, 0.02 mg, Intravenous, PRN    ondansetron, 4 mg, Intravenous, Q8H PRN    potassium bicarbonate,  35 mEq, Oral, PRN    potassium bicarbonate, 50 mEq, Oral, PRN    potassium bicarbonate, 60 mEq, Oral, PRN    potassium, sodium phosphates, 2 packet, Oral, PRN    potassium, sodium phosphates, 2 packet, Oral, PRN    potassium, sodium phosphates, 2 packet, Oral, PRN    prochlorperazine, 5 mg, Intravenous, Q6H PRN    simethicone, 1 tablet, Oral, QID PRN    sodium chloride 0.9%, 10 mL, Intravenous, Q12H PRN     Review of patient's allergies indicates:   Allergen Reactions    Contrast media Hives     Objective:     Vital Signs (Most Recent):  Temp: 97.7 °F (36.5 °C) (05/06/24 0750)  Pulse: (!) 58 (05/06/24 0750)  Resp: 18 (05/06/24 0750)  BP: 105/62 (05/06/24 0750)  SpO2: 100 % (05/06/24 0750) Vital Signs (24h Range):  Temp:  [97.5 °F (36.4 °C)-97.8 °F (36.6 °C)] 97.7 °F (36.5 °C)  Pulse:  [56-62] 58  Resp:  [18-20] 18  SpO2:  [97 %-100 %] 100 %  BP: (101-120)/(62-75) 105/62     Weight: (!) 137.3 kg (302 lb 11.1 oz)  Body mass index is 44.7 kg/m².    Intake/Output - Last 3 Shifts         05/04 0700  05/05 0659 05/05 0700 05/06 0659 05/06 0700  05/07 0659    P.O. 240 480     I.V. (mL/kg)  2110.6 (15.4)     IV Piggyback  498.3     Total Intake(mL/kg) 240 (1.7) 3089 (22.5)     Urine (mL/kg/hr) 550 3000 (0.9) 850 (2)    Stool  0     Total Output 550 3000 850    Net -310 +89 -850           Urine Occurrence  1 x     Stool Occurrence  3 x              Physical Exam  Vitals reviewed.   Constitutional:       General: He is not in acute distress.  HENT:      Head: Normocephalic and atraumatic.      Nose: Nose normal.   Eyes:      General:         Right eye: No discharge.         Left eye: No discharge.   Cardiovascular:      Rate and Rhythm: Normal rate and regular rhythm.   Pulmonary:      Effort: Pulmonary effort is normal. No respiratory distress.      Breath sounds: No stridor.   Abdominal:      Comments: Soft, ND, still with RLQ tenderness but it is better    Musculoskeletal:         General: Normal range of motion.       Cervical back: Normal range of motion.   Skin:     General: Skin is warm and dry.      Capillary Refill: Capillary refill takes 2 to 3 seconds.   Neurological:      General: No focal deficit present.      Mental Status: He is alert and oriented to person, place, and time.   Psychiatric:         Mood and Affect: Mood normal.         Behavior: Behavior normal.          Significant Labs:  I have reviewed all pertinent lab results within the past 24 hours.    Significant Diagnostics:  I have reviewed all pertinent imaging results/findings within the past 24 hours.  Assessment/Plan:     * MICHELLE (acute kidney injury)  Assessment and Plan:  63 y.o. male AFib (on Xarelto at home, hep gtt here), BMI 45, type 2 diabetes, hyperlipidemia, hypothyroidism, chronic leukopenia, and HTN who presented from home with multiple symptoms and was found to be in acute renal failure. There was concern for acute appendicitis on imaging. While he does have some RLQ tenderness, I believe that this is better explained by reactive changes from his ongoing ascending UTI. This could explain the imaging changes, his atypical/mild pain/tenderness, and atypical duration of symptoms. He does not have a fecalith on exam, so even if this were acute appendicitis superimposed on his UTI, this should improve/resolve with IV Abx. We will continue to monitor him to ensure continued improvement. He is a suboptimal surgical candidate in his current condition. Especially with white count of 1.      -Agree with IV Abx continued for now.   -Would complete 10-14 course of PO abx when transitioned although still less likely appendicitis.   -OK for diet as tolerated from our standpoint  -OK to continue heparin gtt.  -Remainder of care per primary     We will continue to follow.        Magdiel Cohen MD  General Surgery  [facility] [place] [facility]

## 2024-05-06 NOTE — ASSESSMENT & PLAN NOTE
Atypical symptoms, complicated by MICHELLE.   Amp-S E.faecalis (covered with Zosyn).  Will over treat rather than under treat.

## 2024-05-06 NOTE — ASSESSMENT & PLAN NOTE
Patient's FSGs are controlled on current medication regimen.  Last A1c reviewed-   Lab Results   Component Value Date    HGBA1C 6.3 (H) 08/08/2022     Most recent fingerstick glucose reviewed-   Recent Labs   Lab 05/05/24  1623 05/05/24  1925 05/06/24  0752 05/06/24  1038   POCTGLUCOSE 123* 105 79 75       Current correctional scale  Low  Maintain anti-hyperglycemic dose as follows-   Antihyperglycemics (From admission, onward)    Start     Stop Route Frequency Ordered    05/04/24 2049  insulin aspart U-100 pen 0-5 Units         -- SubQ Before meals & nightly PRN 05/04/24 1949        Hold Oral hypoglycemics while patient is in the hospital.

## 2024-05-06 NOTE — ASSESSMENT & PLAN NOTE
-phosphorus level elevated at 5.8 on admission   -likely elevated in the setting of acute renal failure   -nephrology consulted   -monitor level

## 2024-05-06 NOTE — NURSING
[facility] [place]  Nurses Note -- 4 Eyes      5/5/2024       Skin assessed on: Q Shift      [x] No Pressure Injuries Present    []Prevention Measures Documented    [] Yes LDA  for Pressure Injury Previously documented     [] Yes New Pressure Injury Discovered   [] LDA for New Pressure Injury Added      Attending RN:  Bonifacio Mcclelland RN     Second RN:  LAM Ayala RN

## 2024-05-06 NOTE — CONSULTS
Hematology- Oncology Consult Note :     5/6/2024    Reason for consult: low wbc      The patient location is: hospital   The chief complaint leading to consultation is: low WBC  Visit type: Virtual visit with synchronous audio and video  Total time spent with patient: 15 minutes  Each patient to whom he or she provides medical services by telemedicine is:  (1) informed of the relationship between the physician and patient and the respective role of any other health care provider with respect to management of the patient; and (2) notified that he or she may decline to receive medical services by telemedicine and may withdraw from such care at any time.        HPI      Pt is a 63-year-old male with a past medical history of AFib (on Xarelto), hypertension, type 2 diabetes, hyperlipidemia, hypothyroidism, chronic leukopenia, morbid obesity who was admitted for abdominal pain associated with nausea, vomiting, diarrhea, and decreased p.o. intake. In ED he was found to have hyperphosphatemia, acute renal failure, and UTI/pyelonephritis. CT abdomen concerning for dilated appendix measuring 12 mm with surrounding periappendiceal stranding/inflammatory and bilateral perinephric stranding, potential ascending urinary tract infection. IV antibiotics started.  Due to concern for low WBC hematology consulted.        Active Problem List with Overview Notes    Diagnosis Date Noted    Pyelonephritis 05/05/2024    Hyperphosphatemia 05/05/2024    Acute appendicitis 05/04/2024    MICHELLE (acute kidney injury) 05/04/2024    Soft tissue abscess 08/09/2022    Atrial flutter with rapid ventricular response 08/08/2022    Hypotension 08/08/2022    History of DVT (deep vein thrombosis) 08/08/2022    Lab test positive for detection of COVID-19 virus 08/08/2022    Hypothyroidism 08/08/2022    Leukopenia 12/03/2020    Near syncope 09/16/2019    PAF (paroxysmal atrial fibrillation) 09/16/2019    Essential hypertension 10/17/2017    Hyperlipidemia  10/17/2017    Diabetes mellitus, type 2 10/17/2017    Morbid obesity 10/17/2017       Patient Active Problem List    Diagnosis Date Noted    Pyelonephritis 05/05/2024    Hyperphosphatemia 05/05/2024    Acute appendicitis 05/04/2024    MICHELLE (acute kidney injury) 05/04/2024    Soft tissue abscess 08/09/2022    Atrial flutter with rapid ventricular response 08/08/2022    Hypotension 08/08/2022    History of DVT (deep vein thrombosis) 08/08/2022    Lab test positive for detection of COVID-19 virus 08/08/2022    Hypothyroidism 08/08/2022    Leukopenia 12/03/2020    Near syncope 09/16/2019    PAF (paroxysmal atrial fibrillation) 09/16/2019    Essential hypertension 10/17/2017    Hyperlipidemia 10/17/2017    Diabetes mellitus, type 2 10/17/2017    Morbid obesity 10/17/2017     Past Medical History:   Diagnosis Date    Acquired hypothyroidism     Atrial fibrillation 09/16/2019    Diabetes mellitus     High cholesterol     History of DVT (deep vein thrombosis)     History of pulmonary embolism     Hypertension     LARRY (obstructive sleep apnea)     S/P IVC filter       Past Surgical History:   Procedure Laterality Date    THYROIDECTOMY, PARTIAL  2006    TONSILLECTOMY      TREATMENT OF CARDIAC ARRHYTHMIA  9/16/2019    Procedure: Cardioversion or Defibrillation;  Surgeon: Deven Vasquez MD;  Location: [facility] CATH LAB;  Service: Cardiology;;      Medications Prior to Admission   Medication Sig Dispense Refill Last Dose    levothyroxine (SYNTHROID) 200 MCG tablet Take 200 mcg by mouth before breakfast.   5/4/2024    rivaroxaban (XARELTO) 20 mg Tab Take 1 tablet (20 mg total) by mouth daily with dinner or evening meal. 30 tablet 6 5/4/2024    rosuvastatin (CRESTOR) 40 MG Tab Take 10 mg by mouth every evening.   5/4/2024    acetaminophen (TYLENOL) 500 MG tablet Take 500 mg by mouth every 6 (six) hours as needed for Pain.   Unknown    amiodarone (PACERONE) 200 MG Tab Take 1 tablet (200 mg total) by mouth once daily. 90 tablet 3      cholecalciferol, vitamin D3, (VITAMIN D3) 50 mcg (2,000 unit) Tab Take by mouth once daily.       doxycycline (VIBRA-TABS) 100 MG tablet Take 1 tablet (100 mg total) by mouth every 12 (twelve) hours. 18 tablet 0     EPINEPHrine (EPIPEN) 0.3 mg/0.3 mL AtIn        ergocalciferol (ERGOCALCIFEROL) 50,000 unit Cap Take 50,000 Units by mouth every 7 days.       ferrous sulfate 325 (65 FE) MG EC tablet TAKE 1 TABLET EVERY DAY (Patient taking differently: 325 mg.) 30 tablet 0     gabapentin (NEURONTIN) 600 MG tablet Take 600 mg by mouth 3 (three) times daily.       HYDROcodone-acetaminophen (NORCO)  mg per tablet    Unknown    metformin (GLUCOPHAGE) 1000 MG tablet Take 1,000 mg by mouth 2 (two) times daily with meals.       metoprolol tartrate (LOPRESSOR) 25 MG tablet Take 1 tablet (25 mg total) by mouth 2 (two) times daily. 60 tablet 11     MOVANTIK 25 mg tablet Take 25 mg by mouth once daily.       olmesartan (BENICAR) 40 MG tablet Take 1 tablet (40 mg total) by mouth once daily. 90 tablet 3     omega-3 fatty acids/fish oil (FISH OIL-OMEGA-3 FATTY ACIDS) 300-1,000 mg capsule Take 1 capsule by mouth 2 (two) times a day.       omeprazole (PRILOSEC) 20 MG capsule    Unknown    sildenafiL (VIAGRA) 50 MG tablet Take 1 tablet (50 mg total) by mouth daily as needed for Erectile Dysfunction. 10 tablet 11     testosterone 200 mg Pllt by Implant route.       triamcinolone acetonide 0.5% (KENALOG) 0.5 % Crea APPLY TOPICALLY A SMALL AMOUNT TO THE AFFECTED AREA(S) ONCE DAILY       TRUE METRIX GLUCOSE TEST STRIP Strp        TRUEPLUS LANCETS 33 gauge Misc         Review of patient's allergies indicates:   Allergen Reactions    Contrast media Hives      Social History     Tobacco Use    Smoking status: Never    Smokeless tobacco: Never   Substance Use Topics    Alcohol use: Not Currently     Comment: occasionally      Family History   Family history unknown: Yes        Review of Systems :  Review of Systems   Constitutional:   Positive for malaise/fatigue. Negative for fever and weight loss.   HENT:  Negative for congestion, hearing loss and nosebleeds.    Eyes:  Negative for blurred vision and discharge.   Respiratory:  Negative for cough, sputum production and shortness of breath.    Cardiovascular:  Negative for chest pain and leg swelling.   Gastrointestinal:  Negative for abdominal pain, blood in stool, constipation and heartburn.   Genitourinary:  Negative for dysuria and hematuria.   Musculoskeletal:  Negative for back pain, joint pain and myalgias.   Skin:  Negative for itching and rash.   Neurological:  Negative for dizziness, focal weakness and weakness.   Endo/Heme/Allergies:  Does not bruise/bleed easily.   Psychiatric/Behavioral:  Negative for depression. The patient is not nervous/anxious.        Physical Exam :  Wt Readings from Last 3 Encounters:   05/05/24 (!) 137.3 kg (302 lb 11.1 oz)   10/13/23 (!) 154.2 kg (340 lb)   07/27/23 (!) 141.4 kg (311 lb 11.7 oz)     Temp Readings from Last 3 Encounters:   05/06/24 97.7 °F (36.5 °C) (Oral)   10/13/23 98.2 °F (36.8 °C) (Oral)   08/11/22 98.7 °F (37.1 °C) (Oral)     BP Readings from Last 3 Encounters:   05/06/24 105/62   10/13/23 132/88   07/27/23 120/78     Pulse Readings from Last 3 Encounters:   05/06/24 (!) 58   10/13/23 62   07/27/23 60     Body mass index is 44.7 kg/m².    Physical Exam      Virtual visit      Pertinent Diagnostic studies:    Recent Results (from the past 24 hour(s))   POCT glucose    Collection Time: 05/05/24 11:06 AM   Result Value Ref Range    POCT Glucose 95 70 - 110 mg/dL   APTT    Collection Time: 05/05/24 12:55 PM   Result Value Ref Range    aPTT 63.9 (H) 21.0 - 32.0 sec   POCT glucose    Collection Time: 05/05/24  4:23 PM   Result Value Ref Range    POCT Glucose 123 (H) 70 - 110 mg/dL   POCT glucose    Collection Time: 05/05/24  7:25 PM   Result Value Ref Range    POCT Glucose 105 70 - 110 mg/dL   APTT    Collection Time: 05/06/24  4:24 AM   Result  Value Ref Range    aPTT 49.8 (H) 21.0 - 32.0 sec   CBC auto differential    Collection Time: 05/06/24  4:24 AM   Result Value Ref Range    WBC 1.10 (LL) 3.90 - 12.70 K/uL    RBC 3.81 (L) 4.60 - 6.20 M/uL    Hemoglobin 8.6 (L) 14.0 - 18.0 g/dL    Hematocrit 28.4 (L) 40.0 - 54.0 %    MCV 75 (L) 82 - 98 fL    MCH 22.6 (L) 27.0 - 31.0 pg    MCHC 30.3 (L) 32.0 - 36.0 g/dL    RDW 17.9 (H) 11.5 - 14.5 %    Platelets 261 150 - 450 K/uL    MPV 9.4 9.2 - 12.9 fL    Immature Granulocytes 0.0 0.0 - 0.5 %    Gran # (ANC) 0.5 (L) 1.8 - 7.7 K/uL    Immature Grans (Abs) 0.00 0.00 - 0.04 K/uL    Lymph # 0.6 (L) 1.0 - 4.8 K/uL    Mono # 0.0 (L) 0.3 - 1.0 K/uL    Eos # 0.1 0.0 - 0.5 K/uL    Baso # 0.00 0.00 - 0.20 K/uL    nRBC 0 0 /100 WBC    Gran % 41.9 38.0 - 73.0 %    Lymph % 51.8 (H) 18.0 - 48.0 %    Mono % 1.8 (L) 4.0 - 15.0 %    Eosinophil % 4.5 0.0 - 8.0 %    Basophil % 0.0 0.0 - 1.9 %    Platelet Estimate Appears normal     Aniso Slight     Poik Moderate     Ovalocytes Occasional     Mandi Cells Moderate     Acanthocytes Present     Differential Method Automated    Basic Metabolic Panel    Collection Time: 05/06/24  4:24 AM   Result Value Ref Range    Sodium 134 (L) 136 - 145 mmol/L    Potassium 4.8 3.5 - 5.1 mmol/L    Chloride 107 95 - 110 mmol/L    CO2 14 (L) 23 - 29 mmol/L    Glucose 74 70 - 110 mg/dL    BUN 61 (H) 8 - 23 mg/dL    Creatinine 7.4 (H) 0.5 - 1.4 mg/dL    Calcium 6.4 (LL) 8.7 - 10.5 mg/dL    Anion Gap 13 8 - 16 mmol/L    eGFR 8 (A) >60 mL/min/1.73 m^2   Magnesium    Collection Time: 05/06/24  4:24 AM   Result Value Ref Range    Magnesium 1.7 1.6 - 2.6 mg/dL   Phosphorus    Collection Time: 05/06/24  4:24 AM   Result Value Ref Range    Phosphorus 5.4 (H) 2.7 - 4.5 mg/dL   Albumin    Collection Time: 05/06/24  4:24 AM   Result Value Ref Range    Albumin 2.6 (L) 3.5 - 5.2 g/dL   PTH, intact    Collection Time: 05/06/24  6:52 AM   Result Value Ref Range    PTH, Intact 182.8 (H) 9.0 - 77.0 pg/mL   POCT glucose     Collection Time: 05/06/24  7:52 AM   Result Value Ref Range    POCT Glucose 79 70 - 110 mg/dL       Assessment/Plan :     Leukopenia  -chronic, most likely has familial component  -Patient had a bone marrow biopsy in 2020, which was unrevealing, follows with Dr. 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    Lorena Moore MD   Hematology/oncology, [facility]

## 2024-05-06 NOTE — SUBJECTIVE & OBJECTIVE
"Interval History:  No acute overnight events.  Patient remained afebrile.  Feeling better this morning. Urinating more. Complains of "burning with urination with the brandt". Admits RLQ pain present but very mild.      Review of Systems   Constitutional:  Positive for activity change, appetite change and fatigue. Negative for fever.   Respiratory:  Negative for cough and shortness of breath.    Cardiovascular:  Positive for chest pain. Negative for palpitations.   Gastrointestinal:  Positive for abdominal distention and abdominal pain (improving). Negative for diarrhea, nausea and vomiting.   Endocrine: Negative.    Genitourinary:  Positive for difficulty urinating and dysuria.   Skin: Negative.    Allergic/Immunologic: Negative.    Neurological:  Positive for weakness (Generalized).     Objective:     Vital Signs (Most Recent):  Temp: 97.7 °F (36.5 °C) (05/06/24 0750)  Pulse: (!) 58 (05/06/24 0750)  Resp: 18 (05/06/24 0750)  BP: 105/62 (05/06/24 0750)  SpO2: 100 % (05/06/24 0750) Vital Signs (24h Range):  Temp:  [97.5 °F (36.4 °C)-97.8 °F (36.6 °C)] 97.7 °F (36.5 °C)  Pulse:  [56-62] 58  Resp:  [18-20] 18  SpO2:  [97 %-100 %] 100 %  BP: (101-120)/(62-75) 105/62     Weight: (!) 137.3 kg (302 lb 11.1 oz)  Body mass index is 44.7 kg/m².    Intake/Output Summary (Last 24 hours) at 5/6/2024 1102  Last data filed at 5/6/2024 0943  Gross per 24 hour   Intake 3088.97 ml   Output 3850 ml   Net -761.03 ml         Physical Exam  Vitals and nursing note reviewed.   Constitutional:       General: He is not in acute distress.     Appearance: He is morbidly obese. He is ill-appearing.   HENT:      Mouth/Throat:      Mouth: Mucous membranes are dry.   Eyes:      Extraocular Movements: Extraocular movements intact.   Cardiovascular:      Rate and Rhythm: Normal rate and regular rhythm.   Pulmonary:      Effort: Pulmonary effort is normal. No respiratory distress.      Breath sounds: Normal breath sounds. No wheezing.   Abdominal:    "   General: There is distension.      Palpations: Abdomen is soft.      Tenderness: There is abdominal tenderness (near RLQ). There is no right CVA tenderness, left CVA tenderness, guarding or rebound.      Comments: No local or generalized peritonitis.  No CVA tenderness today    Musculoskeletal:         General: No swelling or tenderness.   Skin:     General: Skin is warm and dry.   Neurological:      General: No focal deficit present.      Mental Status: He is alert and oriented to person, place, and time.   Psychiatric:         Mood and Affect: Mood normal.         Thought Content: Thought content normal.             Significant Labs: All pertinent labs within the past 24 hours have been reviewed.    Significant Imaging: I have reviewed all pertinent imaging results/findings within the past 24 hours.

## 2024-05-06 NOTE — SUBJECTIVE & OBJECTIVE
Interval History: Pain better but still with persistent pain. Cr getting better.     Medications:  Continuous Infusions:  Current Facility-Administered Medications   Medication Dose Route Frequency Last Rate Last Admin    heparin (porcine) in D5W  0-40 Units/kg/hr (Adjusted) Intravenous Continuous 7.8 mL/hr at 05/05/24 1824 8 Units/kg/hr at 05/05/24 1824    sodium bicarbonate 150 mEq in dextrose 5 % (D5W) 1,000 mL infusion   Intravenous Continuous         Scheduled Meds:  Current Facility-Administered Medications   Medication Dose Route Frequency    amiodarone  200 mg Oral Daily    atorvastatin  80 mg Oral Daily    cefTRIAXone (Rocephin) IV (PEDS and ADULTS)  2 g Intravenous Q24H    levothyroxine  200 mcg Oral Before breakfast    metoprolol tartrate  12.5 mg Oral BID    metroNIDAZOLE  500 mg Oral Q8H    mupirocin   Nasal BID    polyethylene glycol  17 g Oral Daily     PRN Meds:  Current Facility-Administered Medications:     acetaminophen, 650 mg, Oral, Q8H PRN    acetaminophen, 650 mg, Oral, Q4H PRN    albuterol-ipratropium, 3 mL, Nebulization, Q4H PRN    aluminum-magnesium hydroxide-simethicone, 30 mL, Oral, QID PRN    dextrose 10%, 12.5 g, Intravenous, PRN    dextrose 10%, 25 g, Intravenous, PRN    gabapentin, 600 mg, Oral, TID PRN    glucagon (human recombinant), 1 mg, Intramuscular, PRN    glucose, 16 g, Oral, PRN    glucose, 24 g, Oral, PRN    heparin (PORCINE), 60 Units/kg (Adjusted), Intravenous, PRN    heparin (PORCINE), 30 Units/kg (Adjusted), Intravenous, PRN    insulin aspart U-100, 0-5 Units, Subcutaneous, QID (AC + HS) PRN    magnesium oxide, 800 mg, Oral, PRN    magnesium oxide, 800 mg, Oral, PRN    melatonin, 6 mg, Oral, Nightly PRN    naloxone, 0.02 mg, Intravenous, PRN    ondansetron, 4 mg, Intravenous, Q8H PRN    potassium bicarbonate, 35 mEq, Oral, PRN    potassium bicarbonate, 50 mEq, Oral, PRN    potassium bicarbonate, 60 mEq, Oral, PRN    potassium, sodium phosphates, 2 packet, Oral, PRN     potassium, sodium phosphates, 2 packet, Oral, PRN    potassium, sodium phosphates, 2 packet, Oral, PRN    prochlorperazine, 5 mg, Intravenous, Q6H PRN    simethicone, 1 tablet, Oral, QID PRN    sodium chloride 0.9%, 10 mL, Intravenous, Q12H PRN     Review of patient's allergies indicates:   Allergen Reactions    Contrast media Hives     Objective:     Vital Signs (Most Recent):  Temp: 97.7 °F (36.5 °C) (05/06/24 0750)  Pulse: (!) 58 (05/06/24 0750)  Resp: 18 (05/06/24 0750)  BP: 105/62 (05/06/24 0750)  SpO2: 100 % (05/06/24 0750) Vital Signs (24h Range):  Temp:  [97.5 °F (36.4 °C)-97.8 °F (36.6 °C)] 97.7 °F (36.5 °C)  Pulse:  [56-62] 58  Resp:  [18-20] 18  SpO2:  [97 %-100 %] 100 %  BP: (101-120)/(62-75) 105/62     Weight: (!) 137.3 kg (302 lb 11.1 oz)  Body mass index is 44.7 kg/m².    Intake/Output - Last 3 Shifts         05/04 0700  05/05 0659 05/05 0700  05/06 0659 05/06 0700  05/07 0659    P.O. 240 480     I.V. (mL/kg)  2110.6 (15.4)     IV Piggyback  498.3     Total Intake(mL/kg) 240 (1.7) 3089 (22.5)     Urine (mL/kg/hr) 550 3000 (0.9) 850 (2)    Stool  0     Total Output 550 3000 850    Net -310 +89 -850           Urine Occurrence  1 x     Stool Occurrence  3 x              Physical Exam  Vitals reviewed.   Constitutional:       General: He is not in acute distress.  HENT:      Head: Normocephalic and atraumatic.      Nose: Nose normal.   Eyes:      General:         Right eye: No discharge.         Left eye: No discharge.   Cardiovascular:      Rate and Rhythm: Normal rate and regular rhythm.   Pulmonary:      Effort: Pulmonary effort is normal. No respiratory distress.      Breath sounds: No stridor.   Abdominal:      Comments: Soft, ND, still with RLQ tenderness but it is better    Musculoskeletal:         General: Normal range of motion.      Cervical back: Normal range of motion.   Skin:     General: Skin is warm and dry.      Capillary Refill: Capillary refill takes 2 to 3 seconds.   Neurological:       General: No focal deficit present.      Mental Status: He is alert and oriented to person, place, and time.   Psychiatric:         Mood and Affect: Mood normal.         Behavior: Behavior normal.          Significant Labs:  I have reviewed all pertinent lab results within the past 24 hours.    Significant Diagnostics:  I have reviewed all pertinent imaging results/findings within the past 24 hours.

## 2024-05-06 NOTE — ASSESSMENT & PLAN NOTE
History noted. Chronic but worsening, likely in the setting of infection   Patient had a bone marrow biopsy in 2020, which was unrevealing.   WBC down to 1.10 on 05/06  Hematology consulted  Monitor

## 2024-05-06 NOTE — ASSESSMENT & PLAN NOTE
Patient with acute kidney injury/acute renal failure likely due to pre-renal azotemia due to IVVD MICHELLE is currently stable. Baseline creatinine  0.9  - Labs reviewed- Renal function/electrolytes with Estimated Creatinine Clearance: 14.4 mL/min (A) (based on SCr of 7.4 mg/dL (H)). according to latest data. Monitor urine output and serial BMP and adjust therapy as needed. Avoid nephrotoxins and renally dose meds for GFR listed above.    -Presented with MICHELLE  -Cr on admit 8.3  -Baseline Cr 0.9  -Suspect due to infection and decreased PO intake  -Nephrology consulted  -IVF  -Avoid nephrotoxins, renal dose all meds.   -Monitor Is/Os  -Monitor

## 2024-05-06 NOTE — HPI
Mr. Santos is a 64 yo man with atrial fibrillation admitted with abdominal pain. ED imaging revealed findings suggestive of acute appendicitis, with bilateral perinephric stranding/possible ascending UTI without evidence of hydronephrosis. He was started on empiric abx and ID is consulted.     The patient tells me that he developed abdominal discomfort after eating a quart of coleslaw from [employer] last Tuesday night. He started feeling poorly but delayed coming to the ED. Nevertheless, he presented on 5/4 and his work-up revealed acute appendicitis, possible bilateral pyelonephritis, a non-obstructing renal stone, and cholelithiasis.    He was started on empiric Zosyn and vancomycin. Blood cultures are NGTD while his urine grew a pen-S E.faecalis.

## 2024-05-06 NOTE — ASSESSMENT & PLAN NOTE
Chronic, controlled. Latest blood pressure and vitals reviewed-     Temp:  [97.6 °F (36.4 °C)-97.8 °F (36.6 °C)]   Pulse:  [56-62]   Resp:  [18-20]   BP: (101-117)/(62-73)   SpO2:  [100 %] .   Home meds for hypertension were reviewed and noted below.   Hypertension Medications               metoprolol tartrate (LOPRESSOR) 25 MG tablet Take 1 tablet (25 mg total) by mouth 2 (two) times daily.    olmesartan (BENICAR) 40 MG tablet Take 1 tablet (40 mg total) by mouth once daily.            While in the hospital, will manage blood pressure as follows; Adjust home antihypertensive regimen as follows- hold nephrotoxic medications, monitor BP    Will utilize p.r.n. blood pressure medication only if patient's blood pressure greater than 180/110 and he develops symptoms such as worsening chest pain or shortness of breath.

## 2024-05-06 NOTE — PROGRESS NOTES
Date of Admission:5/4/2024    SUBJECTIVE:notes urinating better    Current Facility-Administered Medications   Medication Dose Route Frequency Provider Last Rate Last Admin    acetaminophen tablet 650 mg  650 mg Oral Q8H PRN Chaka Meza MD        acetaminophen tablet 650 mg  650 mg Oral Q4H PRN Chaka Meza MD        albuterol-ipratropium 2.5 mg-0.5 mg/3 mL nebulizer solution 3 mL  3 mL Nebulization Q4H PRN Chaka Meza MD        aluminum-magnesium hydroxide-simethicone 200-200-20 mg/5 mL suspension 30 mL  30 mL Oral QID PRN Chaka Meza MD        amiodarone tablet 200 mg  200 mg Oral Daily Chaka Meza MD   200 mg at 05/06/24 0935    atorvastatin tablet 80 mg  80 mg Oral Daily Chaka Meza MD   80 mg at 05/06/24 0935    cefTRIAXone (ROCEPHIN) 2 g in dextrose 5 % in water (D5W) 100 mL IVPB (MB+)  2 g Intravenous Q24H Chaka Meza MD   Stopped at 05/05/24 1620    dextrose 10% bolus 125 mL 125 mL  12.5 g Intravenous PRN Chaka Meza MD        dextrose 10% bolus 250 mL 250 mL  25 g Intravenous PRN Chaka Meza MD        gabapentin capsule 600 mg  600 mg Oral TID PRN Chaka Meza MD        glucagon (human recombinant) injection 1 mg  1 mg Intramuscular PRN Chaka Meza MD        glucose chewable tablet 16 g  16 g Oral PRN Chaka Meza MD        glucose chewable tablet 24 g  24 g Oral PRN Chaka Meza MD        heparin 25,000 units in dextrose 5% (100 units/ml) IV bolus from bag LOW INTENSITY nomogram - [facility]  60 Units/kg (Adjusted) Intravenous PRN Chaka Meza MD        heparin 25,000 units in dextrose 5% (100 units/ml) IV bolus from bag LOW INTENSITY nomogram - [facility]  30 Units/kg (Adjusted) Intravenous PRN Chaka Meza MD        heparin 25,000 units in dextrose 5% 250 mL (100 units/mL) infusion LOW INTENSITY nomogram - [facility]  0-40 Units/kg/hr (Adjusted) Intravenous Continuous Chaka Meza MD 7.8 mL/hr at 05/05/24 1824 8 Units/kg/hr at 05/05/24 1824    insulin aspart U-100 pen 0-5 Units  0-5 Units  Subcutaneous QID (AC + HS) PRN Chaka Meza MD        levothyroxine tablet 200 mcg  200 mcg Oral Before breakfast Chaka Meza MD   200 mcg at 05/06/24 0523    magnesium oxide tablet 800 mg  800 mg Oral PRN Chaka Meza MD        magnesium oxide tablet 800 mg  800 mg Oral PRN Chaka Meza MD        melatonin tablet 6 mg  6 mg Oral Nightly PRN Chaka Meza MD        metoprolol tartrate (LOPRESSOR) split tablet 12.5 mg  12.5 mg Oral BID Chaka Meza MD        metroNIDAZOLE tablet 500 mg  500 mg Oral Q8H Maureen Porras T., DO        mupirocin 2 % ointment   Nasal BID Maureen Porras T., DO   Given at 05/06/24 1017    naloxone 0.4 mg/mL injection 0.02 mg  0.02 mg Intravenous PRN Chaka Meza MD        ondansetron injection 4 mg  4 mg Intravenous Q8H PRN Chaka Meza MD        polyethylene glycol packet 17 g  17 g Oral Daily Yesenia Cates PA-C   17 g at 05/06/24 0935    potassium bicarbonate disintegrating tablet 35 mEq  35 mEq Oral PRN Chaka Meza MD        potassium bicarbonate disintegrating tablet 50 mEq  50 mEq Oral PRN Chaak Meza MD        potassium bicarbonate disintegrating tablet 60 mEq  60 mEq Oral PRN Chaka Meza MD        potassium, sodium phosphates 280-160-250 mg packet 2 packet  2 packet Oral PRN Chaka Meza MD        potassium, sodium phosphates 280-160-250 mg packet 2 packet  2 packet Oral PRN Chaka Meza MD        potassium, sodium phosphates 280-160-250 mg packet 2 packet  2 packet Oral PRN Chaka Meza MD        prochlorperazine injection Soln 5 mg  5 mg Intravenous Q6H PRN Chaka Meza MD        simethicone chewable tablet 80 mg  1 tablet Oral QID PRN Chaka Meza MD   80 mg at 05/04/24 2310    sodium bicarbonate 150 mEq in dextrose 5 % (D5W) 1,000 mL infusion   Intravenous Continuous Reanna Middleton  mL/hr at 05/06/24 1017 New Bag at 05/06/24 1017    sodium chloride 0.9% flush 10 mL  10 mL Intravenous Q12H PRN Chaka Meza MD           Wt Readings from Last 3  Encounters:   05/05/24 (!) 137.3 kg (302 lb 11.1 oz)   10/13/23 (!) 154.2 kg (340 lb)   07/27/23 (!) 141.4 kg (311 lb 11.7 oz)     Temp Readings from Last 3 Encounters:   05/06/24 97.7 °F (36.5 °C) (Oral)   10/13/23 98.2 °F (36.8 °C) (Oral)   08/11/22 98.7 °F (37.1 °C) (Oral)     BP Readings from Last 3 Encounters:   05/06/24 105/62   10/13/23 132/88   07/27/23 120/78     Pulse Readings from Last 3 Encounters:   05/06/24 (!) 58   10/13/23 62   07/27/23 60       Intake/Output Summary (Last 24 hours) at 5/6/2024 1024  Last data filed at 5/6/2024 0943  Gross per 24 hour   Intake 3088.97 ml   Output 3850 ml   Net -761.03 ml       PE:  GEN:wd male in nad  HEENT:ncat,eomi,mmm, bipap mask  CVS:s1s2 regular  PULM:coarse bs  ABD:soft,nd  EXT:no leg edema  NEURO:awake    Recent Labs   Lab 05/06/24 0424   GLU 74   *   K 4.8      CO2 14*   BUN 61*   CREATININE 7.4*   CALCIUM 6.4*   MG 1.7       Lab Results   Component Value Date    .8 (H) 05/06/2024    CALCIUM 6.4 (LL) 05/06/2024    PHOS 5.4 (H) 05/06/2024       Recent Labs   Lab 05/06/24 0424   WBC 1.10*   RBC 3.81*   HGB 8.6*   HCT 28.4*      MCV 75*   MCH 22.6*   MCHC 30.3*           A/P:  1.boris. creatinine is better. Cont ivfs. Needs to eat and drink more. Cont uti.  2.anemia. hg low. No prbc indicated. Iron def. Add iron.  3.leukopenia. worse. Neutropenic precautions. Consult heme.  4.uti. consult id. Watch for vre.   5.appendicitis. tailor antibiotics with id for uti coverage too.  6.hyperphospatemia. no binders for now.  7.lily. cont  cpap as needed.  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 Ck vit d 25.  9.metabolic acidosis. Change to bicarb gtt.

## 2024-05-06 NOTE — CONSULTS
"The order "inpatient consult to nephrology" has Dr. Rajan ([facility]) listed as the "referred to" provider.  It is unclear at this time why the order was not followed and a non-[facility] nephrologist is following the patient; however as patient has already been seen, [facility] will sign off.         Elyssa Sawant MD  [facility]  [phone]    "

## 2024-05-06 NOTE — CONSULTS
[facility] Surg  Infectious Disease  Consult Note    Patient Name: Russell Santos  MRN: [id number]  Admission Date: 5/4/2024  Hospital Length of Stay: 2 days  Attending Physician: Maureen Porras DO  Primary Care Provider: Roxanne Phillip -     Isolation Status: Airborne and Contact and Enhanced Respiratory    Patient information was obtained from patient, past medical records, and ER records.      Inpatient consult to Infectious Diseases  Consult performed by: Chapincito Juan MD  Consult ordered by: Reanna Middleton MD        Assessment/Plan:     Acute appendicitis  62 yo man with acute appendicitis by CT. Seen by Surgery and cleared for medical management.    Recommendations  Zosyn for a few days (depending on resolution of neutropenia), then Augmentin x 10 days     Pyelonephritis  Atypical symptoms, complicated by MICHELLE.   Amp-S E.faecalis (covered with Zosyn).  Will over treat rather than under treat.    Leukopenia  Etiology is unclear to me.  Work-up in progress.  Change abx to Zosyn.    Thank you for your consult. I will follow-up with patient. Please contact us if you have any additional questions.    Chapincito Juan MD  Infectious Disease  [facility] Surg    Subjective:     Principal Problem: MICHELLE (acute kidney injury)    HPI: Mr. Santos is a 62 yo man with atrial fibrillation admitted with abdominal pain. ED imaging revealed findings suggestive of acute appendicitis, with bilateral perinephric stranding/possible ascending UTI without evidence of hydronephrosis. He was started on empiric abx and ID is consulted.     The patient tells me that he developed abdominal discomfort after eating a quart of coleslaw from [employer] last Tuesday night. He started feeling poorly but delayed coming to the ED. Nevertheless, he presented on 5/4 and his work-up revealed acute appendicitis, possible bilateral pyelonephritis, a non-obstructing renal stone, and cholelithiasis.    He was started on empiric Zosyn and  vancomycin. Blood cultures are NGTD while his urine grew a pen-S E.faecalis.        Past Medical History:   Diagnosis Date    Acquired hypothyroidism     Atrial fibrillation 09/16/2019    Diabetes mellitus     High cholesterol     History of DVT (deep vein thrombosis)     History of pulmonary embolism     Hypertension     LARRY (obstructive sleep apnea)     S/P IVC filter        Past Surgical History:   Procedure Laterality Date    THYROIDECTOMY, PARTIAL  2006    TONSILLECTOMY      TREATMENT OF CARDIAC ARRHYTHMIA  9/16/2019    Procedure: Cardioversion or Defibrillation;  Surgeon: Deven Vasquez MD;  Location: [facility] CATH LAB;  Service: Cardiology;;       Review of patient's allergies indicates:   Allergen Reactions    Contrast media Hives       Medications:  Current Facility-Administered Medications   Medication Dose Route Frequency Provider Last Rate Last Admin    acetaminophen tablet 650 mg  650 mg Oral Q8H PRN Chaka Meza MD        acetaminophen tablet 650 mg  650 mg Oral Q4H PRN Chaka Meza MD        albuterol-ipratropium 2.5 mg-0.5 mg/3 mL nebulizer solution 3 mL  3 mL Nebulization Q4H PRN Chaka Meza MD        aluminum-magnesium hydroxide-simethicone 200-200-20 mg/5 mL suspension 30 mL  30 mL Oral QID PRN Chaka Meza MD        amiodarone tablet 200 mg  200 mg Oral Daily Chaka Meza MD   200 mg at 05/06/24 0935    atorvastatin tablet 80 mg  80 mg Oral Daily Chaka Meza MD   80 mg at 05/06/24 0935    dextrose 10% bolus 125 mL 125 mL  12.5 g Intravenous PRN Chaka Meza MD        dextrose 10% bolus 250 mL 250 mL  25 g Intravenous PRN Chaka Meza MD        gabapentin capsule 600 mg  600 mg Oral TID PRN Chaka Meza MD        glucagon (human recombinant) injection 1 mg  1 mg Intramuscular PRN Chaka Meza MD        glucose chewable tablet 16 g  16 g Oral PRN Chaka Meza MD        glucose chewable tablet 24 g  24 g Oral PRN Chaka Meza MD        heparin 25,000 units in dextrose 5% (100  units/ml) IV bolus from bag LOW INTENSITY nomogram - [facility]  60 Units/kg (Adjusted) Intravenous PRN Chaka Meza MD        heparin 25,000 units in dextrose 5% (100 units/ml) IV bolus from bag LOW INTENSITY nomogram - [facility]  30 Units/kg (Adjusted) Intravenous PRN Chaka Meza MD        heparin 25,000 units in dextrose 5% 250 mL (100 units/mL) infusion LOW INTENSITY nomogram - [facility]  0-40 Units/kg/hr (Adjusted) Intravenous Continuous Chaka Meza MD 7.8 mL/hr at 05/05/24 1824 8 Units/kg/hr at 05/05/24 1824    insulin aspart U-100 pen 0-5 Units  0-5 Units Subcutaneous QID (AC + HS) PRN Chaka Meza MD        levothyroxine tablet 200 mcg  200 mcg Oral Before breakfast Chaka Meza MD   200 mcg at 05/06/24 0523    magnesium oxide tablet 800 mg  800 mg Oral PRN Chaka Meza MD        magnesium oxide tablet 800 mg  800 mg Oral PRN Chaka Meza MD        melatonin tablet 6 mg  6 mg Oral Nightly PRN Chaka Meza MD        metoprolol tartrate (LOPRESSOR) split tablet 12.5 mg  12.5 mg Oral BID Chaka Meza MD        mupirocin 2 % ointment   Nasal BID Maureen Porras, DO   Given at 05/06/24 1017    naloxone 0.4 mg/mL injection 0.02 mg  0.02 mg Intravenous PRN Chaka Meza MD        ondansetron injection 4 mg  4 mg Intravenous Q8H PRN Chaka Meza MD        piperacillin-tazobactam (ZOSYN) 4.5 g in dextrose 5 % in water (D5W) 100 mL IVPB (MB+)  4.5 g Intravenous Q12H Chapincito Juan MD 25 mL/hr at 05/06/24 1254 4.5 g at 05/06/24 1254    polyethylene glycol packet 17 g  17 g Oral Daily Yesenia Cates PA-C   17 g at 05/06/24 0935    potassium bicarbonate disintegrating tablet 35 mEq  35 mEq Oral PRN Chaka Meza MD        potassium bicarbonate disintegrating tablet 50 mEq  50 mEq Oral PRN Chaka Meza MD        potassium bicarbonate disintegrating tablet 60 mEq  60 mEq Oral Chaka Pugh MD        potassium, sodium phosphates 280-160-250 mg packet 2 packet  2 packet Oral Chaka Pugh MD         potassium, sodium phosphates 280-160-250 mg packet 2 packet  2 packet Oral PRN Chaka Meza MD        potassium, sodium phosphates 280-160-250 mg packet 2 packet  2 packet Oral PRN Chaka Meza MD        prochlorperazine injection Soln 5 mg  5 mg Intravenous Q6H PRN Chaka Meza MD        simethicone chewable tablet 80 mg  1 tablet Oral QID PRN Chaka Meza MD   80 mg at 05/04/24 2310    sodium bicarbonate 150 mEq in dextrose 5 % (D5W) 1,000 mL infusion   Intravenous Continuous Reanna Middleton  mL/hr at 05/06/24 1253 New Bag at 05/06/24 1253    sodium chloride 0.9% flush 10 mL  10 mL Intravenous Q12H PRN Chaka Meza MD         Antibiotics (From admission, onward)      Start     Stop Route Frequency Ordered    05/06/24 1330  piperacillin-tazobactam (ZOSYN) 4.5 g in dextrose 5 % in water (D5W) 100 mL IVPB (MB+)         -- IV Every 12 hours (non-standard times) 05/06/24 1218    05/06/24 1045  mupirocin 2 % ointment         05/11/24 0859 Nasl 2 times daily 05/06/24 0932          Antifungals (From admission, onward)      None          Antivirals (From admission, onward)      None             Immunization History   Administered Date(s) Administered    Influenza - Quadrivalent - PF *Preferred* (6 months and older) 10/19/2017, 09/16/2019    Pneumococcal Conjugate - 13 Valent 10/19/2017       Family History    Family history is unknown by patient.       Social History     Socioeconomic History    Marital status:    Tobacco Use    Smoking status: Never    Smokeless tobacco: Never   Substance and Sexual Activity    Alcohol use: Not Currently     Comment: occasionally    Drug use: No    Sexual activity: Not Currently     Review of Systems   Constitutional:  Negative for chills and fever.   Gastrointestinal:  Positive for abdominal pain and anal bleeding.   Genitourinary:  Positive for difficulty urinating. Negative for dysuria.   All other systems reviewed and are negative.    Objective:     Vital Signs (Most  Recent):  Temp: 98.1 °F (36.7 °C) (05/06/24 1116)  Pulse: 68 (05/06/24 1116)  Resp: 20 (05/06/24 1116)  BP: 137/84 (05/06/24 1116)  SpO2: 100 % (05/06/24 1116) Vital Signs (24h Range):  Temp:  [97.6 °F (36.4 °C)-98.1 °F (36.7 °C)] 98.1 °F (36.7 °C)  Pulse:  [56-68] 68  Resp:  [18-20] 20  SpO2:  [100 %] 100 %  BP: (101-137)/(62-84) 137/84     Weight: (!) 137.3 kg (302 lb 11.1 oz)  Body mass index is 44.7 kg/m².    Estimated Creatinine Clearance: 14.4 mL/min (A) (based on SCr of 7.4 mg/dL (H)).     Physical Exam  Vitals and nursing note reviewed.   Constitutional:       General: He is not in acute distress.     Appearance: Normal appearance. He is not ill-appearing, toxic-appearing or diaphoretic.   HENT:      Head: Normocephalic and atraumatic.      Right Ear: External ear normal.      Left Ear: External ear normal.   Eyes:      General: No scleral icterus.  Cardiovascular:      Rate and Rhythm: Normal rate and regular rhythm.   Abdominal:      General: There is distension.      Tenderness: There is no abdominal tenderness. There is no right CVA tenderness, left CVA tenderness or guarding.   Neurological:      General: No focal deficit present.      Mental Status: He is alert and oriented to person, place, and time.   Psychiatric:         Mood and Affect: Mood normal.         Thought Content: Thought content normal.          Significant Labs: BMP:   Recent Labs   Lab 05/06/24 0424   GLU 74   *   K 4.8      CO2 14*   BUN 61*   CREATININE 7.4*   CALCIUM 6.4*   MG 1.7     CBC:   Recent Labs   Lab 05/05/24  0330 05/06/24  0424 05/06/24  0652   WBC 1.14* 1.10* 1.01*   HGB 9.3* 8.6* 9.2*   HCT 29.8* 28.4* 29.7*    261 281     Microbiology Results (last 7 days)       Procedure Component Value Units Date/Time    Urine culture [[id number]]  (Abnormal)  (Susceptibility) Collected: 05/04/24 1541    Order Status: Completed Specimen: Urine Updated: 05/06/24 1151     Urine Culture, Routine ENTEROCOCCUS  FAECALIS  >100,000 cfu/ml      Narrative:      Specimen Source->Urine    Blood culture [[id number]] Collected: 05/05/24 0708    Order Status: Completed Specimen: Blood from Peripheral, Wrist, Right Updated: 05/06/24 0903     Blood Culture, Routine No Growth to date      No Growth to date    Blood culture [[id number]] Collected: 05/05/24 0708    Order Status: Completed Specimen: Blood from Peripheral, Hand, Left Updated: 05/06/24 0903     Blood Culture, Routine No Growth to date      No Growth to date            Significant Imaging: I have reviewed all pertinent imaging results/findings within the past 24 hours.

## 2024-05-06 NOTE — ASSESSMENT & PLAN NOTE
[id number] Score: 1.  Continue Lopressor. Hold Xarelto given potential surgical intervention. Heparin ggt

## 2024-05-06 NOTE — SUBJECTIVE & OBJECTIVE
Past Medical History:   Diagnosis Date    Acquired hypothyroidism     Atrial fibrillation 09/16/2019    Diabetes mellitus     High cholesterol     History of DVT (deep vein thrombosis)     History of pulmonary embolism     Hypertension     LARRY (obstructive sleep apnea)     S/P IVC filter        Past Surgical History:   Procedure Laterality Date    THYROIDECTOMY, PARTIAL  2006    TONSILLECTOMY      TREATMENT OF CARDIAC ARRHYTHMIA  9/16/2019    Procedure: Cardioversion or Defibrillation;  Surgeon: Deven Vasquez MD;  Location: [facility] CATH LAB;  Service: Cardiology;;       Review of patient's allergies indicates:   Allergen Reactions    Contrast media Hives       Medications:  Current Facility-Administered Medications   Medication Dose Route Frequency Provider Last Rate Last Admin    acetaminophen tablet 650 mg  650 mg Oral Q8H PRN Chaka Meza MD        acetaminophen tablet 650 mg  650 mg Oral Q4H PRN Chaka Meza MD        albuterol-ipratropium 2.5 mg-0.5 mg/3 mL nebulizer solution 3 mL  3 mL Nebulization Q4H PRN Chaka Meza MD        aluminum-magnesium hydroxide-simethicone 200-200-20 mg/5 mL suspension 30 mL  30 mL Oral QID PRN Chaka Meza MD        amiodarone tablet 200 mg  200 mg Oral Daily Chaka Meza MD   200 mg at 05/06/24 0935    atorvastatin tablet 80 mg  80 mg Oral Daily Chaka Meza MD   80 mg at 05/06/24 0935    dextrose 10% bolus 125 mL 125 mL  12.5 g Intravenous PRN Chaka Meza MD        dextrose 10% bolus 250 mL 250 mL  25 g Intravenous PRN SalChaka cullen MD        gabapentin capsule 600 mg  600 mg Oral TID PRN Chaka Meza MD        glucagon (human recombinant) injection 1 mg  1 mg Intramuscular PRN Chaka Meza MD        glucose chewable tablet 16 g  16 g Oral PRN SalChaka cullen MD        glucose chewable tablet 24 g  24 g Oral PRN SalChaka cullen MD        heparin 25,000 units in dextrose 5% (100 units/ml) IV bolus from bag LOW INTENSITY nomogram - [facility]  60 Units/kg (Adjusted) Intravenous  PRN Chaka Meza MD        heparin 25,000 units in dextrose 5% (100 units/ml) IV bolus from bag LOW INTENSITY nomogram - [facility]  30 Units/kg (Adjusted) Intravenous PRN Chaka Meza MD        heparin 25,000 units in dextrose 5% 250 mL (100 units/mL) infusion LOW INTENSITY nomogram - [facility]  0-40 Units/kg/hr (Adjusted) Intravenous Continuous Chaka Meza MD 7.8 mL/hr at 05/05/24 1824 8 Units/kg/hr at 05/05/24 1824    insulin aspart U-100 pen 0-5 Units  0-5 Units Subcutaneous QID (AC + HS) PRN Chaka Meza MD        levothyroxine tablet 200 mcg  200 mcg Oral Before breakfast Chaka Meza MD   200 mcg at 05/06/24 0523    magnesium oxide tablet 800 mg  800 mg Oral PRN Chaka Meza MD        magnesium oxide tablet 800 mg  800 mg Oral PRN Chaka Meza MD        melatonin tablet 6 mg  6 mg Oral Nightly PRN Chaka Meza MD        metoprolol tartrate (LOPRESSOR) split tablet 12.5 mg  12.5 mg Oral BID Chaka Meza MD        mupirocin 2 % ointment   Nasal BID Keri Porras-Ana T., DO   Given at 05/06/24 1017    naloxone 0.4 mg/mL injection 0.02 mg  0.02 mg Intravenous PRN Chaka Meza MD        ondansetron injection 4 mg  4 mg Intravenous Q8H PRN Chaka Meza MD        piperacillin-tazobactam (ZOSYN) 4.5 g in dextrose 5 % in water (D5W) 100 mL IVPB (MB+)  4.5 g Intravenous Q12H Chapincito Juan MD 25 mL/hr at 05/06/24 1254 4.5 g at 05/06/24 1254    polyethylene glycol packet 17 g  17 g Oral Daily Yesenia Cates PA-C   17 g at 05/06/24 0935    potassium bicarbonate disintegrating tablet 35 mEq  35 mEq Oral PRN Chaka Meza MD        potassium bicarbonate disintegrating tablet 50 mEq  50 mEq Oral PRN Chaka Meza MD        potassium bicarbonate disintegrating tablet 60 mEq  60 mEq Oral PRN Chaka Meza MD        potassium, sodium phosphates 280-160-250 mg packet 2 packet  2 packet Oral PRN Chaka Meza MD        potassium, sodium phosphates 280-160-250 mg packet 2 packet  2 packet Oral PRN Chaka Meza MD         potassium, sodium phosphates 280-160-250 mg packet 2 packet  2 packet Oral PRN Chaka Meza MD        prochlorperazine injection Soln 5 mg  5 mg Intravenous Q6H PRN Chaka Meza MD        simethicone chewable tablet 80 mg  1 tablet Oral QID PRN Chaka Meza MD   80 mg at 05/04/24 2310    sodium bicarbonate 150 mEq in dextrose 5 % (D5W) 1,000 mL infusion   Intravenous Continuous Reanna Middleton  mL/hr at 05/06/24 1253 New Bag at 05/06/24 1253    sodium chloride 0.9% flush 10 mL  10 mL Intravenous Q12H PRN Chaka Meza MD         Antibiotics (From admission, onward)      Start     Stop Route Frequency Ordered    05/06/24 1330  piperacillin-tazobactam (ZOSYN) 4.5 g in dextrose 5 % in water (D5W) 100 mL IVPB (MB+)         -- IV Every 12 hours (non-standard times) 05/06/24 1218    05/06/24 1045  mupirocin 2 % ointment         05/11/24 0859 Nasl 2 times daily 05/06/24 0932          Antifungals (From admission, onward)      None          Antivirals (From admission, onward)      None             Immunization History   Administered Date(s) Administered    Influenza - Quadrivalent - PF *Preferred* (6 months and older) 10/19/2017, 09/16/2019    Pneumococcal Conjugate - 13 Valent 10/19/2017       Family History    Family history is unknown by patient.       Social History     Socioeconomic History    Marital status:    Tobacco Use    Smoking status: Never    Smokeless tobacco: Never   Substance and Sexual Activity    Alcohol use: Not Currently     Comment: occasionally    Drug use: No    Sexual activity: Not Currently     Review of Systems   Constitutional:  Negative for chills and fever.   Gastrointestinal:  Positive for abdominal pain and anal bleeding.   Genitourinary:  Positive for difficulty urinating. Negative for dysuria.   All other systems reviewed and are negative.    Objective:     Vital Signs (Most Recent):  Temp: 98.1 °F (36.7 °C) (05/06/24 1116)  Pulse: 68 (05/06/24 1116)  Resp: 20 (05/06/24  1116)  BP: 137/84 (05/06/24 1116)  SpO2: 100 % (05/06/24 1116) Vital Signs (24h Range):  Temp:  [97.6 °F (36.4 °C)-98.1 °F (36.7 °C)] 98.1 °F (36.7 °C)  Pulse:  [56-68] 68  Resp:  [18-20] 20  SpO2:  [100 %] 100 %  BP: (101-137)/(62-84) 137/84     Weight: (!) 137.3 kg (302 lb 11.1 oz)  Body mass index is 44.7 kg/m².    Estimated Creatinine Clearance: 14.4 mL/min (A) (based on SCr of 7.4 mg/dL (H)).     Physical Exam  Vitals and nursing note reviewed.   Constitutional:       General: He is not in acute distress.     Appearance: Normal appearance. He is not ill-appearing, toxic-appearing or diaphoretic.   HENT:      Head: Normocephalic and atraumatic.      Right Ear: External ear normal.      Left Ear: External ear normal.   Eyes:      General: No scleral icterus.  Cardiovascular:      Rate and Rhythm: Normal rate and regular rhythm.   Abdominal:      General: There is distension.      Tenderness: There is no abdominal tenderness. There is no right CVA tenderness, left CVA tenderness or guarding.   Neurological:      General: No focal deficit present.      Mental Status: He is alert and oriented to person, place, and time.   Psychiatric:         Mood and Affect: Mood normal.         Thought Content: Thought content normal.          Significant Labs: BMP:   Recent Labs   Lab 05/06/24  0424   GLU 74   *   K 4.8      CO2 14*   BUN 61*   CREATININE 7.4*   CALCIUM 6.4*   MG 1.7     CBC:   Recent Labs   Lab 05/05/24  0330 05/06/24  0424 05/06/24  0652   WBC 1.14* 1.10* 1.01*   HGB 9.3* 8.6* 9.2*   HCT 29.8* 28.4* 29.7*    261 281     Microbiology Results (last 7 days)       Procedure Component Value Units Date/Time    Urine culture [[id number]]  (Abnormal)  (Susceptibility) Collected: 05/04/24 1541    Order Status: Completed Specimen: Urine Updated: 05/06/24 1151     Urine Culture, Routine ENTEROCOCCUS FAECALIS  >100,000 cfu/ml      Narrative:      Specimen Source->Urine    Blood culture [[id number]]  Collected: 05/05/24 0708    Order Status: Completed Specimen: Blood from Peripheral, Wrist, Right Updated: 05/06/24 0903     Blood Culture, Routine No Growth to date      No Growth to date    Blood culture [[id number]] Collected: 05/05/24 0708    Order Status: Completed Specimen: Blood from Peripheral, Hand, Left Updated: 05/06/24 0903     Blood Culture, Routine No Growth to date      No Growth to date            Significant Imaging: I have reviewed all pertinent imaging results/findings within the past 24 hours.

## 2024-05-06 NOTE — ASSESSMENT & PLAN NOTE
64 yo man with acute appendicitis by CT. Seen by Surgery and cleared for medical management.    Recommendations  Zosyn for a few days (depending on resolution of neutropenia), then Augmentin x 10 days

## 2024-05-06 NOTE — PROGRESS NOTES
[facility] Medicine  Progress Note    Patient Name: Russell Santos  MRN: [id number]  Patient Class: IP- Inpatient   Admission Date: 5/4/2024  Length of Stay: 2 days  Attending Physician: Keri Porras-Ana PEARSON DO  Primary Care Provider: Roxanne Phillip -        Subjective:     Principal Problem:MICHELLE (acute kidney injury)        HPI:  This is a 63-year-old male with a past medical history of AFib (on Xarelto), hypertension, type 2 diabetes, hyperlipidemia, hypothyroidism, chronic leukopenia, morbid obesity, who presents with abdominal pain.    Patient presents with abdominal pain that started 4 days prior to presentation.  He reports that his symptoms started after eating [employer] coleslaw.  He endorsed diarrhea, and right lower quadrant abdominal pain.  Associated symptoms include decreased p.o. intake, decreased appetite, generalized weakness, leg cramping, nausea and vomiting.  Additionally, he endorses decreased urination and intermittent dysuria.    In the ED, the patient was hemodynamically stable.  Labs remarkable for an elevated creatinine (8.3 baseline of 0.9), leukopenia (1.4- below baseline), microcytic anemia (9.6), elevated BNP (123).  VBG showed a pH of 7.24, HC03 of 18.1.  UA showed +3 leukocyte esterase, 46 RBCs, 42 WBCs, +1 protein, and many bacteria.  CT abdomen and pelvis showed findings suggestive of acute appendicitis, with bilateral perinephric stranding/possible ascending UTI without evidence of hydronephrosis. Surgery recommended medical management.  Patient was given 500 mL of LR, ceftriaxone, Flagyl.  He was admitted for further management.    Overview/Hospital Course:  63-year-old male with a past medical history of AFib (on Xarelto), hypertension, type 2 diabetes, hyperlipidemia, hypothyroidism, chronic leukopenia, morbid obesity admitted on 05/04/2024 for further evaluation of abdominal pain associated with nausea, vomiting, diarrhea, and decreased p.o. intake.  Admits  "generalized weakness.  Patient found to have  hyperphosphatemia, acute renal failure, possible appendicitis, and UTI/pyelonephritis.  CT abdomen with Dilated appendix measuring 12 mm with surrounding periappendiceal stranding/inflammatory change, concern for acute appendicitis, no evidence of abscess or perforation.  Also noted bilateral perinephric stranding, potential ascending urinary tract infection.  Creatinine 8.3 on admission.  Urinalysis red blood, many bacteria, and WBCs.  Blood culture ordered, NGTD.  Urine culture with Enterococcus species, susceptibilities pending.  General surgery consulted-no plans for acute surgical intervention.  Agree with IV antibiotics.  ID consulted. Nephrology consulted-continue on bicarb gtt. Pt now with worsening leukopenia, hematology consulted.     Interval History:  No acute overnight events.  Patient remained afebrile.  Feeling better this morning. Urinating more. Complains of "burning with urination with the brandt". Admits RLQ pain present but very mild.      Review of Systems   Constitutional:  Positive for activity change, appetite change and fatigue. Negative for fever.   Respiratory:  Negative for cough and shortness of breath.    Cardiovascular:  Positive for chest pain. Negative for palpitations.   Gastrointestinal:  Positive for abdominal distention and abdominal pain (improving). Negative for diarrhea, nausea and vomiting.   Endocrine: Negative.    Genitourinary:  Positive for difficulty urinating and dysuria.   Skin: Negative.    Allergic/Immunologic: Negative.    Neurological:  Positive for weakness (Generalized).     Objective:     Vital Signs (Most Recent):  Temp: 97.7 °F (36.5 °C) (05/06/24 0750)  Pulse: (!) 58 (05/06/24 0750)  Resp: 18 (05/06/24 0750)  BP: 105/62 (05/06/24 0750)  SpO2: 100 % (05/06/24 0750) Vital Signs (24h Range):  Temp:  [97.5 °F (36.4 °C)-97.8 °F (36.6 °C)] 97.7 °F (36.5 °C)  Pulse:  [56-62] 58  Resp:  [18-20] 18  SpO2:  [97 %-100 %] 100 " %  BP: (101-120)/(62-75) 105/62     Weight: (!) 137.3 kg (302 lb 11.1 oz)  Body mass index is 44.7 kg/m².    Intake/Output Summary (Last 24 hours) at 5/6/2024 1102  Last data filed at 5/6/2024 0943  Gross per 24 hour   Intake 3088.97 ml   Output 3850 ml   Net -761.03 ml         Physical Exam  Vitals and nursing note reviewed.   Constitutional:       General: He is not in acute distress.     Appearance: He is morbidly obese. He is ill-appearing.   HENT:      Mouth/Throat:      Mouth: Mucous membranes are dry.   Eyes:      Extraocular Movements: Extraocular movements intact.   Cardiovascular:      Rate and Rhythm: Normal rate and regular rhythm.   Pulmonary:      Effort: Pulmonary effort is normal. No respiratory distress.      Breath sounds: Normal breath sounds. No wheezing.   Abdominal:      General: There is distension.      Palpations: Abdomen is soft.      Tenderness: There is abdominal tenderness (near RLQ). There is no right CVA tenderness, left CVA tenderness, guarding or rebound.      Comments: No local or generalized peritonitis.  No CVA tenderness today    Musculoskeletal:         General: No swelling or tenderness.   Skin:     General: Skin is warm and dry.   Neurological:      General: No focal deficit present.      Mental Status: He is alert and oriented to person, place, and time.   Psychiatric:         Mood and Affect: Mood normal.         Thought Content: Thought content normal.             Significant Labs: All pertinent labs within the past 24 hours have been reviewed.    Significant Imaging: I have reviewed all pertinent imaging results/findings within the past 24 hours.    Assessment/Plan:      * MICHELLE (acute kidney injury)  Patient with acute kidney injury/acute renal failure likely due to pre-renal azotemia due to IVVD MICHELLE is currently stable. Baseline creatinine  0.9  - Labs reviewed- Renal function/electrolytes with Estimated Creatinine Clearance: 14.4 mL/min (A) (based on SCr of 7.4 mg/dL  (H)). according to latest data. Monitor urine output and serial BMP and adjust therapy as needed. Avoid nephrotoxins and renally dose meds for GFR listed above.    -Presented with MICHELLE  -Cr on admit 8.3  -Baseline Cr 0.9  -Suspect due to infection and decreased PO intake  -Nephrology consulted  -IVF  -Avoid nephrotoxins, renal dose all meds.   -Monitor Is/Os  -Monitor       Pyelonephritis  -patient with complaints difficulty urinating and dysuria with MICHELLE on likely CKD  -CT abdomen showed bilateral perinephric stranding which extends inferiorly along the ureteral courses and in the right lower quadrant periappendiceal region. Potential ascending urinary tract infection  -Urine culture with Enterococcus species, identification speciation pending   -continue IV ceftriaxone  -ID consulted    Acute appendicitis  -CT abdomen and pelvis showed findings suggestive of acute appendicitis, with bilateral perinephric stranding/possible ascending UTI.  -General surgery recommended medical management  -Continue ceftriaxone/Flagyl  -Symptomatic control  -ID consulted    Hyperphosphatemia  -phosphorus level elevated at 5.8 on admission   -likely elevated in the setting of acute renal failure   -nephrology consulted   -monitor level      Leukopenia  History noted. Chronic but worsening, likely in the setting of infection   Patient had a bone marrow biopsy in 2020, which was unrevealing.   WBC down to 1.10 on 05/06  Hematology consulted  Monitor       PAF (paroxysmal atrial fibrillation)  [id number] Score: 1.  Continue Lopressor. Hold Xarelto given potential surgical intervention. Heparin ggt     Essential hypertension  Chronic, controlled. Latest blood pressure and vitals reviewed-     Temp:  [97.6 °F (36.4 °C)-97.8 °F (36.6 °C)]   Pulse:  [56-62]   Resp:  [18-20]   BP: (101-117)/(62-73)   SpO2:  [100 %] .   Home meds for hypertension were reviewed and noted below.   Hypertension Medications               metoprolol tartrate  (LOPRESSOR) 25 MG tablet Take 1 tablet (25 mg total) by mouth 2 (two) times daily.    olmesartan (BENICAR) 40 MG tablet Take 1 tablet (40 mg total) by mouth once daily.            While in the hospital, will manage blood pressure as follows; Adjust home antihypertensive regimen as follows- hold nephrotoxic medications, monitor BP    Will utilize p.r.n. blood pressure medication only if patient's blood pressure greater than 180/110 and he develops symptoms such as worsening chest pain or shortness of breath.    Hyperlipidemia  Continue home statin       Diabetes mellitus, type 2  Patient's FSGs are controlled on current medication regimen.  Last A1c reviewed-   Lab Results   Component Value Date    HGBA1C 6.3 (H) 08/08/2022     Most recent fingerstick glucose reviewed-   Recent Labs   Lab 05/05/24  1623 05/05/24  1925 05/06/24  0752 05/06/24  1038   POCTGLUCOSE 123* 105 79 75       Current correctional scale  Low  Maintain anti-hyperglycemic dose as follows-   Antihyperglycemics (From admission, onward)      Start     Stop Route Frequency Ordered    05/04/24 2049  insulin aspart U-100 pen 0-5 Units         -- SubQ Before meals & nightly PRN 05/04/24 1949          Hold Oral hypoglycemics while patient is in the hospital.    Hypothyroidism  Continue home Synthroid        History of DVT (deep vein thrombosis)  Hold Xarelto given potential surgical intervention  Heparin ggt     Morbid obesity  Body mass index is 44.7 kg/m². Morbid obesity complicates all aspects of disease management from diagnostic modalities to treatment. Weight loss encouraged and health benefits explained to patient.           VTE Risk Mitigation (From admission, onward)           Ordered     heparin 25,000 units in dextrose 5% (100 units/ml) IV bolus from bag LOW INTENSITY nomogram - [facility]  As needed (PRN)        Question:  Heparin Infusion Adjustment (DO NOT MODIFY ANSWER)  Answer:  [email or web address]\epic\Images\Pharmacy\HeparinInfusions\heparin LOW  INTENSITY nomogram for [facility] [id number]    05/04/24 1951     heparin 25,000 units in dextrose 5% (100 units/ml) IV bolus from bag LOW INTENSITY nomogram - [facility]  As needed (PRN)        Question:  Heparin Infusion Adjustment (DO NOT MODIFY ANSWER)  Answer:  [email or web address]\Images\Pharmacy\HeparinInfusions\heparin LOW INTENSITY nomogram for [facility] [id number]    05/04/24 1951     heparin 25,000 units in dextrose 5% 250 mL (100 units/mL) infusion LOW INTENSITY nomogram - [facility]  Continuous        Question:  Begin at (units/kg/hr)  Answer:  12    05/04/24 1951     IP VTE HIGH RISK PATIENT  Once         05/04/24 1949     Place sequential compression device  Until discontinued         05/04/24 1949                    Discharge Planning   CASA:      Code Status: Full Code   Is the patient medically ready for discharge?:     Reason for patient still in hospital (select all that apply): Patient trending condition, Treatment, and Consult recommendations  Discharge Plan A: Home                  Maureen Porras DO  Department of Hospital Medicine   [facility] Surg

## 2024-05-06 NOTE — ASSESSMENT & PLAN NOTE
Assessment and Plan:  63 y.o. male AFib (on Xarelto at home, hep gtt here), BMI 45, type 2 diabetes, hyperlipidemia, hypothyroidism, chronic leukopenia, and HTN who presented from home with multiple symptoms and was found to be in acute renal failure. There was concern for acute appendicitis on imaging. While he does have some RLQ tenderness, I believe that this is better explained by reactive changes from his ongoing ascending UTI. This could explain the imaging changes, his atypical/mild pain/tenderness, and atypical duration of symptoms. He does not have a fecalith on exam, so even if this were acute appendicitis superimposed on his UTI, this should improve/resolve with IV Abx. We will continue to monitor him to ensure continued improvement. He is a suboptimal surgical candidate in his current condition.     -Agree with IV Abx continued for now.   -Would complete 10-14 course of PO abx when transitioned although still less likely appendicitis.   -OK for diet as tolerated from our standpoint  -OK to continue heparin gtt.  -Remainder of care per primary     We will continue to follow.

## 2024-05-06 NOTE — NURSING
[facility], [facility]  Nurses Note -- 4 Eyes      5/6/2024       Skin assessed on: Q Shift      [x] No Pressure Injuries Present    []Prevention Measures Documented    [] Yes LDA  for Pressure Injury Previously documented     [] Yes New Pressure Injury Discovered   [] LDA for New Pressure Injury Added      Attending RN:  Rosana Gayle, LAM     Second RN:  StevensonRN

## 2024-05-06 NOTE — PLAN OF CARE
Problem: Adult Inpatient Plan of Care  Goal: Absence of Hospital-Acquired Illness or Injury  Outcome: Progressing  Goal: Optimal Comfort and Wellbeing  Outcome: Progressing     Problem: Bariatric Environmental Safety  Goal: Safety Maintained with Care  Outcome: Progressing     Problem: Diabetes Comorbidity  Goal: Blood Glucose Level Within Targeted Range  Outcome: Progressing

## 2024-05-06 NOTE — ASSESSMENT & PLAN NOTE
-patient with complaints difficulty urinating and dysuria with MICHELLE on likely CKD  -CT abdomen showed bilateral perinephric stranding which extends inferiorly along the ureteral courses and in the right lower quadrant periappendiceal region. Potential ascending urinary tract infection  -Urine culture with Enterococcus species, identification speciation pending   -continue IV ceftriaxone  -ID consulted

## 2024-05-06 NOTE — ASSESSMENT & PLAN NOTE
-CT abdomen and pelvis showed findings suggestive of acute appendicitis, with bilateral perinephric stranding/possible ascending UTI.  -General surgery recommended medical management  -Continue ceftriaxone/Flagyl  -Symptomatic control  -ID consulted

## 2024-05-07 LAB
25(OH)D3+25(OH)D2 SERPL-MCNC: 19 NG/ML (ref 30–96)
ANION GAP SERPL CALC-SCNC: 11 MMOL/L (ref 8–16)
APTT PPP: 38.7 SEC (ref 21–32)
APTT PPP: 38.9 SEC (ref 21–32)
APTT PPP: 42.7 SEC (ref 21–32)
APTT PPP: 54 SEC (ref 21–32)
BASOPHILS # BLD AUTO: 0.02 K/UL (ref 0–0.2)
BASOPHILS # BLD AUTO: 0.02 K/UL (ref 0–0.2)
BASOPHILS NFR BLD: 2.4 % (ref 0–1.9)
BASOPHILS NFR BLD: 2.5 % (ref 0–1.9)
BUN SERPL-MCNC: 51 MG/DL (ref 8–23)
CALCIUM SERPL-MCNC: 6.4 MG/DL (ref 8.7–10.5)
CHLORIDE SERPL-SCNC: 108 MMOL/L (ref 95–110)
CO2 SERPL-SCNC: 20 MMOL/L (ref 23–29)
CREAT SERPL-MCNC: 5.8 MG/DL (ref 0.5–1.4)
DIFFERENTIAL METHOD BLD: ABNORMAL
DIFFERENTIAL METHOD BLD: ABNORMAL
EOSINOPHIL # BLD AUTO: 0.1 K/UL (ref 0–0.5)
EOSINOPHIL # BLD AUTO: 0.1 K/UL (ref 0–0.5)
EOSINOPHIL NFR BLD: 6 % (ref 0–8)
EOSINOPHIL NFR BLD: 7.4 % (ref 0–8)
ERYTHROCYTE [DISTWIDTH] IN BLOOD BY AUTOMATED COUNT: 17.7 % (ref 11.5–14.5)
ERYTHROCYTE [DISTWIDTH] IN BLOOD BY AUTOMATED COUNT: 17.9 % (ref 11.5–14.5)
EST. GFR  (NO RACE VARIABLE): 10 ML/MIN/1.73 M^2
GLUCOSE SERPL-MCNC: 106 MG/DL (ref 70–110)
HCT VFR BLD AUTO: 26.2 % (ref 40–54)
HCT VFR BLD AUTO: 28.7 % (ref 40–54)
HGB BLD-MCNC: 8.3 G/DL (ref 14–18)
HGB BLD-MCNC: 9 G/DL (ref 14–18)
IMM GRANULOCYTES # BLD AUTO: 0 K/UL (ref 0–0.04)
IMM GRANULOCYTES # BLD AUTO: 0.01 K/UL (ref 0–0.04)
IMM GRANULOCYTES NFR BLD AUTO: 0 % (ref 0–0.5)
IMM GRANULOCYTES NFR BLD AUTO: 1.2 % (ref 0–0.5)
LYMPHOCYTES # BLD AUTO: 0.4 K/UL (ref 1–4.8)
LYMPHOCYTES # BLD AUTO: 0.4 K/UL (ref 1–4.8)
LYMPHOCYTES NFR BLD: 53 % (ref 18–48)
LYMPHOCYTES NFR BLD: 53.1 % (ref 18–48)
MAGNESIUM SERPL-MCNC: 1.5 MG/DL (ref 1.6–2.6)
MCH RBC QN AUTO: 22.7 PG (ref 27–31)
MCH RBC QN AUTO: 22.8 PG (ref 27–31)
MCHC RBC AUTO-ENTMCNC: 31.4 G/DL (ref 32–36)
MCHC RBC AUTO-ENTMCNC: 31.7 G/DL (ref 32–36)
MCV RBC AUTO: 72 FL (ref 82–98)
MCV RBC AUTO: 73 FL (ref 82–98)
MONOCYTES # BLD AUTO: 0 K/UL (ref 0.3–1)
MONOCYTES # BLD AUTO: 0 K/UL (ref 0.3–1)
MONOCYTES NFR BLD: 1.2 % (ref 4–15)
MONOCYTES NFR BLD: 1.2 % (ref 4–15)
NEUTROPHILS # BLD AUTO: 0.3 K/UL (ref 1.8–7.7)
NEUTROPHILS # BLD AUTO: 0.3 K/UL (ref 1.8–7.7)
NEUTROPHILS NFR BLD: 35.8 % (ref 38–73)
NEUTROPHILS NFR BLD: 36.2 % (ref 38–73)
NRBC BLD-RTO: 0 /100 WBC
NRBC BLD-RTO: 0 /100 WBC
PATH REV BLD -IMP: NORMAL
PATH REV BLD -IMP: NORMAL
PHOSPHATE SERPL-MCNC: 4.4 MG/DL (ref 2.7–4.5)
PLATELET # BLD AUTO: 273 K/UL (ref 150–450)
PLATELET # BLD AUTO: 293 K/UL (ref 150–450)
PMV BLD AUTO: 9.2 FL (ref 9.2–12.9)
PMV BLD AUTO: 9.3 FL (ref 9.2–12.9)
POCT GLUCOSE: 107 MG/DL (ref 70–110)
POCT GLUCOSE: 112 MG/DL (ref 70–110)
POCT GLUCOSE: 113 MG/DL (ref 70–110)
POCT GLUCOSE: 79 MG/DL (ref 70–110)
POTASSIUM SERPL-SCNC: 4.3 MMOL/L (ref 3.5–5.1)
RBC # BLD AUTO: 3.65 M/UL (ref 4.6–6.2)
RBC # BLD AUTO: 3.95 M/UL (ref 4.6–6.2)
SODIUM SERPL-SCNC: 139 MMOL/L (ref 136–145)
WBC # BLD AUTO: 0.81 K/UL (ref 3.9–12.7)
WBC # BLD AUTO: 0.83 K/UL (ref 3.9–12.7)

## 2024-05-07 PROCEDURE — 63600175 PHARM REV CODE 636 W HCPCS: Performed by: STUDENT IN AN ORGANIZED HEALTH CARE EDUCATION/TRAINING PROGRAM

## 2024-05-07 PROCEDURE — 27000207 HC ISOLATION

## 2024-05-07 PROCEDURE — 83735 ASSAY OF MAGNESIUM: CPT | Performed by: STUDENT IN AN ORGANIZED HEALTH CARE EDUCATION/TRAINING PROGRAM

## 2024-05-07 PROCEDURE — 36415 COLL VENOUS BLD VENIPUNCTURE: CPT | Performed by: STUDENT IN AN ORGANIZED HEALTH CARE EDUCATION/TRAINING PROGRAM

## 2024-05-07 PROCEDURE — 80048 BASIC METABOLIC PNL TOTAL CA: CPT | Performed by: STUDENT IN AN ORGANIZED HEALTH CARE EDUCATION/TRAINING PROGRAM

## 2024-05-07 PROCEDURE — 25000003 PHARM REV CODE 250: Performed by: STUDENT IN AN ORGANIZED HEALTH CARE EDUCATION/TRAINING PROGRAM

## 2024-05-07 PROCEDURE — 85730 THROMBOPLASTIN TIME PARTIAL: CPT | Mod: 91 | Performed by: STUDENT IN AN ORGANIZED HEALTH CARE EDUCATION/TRAINING PROGRAM

## 2024-05-07 PROCEDURE — 25000003 PHARM REV CODE 250: Performed by: INTERNAL MEDICINE

## 2024-05-07 PROCEDURE — 85520 HEPARIN ASSAY: CPT | Performed by: STUDENT IN AN ORGANIZED HEALTH CARE EDUCATION/TRAINING PROGRAM

## 2024-05-07 PROCEDURE — 85025 COMPLETE CBC W/AUTO DIFF WBC: CPT | Performed by: STUDENT IN AN ORGANIZED HEALTH CARE EDUCATION/TRAINING PROGRAM

## 2024-05-07 PROCEDURE — 99233 SBSQ HOSP IP/OBS HIGH 50: CPT | Mod: ,,, | Performed by: SURGERY

## 2024-05-07 PROCEDURE — 99900035 HC TECH TIME PER 15 MIN (STAT)

## 2024-05-07 PROCEDURE — 82306 VITAMIN D 25 HYDROXY: CPT | Performed by: INTERNAL MEDICINE

## 2024-05-07 PROCEDURE — 85025 COMPLETE CBC W/AUTO DIFF WBC: CPT | Mod: 91 | Performed by: STUDENT IN AN ORGANIZED HEALTH CARE EDUCATION/TRAINING PROGRAM

## 2024-05-07 PROCEDURE — 11000001 HC ACUTE MED/SURG PRIVATE ROOM

## 2024-05-07 PROCEDURE — 63600175 PHARM REV CODE 636 W HCPCS: Performed by: INTERNAL MEDICINE

## 2024-05-07 PROCEDURE — 84100 ASSAY OF PHOSPHORUS: CPT | Performed by: STUDENT IN AN ORGANIZED HEALTH CARE EDUCATION/TRAINING PROGRAM

## 2024-05-07 PROCEDURE — 85730 THROMBOPLASTIN TIME PARTIAL: CPT | Performed by: STUDENT IN AN ORGANIZED HEALTH CARE EDUCATION/TRAINING PROGRAM

## 2024-05-07 PROCEDURE — 36415 COLL VENOUS BLD VENIPUNCTURE: CPT | Performed by: INTERNAL MEDICINE

## 2024-05-07 RX ORDER — SODIUM BICARBONATE 325 MG/1
1300 TABLET ORAL 2 TIMES DAILY
Status: DISCONTINUED | OUTPATIENT
Start: 2024-05-07 | End: 2024-05-11 | Stop reason: HOSPADM

## 2024-05-07 RX ORDER — CALCIUM CARBONATE 200(500)MG
1000 TABLET,CHEWABLE ORAL 2 TIMES DAILY
Status: DISCONTINUED | OUTPATIENT
Start: 2024-05-07 | End: 2024-05-11 | Stop reason: HOSPADM

## 2024-05-07 RX ORDER — HEPARIN SODIUM,PORCINE/D5W 25000/250
0-40 INTRAVENOUS SOLUTION INTRAVENOUS CONTINUOUS
Status: DISCONTINUED | OUTPATIENT
Start: 2024-05-07 | End: 2024-05-08

## 2024-05-07 RX ADMIN — LEVOTHYROXINE SODIUM 200 MCG: 0.1 TABLET ORAL at 05:05

## 2024-05-07 RX ADMIN — HEPARIN SODIUM 8 UNITS/KG/HR: 10000 INJECTION, SOLUTION INTRAVENOUS at 12:05

## 2024-05-07 RX ADMIN — SODIUM BICARBONATE: 84 INJECTION, SOLUTION INTRAVENOUS at 08:05

## 2024-05-07 RX ADMIN — PIPERACILLIN SODIUM AND TAZOBACTAM SODIUM 4.5 G: 4; .5 INJECTION, POWDER, LYOPHILIZED, FOR SOLUTION INTRAVENOUS at 12:05

## 2024-05-07 RX ADMIN — AMIODARONE HYDROCHLORIDE 200 MG: 200 TABLET ORAL at 08:05

## 2024-05-07 RX ADMIN — MUPIROCIN: 20 OINTMENT TOPICAL at 09:05

## 2024-05-07 RX ADMIN — METOPROLOL TARTRATE 12.5 MG: 25 TABLET, FILM COATED ORAL at 08:05

## 2024-05-07 RX ADMIN — SODIUM BICARBONATE 1300 MG: 325 TABLET ORAL at 11:05

## 2024-05-07 RX ADMIN — CALCIUM CARBONATE (ANTACID) CHEW TAB 500 MG 1000 MG: 500 CHEW TAB at 09:05

## 2024-05-07 RX ADMIN — ATORVASTATIN CALCIUM 80 MG: 40 TABLET, FILM COATED ORAL at 08:05

## 2024-05-07 RX ADMIN — HEPARIN SODIUM 10 UNITS/KG/HR: 10000 INJECTION, SOLUTION INTRAVENOUS at 12:05

## 2024-05-07 RX ADMIN — MUPIROCIN: 20 OINTMENT TOPICAL at 08:05

## 2024-05-07 RX ADMIN — SODIUM BICARBONATE 1300 MG: 325 TABLET ORAL at 09:05

## 2024-05-07 RX ADMIN — CALCIUM CARBONATE (ANTACID) CHEW TAB 500 MG 1000 MG: 500 CHEW TAB at 08:05

## 2024-05-07 RX ADMIN — PIPERACILLIN SODIUM AND TAZOBACTAM SODIUM 4.5 G: 4; .5 INJECTION, POWDER, LYOPHILIZED, FOR SOLUTION INTRAVENOUS at 02:05

## 2024-05-07 NOTE — PROGRESS NOTES
[facility] Medicine  Progress Note    Patient Name: Russell Santos  MRN: [id number]  Patient Class: IP- Inpatient   Admission Date: 5/4/2024  Length of Stay: 3 days  Attending Physician: Christian Roy III, MD  Primary Care Provider: Roxanne Phillip -        Subjective:     Principal Problem:MICHELLE (acute kidney injury)        HPI:  This is a 63-year-old male with a past medical history of AFib (on Xarelto), hypertension, type 2 diabetes, hyperlipidemia, hypothyroidism, chronic leukopenia, morbid obesity, who presents with abdominal pain.    Patient presents with abdominal pain that started 4 days prior to presentation.  He reports that his symptoms started after eating [employer] coleslaw.  He endorsed diarrhea, and right lower quadrant abdominal pain.  Associated symptoms include decreased p.o. intake, decreased appetite, generalized weakness, leg cramping, nausea and vomiting.  Additionally, he endorses decreased urination and intermittent dysuria.    In the ED, the patient was hemodynamically stable.  Labs remarkable for an elevated creatinine (8.3 baseline of 0.9), leukopenia (1.4- below baseline), microcytic anemia (9.6), elevated BNP (123).  VBG showed a pH of 7.24, HC03 of 18.1.  UA showed +3 leukocyte esterase, 46 RBCs, 42 WBCs, +1 protein, and many bacteria.  CT abdomen and pelvis showed findings suggestive of acute appendicitis, with bilateral perinephric stranding/possible ascending UTI without evidence of hydronephrosis. Surgery recommended medical management.  Patient was given 500 mL of LR, ceftriaxone, Flagyl.  He was admitted for further management.    Overview/Hospital Course:  63-year-old male with a past medical history of AFib (on Xarelto), hypertension, type 2 diabetes, hyperlipidemia, hypothyroidism, chronic leukopenia, morbid obesity admitted on 05/04/2024 for further evaluation of abdominal pain associated with nausea, vomiting, diarrhea, and decreased p.o. intake.  Admits  generalized weakness.  Patient found to have  hyperphosphatemia, acute renal failure, possible appendicitis, and UTI/pyelonephritis.  CT abdomen with Dilated appendix measuring 12 mm with surrounding periappendiceal stranding/inflammatory change, concern for acute appendicitis, no evidence of abscess or perforation.  Also noted bilateral perinephric stranding, potential ascending urinary tract infection.  Creatinine 8.3 on admission.  Urinalysis red blood, many bacteria, and WBCs.  Blood culture ordered, NGTD.  Urine culture with Enterococcus species, susceptibilities pending.  General surgery consulted-no plans for acute surgical intervention.  Agree with IV antibiotics.  ID consulted. Nephrology consulted-continue on bicarb gtt. Pt now with worsening leukopenia, hematology consulted.     Interval History:  Patient states he is still having some abdominal discomfort, but no nausea or vomiting.  Does state that he has bleeding when he wipes, but this is not new for him and has history of hemorrhoids.  No other bleeding noted.    Review of Systems  Objective:     Vital Signs (Most Recent):  Temp: 97.9 °F (36.6 °C) (05/07/24 1143)  Pulse: 64 (05/07/24 1143)  Resp: 18 (05/07/24 1143)  BP: 111/77 (05/07/24 1143)  SpO2: 100 % (05/07/24 1143) Vital Signs (24h Range):  Temp:  [97 °F (36.1 °C)-98.7 °F (37.1 °C)] 97.9 °F (36.6 °C)  Pulse:  [55-64] 64  Resp:  [16-20] 18  SpO2:  [100 %] 100 %  BP: (100-113)/(59-77) 111/77     Weight: (!) 137.3 kg (302 lb 11.1 oz)  Body mass index is 44.7 kg/m².    Intake/Output Summary (Last 24 hours) at 5/7/2024 1216  Last data filed at 5/7/2024 0845  Gross per 24 hour   Intake 240 ml   Output 3100 ml   Net -2860 ml         Physical Exam  Constitutional:       General: He is not in acute distress.     Appearance: He is obese.   HENT:      Head: Normocephalic and atraumatic.   Cardiovascular:      Rate and Rhythm: Normal rate and regular rhythm.   Pulmonary:      Effort: No respiratory  distress.   Musculoskeletal:      Cervical back: Neck supple. No rigidity.   Skin:     General: Skin is warm and dry.   Neurological:      General: No focal deficit present.      Mental Status: He is alert and oriented to person, place, and time.             Significant Labs: All pertinent labs within the past 24 hours have been reviewed.    Significant Imaging: I have reviewed all pertinent imaging results/findings within the past 24 hours.    Assessment/Plan:      * MICHELLE (acute kidney injury)  Patient with acute kidney injury/acute renal failure likely due to pre-renal azotemia due to IVVD MICHELLE is currently stable. Baseline creatinine  0.9  - Labs reviewed- Renal function/electrolytes with Estimated Creatinine Clearance: 18.4 mL/min (A) (based on SCr of 5.8 mg/dL (H)). according to latest data. Monitor urine output and serial BMP and adjust therapy as needed. Avoid nephrotoxins and renally dose meds for GFR listed above.    -Presented with MICHELLE  -Cr on admit 8.3  -Baseline Cr 0.9  -Suspect due to infection and decreased PO intake  -Nephrology consulted  -IVF  -Avoid nephrotoxins, renal dose all meds.   -Monitor Is/Os  -Monitor       Hyperphosphatemia  -phosphorus level elevated at 5.8 on admission   -likely elevated in the setting of acute renal failure   -nephrology consulted   -monitor level      Pyelonephritis  -patient with complaints difficulty urinating and dysuria with MICHELLE on likely CKD  -CT abdomen showed bilateral perinephric stranding which extends inferiorly along the ureteral courses and in the right lower quadrant periappendiceal region. Potential ascending urinary tract infection  -Urine culture with Enterococcus species, identification speciation pending   -ID consulted switch to Zosyn    Acute appendicitis  -CT abdomen and pelvis showed findings suggestive of acute appendicitis, with bilateral perinephric stranding/possible ascending UTI.  -General surgery recommended medical management  -Symptomatic  control  -ID consulted switch to Zosyn.    Hypothyroidism  Continue home Synthroid        History of DVT (deep vein thrombosis)  Hold Xarelto given potential surgical intervention  Heparin ggt     Leukopenia  History noted. Chronic but worsening, likely in the setting of infection   Patient had a bone marrow biopsy in 2020, which was unrevealing.   WBC down to 1.10 on 05/06  Hematology consulted, recommending outpatient follow up.  We will continue to monitor     PAF (paroxysmal atrial fibrillation)  [id number] Score: 1.  Continue Lopressor. Hold Xarelto given potential surgical intervention. Heparin ggt     Morbid obesity  Body mass index is 44.7 kg/m². Morbid obesity complicates all aspects of disease management from diagnostic modalities to treatment. Weight loss encouraged and health benefits explained to patient.         Diabetes mellitus, type 2  Patient's FSGs are controlled on current medication regimen.  Last A1c reviewed-   Lab Results   Component Value Date    HGBA1C 6.3 (H) 08/08/2022     Most recent fingerstick glucose reviewed-   Recent Labs   Lab 05/06/24  1716 05/06/24  1954 05/07/24  0713 05/07/24  1144   POCTGLUCOSE 103 151* 107 79       Current correctional scale  Low  Maintain anti-hyperglycemic dose as follows-   Antihyperglycemics (From admission, onward)      Start     Stop Route Frequency Ordered    05/04/24 2049  insulin aspart U-100 pen 0-5 Units         -- SubQ Before meals & nightly PRN 05/04/24 1949          Hold Oral hypoglycemics while patient is in the hospital.    Hyperlipidemia  Continue home statin       Essential hypertension  Chronic, controlled. Latest blood pressure and vitals reviewed-     Temp:  [97 °F (36.1 °C)-98.7 °F (37.1 °C)]   Pulse:  [55-64]   Resp:  [16-20]   BP: (100-113)/(59-77)   SpO2:  [100 %] .   Home meds for hypertension were reviewed and noted below.   Hypertension Medications               metoprolol tartrate (LOPRESSOR) 25 MG tablet Take 1 tablet (25 mg  total) by mouth 2 (two) times daily.    olmesartan (BENICAR) 40 MG tablet Take 1 tablet (40 mg total) by mouth once daily.            While in the hospital, will manage blood pressure as follows; Adjust home antihypertensive regimen as follows- hold nephrotoxic medications, monitor BP    Will utilize p.r.n. blood pressure medication only if patient's blood pressure greater than 180/110 and he develops symptoms such as worsening chest pain or shortness of breath.      VTE Risk Mitigation (From admission, onward)           Ordered     heparin 25,000 units in dextrose 5% (100 units/ml) IV bolus from bag LOW INTENSITY nomogram Anti- Xa  As needed (PRN)        Question:  Heparin Infusion Adjustment (DO NOT MODIFY ANSWER)  Answer:  [email or web address]\epic\Images\Pharmacy\HeparinInfusions\heparin LOW INTENSITY nomogram with ANTI-XA [id number]    05/07/24 1215     heparin 25,000 units in dextrose 5% (100 units/ml) IV bolus from bag LOW INTENSITY nomogram Anti- Xa  As needed (PRN)        Question:  Heparin Infusion Adjustment (DO NOT MODIFY ANSWER)  Answer:  [email or web address]\epic\Images\Pharmacy\HeparinInfusions\heparin LOW INTENSITY nomogram with ANTI-XA [id number]    05/07/24 1215     heparin 25,000 units in dextrose 5% 250 mL (100 units/mL) infusion LOW INTENSITY nomogram Anti- Xa  Continuous        Question:  Begin at (units/kg/hr)  Answer:  12    05/07/24 1215     IP VTE HIGH RISK PATIENT  Once         05/04/24 1949     Place sequential compression device  Until discontinued         05/04/24 1949                    Discharge Planning   CASA:      Code Status: Full Code   Is the patient medically ready for discharge?:     Reason for patient still in hospital (select all that apply): Treatment and Consult recommendations  Discharge Plan A: Home                  Christian Roy III, MD  Department of Hospital Medicine   [facility] Surg

## 2024-05-07 NOTE — SUBJECTIVE & OBJECTIVE
Interval History:  Patient states he is still having some abdominal discomfort, but no nausea or vomiting.  Does state that he has bleeding when he wipes, but this is not new for him and has history of hemorrhoids.  No other bleeding noted.    Review of Systems  Objective:     Vital Signs (Most Recent):  Temp: 97.9 °F (36.6 °C) (05/07/24 1143)  Pulse: 64 (05/07/24 1143)  Resp: 18 (05/07/24 1143)  BP: 111/77 (05/07/24 1143)  SpO2: 100 % (05/07/24 1143) Vital Signs (24h Range):  Temp:  [97 °F (36.1 °C)-98.7 °F (37.1 °C)] 97.9 °F (36.6 °C)  Pulse:  [55-64] 64  Resp:  [16-20] 18  SpO2:  [100 %] 100 %  BP: (100-113)/(59-77) 111/77     Weight: (!) 137.3 kg (302 lb 11.1 oz)  Body mass index is 44.7 kg/m².    Intake/Output Summary (Last 24 hours) at 5/7/2024 1216  Last data filed at 5/7/2024 0845  Gross per 24 hour   Intake 240 ml   Output 3100 ml   Net -2860 ml         Physical Exam  Constitutional:       General: He is not in acute distress.     Appearance: He is obese.   HENT:      Head: Normocephalic and atraumatic.   Cardiovascular:      Rate and Rhythm: Normal rate and regular rhythm.   Pulmonary:      Effort: No respiratory distress.   Musculoskeletal:      Cervical back: Neck supple. No rigidity.   Skin:     General: Skin is warm and dry.   Neurological:      General: No focal deficit present.      Mental Status: He is alert and oriented to person, place, and time.             Significant Labs: All pertinent labs within the past 24 hours have been reviewed.    Significant Imaging: I have reviewed all pertinent imaging results/findings within the past 24 hours.

## 2024-05-07 NOTE — ASSESSMENT & PLAN NOTE
Patient's FSGs are controlled on current medication regimen.  Last A1c reviewed-   Lab Results   Component Value Date    HGBA1C 6.3 (H) 08/08/2022     Most recent fingerstick glucose reviewed-   Recent Labs   Lab 05/06/24  1716 05/06/24  1954 05/07/24  0713 05/07/24  1144   POCTGLUCOSE 103 151* 107 79       Current correctional scale  Low  Maintain anti-hyperglycemic dose as follows-   Antihyperglycemics (From admission, onward)    Start     Stop Route Frequency Ordered    05/04/24 2049  insulin aspart U-100 pen 0-5 Units         -- SubQ Before meals & nightly PRN 05/04/24 1949        Hold Oral hypoglycemics while patient is in the hospital.

## 2024-05-07 NOTE — PROGRESS NOTES
[facility] Surg  General Surgery  Progress Note    Subjective:     History of Present Illness:  No notes on file    Post-Op Info:  * No surgery found *         Interval History: NAEO. Pain controlled. C/o minimal right sided discomfort.     Medications:  Continuous Infusions:   heparin (porcine) in D5W  0-40 Units/kg/hr (Adjusted) Intravenous Continuous 7.8 mL/hr at 05/07/24 0029 8 Units/kg/hr at 05/07/24 0029    sodium bicarbonate 150 mEq in dextrose 5 % (D5W) 1,000 mL infusion   Intravenous Continuous 100 mL/hr at 05/06/24 2134 New Bag at 05/06/24 2134     Scheduled Meds:   amiodarone  200 mg Oral Daily    atorvastatin  80 mg Oral Daily    calcium carbonate  1,000 mg Oral BID    levothyroxine  200 mcg Oral Before breakfast    metoprolol tartrate  12.5 mg Oral BID    mupirocin   Nasal BID    piperacillin-tazobactam (Zosyn) IV (PEDS and ADULTS) (extended infusion is not appropriate)  4.5 g Intravenous Q12H    polyethylene glycol  17 g Oral Daily     PRN Meds:  Current Facility-Administered Medications:     acetaminophen, 650 mg, Oral, Q8H PRN    acetaminophen, 650 mg, Oral, Q4H PRN    albuterol-ipratropium, 3 mL, Nebulization, Q4H PRN    aluminum-magnesium hydroxide-simethicone, 30 mL, Oral, QID PRN    dextrose 10%, 12.5 g, Intravenous, PRN    dextrose 10%, 25 g, Intravenous, PRN    gabapentin, 600 mg, Oral, TID PRN    glucagon (human recombinant), 1 mg, Intramuscular, PRN    glucose, 16 g, Oral, PRN    glucose, 24 g, Oral, PRN    heparin (PORCINE), 60 Units/kg (Adjusted), Intravenous, PRN    heparin (PORCINE), 30 Units/kg (Adjusted), Intravenous, PRN    insulin aspart U-100, 0-5 Units, Subcutaneous, QID (AC + HS) PRN    magnesium oxide, 800 mg, Oral, PRN    magnesium oxide, 800 mg, Oral, PRN    melatonin, 6 mg, Oral, Nightly PRN    naloxone, 0.02 mg, Intravenous, PRN    ondansetron, 4 mg, Intravenous, Q8H PRN    potassium bicarbonate, 35 mEq, Oral, PRN    potassium bicarbonate, 50 mEq, Oral, PRN    potassium  bicarbonate, 60 mEq, Oral, PRN    potassium, sodium phosphates, 2 packet, Oral, PRN    potassium, sodium phosphates, 2 packet, Oral, PRN    potassium, sodium phosphates, 2 packet, Oral, PRN    prochlorperazine, 5 mg, Intravenous, Q6H PRN    simethicone, 1 tablet, Oral, QID PRN    sodium chloride 0.9%, 10 mL, Intravenous, Q12H PRN     Review of patient's allergies indicates:   Allergen Reactions    Contrast media Hives     Objective:     Vital Signs (Most Recent):  Temp: 97.6 °F (36.4 °C) (05/07/24 0712)  Pulse: (!) 55 (05/07/24 0712)  Resp: 20 (05/07/24 0712)  BP: 105/74 (05/07/24 0712)  SpO2: 100 % (05/07/24 0712) Vital Signs (24h Range):  Temp:  [97 °F (36.1 °C)-98.7 °F (37.1 °C)] 97.6 °F (36.4 °C)  Pulse:  [55-68] 55  Resp:  [16-20] 20  SpO2:  [100 %] 100 %  BP: (100-137)/(59-84) 105/74     Weight: (!) 137.3 kg (302 lb 11.1 oz)  Body mass index is 44.7 kg/m².    Intake/Output - Last 3 Shifts         05/05 0700  05/06 0659 05/06 0700  05/07 0659 05/07 0700  05/08 0659    P.O. 480      I.V. (mL/kg) 2110.6 (15.4)      IV Piggyback 498.3      Total Intake(mL/kg) 3089 (22.5)      Urine (mL/kg/hr) 3000 (0.9) 3950 (1.2)     Emesis/NG output  0     Other  0     Stool 0 0     Blood  0     Total Output 3000 3950     Net +89 -3950            Urine Occurrence 1 x 0 x     Stool Occurrence 3 x 0 x     Emesis Occurrence  0 x              Physical Exam  Vitals reviewed.   Constitutional:       General: He is not in acute distress.  HENT:      Head: Normocephalic and atraumatic.      Nose: Nose normal.   Eyes:      General:         Right eye: No discharge.         Left eye: No discharge.   Cardiovascular:      Rate and Rhythm: Normal rate and regular rhythm.   Pulmonary:      Effort: Pulmonary effort is normal. No respiratory distress.      Breath sounds: No stridor.   Abdominal:      Comments: Soft, ND, still with RLQ tenderness but it is better    Musculoskeletal:         General: Normal range of motion.      Cervical back:  Normal range of motion.   Skin:     General: Skin is warm and dry.      Capillary Refill: Capillary refill takes 2 to 3 seconds.   Neurological:      General: No focal deficit present.      Mental Status: He is alert and oriented to person, place, and time.   Psychiatric:         Mood and Affect: Mood normal.         Behavior: Behavior normal.          Significant Labs:  I have reviewed all pertinent lab results within the past 24 hours.    Significant Diagnostics:  I have reviewed all pertinent imaging results/findings within the past 24 hours.  Assessment/Plan:     * MICHELLE (acute kidney injury)  Assessment and Plan:  63 y.o. male AFib (on Xarelto at home, hep gtt here), BMI 45, type 2 diabetes, hyperlipidemia, hypothyroidism, chronic leukopenia, and HTN who presented from home with multiple symptoms and was found to be in acute renal failure. There was concern for acute appendicitis on imaging. While he does have some RLQ tenderness, I believe that this is better explained by reactive changes from his ongoing ascending UTI. This could explain the imaging changes, his atypical/mild pain/tenderness, and atypical duration of symptoms. He does not have a fecalith on exam, so even if this were acute appendicitis superimposed on his UTI, this should improve/resolve with IV Abx. We will continue to monitor him to ensure continued improvement. He is a suboptimal surgical candidate in his current condition.     -Agree with IV Abx.  Transition to PO as able and complete 14 day course for possible appendicitis    -No plans for surgery   -OK for diet as tolerated from our standpoint  -Remainder of care per primary  -Please call with questions              Magdiel Cohen MD  General Surgery  [facility]

## 2024-05-07 NOTE — ASSESSMENT & PLAN NOTE
-patient with complaints difficulty urinating and dysuria with MICHELLE on likely CKD  -CT abdomen showed bilateral perinephric stranding which extends inferiorly along the ureteral courses and in the right lower quadrant periappendiceal region. Potential ascending urinary tract infection  -Urine culture with Enterococcus species, identification speciation pending   -ID consulted switch to Zosyn

## 2024-05-07 NOTE — ASSESSMENT & PLAN NOTE
Assessment and Plan:  63 y.o. male AFib (on Xarelto at home, hep gtt here), BMI 45, type 2 diabetes, hyperlipidemia, hypothyroidism, chronic leukopenia, and HTN who presented from home with multiple symptoms and was found to be in acute renal failure. There was concern for acute appendicitis on imaging. While he does have some RLQ tenderness, I believe that this is better explained by reactive changes from his ongoing ascending UTI. This could explain the imaging changes, his atypical/mild pain/tenderness, and atypical duration of symptoms. He does not have a fecalith on exam, so even if this were acute appendicitis superimposed on his UTI, this should improve/resolve with IV Abx. We will continue to monitor him to ensure continued improvement. He is a suboptimal surgical candidate in his current condition.     -Agree with IV Abx.  Transition to PO as able and complete 14 day course for possible appendicitis    -No plans for surgery   -OK for diet as tolerated from our standpoint  -Remainder of care per primary  -Please call with questions

## 2024-05-07 NOTE — ASSESSMENT & PLAN NOTE
Chronic, controlled. Latest blood pressure and vitals reviewed-     Temp:  [97 °F (36.1 °C)-98.7 °F (37.1 °C)]   Pulse:  [55-64]   Resp:  [16-20]   BP: (100-113)/(59-77)   SpO2:  [100 %] .   Home meds for hypertension were reviewed and noted below.   Hypertension Medications               metoprolol tartrate (LOPRESSOR) 25 MG tablet Take 1 tablet (25 mg total) by mouth 2 (two) times daily.    olmesartan (BENICAR) 40 MG tablet Take 1 tablet (40 mg total) by mouth once daily.            While in the hospital, will manage blood pressure as follows; Adjust home antihypertensive regimen as follows- hold nephrotoxic medications, monitor BP    Will utilize p.r.n. blood pressure medication only if patient's blood pressure greater than 180/110 and he develops symptoms such as worsening chest pain or shortness of breath.

## 2024-05-07 NOTE — ASSESSMENT & PLAN NOTE
Patient with acute kidney injury/acute renal failure likely due to pre-renal azotemia due to IVVD MICHELLE is currently stable. Baseline creatinine  0.9  - Labs reviewed- Renal function/electrolytes with Estimated Creatinine Clearance: 18.4 mL/min (A) (based on SCr of 5.8 mg/dL (H)). according to latest data. Monitor urine output and serial BMP and adjust therapy as needed. Avoid nephrotoxins and renally dose meds for GFR listed above.    -Presented with MICHELLE  -Cr on admit 8.3  -Baseline Cr 0.9  -Suspect due to infection and decreased PO intake  -Nephrology consulted  -IVF  -Avoid nephrotoxins, renal dose all meds.   -Monitor Is/Os  -Monitor

## 2024-05-07 NOTE — NURSING
[facility] [place]  Nurses Note -- 4 Eyes      5/7/2024       Skin assessed on: Q Shift      [x] No Pressure Injuries Present    []Prevention Measures Documented    [] Yes LDA  for Pressure Injury Previously documented     [] Yes New Pressure Injury Discovered   [] LDA for New Pressure Injury Added      Attending RN:  Rosana Gayle, RN     Second RN:  PRICILLA Mitchell

## 2024-05-07 NOTE — PROGRESS NOTES
Date of Admission:5/4/2024    SUBJECTIVE:notes feeling ok    Current Facility-Administered Medications   Medication    acetaminophen tablet 650 mg    acetaminophen tablet 650 mg    albuterol-ipratropium 2.5 mg-0.5 mg/3 mL nebulizer solution 3 mL    aluminum-magnesium hydroxide-simethicone 200-200-20 mg/5 mL suspension 30 mL    amiodarone tablet 200 mg    atorvastatin tablet 80 mg    calcium carbonate 200 mg calcium (500 mg) chewable tablet 1,000 mg    dextrose 10% bolus 125 mL 125 mL    dextrose 10% bolus 250 mL 250 mL    gabapentin capsule 600 mg    glucagon (human recombinant) injection 1 mg    glucose chewable tablet 16 g    glucose chewable tablet 24 g    heparin 25,000 units in dextrose 5% (100 units/ml) IV bolus from bag LOW INTENSITY nomogram - [facility]    heparin 25,000 units in dextrose 5% (100 units/ml) IV bolus from bag LOW INTENSITY nomogram - [facility]    heparin 25,000 units in dextrose 5% 250 mL (100 units/mL) infusion LOW INTENSITY nomogram - [facility]    insulin aspart U-100 pen 0-5 Units    levothyroxine tablet 200 mcg    magnesium oxide tablet 800 mg    magnesium oxide tablet 800 mg    melatonin tablet 6 mg    metoprolol tartrate (LOPRESSOR) split tablet 12.5 mg    mupirocin 2 % ointment    naloxone 0.4 mg/mL injection 0.02 mg    ondansetron injection 4 mg    piperacillin-tazobactam (ZOSYN) 4.5 g in dextrose 5 % in water (D5W) 100 mL IVPB (MB+)    polyethylene glycol packet 17 g    potassium bicarbonate disintegrating tablet 35 mEq    potassium bicarbonate disintegrating tablet 50 mEq    potassium bicarbonate disintegrating tablet 60 mEq    potassium, sodium phosphates 280-160-250 mg packet 2 packet    potassium, sodium phosphates 280-160-250 mg packet 2 packet    potassium, sodium phosphates 280-160-250 mg packet 2 packet    prochlorperazine injection Soln 5 mg    simethicone chewable tablet 80 mg    sodium bicarbonate 150 mEq in dextrose 5 % (D5W) 1,000 mL infusion    sodium chloride 0.9% flush 10 mL       Wt  Readings from Last 3 Encounters:   05/05/24 (!) 137.3 kg (302 lb 11.1 oz)   10/13/23 (!) 154.2 kg (340 lb)   07/27/23 (!) 141.4 kg (311 lb 11.7 oz)     Temp Readings from Last 3 Encounters:   05/07/24 97.6 °F (36.4 °C) (Axillary)   10/13/23 98.2 °F (36.8 °C) (Oral)   08/11/22 98.7 °F (37.1 °C) (Oral)     BP Readings from Last 3 Encounters:   05/07/24 105/74   10/13/23 132/88   07/27/23 120/78     Pulse Readings from Last 3 Encounters:   05/07/24 (!) 55   10/13/23 62   07/27/23 60       Intake/Output Summary (Last 24 hours) at 5/7/2024 1115  Last data filed at 5/7/2024 0845  Gross per 24 hour   Intake 240 ml   Output 3100 ml   Net -2860 ml       PE:  GEN:wd male in nad  HEENT:ncat,eomi,mmm  CVS:s1s2 regular  PULM:coarse bs  ABD:soft,nd  EXT:1+ leg edema  NEURO:awake,alert    Recent Labs   Lab 05/07/24 0424         K 4.3      CO2 20*   BUN 51*   CREATININE 5.8*   CALCIUM 6.4*   MG 1.5*       Lab Results   Component Value Date    .8 (H) 05/06/2024    CALCIUM 6.4 (LL) 05/07/2024    CAION 0.89 (L) 05/06/2024    PHOS 4.4 05/07/2024       Recent Labs   Lab 05/07/24 0425   WBC 0.83*   RBC 3.65*   HGB 8.3*   HCT 26.2*      MCV 72*   MCH 22.7*   MCHC 31.7*           A/P:  1.boris. creatinine is better.  Edema worse. Hold ivfs.  2.anemia. hg low. No prbc indicated. Iron def. Hg ok.  3.leukopenia. worse. Neutropenic precautions. Consulted heme.  Following.    4.uti. seen by id. Enterococcus faecalis uti. Tx per id.  5.appendicitis. tailor antibiotics with id for uti coverage too.  6.hyperphospatemia. resolved.  7.lily. cont  cpap as needed.  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 Ck vit d 25 pending.  9.metabolic acidosis. Change to bicarb po.

## 2024-05-07 NOTE — ASSESSMENT & PLAN NOTE
-CT abdomen and pelvis showed findings suggestive of acute appendicitis, with bilateral perinephric stranding/possible ascending UTI.  -General surgery recommended medical management  -Symptomatic control  -ID consulted switch to Zosyn.

## 2024-05-07 NOTE — SUBJECTIVE & OBJECTIVE
Interval History: NAEO. Pain controlled. C/o minimal right sided discomfort.     Medications:  Continuous Infusions:   heparin (porcine) in D5W  0-40 Units/kg/hr (Adjusted) Intravenous Continuous 7.8 mL/hr at 05/07/24 0029 8 Units/kg/hr at 05/07/24 0029    sodium bicarbonate 150 mEq in dextrose 5 % (D5W) 1,000 mL infusion   Intravenous Continuous 100 mL/hr at 05/06/24 2134 New Bag at 05/06/24 2134     Scheduled Meds:   amiodarone  200 mg Oral Daily    atorvastatin  80 mg Oral Daily    calcium carbonate  1,000 mg Oral BID    levothyroxine  200 mcg Oral Before breakfast    metoprolol tartrate  12.5 mg Oral BID    mupirocin   Nasal BID    piperacillin-tazobactam (Zosyn) IV (PEDS and ADULTS) (extended infusion is not appropriate)  4.5 g Intravenous Q12H    polyethylene glycol  17 g Oral Daily     PRN Meds:  Current Facility-Administered Medications:     acetaminophen, 650 mg, Oral, Q8H PRN    acetaminophen, 650 mg, Oral, Q4H PRN    albuterol-ipratropium, 3 mL, Nebulization, Q4H PRN    aluminum-magnesium hydroxide-simethicone, 30 mL, Oral, QID PRN    dextrose 10%, 12.5 g, Intravenous, PRN    dextrose 10%, 25 g, Intravenous, PRN    gabapentin, 600 mg, Oral, TID PRN    glucagon (human recombinant), 1 mg, Intramuscular, PRN    glucose, 16 g, Oral, PRN    glucose, 24 g, Oral, PRN    heparin (PORCINE), 60 Units/kg (Adjusted), Intravenous, PRN    heparin (PORCINE), 30 Units/kg (Adjusted), Intravenous, PRN    insulin aspart U-100, 0-5 Units, Subcutaneous, QID (AC + HS) PRN    magnesium oxide, 800 mg, Oral, PRN    magnesium oxide, 800 mg, Oral, PRN    melatonin, 6 mg, Oral, Nightly PRN    naloxone, 0.02 mg, Intravenous, PRN    ondansetron, 4 mg, Intravenous, Q8H PRN    potassium bicarbonate, 35 mEq, Oral, PRN    potassium bicarbonate, 50 mEq, Oral, PRN    potassium bicarbonate, 60 mEq, Oral, PRN    potassium, sodium phosphates, 2 packet, Oral, PRN    potassium, sodium phosphates, 2 packet, Oral, PRN    potassium, sodium  phosphates, 2 packet, Oral, PRN    prochlorperazine, 5 mg, Intravenous, Q6H PRN    simethicone, 1 tablet, Oral, QID PRN    sodium chloride 0.9%, 10 mL, Intravenous, Q12H PRN     Review of patient's allergies indicates:   Allergen Reactions    Contrast media Hives     Objective:     Vital Signs (Most Recent):  Temp: 97.6 °F (36.4 °C) (05/07/24 0712)  Pulse: (!) 55 (05/07/24 0712)  Resp: 20 (05/07/24 0712)  BP: 105/74 (05/07/24 0712)  SpO2: 100 % (05/07/24 0712) Vital Signs (24h Range):  Temp:  [97 °F (36.1 °C)-98.7 °F (37.1 °C)] 97.6 °F (36.4 °C)  Pulse:  [55-68] 55  Resp:  [16-20] 20  SpO2:  [100 %] 100 %  BP: (100-137)/(59-84) 105/74     Weight: (!) 137.3 kg (302 lb 11.1 oz)  Body mass index is 44.7 kg/m².    Intake/Output - Last 3 Shifts         05/05 0700  05/06 0659 05/06 0700  05/07 0659 05/07 0700  05/08 0659    P.O. 480      I.V. (mL/kg) 2110.6 (15.4)      IV Piggyback 498.3      Total Intake(mL/kg) 3089 (22.5)      Urine (mL/kg/hr) 3000 (0.9) 3950 (1.2)     Emesis/NG output  0     Other  0     Stool 0 0     Blood  0     Total Output 3000 3950     Net +89 -3950            Urine Occurrence 1 x 0 x     Stool Occurrence 3 x 0 x     Emesis Occurrence  0 x              Physical Exam  Vitals reviewed.   Constitutional:       General: He is not in acute distress.  HENT:      Head: Normocephalic and atraumatic.      Nose: Nose normal.   Eyes:      General:         Right eye: No discharge.         Left eye: No discharge.   Cardiovascular:      Rate and Rhythm: Normal rate and regular rhythm.   Pulmonary:      Effort: Pulmonary effort is normal. No respiratory distress.      Breath sounds: No stridor.   Abdominal:      Comments: Soft, ND, still with RLQ tenderness but it is better    Musculoskeletal:         General: Normal range of motion.      Cervical back: Normal range of motion.   Skin:     General: Skin is warm and dry.      Capillary Refill: Capillary refill takes 2 to 3 seconds.   Neurological:      General: No  focal deficit present.      Mental Status: He is alert and oriented to person, place, and time.   Psychiatric:         Mood and Affect: Mood normal.         Behavior: Behavior normal.          Significant Labs:  I have reviewed all pertinent lab results within the past 24 hours.    Significant Diagnostics:  I have reviewed all pertinent imaging results/findings within the past 24 hours.

## 2024-05-07 NOTE — ASSESSMENT & PLAN NOTE
History noted. Chronic but worsening, likely in the setting of infection   Patient had a bone marrow biopsy in 2020, which was unrevealing.   WBC down to 1.10 on 05/06  Hematology consulted, recommending outpatient follow up.  We will continue to monitor

## 2024-05-07 NOTE — ASSESSMENT & PLAN NOTE
[id number] Score: 1.  Continue Lopressor. Hold Xarelto given potential surgical intervention. Heparin ggt

## 2024-05-08 LAB
ANION GAP SERPL CALC-SCNC: 10 MMOL/L (ref 8–16)
APTT PPP: 49.6 SEC (ref 21–32)
APTT PPP: 63.4 SEC (ref 21–32)
BASOPHILS # BLD AUTO: 0.01 K/UL (ref 0–0.2)
BASOPHILS NFR BLD: 1.1 % (ref 0–1.9)
BUN SERPL-MCNC: 39 MG/DL (ref 8–23)
CALCIUM SERPL-MCNC: 6.9 MG/DL (ref 8.7–10.5)
CHLORIDE SERPL-SCNC: 110 MMOL/L (ref 95–110)
CO2 SERPL-SCNC: 24 MMOL/L (ref 23–29)
CREAT SERPL-MCNC: 4.3 MG/DL (ref 0.5–1.4)
DIFFERENTIAL METHOD BLD: ABNORMAL
EOSINOPHIL # BLD AUTO: 0.1 K/UL (ref 0–0.5)
EOSINOPHIL NFR BLD: 8.6 % (ref 0–8)
ERYTHROCYTE [DISTWIDTH] IN BLOOD BY AUTOMATED COUNT: 17.9 % (ref 11.5–14.5)
EST. GFR  (NO RACE VARIABLE): 15 ML/MIN/1.73 M^2
FACT X PPP CHRO-ACNC: 0.1 IU/ML (ref 0.3–0.7)
FACT X PPP CHRO-ACNC: 0.41 IU/ML (ref 0.3–0.7)
GLUCOSE SERPL-MCNC: 88 MG/DL (ref 70–110)
HCT VFR BLD AUTO: 25.9 % (ref 40–54)
HGB BLD-MCNC: 8.2 G/DL (ref 14–18)
IMM GRANULOCYTES # BLD AUTO: 0.01 K/UL (ref 0–0.04)
IMM GRANULOCYTES NFR BLD AUTO: 1.1 % (ref 0–0.5)
INR PPP: 1.1 (ref 0.8–1.2)
LYMPHOCYTES # BLD AUTO: 0.7 K/UL (ref 1–4.8)
LYMPHOCYTES NFR BLD: 71 % (ref 18–48)
MAGNESIUM SERPL-MCNC: 1.4 MG/DL (ref 1.6–2.6)
MCH RBC QN AUTO: 22.9 PG (ref 27–31)
MCHC RBC AUTO-ENTMCNC: 31.7 G/DL (ref 32–36)
MCV RBC AUTO: 72 FL (ref 82–98)
MONOCYTES # BLD AUTO: 0 K/UL (ref 0.3–1)
MONOCYTES NFR BLD: 1.1 % (ref 4–15)
NEUTROPHILS # BLD AUTO: 0.2 K/UL (ref 1.8–7.7)
NEUTROPHILS NFR BLD: 17.1 % (ref 38–73)
NRBC BLD-RTO: 0 /100 WBC
PHOSPHATE SERPL-MCNC: 3.3 MG/DL (ref 2.7–4.5)
PLATELET # BLD AUTO: 280 K/UL (ref 150–450)
PMV BLD AUTO: 9.1 FL (ref 9.2–12.9)
POCT GLUCOSE: 107 MG/DL (ref 70–110)
POCT GLUCOSE: 162 MG/DL (ref 70–110)
POCT GLUCOSE: 77 MG/DL (ref 70–110)
POCT GLUCOSE: 93 MG/DL (ref 70–110)
POTASSIUM SERPL-SCNC: 3.6 MMOL/L (ref 3.5–5.1)
PROTHROMBIN TIME: 12.2 SEC (ref 9–12.5)
RBC # BLD AUTO: 3.58 M/UL (ref 4.6–6.2)
SODIUM SERPL-SCNC: 144 MMOL/L (ref 136–145)
WBC # BLD AUTO: 0.93 K/UL (ref 3.9–12.7)

## 2024-05-08 PROCEDURE — 63600175 PHARM REV CODE 636 W HCPCS: Performed by: STUDENT IN AN ORGANIZED HEALTH CARE EDUCATION/TRAINING PROGRAM

## 2024-05-08 PROCEDURE — 25000003 PHARM REV CODE 250: Performed by: HOSPITALIST

## 2024-05-08 PROCEDURE — 27000207 HC ISOLATION

## 2024-05-08 PROCEDURE — 99233 SBSQ HOSP IP/OBS HIGH 50: CPT | Mod: ,,, | Performed by: INTERNAL MEDICINE

## 2024-05-08 PROCEDURE — 85520 HEPARIN ASSAY: CPT | Performed by: STUDENT IN AN ORGANIZED HEALTH CARE EDUCATION/TRAINING PROGRAM

## 2024-05-08 PROCEDURE — 85730 THROMBOPLASTIN TIME PARTIAL: CPT | Performed by: HOSPITALIST

## 2024-05-08 PROCEDURE — 36415 COLL VENOUS BLD VENIPUNCTURE: CPT | Mod: XB | Performed by: STUDENT IN AN ORGANIZED HEALTH CARE EDUCATION/TRAINING PROGRAM

## 2024-05-08 PROCEDURE — 25000003 PHARM REV CODE 250: Performed by: STUDENT IN AN ORGANIZED HEALTH CARE EDUCATION/TRAINING PROGRAM

## 2024-05-08 PROCEDURE — 83735 ASSAY OF MAGNESIUM: CPT | Performed by: STUDENT IN AN ORGANIZED HEALTH CARE EDUCATION/TRAINING PROGRAM

## 2024-05-08 PROCEDURE — 84100 ASSAY OF PHOSPHORUS: CPT | Performed by: STUDENT IN AN ORGANIZED HEALTH CARE EDUCATION/TRAINING PROGRAM

## 2024-05-08 PROCEDURE — 85610 PROTHROMBIN TIME: CPT | Performed by: HOSPITALIST

## 2024-05-08 PROCEDURE — 99900035 HC TECH TIME PER 15 MIN (STAT)

## 2024-05-08 PROCEDURE — 25000003 PHARM REV CODE 250: Performed by: INTERNAL MEDICINE

## 2024-05-08 PROCEDURE — 63600175 PHARM REV CODE 636 W HCPCS: Performed by: INTERNAL MEDICINE

## 2024-05-08 PROCEDURE — 11000001 HC ACUTE MED/SURG PRIVATE ROOM

## 2024-05-08 PROCEDURE — 85025 COMPLETE CBC W/AUTO DIFF WBC: CPT | Performed by: STUDENT IN AN ORGANIZED HEALTH CARE EDUCATION/TRAINING PROGRAM

## 2024-05-08 PROCEDURE — 25000003 PHARM REV CODE 250

## 2024-05-08 PROCEDURE — 94761 N-INVAS EAR/PLS OXIMETRY MLT: CPT

## 2024-05-08 PROCEDURE — 36415 COLL VENOUS BLD VENIPUNCTURE: CPT | Performed by: HOSPITALIST

## 2024-05-08 PROCEDURE — 80048 BASIC METABOLIC PNL TOTAL CA: CPT | Performed by: STUDENT IN AN ORGANIZED HEALTH CARE EDUCATION/TRAINING PROGRAM

## 2024-05-08 PROCEDURE — 85730 THROMBOPLASTIN TIME PARTIAL: CPT | Mod: 91 | Performed by: STUDENT IN AN ORGANIZED HEALTH CARE EDUCATION/TRAINING PROGRAM

## 2024-05-08 PROCEDURE — 63600175 PHARM REV CODE 636 W HCPCS: Performed by: HOSPITALIST

## 2024-05-08 RX ORDER — FERROUS GLUCONATE 324(37.5)
324 TABLET ORAL 2 TIMES DAILY WITH MEALS
Status: DISCONTINUED | OUTPATIENT
Start: 2024-05-08 | End: 2024-05-11 | Stop reason: HOSPADM

## 2024-05-08 RX ORDER — HEPARIN SODIUM,PORCINE/D5W 25000/250
0-40 INTRAVENOUS SOLUTION INTRAVENOUS CONTINUOUS
Status: DISCONTINUED | OUTPATIENT
Start: 2024-05-08 | End: 2024-05-09

## 2024-05-08 RX ORDER — LIDOCAINE 50 MG/G
1 PATCH TOPICAL
Status: DISCONTINUED | OUTPATIENT
Start: 2024-05-08 | End: 2024-05-11 | Stop reason: HOSPADM

## 2024-05-08 RX ORDER — ERGOCALCIFEROL 1.25 MG/1
50000 CAPSULE ORAL
Status: DISCONTINUED | OUTPATIENT
Start: 2024-05-09 | End: 2024-05-11 | Stop reason: HOSPADM

## 2024-05-08 RX ORDER — MAGNESIUM SULFATE HEPTAHYDRATE 40 MG/ML
4 INJECTION, SOLUTION INTRAVENOUS ONCE
Status: COMPLETED | OUTPATIENT
Start: 2024-05-08 | End: 2024-05-08

## 2024-05-08 RX ORDER — POTASSIUM CHLORIDE 20 MEQ/1
40 TABLET, EXTENDED RELEASE ORAL ONCE
Status: COMPLETED | OUTPATIENT
Start: 2024-05-08 | End: 2024-05-08

## 2024-05-08 RX ORDER — CALCIUM GLUCONATE 20 MG/ML
1 INJECTION, SOLUTION INTRAVENOUS ONCE
Status: COMPLETED | OUTPATIENT
Start: 2024-05-08 | End: 2024-05-08

## 2024-05-08 RX ORDER — CALCIUM CHLORIDE INJECTION 100 MG/ML
1 INJECTION, SOLUTION INTRAVENOUS ONCE
Status: DISCONTINUED | OUTPATIENT
Start: 2024-05-08 | End: 2024-05-08 | Stop reason: CLARIF

## 2024-05-08 RX ADMIN — Medication 324 MG: at 05:05

## 2024-05-08 RX ADMIN — PIPERACILLIN SODIUM AND TAZOBACTAM SODIUM 4.5 G: 4; .5 INJECTION, POWDER, LYOPHILIZED, FOR SOLUTION INTRAVENOUS at 02:05

## 2024-05-08 RX ADMIN — CALCIUM CARBONATE (ANTACID) CHEW TAB 500 MG 1000 MG: 500 CHEW TAB at 09:05

## 2024-05-08 RX ADMIN — HEPARIN SODIUM 12 UNITS/KG/HR: 10000 INJECTION, SOLUTION INTRAVENOUS at 12:05

## 2024-05-08 RX ADMIN — CALCIUM CARBONATE (ANTACID) CHEW TAB 500 MG 1000 MG: 500 CHEW TAB at 08:05

## 2024-05-08 RX ADMIN — MUPIROCIN: 20 OINTMENT TOPICAL at 10:05

## 2024-05-08 RX ADMIN — SODIUM BICARBONATE 1300 MG: 325 TABLET ORAL at 09:05

## 2024-05-08 RX ADMIN — POLYETHYLENE GLYCOL 3350 17 G: 17 POWDER, FOR SOLUTION ORAL at 08:05

## 2024-05-08 RX ADMIN — METOPROLOL TARTRATE 12.5 MG: 25 TABLET, FILM COATED ORAL at 09:05

## 2024-05-08 RX ADMIN — CALCIUM GLUCONATE 1 G: 20 INJECTION, SOLUTION INTRAVENOUS at 02:05

## 2024-05-08 RX ADMIN — MAGNESIUM SULFATE HEPTAHYDRATE 2 G: 40 INJECTION, SOLUTION INTRAVENOUS at 11:05

## 2024-05-08 RX ADMIN — LEVOTHYROXINE SODIUM 200 MCG: 0.1 TABLET ORAL at 05:05

## 2024-05-08 RX ADMIN — AMIODARONE HYDROCHLORIDE 200 MG: 200 TABLET ORAL at 08:05

## 2024-05-08 RX ADMIN — ATORVASTATIN CALCIUM 80 MG: 40 TABLET, FILM COATED ORAL at 08:05

## 2024-05-08 RX ADMIN — HEPARIN SODIUM 12 UNITS/KG/HR: 10000 INJECTION, SOLUTION INTRAVENOUS at 07:05

## 2024-05-08 RX ADMIN — LIDOCAINE 1 PATCH: 50 PATCH TOPICAL at 02:05

## 2024-05-08 RX ADMIN — MAGNESIUM SULFATE HEPTAHYDRATE 2 G: 40 INJECTION, SOLUTION INTRAVENOUS at 09:05

## 2024-05-08 RX ADMIN — MUPIROCIN: 20 OINTMENT TOPICAL at 09:05

## 2024-05-08 RX ADMIN — SODIUM BICARBONATE 1300 MG: 325 TABLET ORAL at 08:05

## 2024-05-08 RX ADMIN — POTASSIUM CHLORIDE 40 MEQ: 1500 TABLET, EXTENDED RELEASE ORAL at 08:05

## 2024-05-08 NOTE — PROGRESS NOTES
[facility]  Infectious Disease  Progress Note    Patient Name: Russell Santos  MRN: [id number]  Admission Date: 5/4/2024  Length of Stay: 4 days  Attending Physician: Digna Colbert MD  Primary Care Provider: Roxanne Phillip -    Isolation Status: Airborne and Contact and Enhanced Respiratory    Assessment/Plan:            Acute appendicitis  64 yo man with acute appendicitis by CT. Seen by Surgery and cleared for medical management.    Recommendations  Zosyn for a few days (depending on resolution of neutropenia), then Augmentin x 10 days     Pyelonephritis  Atypical symptoms, complicated by MICHELLE.   Amp-S E.faecalis (covered with Zosyn).      Leukopenia  History of same, now worse.  Neupogen?    Chapincito Juan MD  Infectious Disease  [facility] Surg    Subjective:     Principal Problem:MICHELLE (acute kidney injury)    HPI: Mr. Santos is a 64 yo man with atrial fibrillation admitted with abdominal pain. ED imaging revealed findings suggestive of acute appendicitis, with bilateral perinephric stranding/possible ascending UTI without evidence of hydronephrosis. He was started on empiric abx and ID is consulted.     The patient tells me that he developed abdominal discomfort after eating a quart of coleslaw from [employer] last Tuesday night. He started feeling poorly but delayed coming to the ED. Nevertheless, he presented on 5/4 and his work-up revealed acute appendicitis, possible bilateral pyelonephritis, a non-obstructing renal stone, and cholelithiasis.    He was started on empiric Zosyn and vancomycin. Blood cultures are NGTD while his urine grew a pen-S E.faecalis.      Interval History: Feeling better. WBC still quite low. Denies fever or chills.    Review of Systems   All other systems reviewed and are negative.    Objective:     Vital Signs (Most Recent):  Temp: 97.5 °F (36.4 °C) (05/08/24 0711)  Pulse: (!) 56 (05/08/24 0711)  Resp: 20 (05/08/24 0711)  BP: 109/73 (05/08/24 0711)  SpO2: 100 %  (05/08/24 0711) Vital Signs (24h Range):  Temp:  [97.5 °F (36.4 °C)-98.8 °F (37.1 °C)] 97.5 °F (36.4 °C)  Pulse:  [56-65] 56  Resp:  [18-20] 20  SpO2:  [97 %-100 %] 100 %  BP: (109-149)/(73-95) 109/73     Weight: (!) 137.3 kg (302 lb 11.1 oz)  Body mass index is 44.7 kg/m².    Estimated Creatinine Clearance: 24.8 mL/min (A) (based on SCr of 4.3 mg/dL (H)).     Physical Exam  Vitals and nursing note reviewed.   Constitutional:       Appearance: Normal appearance.   HENT:      Mouth/Throat:      Mouth: Mucous membranes are moist.   Abdominal:      Tenderness: There is no right CVA tenderness or left CVA tenderness.   Neurological:      General: No focal deficit present.      Mental Status: He is alert and oriented to person, place, and time.   Psychiatric:         Mood and Affect: Mood normal.         Behavior: Behavior normal.         Thought Content: Thought content normal.         Judgment: Judgment normal.          Significant Labs: CBC:   Recent Labs   Lab 05/07/24  0425 05/07/24  1244 05/08/24  0632   WBC 0.83* 0.81* 0.93*   HGB 8.3* 9.0* 8.2*   HCT 26.2* 28.7* 25.9*    293 280     Microbiology Results (last 7 days)       Procedure Component Value Units Date/Time    Blood culture [[id number]] Collected: 05/05/24 0708    Order Status: Completed Specimen: Blood from Peripheral, Wrist, Right Updated: 05/08/24 0903     Blood Culture, Routine No Growth to date      No Growth to date      No Growth to date      No Growth to date    Blood culture [[id number]] Collected: 05/05/24 0708    Order Status: Completed Specimen: Blood from Peripheral, Hand, Left Updated: 05/08/24 0903     Blood Culture, Routine No Growth to date      No Growth to date      No Growth to date      No Growth to date    Urine culture [[id number]]  (Abnormal)  (Susceptibility) Collected: 05/04/24 1541    Order Status: Completed Specimen: Urine Updated: 05/06/24 1151     Urine Culture, Routine ENTEROCOCCUS FAECALIS  >100,000 cfu/ml       Narrative:      Specimen Source->Urine            Significant Imaging: I have reviewed all pertinent imaging results/findings within the past 24 hours.

## 2024-05-08 NOTE — SUBJECTIVE & OBJECTIVE
Interval History: Feeling better. WBC still quite low. Denies fever or chills.    Review of Systems   All other systems reviewed and are negative.    Objective:     Vital Signs (Most Recent):  Temp: 97.5 °F (36.4 °C) (05/08/24 0711)  Pulse: (!) 56 (05/08/24 0711)  Resp: 20 (05/08/24 0711)  BP: 109/73 (05/08/24 0711)  SpO2: 100 % (05/08/24 0711) Vital Signs (24h Range):  Temp:  [97.5 °F (36.4 °C)-98.8 °F (37.1 °C)] 97.5 °F (36.4 °C)  Pulse:  [56-65] 56  Resp:  [18-20] 20  SpO2:  [97 %-100 %] 100 %  BP: (109-149)/(73-95) 109/73     Weight: (!) 137.3 kg (302 lb 11.1 oz)  Body mass index is 44.7 kg/m².    Estimated Creatinine Clearance: 24.8 mL/min (A) (based on SCr of 4.3 mg/dL (H)).     Physical Exam  Vitals and nursing note reviewed.   Constitutional:       Appearance: Normal appearance.   HENT:      Mouth/Throat:      Mouth: Mucous membranes are moist.   Abdominal:      Tenderness: There is no right CVA tenderness or left CVA tenderness.   Neurological:      General: No focal deficit present.      Mental Status: He is alert and oriented to person, place, and time.   Psychiatric:         Mood and Affect: Mood normal.         Behavior: Behavior normal.         Thought Content: Thought content normal.         Judgment: Judgment normal.          Significant Labs: CBC:   Recent Labs   Lab 05/07/24  0425 05/07/24  1244 05/08/24  0632   WBC 0.83* 0.81* 0.93*   HGB 8.3* 9.0* 8.2*   HCT 26.2* 28.7* 25.9*    293 280     Microbiology Results (last 7 days)       Procedure Component Value Units Date/Time    Blood culture [[id number]] Collected: 05/05/24 0708    Order Status: Completed Specimen: Blood from Peripheral, Wrist, Right Updated: 05/08/24 0903     Blood Culture, Routine No Growth to date      No Growth to date      No Growth to date      No Growth to date    Blood culture [[id number]] Collected: 05/05/24 0708    Order Status: Completed Specimen: Blood from Peripheral, Hand, Left Updated: 05/08/24 0903     Blood  Culture, Routine No Growth to date      No Growth to date      No Growth to date      No Growth to date    Urine culture [[id number]]  (Abnormal)  (Susceptibility) Collected: 05/04/24 1541    Order Status: Completed Specimen: Urine Updated: 05/06/24 1151     Urine Culture, Routine ENTEROCOCCUS FAECALIS  >100,000 cfu/ml      Narrative:      Specimen Source->Urine            Significant Imaging: I have reviewed all pertinent imaging results/findings within the past 24 hours.

## 2024-05-08 NOTE — PLAN OF CARE
"Pt stated " I'm doing well." No new needs identified. CM will assist as needed.     05/08/24 1641   Discharge Reassessment   Assessment Type Discharge Planning Reassessment   Did the patient's condition or plan change since previous assessment? Yes   Discharge Plan discussed with: Patient   Discharge Plan A Home with family   Discharge Plan B Other  (tbd)   DME Needed Upon Discharge  other (see comments)  (tbd)   Transition of Care Barriers None   Why the patient remains in the hospital Requires continued medical care   Post-Acute Status   Coverage [employer] [facility]   Discharge Delays None known at this time       "

## 2024-05-08 NOTE — NURSING
Morse cath removed, per pt request, stated will like to get up and urinate, patient tolerated removal well, denies any pain/discomfort at this time.

## 2024-05-08 NOTE — NURSING
aPTT 38.9; bolus given and increased dose to 12units/kg/hr @11.7ml/hr. Next aPTT @0630. Will cont to monitor

## 2024-05-08 NOTE — RESPIRATORY THERAPY
Pt requested BiPAP. Placed Pt on BiPAP and found tape covering exhalation port on circuit.  Pt states he took the tape off of his arm and covered the port because it is too loud. Attempted to take tape off of circuit, and Pt did not want tape removed.  Explained why the port is there-for exhalation. Explained there is a risk of death with the tape there. Pt stated he wanted the machine removed from his room if he isn't going to use it because he can't rest-it is too loud. Tape was removed from exhalation port. Machine is outside of room in case Pt changes his mind and requires BiPAP.

## 2024-05-08 NOTE — NURSING
[facility] [place]  Nurses Note -- 4 Eyes        Date:  05/08/2024        Skin assessed on: Q shift        [x] No Pressure Injuries Present                 []Prevention Measures Documented     [] Yes LDA Previously documented      [] Yes New Pressure Injury Discovered              [] LDA Added        Attending RN:  LAM Frank     Second RN:  Julieth, PCT

## 2024-05-08 NOTE — CONSULTS
[facility] Medicine  Telemedicine Consult Note       Russell Santos has been accepted for transfer to [facility] and will be followed through telemedicine services beginning 05/08/24 at 7 AM.    Digna Colbert MD  [facility] Medicine Staff

## 2024-05-08 NOTE — PLAN OF CARE
Problem: Acute Kidney Injury/Impairment  Goal: Effective Renal Function  Outcome: Progressing  Intervention: Monitor and Support Renal Function  Flowsheets (Taken 5/8/2024 0162)  Medication Review/Management: medications reviewed

## 2024-05-08 NOTE — PROGRESS NOTES
Date of Admission:5/4/2024    SUBJECTIVE:notes no fever    Current Facility-Administered Medications   Medication    acetaminophen tablet 650 mg    acetaminophen tablet 650 mg    albuterol-ipratropium 2.5 mg-0.5 mg/3 mL nebulizer solution 3 mL    aluminum-magnesium hydroxide-simethicone 200-200-20 mg/5 mL suspension 30 mL    amiodarone tablet 200 mg    atorvastatin tablet 80 mg    calcium carbonate 200 mg calcium (500 mg) chewable tablet 1,000 mg    dextrose 10% bolus 125 mL 125 mL    dextrose 10% bolus 250 mL 250 mL    gabapentin capsule 600 mg    glucagon (human recombinant) injection 1 mg    glucose chewable tablet 16 g    glucose chewable tablet 24 g    heparin 25,000 units in dextrose 5% (100 units/ml) IV bolus from bag LOW INTENSITY nomogram - [facility]    heparin 25,000 units in dextrose 5% 250 mL (100 units/mL) infusion LOW INTENSITY nomogram - [facility]    insulin aspart U-100 pen 0-5 Units    levothyroxine tablet 200 mcg    LIDOcaine 5 % patch 1 patch    magnesium oxide tablet 800 mg    magnesium oxide tablet 800 mg    melatonin tablet 6 mg    metoprolol tartrate (LOPRESSOR) split tablet 12.5 mg    mupirocin 2 % ointment    naloxone 0.4 mg/mL injection 0.02 mg    ondansetron injection 4 mg    piperacillin-tazobactam (ZOSYN) 4.5 g in dextrose 5 % in water (D5W) 100 mL IVPB (MB+)    polyethylene glycol packet 17 g    potassium bicarbonate disintegrating tablet 35 mEq    potassium bicarbonate disintegrating tablet 50 mEq    potassium bicarbonate disintegrating tablet 60 mEq    potassium, sodium phosphates 280-160-250 mg packet 2 packet    potassium, sodium phosphates 280-160-250 mg packet 2 packet    potassium, sodium phosphates 280-160-250 mg packet 2 packet    prochlorperazine injection Soln 5 mg    simethicone chewable tablet 80 mg    sodium bicarbonate tablet 1,300 mg    sodium chloride 0.9% flush 10 mL       Wt Readings from Last 3 Encounters:   05/05/24 (!) 137.3 kg (302 lb 11.1 oz)   10/13/23 (!) 154.2 kg (340 lb)    07/27/23 (!) 141.4 kg (311 lb 11.7 oz)     Temp Readings from Last 3 Encounters:   05/08/24 98.5 °F (36.9 °C) (Oral)   10/13/23 98.2 °F (36.8 °C) (Oral)   08/11/22 98.7 °F (37.1 °C) (Oral)     BP Readings from Last 3 Encounters:   05/08/24 134/88   10/13/23 132/88   07/27/23 120/78     Pulse Readings from Last 3 Encounters:   05/08/24 (!) 59   10/13/23 62   07/27/23 60       Intake/Output Summary (Last 24 hours) at 5/8/2024 1659  Last data filed at 5/8/2024 1658  Gross per 24 hour   Intake 960 ml   Output 5700 ml   Net -4740 ml       PE:  GEN:wd male in nad  HEENT:ncat,eomi,mmm  CVS:s1s2 regular  PULM:coarse bs  ABD:soft,nd  EXT:1+ leg edema  NEURO:awake,alert    Recent Labs   Lab 05/08/24  0632   GLU 88      K 3.6      CO2 24   BUN 39*   CREATININE 4.3*   CALCIUM 6.9*   MG 1.4*       Lab Results   Component Value Date    .8 (H) 05/06/2024    CALCIUM 6.9 (LL) 05/08/2024    CAION 0.89 (L) 05/06/2024    PHOS 3.3 05/08/2024       Recent Labs   Lab 05/08/24  0632   WBC 0.93*   RBC 3.58*   HGB 8.2*   HCT 25.9*      MCV 72*   MCH 22.9*   MCHC 31.7*           A/P:  1.boris. creatinine is better. Holding ivf.  2.anemia. hg low. No prbc indicated. Iron def. Hg ok.  3.leukopenia. worse. Neutropenic precautions. Consulted heme.  Following.  Still low. Cont isolations.  4.uti. seen by id. Enterococcus faecalis uti. Tx per id.  5.appendicitis. tailor antibiotics with id for uti coverage too.  6.hyperphospatemia. resolved.  7.lily. cont  cpap as needed.  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 Vit d def. Add ergo.  9.metabolic acidosis. Better. Cont tx.  10.hypomag. replete.

## 2024-05-09 LAB
ANION GAP SERPL CALC-SCNC: 11 MMOL/L (ref 8–16)
APTT PPP: 49.3 SEC (ref 21–32)
BACTERIA BLD CULT: NORMAL
BACTERIA BLD CULT: NORMAL
BASOPHILS # BLD AUTO: 0.03 K/UL (ref 0–0.2)
BASOPHILS NFR BLD: 3.2 % (ref 0–1.9)
BUN SERPL-MCNC: 29 MG/DL (ref 8–23)
CALCIUM SERPL-MCNC: 7.4 MG/DL (ref 8.7–10.5)
CHLORIDE SERPL-SCNC: 106 MMOL/L (ref 95–110)
CO2 SERPL-SCNC: 24 MMOL/L (ref 23–29)
CREAT SERPL-MCNC: 3.1 MG/DL (ref 0.5–1.4)
DIFFERENTIAL METHOD BLD: ABNORMAL
EOSINOPHIL # BLD AUTO: 0.1 K/UL (ref 0–0.5)
EOSINOPHIL NFR BLD: 8.4 % (ref 0–8)
ERYTHROCYTE [DISTWIDTH] IN BLOOD BY AUTOMATED COUNT: 18.3 % (ref 11.5–14.5)
EST. GFR  (NO RACE VARIABLE): 22 ML/MIN/1.73 M^2
GLUCOSE SERPL-MCNC: 99 MG/DL (ref 70–110)
HCT VFR BLD AUTO: 30.1 % (ref 40–54)
HGB BLD-MCNC: 9.1 G/DL (ref 14–18)
IMM GRANULOCYTES # BLD AUTO: 0 K/UL (ref 0–0.04)
IMM GRANULOCYTES NFR BLD AUTO: 0 % (ref 0–0.5)
LYMPHOCYTES # BLD AUTO: 0.7 K/UL (ref 1–4.8)
LYMPHOCYTES NFR BLD: 74.7 % (ref 18–48)
MCH RBC QN AUTO: 22.5 PG (ref 27–31)
MCHC RBC AUTO-ENTMCNC: 30.2 G/DL (ref 32–36)
MCV RBC AUTO: 74 FL (ref 82–98)
MONOCYTES # BLD AUTO: 0 K/UL (ref 0.3–1)
MONOCYTES NFR BLD: 2.1 % (ref 4–15)
NEUTROPHILS # BLD AUTO: 0.1 K/UL (ref 1.8–7.7)
NEUTROPHILS NFR BLD: 11.6 % (ref 38–73)
NRBC BLD-RTO: 0 /100 WBC
PLATELET # BLD AUTO: 378 K/UL (ref 150–450)
PLATELET BLD QL SMEAR: ABNORMAL
PMV BLD AUTO: 9.4 FL (ref 9.2–12.9)
POCT GLUCOSE: 101 MG/DL (ref 70–110)
POCT GLUCOSE: 103 MG/DL (ref 70–110)
POCT GLUCOSE: 107 MG/DL (ref 70–110)
POCT GLUCOSE: 205 MG/DL (ref 70–110)
POTASSIUM SERPL-SCNC: 3.9 MMOL/L (ref 3.5–5.1)
RBC # BLD AUTO: 4.05 M/UL (ref 4.6–6.2)
SODIUM SERPL-SCNC: 141 MMOL/L (ref 136–145)
WBC # BLD AUTO: 0.95 K/UL (ref 3.9–12.7)

## 2024-05-09 PROCEDURE — 27000207 HC ISOLATION

## 2024-05-09 PROCEDURE — 36415 COLL VENOUS BLD VENIPUNCTURE: CPT | Performed by: HOSPITALIST

## 2024-05-09 PROCEDURE — 85730 THROMBOPLASTIN TIME PARTIAL: CPT | Performed by: HOSPITALIST

## 2024-05-09 PROCEDURE — 25000003 PHARM REV CODE 250: Performed by: HOSPITALIST

## 2024-05-09 PROCEDURE — 25000003 PHARM REV CODE 250: Performed by: STUDENT IN AN ORGANIZED HEALTH CARE EDUCATION/TRAINING PROGRAM

## 2024-05-09 PROCEDURE — 25000003 PHARM REV CODE 250: Performed by: INTERNAL MEDICINE

## 2024-05-09 PROCEDURE — 85025 COMPLETE CBC W/AUTO DIFF WBC: CPT | Performed by: HOSPITALIST

## 2024-05-09 PROCEDURE — 63600175 PHARM REV CODE 636 W HCPCS: Performed by: INTERNAL MEDICINE

## 2024-05-09 PROCEDURE — 80048 BASIC METABOLIC PNL TOTAL CA: CPT | Performed by: HOSPITALIST

## 2024-05-09 PROCEDURE — 11000001 HC ACUTE MED/SURG PRIVATE ROOM

## 2024-05-09 RX ORDER — LOPERAMIDE HYDROCHLORIDE 2 MG/1
2 CAPSULE ORAL 4 TIMES DAILY PRN
Status: DISCONTINUED | OUTPATIENT
Start: 2024-05-09 | End: 2024-05-11 | Stop reason: HOSPADM

## 2024-05-09 RX ADMIN — Medication 324 MG: at 08:05

## 2024-05-09 RX ADMIN — LOPERAMIDE HYDROCHLORIDE 2 MG: 2 CAPSULE ORAL at 02:05

## 2024-05-09 RX ADMIN — ERGOCALCIFEROL 50000 UNITS: 1.25 CAPSULE ORAL at 08:05

## 2024-05-09 RX ADMIN — CALCIUM CARBONATE (ANTACID) CHEW TAB 500 MG 1000 MG: 500 CHEW TAB at 09:05

## 2024-05-09 RX ADMIN — SODIUM BICARBONATE 1300 MG: 325 TABLET ORAL at 08:05

## 2024-05-09 RX ADMIN — CALCIUM CARBONATE (ANTACID) CHEW TAB 500 MG 1000 MG: 500 CHEW TAB at 08:05

## 2024-05-09 RX ADMIN — AMIODARONE HYDROCHLORIDE 200 MG: 200 TABLET ORAL at 08:05

## 2024-05-09 RX ADMIN — METOPROLOL TARTRATE 12.5 MG: 25 TABLET, FILM COATED ORAL at 09:05

## 2024-05-09 RX ADMIN — MUPIROCIN: 20 OINTMENT TOPICAL at 09:05

## 2024-05-09 RX ADMIN — ATORVASTATIN CALCIUM 80 MG: 40 TABLET, FILM COATED ORAL at 08:05

## 2024-05-09 RX ADMIN — SODIUM BICARBONATE 1300 MG: 325 TABLET ORAL at 09:05

## 2024-05-09 RX ADMIN — RIVAROXABAN 15 MG: 15 TABLET, FILM COATED ORAL at 04:05

## 2024-05-09 RX ADMIN — PIPERACILLIN SODIUM AND TAZOBACTAM SODIUM 4.5 G: 4; .5 INJECTION, POWDER, LYOPHILIZED, FOR SOLUTION INTRAVENOUS at 01:05

## 2024-05-09 RX ADMIN — ACETAMINOPHEN 650 MG: 325 TABLET ORAL at 01:05

## 2024-05-09 RX ADMIN — METOPROLOL TARTRATE 12.5 MG: 25 TABLET, FILM COATED ORAL at 08:05

## 2024-05-09 RX ADMIN — LEVOTHYROXINE SODIUM 200 MCG: 0.1 TABLET ORAL at 05:05

## 2024-05-09 RX ADMIN — SIMETHICONE 80 MG: 80 TABLET, CHEWABLE ORAL at 08:05

## 2024-05-09 RX ADMIN — LIDOCAINE 1 PATCH: 50 PATCH TOPICAL at 01:05

## 2024-05-09 RX ADMIN — Medication 324 MG: at 04:05

## 2024-05-09 NOTE — ASSESSMENT & PLAN NOTE
[id number] Score: 1.  Continue Lopressor. Hold Xarelto given potential surgical intervention. Heparin ggt

## 2024-05-09 NOTE — PROGRESS NOTES
[facility] Medicine  Telemedicine Progress Note    Patient Name: Russell Santos  MRN: [id number]  Patient Class: IP- Inpatient   Admission Date: 5/4/2024  Length of Stay: 5 days  Attending Physician: Nicole Orozco MD  Primary Care Provider: Roxanne Phillip -          Subjective:     Principal Problem:MICHELLE (acute kidney injury)        HPI:  This is a 63-year-old male with a past medical history of AFib (on Xarelto), hypertension, type 2 diabetes, hyperlipidemia, hypothyroidism, chronic leukopenia, morbid obesity, who presents with abdominal pain.    Patient presents with abdominal pain that started 4 days prior to presentation.  He reports that his symptoms started after eating [employer] coleslaw.  He endorsed diarrhea, and right lower quadrant abdominal pain.  Associated symptoms include decreased p.o. intake, decreased appetite, generalized weakness, leg cramping, nausea and vomiting.  Additionally, he endorses decreased urination and intermittent dysuria.    In the ED, the patient was hemodynamically stable.  Labs remarkable for an elevated creatinine (8.3 baseline of 0.9), leukopenia (1.4- below baseline), microcytic anemia (9.6), elevated BNP (123).  VBG showed a pH of 7.24, HC03 of 18.1.  UA showed +3 leukocyte esterase, 46 RBCs, 42 WBCs, +1 protein, and many bacteria.  CT abdomen and pelvis showed findings suggestive of acute appendicitis, with bilateral perinephric stranding/possible ascending UTI without evidence of hydronephrosis. Surgery recommended medical management.  Patient was given 500 mL of LR, ceftriaxone, Flagyl.  He was admitted for further management.    Overview/Hospital Course:  63-year-old male with a past medical history of AFib (on Xarelto), hypertension, type 2 diabetes, hyperlipidemia, hypothyroidism, chronic leukopenia, morbid obesity admitted on 05/04/2024 for further evaluation of abdominal pain associated with nausea, vomiting, diarrhea, and decreased p.o. intake.   Admits generalized weakness.  Patient found to have  hyperphosphatemia, acute renal failure, possible appendicitis, and UTI/pyelonephritis.  CT abdomen with Dilated appendix measuring 12 mm with surrounding periappendiceal stranding/inflammatory change, concern for acute appendicitis, no evidence of abscess or perforation.  Also noted bilateral perinephric stranding, potential ascending urinary tract infection.  Creatinine 8.3 on admission.  Urinalysis red blood, many bacteria, and WBCs.  Blood culture ordered, NGTD.  Urine culture with Enterococcus species, susceptibilities pending.  General surgery consulted-no plans for acute surgical intervention.  Agree with IV antibiotics.  ID consulted. Nephrology consulted-continue on bicarb gtt. Pt now with worsening leukopenia, hematology consulted.     Follow-up For:  Patient Active Problem List    Diagnosis Date Noted Date Diagnosed    MICHELLE (acute kidney injury) 05/04/2024     Pyelonephritis 05/05/2024     Acute appendicitis 05/04/2024     Leukopenia 12/03/2020     PAF (paroxysmal atrial fibrillation) 09/16/2019      Discharge Planning   CASA:    Discharge Plan A: Home with family   Discharge Delays: None known at this time    Interval History:   Subjective: Russell Santos is being followed for MICHELLE (acute kidney injury). No acute events overnight. Sitting up in bed eating breakfast independently. Denies fever/chills or abdominal pain. Tolerating current diet. Able to ambulate independently.     Symptoms: Improved. The patient denies shortness of breath, malaise, cough, chest pain, weakness, headache, chest pressure, anorexia, diarrhea and anxiety.     Diet: Regular. Adequate intake. The patient denies nausea and vomiting.    Last Bowel Movement: 05/07/24    Activity Level: Normal    Pain: The patient Denies pain     Review of Systems   Constitutional:  Negative for chills and fever.   Respiratory:  Negative for cough and shortness of breath.    Cardiovascular:  Negative for  chest pain and palpitations.   Gastrointestinal:  Negative for abdominal pain.        Scheduled Meds:   amiodarone  200 mg Oral Daily    atorvastatin  80 mg Oral Daily    calcium carbonate  1,000 mg Oral BID    ergocalciferol  50,000 Units Oral Q7 Days    ferrous gluconate  324 mg Oral BID WM    levothyroxine  200 mcg Oral Before breakfast    LIDOcaine  1 patch Transdermal Q24H    metoprolol tartrate  12.5 mg Oral BID    mupirocin   Nasal BID    piperacillin-tazobactam (Zosyn) IV (PEDS and ADULTS) (extended infusion is not appropriate)  4.5 g Intravenous Q12H    polyethylene glycol  17 g Oral Daily    rivaroxaban  20 mg Oral Daily with dinner    sodium bicarbonate  1,300 mg Oral BID     Continuous Infusions:  PRN Meds:.  Current Facility-Administered Medications:     acetaminophen, 650 mg, Oral, Q8H PRN    acetaminophen, 650 mg, Oral, Q4H PRN    albuterol-ipratropium, 3 mL, Nebulization, Q4H PRN    aluminum-magnesium hydroxide-simethicone, 30 mL, Oral, QID PRN    dextrose 10%, 12.5 g, Intravenous, PRN    dextrose 10%, 25 g, Intravenous, PRN    gabapentin, 600 mg, Oral, TID PRN    glucagon (human recombinant), 1 mg, Intramuscular, PRN    glucose, 16 g, Oral, PRN    glucose, 24 g, Oral, PRN    insulin aspart U-100, 0-5 Units, Subcutaneous, QID (AC + HS) PRN    magnesium oxide, 800 mg, Oral, PRN    magnesium oxide, 800 mg, Oral, PRN    melatonin, 6 mg, Oral, Nightly PRN    naloxone, 0.02 mg, Intravenous, PRN    ondansetron, 4 mg, Intravenous, Q8H PRN    potassium bicarbonate, 35 mEq, Oral, PRN    potassium bicarbonate, 50 mEq, Oral, PRN    potassium bicarbonate, 60 mEq, Oral, PRN    potassium, sodium phosphates, 2 packet, Oral, PRN    potassium, sodium phosphates, 2 packet, Oral, PRN    potassium, sodium phosphates, 2 packet, Oral, PRN    prochlorperazine, 5 mg, Intravenous, Q6H PRN    simethicone, 1 tablet, Oral, QID PRN    sodium chloride 0.9%, 10 mL, Intravenous, Q12H PRN      Objective:     Vitals:    05/09/24 0420  05/09/24 0715 05/09/24 0825 05/09/24 0842   BP: (!) 140/90 (!) 149/96 (!) 154/87 (!) 154/87   BP Location: Left arm Left arm Left arm    Patient Position: Lying Lying Sitting    Pulse: (!) 56 60 63 63   Resp: 18 18 18    Temp: 97.8 °F (36.6 °C) 98 °F (36.7 °C)     TempSrc:  Oral     SpO2: 98% 100% 100%    Weight:       Height:           No data found.    Intake/Output Summary (Last 24 hours) at 5/9/2024 0851  Last data filed at 5/9/2024 0849  Gross per 24 hour   Intake 1320 ml   Output 2450 ml   Net -1130 ml     Net IO Since Admission: -9,681.03 mL [05/09/24 0851]    Physical Exam  Vitals and nursing note reviewed.   Constitutional:       General: He is awake. He is not in acute distress.     Appearance: Normal appearance. He is well-developed and well-groomed. He is not toxic-appearing.   HENT:      Head: Normocephalic and atraumatic.   Cardiovascular:      Rate and Rhythm: Normal rate.   Pulmonary:      Effort: No tachypnea, accessory muscle usage or respiratory distress.   Abdominal:      General: There is no distension.   Neurological:      Mental Status: He is alert and oriented to person, place, and time. Mental status is at baseline.   Psychiatric:         Mood and Affect: Mood normal.         Behavior: Behavior normal. Behavior is cooperative.         Thought Content: Thought content normal.       Significant Labs: All pertinent labs within the past 24 hours have been reviewed.    BMP (Last 3 Results):   Recent Labs   Lab 05/06/24  0424 05/07/24  0424 05/08/24  0632   GLU 74 106 88   * 139 144   K 4.8 4.3 3.6    108 110   CO2 14* 20* 24   BUN 61* 51* 39*   CREATININE 7.4* 5.8* 4.3*   CALCIUM 6.4* 6.4* 6.9*   MG 1.7 1.5* 1.4*     CBC (Last 3 Results):   Recent Labs   Lab 05/07/24  0425 05/07/24  1244 05/08/24  0632   WBC 0.83* 0.81* 0.93*   RBC 3.65* 3.95* 3.58*   HGB 8.3* 9.0* 8.2*   HCT 26.2* 28.7* 25.9*    293 280   MCV 72* 73* 72*   MCH 22.7* 22.8* 22.9*   MCHC 31.7* 31.4* 31.7*      Microbiology Results (last 7 days)       Procedure Component Value Units Date/Time    Blood culture [[id number]] Collected: 05/05/24 0708    Order Status: Completed Specimen: Blood from Peripheral, Wrist, Right Updated: 05/08/24 0903     Blood Culture, Routine No Growth to date      No Growth to date      No Growth to date      No Growth to date    Blood culture [[id number]] Collected: 05/05/24 0708    Order Status: Completed Specimen: Blood from Peripheral, Hand, Left Updated: 05/08/24 0903     Blood Culture, Routine No Growth to date      No Growth to date      No Growth to date      No Growth to date    Urine culture [[id number]]  (Abnormal)  (Susceptibility) Collected: 05/04/24 1541    Order Status: Completed Specimen: Urine Updated: 05/06/24 1151     Urine Culture, Routine ENTEROCOCCUS FAECALIS  >100,000 cfu/ml      Narrative:      Specimen Source->Urine            Significant Imaging: I have reviewed all pertinent imaging results/findings within the past 24 hours.  CT Abdomen Pelvis  Without Contrast  Narrative: EXAMINATION:  CT ABDOMEN PELVIS WITHOUT CONTRAST    CLINICAL HISTORY:  Abdominal pain, acute, nonlocalized;    TECHNIQUE:  Low dose axial images, sagittal and coronal reformations were obtained from the lung bases to the pubic symphysis.  Oral contrast was not administered.    COMPARISON:  None    FINDINGS:  The visualized portion of the heart is unremarkable.  Mild bibasilar atelectatic changes are seen.  No evidence of focal consolidation or pleural effusion.    No significant hepatic abnormality seen on this noncontrast exam.  There is no intra-or extrahepatic biliary ductal dilatation.  There is cholelithiasis.  The stomach, pancreas, spleen, and adrenal glands are unremarkable.    Single nonobstructing right renal stone is seen.  No evidence of left-sided renal stones.  No right or left-sided hydronephrosis is seen.  No stones are seen along the ureteral courses.  There is bilateral fairly  symmetric appearing perinephric stranding which extends inferiorly along the ureteral courses and retroperitoneal region.  Urinary bladder is nondistended.  Prostate is unremarkable.    Appendix is dilated measuring 12 mm with periappendiceal stranding/inflammatory change, also noting aforementioned findings extending near this region.  No evidence of bowel obstruction.  No free air or free fluid.    Aorta tapers normally with mild atherosclerosis.    No acute osseous abnormality identified.  Degenerative changes and prominent anterior bridging osteophytes are seen throughout the spine.  There is mild grade 1 anterolisthesis of L5 on S1 secondary to bilateral L5 pars defects.  Subcutaneous soft tissues show no significant abnormalities.  Impression: 1. Dilated appendix measuring 12 mm with surrounding periappendiceal stranding/inflammatory change.  Findings are suggestive for acute appendicitis.  No evidence of abscess or perforation.  Surgical consultation is recommended.  2. Fairly symmetric appearing bilateral perinephric stranding which extends inferiorly along the ureteral courses and in the right lower quadrant periappendiceal region.  Uncertain if this may relate to chronic change versus acute infectious/inflammatory process as no prior studies are available for comparison.  Potential ascending urinary tract infection could present with such appearance in the right clinical setting.  Correlate with clinical symptoms and urinalysis.  3. Single nonobstructing right renal stone.  4. Cholelithiasis.  5. Additional findings as detailed above.  This report was flagged in Epic as abnormal.    Electronically signed by: Raulito Keys MD  Date:    05/04/2024  Time:    17:44        Assessment/Plan:      * MICHELLE (acute kidney injury)  Patient with acute kidney injury/acute renal failure likely due to pre-renal azotemia due to IVVD MICHELLE is currently stable. Baseline creatinine  0.9  - Labs reviewed- Renal  function/electrolytes with Estimated Creatinine Clearance: 24.8 mL/min (A) (based on SCr of 4.3 mg/dL (H)). according to latest data. Monitor urine output and serial BMP and adjust therapy as needed. Avoid nephrotoxins and renally dose meds for GFR listed above.    -Presented with MICHELLE  -Cr on admit 8.3  -Baseline Cr 0.9  -Suspect due to infection and decreased PO intake  -Nephrology consulted  -IVF held today per nephro  -Cr improving   -Avoid nephrotoxins, renal dose all meds.   -Monitor Is/Os  -Monitor       Pyelonephritis  -patient with complaints difficulty urinating and dysuria with MICHELLE on likely CKD  -CT abdomen showed bilateral perinephric stranding which extends inferiorly along the ureteral courses and in the right lower quadrant periappendiceal region. Potential ascending urinary tract infection  -Urine culture with Enterococcus   -ID consulted switch to Zosyn    Antibiotics (From admission, onward)      Start     Stop Route Frequency Ordered    05/06/24 1330  piperacillin-tazobactam (ZOSYN) 4.5 g in dextrose 5 % in water (D5W) 100 mL IVPB (MB+)         -- IV Every 12 hours (non-standard times) 05/06/24 1218    05/06/24 1045  mupirocin 2 % ointment         05/11/24 0859 Nasl 2 times daily 05/06/24 0932          Cultures were taken-   Microbiology Results (last 7 days)       Procedure Component Value Units Date/Time    Blood culture [[id number]] Collected: 05/05/24 0708    Order Status: Completed Specimen: Blood from Peripheral, Wrist, Right Updated: 05/08/24 0903     Blood Culture, Routine No Growth to date      No Growth to date      No Growth to date      No Growth to date    Blood culture [[id number]] Collected: 05/05/24 0708    Order Status: Completed Specimen: Blood from Peripheral, Hand, Left Updated: 05/08/24 0903     Blood Culture, Routine No Growth to date      No Growth to date      No Growth to date      No Growth to date    Urine culture [[id number]]  (Abnormal)  (Susceptibility) Collected:  05/04/24 1541    Order Status: Completed Specimen: Urine Updated: 05/06/24 1151     Urine Culture, Routine ENTEROCOCCUS FAECALIS  >100,000 cfu/ml      Narrative:      Specimen Source->Urine              Acute appendicitis  -CT abdomen and pelvis showed findings suggestive of acute appendicitis, with bilateral perinephric stranding/possible ascending UTI.  -General surgery recommended medical management  -Symptomatic control  -ID consulted switch to Zosyn.  -pain improving     Leukopenia  History noted. Chronic but worsening, likely in the setting of infection   Patient had a bone marrow biopsy in 2020, which was unrevealing.   WBC down to 1.10 on 05/06  Hematology consulted, recommending outpatient follow up.  We will continue to monitor     PAF (paroxysmal atrial fibrillation)  [id number] Score: 1.  Continue Lopressor. Hold Xarelto given potential surgical intervention. Heparin ggt     Hyperphosphatemia  -phosphorus level elevated at 5.8 on admission   -likely elevated in the setting of acute renal failure   -nephrology consulted   -monitor level      Hypothyroidism  Continue home Synthroid        History of DVT (deep vein thrombosis)  Hold Xarelto given potential surgical intervention  Heparin ggt     Morbid obesity  Body mass index is 44.7 kg/m². Morbid obesity complicates all aspects of disease management from diagnostic modalities to treatment. Weight loss encouraged and health benefits explained to patient.         Diabetes mellitus, type 2  Patient's FSGs are controlled on current medication regimen.  Last A1c reviewed-   Lab Results   Component Value Date    HGBA1C 6.3 (H) 08/08/2022     Most recent fingerstick glucose reviewed-   Recent Labs   Lab 05/08/24  1146 05/08/24  1628 05/08/24  1954 05/09/24  0714   POCTGLUCOSE 77 107 162* 103       Current correctional scale  Low  Maintain anti-hyperglycemic dose as follows-   Antihyperglycemics (From admission, onward)      Start     Stop Route Frequency Ordered     05/04/24 2049  insulin aspart U-100 pen 0-5 Units         -- SubQ Before meals & nightly PRN 05/04/24 1949          Hold Oral hypoglycemics while patient is in the hospital.    Hyperlipidemia  Continue home statin       Essential hypertension  Chronic, controlled. Latest blood pressure and vitals reviewed-     Temp:  [97.8 °F (36.6 °C)-98.5 °F (36.9 °C)]   Pulse:  [53-79]   Resp:  [18]   BP: (125-154)/(78-96)   SpO2:  [98 %-100 %] .   Home meds for hypertension were reviewed and noted below.   Hypertension Medications               metoprolol tartrate (LOPRESSOR) 25 MG tablet Take 1 tablet (25 mg total) by mouth 2 (two) times daily.    olmesartan (BENICAR) 40 MG tablet Take 1 tablet (40 mg total) by mouth once daily.            While in the hospital, will manage blood pressure as follows; Adjust home antihypertensive regimen as follows- hold nephrotoxic medications, monitor BP    Will utilize p.r.n. blood pressure medication only if patient's blood pressure greater than 180/110 and he develops symptoms such as worsening chest pain or shortness of breath.      VTE Risk Mitigation (From admission, onward)           Ordered     rivaroxaban tablet 15 mg  With dinner         05/09/24 0851     IP VTE HIGH RISK PATIENT  Once         05/04/24 1949     Place sequential compression device  Until discontinued         05/04/24 1949                          I have completed this tele-visit without the assistance of a telepresenter.    The attending portion of this evaluation, treatment, and documentation was performed per Nicole Patel MD via Telemedicine AudioVisual using the secure [employer] software platform with 2 way audio/video. The provider was located off-site and the patient is located in the hospital. The aforementioned video software was utilized to document the relevant history and physical exam    Nicole Patel MD  Department of Hospital Medicine   [facility]

## 2024-05-09 NOTE — ASSESSMENT & PLAN NOTE
Chronic, controlled. Latest blood pressure and vitals reviewed-     Temp:  [97.8 °F (36.6 °C)-98.5 °F (36.9 °C)]   Pulse:  [53-79]   Resp:  [18]   BP: (125-154)/(78-96)   SpO2:  [98 %-100 %] .   Home meds for hypertension were reviewed and noted below.   Hypertension Medications               metoprolol tartrate (LOPRESSOR) 25 MG tablet Take 1 tablet (25 mg total) by mouth 2 (two) times daily.    olmesartan (BENICAR) 40 MG tablet Take 1 tablet (40 mg total) by mouth once daily.            While in the hospital, will manage blood pressure as follows; Adjust home antihypertensive regimen as follows- hold nephrotoxic medications, monitor BP    Will utilize p.r.n. blood pressure medication only if patient's blood pressure greater than 180/110 and he develops symptoms such as worsening chest pain or shortness of breath.

## 2024-05-09 NOTE — ASSESSMENT & PLAN NOTE
Continue home statin      NOTIFICATION RETURN TO WORK     7/1/2022     Mr. Yudi Rodriguez  [address] [facility] [facility] [address]      To Whom It May Concern:    Yudi Rodriguez is currently under the care of Katelyn Moreau. He will return to work/school on: 07/15/2022 with no restrictions. If there are questions or concerns please have the patient contact our office.         Sincerely,      Natividad Lopez MD

## 2024-05-09 NOTE — PROGRESS NOTES
[facility] Medicine  Telemedicine Progress Note    Patient Name: Russell Santos  MRN: [id number]  Patient Class: IP- Inpatient   Admission Date: 5/4/2024  Length of Stay: 5 days  Attending Physician: Digna Colbert MD  Primary Care Provider: Roxanne Phillip -          Subjective:     Principal Problem:MICHELLE (acute kidney injury)        HPI:  This is a 63-year-old male with a past medical history of AFib (on Xarelto), hypertension, type 2 diabetes, hyperlipidemia, hypothyroidism, chronic leukopenia, morbid obesity, who presents with abdominal pain.    Patient presents with abdominal pain that started 4 days prior to presentation.  He reports that his symptoms started after eating [employer] coleslaw.  He endorsed diarrhea, and right lower quadrant abdominal pain.  Associated symptoms include decreased p.o. intake, decreased appetite, generalized weakness, leg cramping, nausea and vomiting.  Additionally, he endorses decreased urination and intermittent dysuria.    In the ED, the patient was hemodynamically stable.  Labs remarkable for an elevated creatinine (8.3 baseline of 0.9), leukopenia (1.4- below baseline), microcytic anemia (9.6), elevated BNP (123).  VBG showed a pH of 7.24, HC03 of 18.1.  UA showed +3 leukocyte esterase, 46 RBCs, 42 WBCs, +1 protein, and many bacteria.  CT abdomen and pelvis showed findings suggestive of acute appendicitis, with bilateral perinephric stranding/possible ascending UTI without evidence of hydronephrosis. Surgery recommended medical management.  Patient was given 500 mL of LR, ceftriaxone, Flagyl.  He was admitted for further management.    Overview/Hospital Course:  63-year-old male with a past medical history of AFib (on Xarelto), hypertension, type 2 diabetes, hyperlipidemia, hypothyroidism, chronic leukopenia, morbid obesity admitted on 05/04/2024 for further evaluation of abdominal pain associated with nausea, vomiting, diarrhea, and decreased p.o.  intake.  Admits generalized weakness.  Patient found to have  hyperphosphatemia, acute renal failure, possible appendicitis, and UTI/pyelonephritis.  CT abdomen with Dilated appendix measuring 12 mm with surrounding periappendiceal stranding/inflammatory change, concern for acute appendicitis, no evidence of abscess or perforation.  Also noted bilateral perinephric stranding, potential ascending urinary tract infection.  Creatinine 8.3 on admission.  Urinalysis red blood, many bacteria, and WBCs.  Blood culture ordered, NGTD.  Urine culture with Enterococcus species, susceptibilities pending.  General surgery consulted-no plans for acute surgical intervention.  Agree with IV antibiotics.  ID consulted. Nephrology consulted-continue on bicarb gtt. Pt now with worsening leukopenia, hematology consulted.     Interval History: Patient doing well today and overall feels better. Has some back pain but denies any abdominal pain. No nausea or vomiting. Has been eating and drinking well. Has been able to get OOB. Having regular bowel movements. Pt denies any diarrhea, constipation, fever, chills, malaise, chest pain, SOB. Remove brandt today.       Review of Systems   Constitutional:  Positive for activity change and fatigue. Negative for fever.   Respiratory:  Negative for cough and shortness of breath.    Cardiovascular:  Negative for chest pain.   Gastrointestinal:  Negative for abdominal pain.   Musculoskeletal:  Positive for arthralgias.   Neurological:  Negative for dizziness and headaches.   Psychiatric/Behavioral:  Negative for confusion.    All other systems reviewed and are negative.    Objective:     Vital Signs (Most Recent):  Temp: 98.5 °F (36.9 °C) (05/08/24 2247)  Pulse: (!) 53 (05/08/24 2247)  Resp: 18 (05/08/24 2247)  BP: 132/89 (05/08/24 2247)  SpO2: 100 % (05/08/24 2247) Vital Signs (24h Range):  Temp:  [97.5 °F (36.4 °C)-98.5 °F (36.9 °C)] 98.5 °F (36.9 °C)  Pulse:  [53-79] 53  Resp:  [18-20] 18  SpO2:   [97 %-100 %] 100 %  BP: (109-141)/(73-89) 132/89     Weight: (!) 137.3 kg (302 lb 11.1 oz)  Body mass index is 44.7 kg/m².    Intake/Output Summary (Last 24 hours) at 5/9/2024 0020  Last data filed at 5/8/2024 1745  Gross per 24 hour   Intake 960 ml   Output 2600 ml   Net -1640 ml         Physical Exam  Vitals and nursing note reviewed.   Constitutional:       Appearance: Normal appearance.   HENT:      Head: Normocephalic and atraumatic.   Eyes:      Extraocular Movements: Extraocular movements intact.      Pupils: Pupils are equal, round, and reactive to light.   Cardiovascular:      Rate and Rhythm: Normal rate and regular rhythm.   Pulmonary:      Effort: Pulmonary effort is normal. No respiratory distress.   Abdominal:      General: Abdomen is flat. There is no distension.   Musculoskeletal:         General: Normal range of motion.      Cervical back: Normal range of motion.   Neurological:      General: No focal deficit present.      Mental Status: He is alert and oriented to person, place, and time.   Psychiatric:         Mood and Affect: Mood normal.         Behavior: Behavior normal.             Significant Labs: All pertinent labs within the past 24 hours have been reviewed.  CBC:   Recent Labs   Lab 05/07/24  0425 05/07/24  1244 05/08/24  0632   WBC 0.83* 0.81* 0.93*   HGB 8.3* 9.0* 8.2*   HCT 26.2* 28.7* 25.9*    293 280     CMP:   Recent Labs   Lab 05/07/24  0424 05/08/24  0632    144   K 4.3 3.6    110   CO2 20* 24    88   BUN 51* 39*   CREATININE 5.8* 4.3*   CALCIUM 6.4* 6.9*   ANIONGAP 11 10       Significant Imaging: I have reviewed all pertinent imaging results/findings within the past 24 hours.    Assessment/Plan:      * MICHELLE (acute kidney injury)  Patient with acute kidney injury/acute renal failure likely due to pre-renal azotemia due to IVVD MICHELLE is currently stable. Baseline creatinine  0.9  - Labs reviewed- Renal function/electrolytes with Estimated Creatinine  Clearance: 24.8 mL/min (A) (based on SCr of 4.3 mg/dL (H)). according to latest data. Monitor urine output and serial BMP and adjust therapy as needed. Avoid nephrotoxins and renally dose meds for GFR listed above.    -Presented with MICHELLE  -Cr on admit 8.3  -Baseline Cr 0.9  -Suspect due to infection and decreased PO intake  -Nephrology consulted  -IVF held today per nephro  -Cr improving   -Avoid nephrotoxins, renal dose all meds.   -Monitor Is/Os  -Monitor       Hyperphosphatemia  -phosphorus level elevated at 5.8 on admission   -likely elevated in the setting of acute renal failure   -nephrology consulted   -monitor level      Pyelonephritis  -patient with complaints difficulty urinating and dysuria with MICHELLE on likely CKD  -CT abdomen showed bilateral perinephric stranding which extends inferiorly along the ureteral courses and in the right lower quadrant periappendiceal region. Potential ascending urinary tract infection  -Urine culture with Enterococcus species, identification speciation pending   -ID consulted switch to Zosyn    Acute appendicitis  -CT abdomen and pelvis showed findings suggestive of acute appendicitis, with bilateral perinephric stranding/possible ascending UTI.  -General surgery recommended medical management  -Symptomatic control  -ID consulted switch to Zosyn.  -pain improving     Hypothyroidism  Continue home Synthroid        History of DVT (deep vein thrombosis)  Hold Xarelto given potential surgical intervention  Heparin ggt     Leukopenia  History noted. Chronic but worsening, likely in the setting of infection   Patient had a bone marrow biopsy in 2020, which was unrevealing.   WBC down to 1.10 on 05/06  Hematology consulted, recommending outpatient follow up.  We will continue to monitor     PAF (paroxysmal atrial fibrillation)  [id number] Score: 1.  Continue Lopressor. Hold Xarelto given potential surgical intervention. Heparin ggt     Morbid obesity  Body mass index is 44.7 kg/m².  Morbid obesity complicates all aspects of disease management from diagnostic modalities to treatment. Weight loss encouraged and health benefits explained to patient.         Diabetes mellitus, type 2  Patient's FSGs are controlled on current medication regimen.  Last A1c reviewed-   Lab Results   Component Value Date    HGBA1C 6.3 (H) 08/08/2022     Most recent fingerstick glucose reviewed-   Recent Labs   Lab 05/06/24  1716 05/06/24  1954 05/07/24  0713 05/07/24  1144   POCTGLUCOSE 103 151* 107 79       Current correctional scale  Low  Maintain anti-hyperglycemic dose as follows-   Antihyperglycemics (From admission, onward)      Start     Stop Route Frequency Ordered    05/04/24 2049  insulin aspart U-100 pen 0-5 Units         -- SubQ Before meals & nightly PRN 05/04/24 1949          Hold Oral hypoglycemics while patient is in the hospital.    Hyperlipidemia  Continue home statin       Essential hypertension  Chronic, controlled. Latest blood pressure and vitals reviewed-     Temp:  [97 °F (36.1 °C)-98.7 °F (37.1 °C)]   Pulse:  [55-64]   Resp:  [16-20]   BP: (100-113)/(59-77)   SpO2:  [100 %] .   Home meds for hypertension were reviewed and noted below.   Hypertension Medications               metoprolol tartrate (LOPRESSOR) 25 MG tablet Take 1 tablet (25 mg total) by mouth 2 (two) times daily.    olmesartan (BENICAR) 40 MG tablet Take 1 tablet (40 mg total) by mouth once daily.            While in the hospital, will manage blood pressure as follows; Adjust home antihypertensive regimen as follows- hold nephrotoxic medications, monitor BP    Will utilize p.r.n. blood pressure medication only if patient's blood pressure greater than 180/110 and he develops symptoms such as worsening chest pain or shortness of breath.      VTE Risk Mitigation (From admission, onward)           Ordered     heparin 25,000 units in dextrose 5% (100 units/ml) IV bolus from bag LOW INTENSITY nomogram - [facility]  As needed (PRN)         Question:  Heparin Infusion Adjustment (DO NOT MODIFY ANSWER)  Answer:  [email or web address]\heparin LOW INTENSITY nomogram for [facility] [id number]    05/08/24 0950     heparin 25,000 units in dextrose 5% 250 mL (100 units/mL) infusion LOW INTENSITY nomogram - [facility]  Continuous        Question:  Begin at (units/kg/hr)  Answer:  12    05/08/24 0950     IP VTE HIGH RISK PATIENT  Once         05/04/24 1949     Place sequential compression device  Until discontinued         05/04/24 1949                          I have completed this tele-visit without the assistance of a telepresenter.    The attending portion of this evaluation, treatment, and documentation was performed per Digna Colbert MD via Telemedicine AudioVisual using the secure True P[employer] software platform with 2 way audio/video. The provider was located off-site and the patient is located in the hospital. The aforementioned video software was utilized to document the relevant history and physical exam    Digna Colbert MD  Department of Hospital Medicine   [facility]

## 2024-05-09 NOTE — PROGRESS NOTES
Date of Admission:5/4/2024    SUBJECTIVE:notes feeling fine    Current Facility-Administered Medications   Medication    acetaminophen tablet 650 mg    acetaminophen tablet 650 mg    albuterol-ipratropium 2.5 mg-0.5 mg/3 mL nebulizer solution 3 mL    aluminum-magnesium hydroxide-simethicone 200-200-20 mg/5 mL suspension 30 mL    amiodarone tablet 200 mg    atorvastatin tablet 80 mg    calcium carbonate 200 mg calcium (500 mg) chewable tablet 1,000 mg    dextrose 10% bolus 125 mL 125 mL    dextrose 10% bolus 250 mL 250 mL    ergocalciferol capsule 50,000 Units    ferrous gluconate tablet 324 mg    gabapentin capsule 600 mg    glucagon (human recombinant) injection 1 mg    glucose chewable tablet 16 g    glucose chewable tablet 24 g    insulin aspart U-100 pen 0-5 Units    levothyroxine tablet 200 mcg    LIDOcaine 5 % patch 1 patch    magnesium oxide tablet 800 mg    magnesium oxide tablet 800 mg    melatonin tablet 6 mg    metoprolol tartrate (LOPRESSOR) split tablet 12.5 mg    mupirocin 2 % ointment    naloxone 0.4 mg/mL injection 0.02 mg    ondansetron injection 4 mg    piperacillin-tazobactam (ZOSYN) 4.5 g in dextrose 5 % in water (D5W) 100 mL IVPB (MB+)    polyethylene glycol packet 17 g    potassium bicarbonate disintegrating tablet 35 mEq    potassium bicarbonate disintegrating tablet 50 mEq    potassium bicarbonate disintegrating tablet 60 mEq    potassium, sodium phosphates 280-160-250 mg packet 2 packet    potassium, sodium phosphates 280-160-250 mg packet 2 packet    potassium, sodium phosphates 280-160-250 mg packet 2 packet    prochlorperazine injection Soln 5 mg    rivaroxaban tablet 15 mg    simethicone chewable tablet 80 mg    sodium bicarbonate tablet 1,300 mg    sodium chloride 0.9% flush 10 mL       Wt Readings from Last 3 Encounters:   05/05/24 (!) 137.3 kg (302 lb 11.1 oz)   10/13/23 (!) 154.2 kg (340 lb)   07/27/23 (!) 141.4 kg (311 lb 11.7 oz)     Temp Readings from Last 3 Encounters:   05/09/24  "98 °F (36.7 °C) (Oral)   10/13/23 98.2 °F (36.8 °C) (Oral)   08/11/22 98.7 °F (37.1 °C) (Oral)     BP Readings from Last 3 Encounters:   05/09/24 (!) 154/87   10/13/23 132/88   07/27/23 120/78     Pulse Readings from Last 3 Encounters:   05/09/24 63   10/13/23 62   07/27/23 60       Intake/Output Summary (Last 24 hours) at 5/9/2024 0926  Last data filed at 5/9/2024 0849  Gross per 24 hour   Intake 960 ml   Output 2450 ml   Net -1490 ml       PE:  GEN:wd male in nad  HEENT:ncat,eomi,mmm  CVS:s1s2 regular  PULM:ctab   ABD:soft,nd  EXT:1+ leg edema  NEURO:awake,alert    No results for input(s): "GLU", "NA", "K", "CL", "CO2", "BUN", "CREATININE", "CALCIUM", "MG" in the last 24 hours.      Lab Results   Component Value Date    .8 (H) 05/06/2024    CALCIUM 6.9 (LL) 05/08/2024    CAION 0.89 (L) 05/06/2024    PHOS 3.3 05/08/2024       Recent Labs   Lab 05/09/24  0548   WBC 0.95*   RBC 4.05*   HGB 9.1*   HCT 30.1*      MCV 74*   MCH 22.5*   MCHC 30.2*           A/P:  1.boris. creatinine pending this am. Following labs.  2.anemia. hg low. No prbc indicated. Iron def. Hg ok.  3.leukopenia. worse. Neutropenic precautions. Consulted heme.  Following.  Still low. Cont isolations.  4.uti. seen by id. Enterococcus faecalis uti. Tx per id. Still on zosyn. Augmentin for dc.  5.appendicitis. tailor antibiotics with id for uti coverage too.  6.hyperphospatemia. resolved.  7.lily. cont  cpap as needed.  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 Vit d def. Cont tx.  9.metabolic acidosis. Better. Cont tx.  10.hypomag. replete as needed.    "

## 2024-05-09 NOTE — ASSESSMENT & PLAN NOTE
Patient with acute kidney injury/acute renal failure likely due to pre-renal azotemia due to IVVD MICHELLE is currently stable. Baseline creatinine  0.9  - Labs reviewed- Renal function/electrolytes with Estimated Creatinine Clearance: 24.8 mL/min (A) (based on SCr of 4.3 mg/dL (H)). according to latest data. Monitor urine output and serial BMP and adjust therapy as needed. Avoid nephrotoxins and renally dose meds for GFR listed above.    -Presented with MICHELLE  -Cr on admit 8.3  -Baseline Cr 0.9  -Suspect due to infection and decreased PO intake  -Nephrology consulted  -IVF held today per nephro  -Cr improving   -Avoid nephrotoxins, renal dose all meds.   -Monitor Is/Os  -Monitor

## 2024-05-09 NOTE — ASSESSMENT & PLAN NOTE
Patient's FSGs are controlled on current medication regimen.  Last A1c reviewed-   Lab Results   Component Value Date    HGBA1C 6.3 (H) 08/08/2022     Most recent fingerstick glucose reviewed-   Recent Labs   Lab 05/08/24  1146 05/08/24  1628 05/08/24  1954 05/09/24  0714   POCTGLUCOSE 77 107 162* 103       Current correctional scale  Low  Maintain anti-hyperglycemic dose as follows-   Antihyperglycemics (From admission, onward)    Start     Stop Route Frequency Ordered    05/04/24 2049  insulin aspart U-100 pen 0-5 Units         -- SubQ Before meals & nightly PRN 05/04/24 1949        Hold Oral hypoglycemics while patient is in the hospital.

## 2024-05-09 NOTE — PROGRESS NOTES
Pharmacist Renal Dose Adjustment Note    Russell Santos is a 63 y.o. male being treated with the medication Xarelto    Patient Data:    Vital Signs (Most Recent):  Temp: 98 °F (36.7 °C) (05/09/24 0715)  Pulse: 63 (05/09/24 0842)  Resp: 18 (05/09/24 0825)  BP: (!) 154/87 (05/09/24 0842)  SpO2: 100 % (05/09/24 0825) Vital Signs (72h Range):  Temp:  [97 °F (36.1 °C)-98.8 °F (37.1 °C)]   Pulse:  [53-79]   Resp:  [16-20]   BP: (100-154)/(59-96)   SpO2:  [97 %-100 %]      Recent Labs   Lab 05/06/24  0424 05/07/24  0424 05/08/24  0632   CREATININE 7.4* 5.8* 4.3*     Serum creatinine: 4.3 mg/dL (H) 05/08/24 0632  Estimated creatinine clearance: 24.8 mL/min (A)    Medication:Xarelto dose: 20 mg frequency with dinner will be changed to medication:Xarelto dose:15 mg frequency:with dinner    Pharmacist's Name: Erica Falk  Pharmacist's Extension: [phone]

## 2024-05-09 NOTE — ASSESSMENT & PLAN NOTE
-CT abdomen and pelvis showed findings suggestive of acute appendicitis, with bilateral perinephric stranding/possible ascending UTI.  -General surgery recommended medical management  -Symptomatic control  -ID consulted switch to Zosyn.  -pain improving

## 2024-05-09 NOTE — SUBJECTIVE & OBJECTIVE
Follow-up For:  Patient Active Problem List    Diagnosis Date Noted Date Diagnosed    MICHELLE (acute kidney injury) 05/04/2024     Pyelonephritis 05/05/2024     Acute appendicitis 05/04/2024     Leukopenia 12/03/2020     PAF (paroxysmal atrial fibrillation) 09/16/2019      Discharge Planning   CASA:    Discharge Plan A: Home with family   Discharge Delays: None known at this time    Interval History:   Subjective: Russell Santos is being followed for MICHELLE (acute kidney injury). No acute events overnight. Sitting up in bed eating breakfast independently. Denies fever/chills or abdominal pain. Tolerating current diet. Able to ambulate independently.     Symptoms: Improved. The patient denies shortness of breath, malaise, cough, chest pain, weakness, headache, chest pressure, anorexia, diarrhea and anxiety.     Diet: Regular. Adequate intake. The patient denies nausea and vomiting.    Last Bowel Movement: 05/07/24    Activity Level: Normal    Pain: The patient Denies pain     Review of Systems   Constitutional:  Negative for chills and fever.   Respiratory:  Negative for cough and shortness of breath.    Cardiovascular:  Negative for chest pain and palpitations.   Gastrointestinal:  Negative for abdominal pain.        Scheduled Meds:   amiodarone  200 mg Oral Daily    atorvastatin  80 mg Oral Daily    calcium carbonate  1,000 mg Oral BID    ergocalciferol  50,000 Units Oral Q7 Days    ferrous gluconate  324 mg Oral BID WM    levothyroxine  200 mcg Oral Before breakfast    LIDOcaine  1 patch Transdermal Q24H    metoprolol tartrate  12.5 mg Oral BID    mupirocin   Nasal BID    piperacillin-tazobactam (Zosyn) IV (PEDS and ADULTS) (extended infusion is not appropriate)  4.5 g Intravenous Q12H    polyethylene glycol  17 g Oral Daily    rivaroxaban  20 mg Oral Daily with dinner    sodium bicarbonate  1,300 mg Oral BID     Continuous Infusions:  PRN Meds:.  Current Facility-Administered Medications:     acetaminophen, 650 mg, Oral,  Q8H PRN    acetaminophen, 650 mg, Oral, Q4H PRN    albuterol-ipratropium, 3 mL, Nebulization, Q4H PRN    aluminum-magnesium hydroxide-simethicone, 30 mL, Oral, QID PRN    dextrose 10%, 12.5 g, Intravenous, PRN    dextrose 10%, 25 g, Intravenous, PRN    gabapentin, 600 mg, Oral, TID PRN    glucagon (human recombinant), 1 mg, Intramuscular, PRN    glucose, 16 g, Oral, PRN    glucose, 24 g, Oral, PRN    insulin aspart U-100, 0-5 Units, Subcutaneous, QID (AC + HS) PRN    magnesium oxide, 800 mg, Oral, PRN    magnesium oxide, 800 mg, Oral, PRN    melatonin, 6 mg, Oral, Nightly PRN    naloxone, 0.02 mg, Intravenous, PRN    ondansetron, 4 mg, Intravenous, Q8H PRN    potassium bicarbonate, 35 mEq, Oral, PRN    potassium bicarbonate, 50 mEq, Oral, PRN    potassium bicarbonate, 60 mEq, Oral, PRN    potassium, sodium phosphates, 2 packet, Oral, PRN    potassium, sodium phosphates, 2 packet, Oral, PRN    potassium, sodium phosphates, 2 packet, Oral, PRN    prochlorperazine, 5 mg, Intravenous, Q6H PRN    simethicone, 1 tablet, Oral, QID PRN    sodium chloride 0.9%, 10 mL, Intravenous, Q12H PRN      Objective:     Vitals:    05/09/24 0420 05/09/24 0715 05/09/24 0825 05/09/24 0842   BP: (!) 140/90 (!) 149/96 (!) 154/87 (!) 154/87   BP Location: Left arm Left arm Left arm    Patient Position: Lying Lying Sitting    Pulse: (!) 56 60 63 63   Resp: 18 18 18    Temp: 97.8 °F (36.6 °C) 98 °F (36.7 °C)     TempSrc:  Oral     SpO2: 98% 100% 100%    Weight:       Height:           No data found.    Intake/Output Summary (Last 24 hours) at 5/9/2024 0851  Last data filed at 5/9/2024 0849  Gross per 24 hour   Intake 1320 ml   Output 2450 ml   Net -1130 ml     Net IO Since Admission: -9,681.03 mL [05/09/24 0851]    Physical Exam  Vitals and nursing note reviewed.   Constitutional:       General: He is awake. He is not in acute distress.     Appearance: Normal appearance. He is well-developed and well-groomed. He is not toxic-appearing.   HENT:       Head: Normocephalic and atraumatic.   Cardiovascular:      Rate and Rhythm: Normal rate.   Pulmonary:      Effort: No tachypnea, accessory muscle usage or respiratory distress.   Abdominal:      General: There is no distension.   Neurological:      Mental Status: He is alert and oriented to person, place, and time. Mental status is at baseline.   Psychiatric:         Mood and Affect: Mood normal.         Behavior: Behavior normal. Behavior is cooperative.         Thought Content: Thought content normal.       Significant Labs: All pertinent labs within the past 24 hours have been reviewed.    BMP (Last 3 Results):   Recent Labs   Lab 05/06/24  0424 05/07/24  0424 05/08/24  0632   GLU 74 106 88   * 139 144   K 4.8 4.3 3.6    108 110   CO2 14* 20* 24   BUN 61* 51* 39*   CREATININE 7.4* 5.8* 4.3*   CALCIUM 6.4* 6.4* 6.9*   MG 1.7 1.5* 1.4*     CBC (Last 3 Results):   Recent Labs   Lab 05/07/24  0425 05/07/24  1244 05/08/24  0632   WBC 0.83* 0.81* 0.93*   RBC 3.65* 3.95* 3.58*   HGB 8.3* 9.0* 8.2*   HCT 26.2* 28.7* 25.9*    293 280   MCV 72* 73* 72*   MCH 22.7* 22.8* 22.9*   MCHC 31.7* 31.4* 31.7*     Microbiology Results (last 7 days)       Procedure Component Value Units Date/Time    Blood culture [[id number]] Collected: 05/05/24 0708    Order Status: Completed Specimen: Blood from Peripheral, Wrist, Right Updated: 05/08/24 0903     Blood Culture, Routine No Growth to date      No Growth to date      No Growth to date      No Growth to date    Blood culture [[id number]] Collected: 05/05/24 0708    Order Status: Completed Specimen: Blood from Peripheral, Hand, Left Updated: 05/08/24 0903     Blood Culture, Routine No Growth to date      No Growth to date      No Growth to date      No Growth to date    Urine culture [[id number]]  (Abnormal)  (Susceptibility) Collected: 05/04/24 1541    Order Status: Completed Specimen: Urine Updated: 05/06/24 1151     Urine Culture, Routine ENTEROCOCCUS  FAECALIS  >100,000 cfu/ml      Narrative:      Specimen Source->Urine            Significant Imaging: I have reviewed all pertinent imaging results/findings within the past 24 hours.  CT Abdomen Pelvis  Without Contrast  Narrative: EXAMINATION:  CT ABDOMEN PELVIS WITHOUT CONTRAST    CLINICAL HISTORY:  Abdominal pain, acute, nonlocalized;    TECHNIQUE:  Low dose axial images, sagittal and coronal reformations were obtained from the lung bases to the pubic symphysis.  Oral contrast was not administered.    COMPARISON:  None    FINDINGS:  The visualized portion of the heart is unremarkable.  Mild bibasilar atelectatic changes are seen.  No evidence of focal consolidation or pleural effusion.    No significant hepatic abnormality seen on this noncontrast exam.  There is no intra-or extrahepatic biliary ductal dilatation.  There is cholelithiasis.  The stomach, pancreas, spleen, and adrenal glands are unremarkable.    Single nonobstructing right renal stone is seen.  No evidence of left-sided renal stones.  No right or left-sided hydronephrosis is seen.  No stones are seen along the ureteral courses.  There is bilateral fairly symmetric appearing perinephric stranding which extends inferiorly along the ureteral courses and retroperitoneal region.  Urinary bladder is nondistended.  Prostate is unremarkable.    Appendix is dilated measuring 12 mm with periappendiceal stranding/inflammatory change, also noting aforementioned findings extending near this region.  No evidence of bowel obstruction.  No free air or free fluid.    Aorta tapers normally with mild atherosclerosis.    No acute osseous abnormality identified.  Degenerative changes and prominent anterior bridging osteophytes are seen throughout the spine.  There is mild grade 1 anterolisthesis of L5 on S1 secondary to bilateral L5 pars defects.  Subcutaneous soft tissues show no significant abnormalities.  Impression: 1. Dilated appendix measuring 12 mm with  surrounding periappendiceal stranding/inflammatory change.  Findings are suggestive for acute appendicitis.  No evidence of abscess or perforation.  Surgical consultation is recommended.  2. Fairly symmetric appearing bilateral perinephric stranding which extends inferiorly along the ureteral courses and in the right lower quadrant periappendiceal region.  Uncertain if this may relate to chronic change versus acute infectious/inflammatory process as no prior studies are available for comparison.  Potential ascending urinary tract infection could present with such appearance in the right clinical setting.  Correlate with clinical symptoms and urinalysis.  3. Single nonobstructing right renal stone.  4. Cholelithiasis.  5. Additional findings as detailed above.  This report was flagged in Epic as abnormal.    Electronically signed by: Raulito Keys MD  Date:    05/04/2024  Time:    17:44

## 2024-05-09 NOTE — NURSING
[facility]  Nurses Note -- 4 Eyes        Date: 05/09/2024        Skin assessed on: Q Shift        [x] No Pressure Injuries Present                 []Prevention Measures Documented     [] Yes LDA Previously documented      [] Yes New Pressure Injury Discovered              [] LDA Added        Attending RN: LAM Frank     Second RN:  JASPREET Scott

## 2024-05-09 NOTE — PLAN OF CARE
Problem: Infection  Goal: Absence of Infection Signs and Symptoms  Outcome: Progressing     Problem: Adult Inpatient Plan of Care  Goal: Plan of Care Review  Outcome: Progressing  Goal: Patient-Specific Goal (Individualized)  Outcome: Progressing  Goal: Absence of Hospital-Acquired Illness or Injury  Outcome: Progressing  Goal: Optimal Comfort and Wellbeing  Outcome: Progressing  Goal: Readiness for Transition of Care  Outcome: Progressing     Problem: Bariatric Environmental Safety  Goal: Safety Maintained with Care  Outcome: Progressing     Problem: Diabetes Comorbidity  Goal: Blood Glucose Level Within Targeted Range  Outcome: Progressing     Problem: Acute Kidney Injury/Impairment  Goal: Fluid and Electrolyte Balance  Outcome: Progressing  Goal: Improved Oral Intake  Outcome: Progressing  Goal: Effective Renal Function  Outcome: Progressing

## 2024-05-09 NOTE — PLAN OF CARE
Problem: Acute Kidney Injury/Impairment  Goal: Fluid and Electrolyte Balance  Outcome: Progressing  Intervention: Monitor and Manage Fluid and Electrolyte Balance  Flowsheets (Taken 5/9/2024 4692)  Fluid/Electrolyte Management: oral rehydration therapy initiated  Goal: Effective Renal Function  Outcome: Progressing  Intervention: Monitor and Support Renal Function  Flowsheets (Taken 5/9/2024 1432)  Medication Review/Management: medications reviewed

## 2024-05-09 NOTE — SUBJECTIVE & OBJECTIVE
Interval History: Patient doing well today and overall feels better. Has some back pain but denies any abdominal pain. No nausea or vomiting. Has been eating and drinking well. Has been able to get OOB. Having regular bowel movements. Pt denies any diarrhea, constipation, fever, chills, malaise, chest pain, SOB. Remove brandt today.       Review of Systems   Constitutional:  Positive for activity change and fatigue. Negative for fever.   Respiratory:  Negative for cough and shortness of breath.    Cardiovascular:  Negative for chest pain.   Gastrointestinal:  Negative for abdominal pain.   Musculoskeletal:  Positive for arthralgias.   Neurological:  Negative for dizziness and headaches.   Psychiatric/Behavioral:  Negative for confusion.    All other systems reviewed and are negative.    Objective:     Vital Signs (Most Recent):  Temp: 98.5 °F (36.9 °C) (05/08/24 2247)  Pulse: (!) 53 (05/08/24 2247)  Resp: 18 (05/08/24 2247)  BP: 132/89 (05/08/24 2247)  SpO2: 100 % (05/08/24 2247) Vital Signs (24h Range):  Temp:  [97.5 °F (36.4 °C)-98.5 °F (36.9 °C)] 98.5 °F (36.9 °C)  Pulse:  [53-79] 53  Resp:  [18-20] 18  SpO2:  [97 %-100 %] 100 %  BP: (109-141)/(73-89) 132/89     Weight: (!) 137.3 kg (302 lb 11.1 oz)  Body mass index is 44.7 kg/m².    Intake/Output Summary (Last 24 hours) at 5/9/2024 0020  Last data filed at 5/8/2024 1745  Gross per 24 hour   Intake 960 ml   Output 2600 ml   Net -1640 ml         Physical Exam  Vitals and nursing note reviewed.   Constitutional:       Appearance: Normal appearance.   HENT:      Head: Normocephalic and atraumatic.   Eyes:      Extraocular Movements: Extraocular movements intact.      Pupils: Pupils are equal, round, and reactive to light.   Cardiovascular:      Rate and Rhythm: Normal rate and regular rhythm.   Pulmonary:      Effort: Pulmonary effort is normal. No respiratory distress.   Abdominal:      General: Abdomen is flat. There is no distension.   Musculoskeletal:          General: Normal range of motion.      Cervical back: Normal range of motion.   Neurological:      General: No focal deficit present.      Mental Status: He is alert and oriented to person, place, and time.   Psychiatric:         Mood and Affect: Mood normal.         Behavior: Behavior normal.             Significant Labs: All pertinent labs within the past 24 hours have been reviewed.  CBC:   Recent Labs   Lab 05/07/24  0425 05/07/24  1244 05/08/24  0632   WBC 0.83* 0.81* 0.93*   HGB 8.3* 9.0* 8.2*   HCT 26.2* 28.7* 25.9*    293 280     CMP:   Recent Labs   Lab 05/07/24  0424 05/08/24  0632    144   K 4.3 3.6    110   CO2 20* 24    88   BUN 51* 39*   CREATININE 5.8* 4.3*   CALCIUM 6.4* 6.9*   ANIONGAP 11 10       Significant Imaging: I have reviewed all pertinent imaging results/findings within the past 24 hours.

## 2024-05-10 PROBLEM — M25.511 RIGHT SHOULDER PAIN: Status: ACTIVE | Noted: 2024-05-10

## 2024-05-10 LAB
ACANTHOCYTES BLD QL SMEAR: PRESENT
ANION GAP SERPL CALC-SCNC: 11 MMOL/L (ref 8–16)
ANISOCYTOSIS BLD QL SMEAR: SLIGHT
BASOPHILS # BLD AUTO: 0.02 K/UL (ref 0–0.2)
BASOPHILS NFR BLD: 2.5 % (ref 0–1.9)
BUN SERPL-MCNC: 20 MG/DL (ref 8–23)
BURR CELLS BLD QL SMEAR: ABNORMAL
CALCIUM SERPL-MCNC: 7.6 MG/DL (ref 8.7–10.5)
CHLORIDE SERPL-SCNC: 110 MMOL/L (ref 95–110)
CO2 SERPL-SCNC: 22 MMOL/L (ref 23–29)
CREAT SERPL-MCNC: 2.2 MG/DL (ref 0.5–1.4)
DIFFERENTIAL METHOD BLD: ABNORMAL
EOSINOPHIL # BLD AUTO: 0.1 K/UL (ref 0–0.5)
EOSINOPHIL NFR BLD: 9.9 % (ref 0–8)
ERYTHROCYTE [DISTWIDTH] IN BLOOD BY AUTOMATED COUNT: 18.5 % (ref 11.5–14.5)
EST. GFR  (NO RACE VARIABLE): 33 ML/MIN/1.73 M^2
GLUCOSE SERPL-MCNC: 84 MG/DL (ref 70–110)
HCT VFR BLD AUTO: 28.1 % (ref 40–54)
HGB BLD-MCNC: 8.6 G/DL (ref 14–18)
IMM GRANULOCYTES # BLD AUTO: 0 K/UL (ref 0–0.04)
IMM GRANULOCYTES NFR BLD AUTO: 0 % (ref 0–0.5)
LYMPHOCYTES # BLD AUTO: 0.6 K/UL (ref 1–4.8)
LYMPHOCYTES NFR BLD: 77.8 % (ref 18–48)
MCH RBC QN AUTO: 22.8 PG (ref 27–31)
MCHC RBC AUTO-ENTMCNC: 30.6 G/DL (ref 32–36)
MCV RBC AUTO: 74 FL (ref 82–98)
METHYLMALONATE SERPL-SCNC: 0.28 UMOL/L
MONOCYTES # BLD AUTO: 0 K/UL (ref 0.3–1)
MONOCYTES NFR BLD: 1.2 % (ref 4–15)
NEUTROPHILS # BLD AUTO: 0.1 K/UL (ref 1.8–7.7)
NEUTROPHILS NFR BLD: 8.6 % (ref 38–73)
NRBC BLD-RTO: 0 /100 WBC
OVALOCYTES BLD QL SMEAR: ABNORMAL
PLATELET # BLD AUTO: 347 K/UL (ref 150–450)
PLATELET BLD QL SMEAR: ABNORMAL
PMV BLD AUTO: 9.1 FL (ref 9.2–12.9)
POCT GLUCOSE: 106 MG/DL (ref 70–110)
POCT GLUCOSE: 175 MG/DL (ref 70–110)
POCT GLUCOSE: 83 MG/DL (ref 70–110)
POIKILOCYTOSIS BLD QL SMEAR: SLIGHT
POTASSIUM SERPL-SCNC: 3.9 MMOL/L (ref 3.5–5.1)
RBC # BLD AUTO: 3.78 M/UL (ref 4.6–6.2)
SODIUM SERPL-SCNC: 143 MMOL/L (ref 136–145)
SPHEROCYTES BLD QL SMEAR: ABNORMAL
WBC # BLD AUTO: 0.81 K/UL (ref 3.9–12.7)

## 2024-05-10 PROCEDURE — 11000001 HC ACUTE MED/SURG PRIVATE ROOM

## 2024-05-10 PROCEDURE — 85025 COMPLETE CBC W/AUTO DIFF WBC: CPT | Performed by: HOSPITALIST

## 2024-05-10 PROCEDURE — 99233 SBSQ HOSP IP/OBS HIGH 50: CPT | Mod: ,,, | Performed by: INTERNAL MEDICINE

## 2024-05-10 PROCEDURE — 80048 BASIC METABOLIC PNL TOTAL CA: CPT | Performed by: HOSPITALIST

## 2024-05-10 PROCEDURE — 25000003 PHARM REV CODE 250: Performed by: STUDENT IN AN ORGANIZED HEALTH CARE EDUCATION/TRAINING PROGRAM

## 2024-05-10 PROCEDURE — 25000003 PHARM REV CODE 250: Performed by: INTERNAL MEDICINE

## 2024-05-10 PROCEDURE — 36415 COLL VENOUS BLD VENIPUNCTURE: CPT | Performed by: HOSPITALIST

## 2024-05-10 PROCEDURE — 25000003 PHARM REV CODE 250

## 2024-05-10 PROCEDURE — 25000003 PHARM REV CODE 250: Performed by: HOSPITALIST

## 2024-05-10 PROCEDURE — 63600175 PHARM REV CODE 636 W HCPCS: Performed by: INTERNAL MEDICINE

## 2024-05-10 PROCEDURE — 27000207 HC ISOLATION

## 2024-05-10 PROCEDURE — 99900035 HC TECH TIME PER 15 MIN (STAT)

## 2024-05-10 RX ADMIN — PIPERACILLIN SODIUM AND TAZOBACTAM SODIUM 4.5 G: 4; .5 INJECTION, POWDER, LYOPHILIZED, FOR SOLUTION INTRAVENOUS at 12:05

## 2024-05-10 RX ADMIN — ALUMINUM HYDROXIDE, MAGNESIUM HYDROXIDE, AND SIMETHICONE 30 ML: 200; 200; 20 SUSPENSION ORAL at 09:05

## 2024-05-10 RX ADMIN — CALCIUM CARBONATE (ANTACID) CHEW TAB 500 MG 1000 MG: 500 CHEW TAB at 08:05

## 2024-05-10 RX ADMIN — METOPROLOL TARTRATE 12.5 MG: 25 TABLET, FILM COATED ORAL at 09:05

## 2024-05-10 RX ADMIN — MUPIROCIN: 20 OINTMENT TOPICAL at 09:05

## 2024-05-10 RX ADMIN — SODIUM BICARBONATE 1300 MG: 325 TABLET ORAL at 08:05

## 2024-05-10 RX ADMIN — RIVAROXABAN 15 MG: 15 TABLET, FILM COATED ORAL at 04:05

## 2024-05-10 RX ADMIN — CALCIUM CARBONATE (ANTACID) CHEW TAB 500 MG 1000 MG: 500 CHEW TAB at 09:05

## 2024-05-10 RX ADMIN — AMIODARONE HYDROCHLORIDE 200 MG: 200 TABLET ORAL at 08:05

## 2024-05-10 RX ADMIN — ACETAMINOPHEN 650 MG: 325 TABLET ORAL at 01:05

## 2024-05-10 RX ADMIN — SODIUM BICARBONATE 1300 MG: 325 TABLET ORAL at 09:05

## 2024-05-10 RX ADMIN — LEVOTHYROXINE SODIUM 200 MCG: 0.1 TABLET ORAL at 06:05

## 2024-05-10 RX ADMIN — ATORVASTATIN CALCIUM 80 MG: 40 TABLET, FILM COATED ORAL at 08:05

## 2024-05-10 RX ADMIN — MUPIROCIN: 20 OINTMENT TOPICAL at 08:05

## 2024-05-10 RX ADMIN — METOPROLOL TARTRATE 12.5 MG: 25 TABLET, FILM COATED ORAL at 08:05

## 2024-05-10 RX ADMIN — Medication 324 MG: at 04:05

## 2024-05-10 RX ADMIN — Medication 324 MG: at 08:05

## 2024-05-10 RX ADMIN — PIPERACILLIN SODIUM AND TAZOBACTAM SODIUM 4.5 G: 4; .5 INJECTION, POWDER, LYOPHILIZED, FOR SOLUTION INTRAVENOUS at 01:05

## 2024-05-10 NOTE — ASSESSMENT & PLAN NOTE
64 yo man with acute appendicitis by CT.   Seen by Surgery and cleared for medical management.    Recommendations  On Zosyn (depending on resolution of neutropenia?), then Augmentin x 10 days

## 2024-05-10 NOTE — PROGRESS NOTES
[facility] Medicine  Telemedicine Progress Note    Patient Name: Russell Santos  MRN: [id number]  Patient Class: IP- Inpatient   Admission Date: 5/4/2024  Length of Stay: 6 days  Attending Physician: Nicole Orozco MD  Primary Care Provider: Roxanne Phillip -          Subjective:     Principal Problem:MICHELLE (acute kidney injury)        HPI:  This is a 63-year-old male with a past medical history of AFib (on Xarelto), hypertension, type 2 diabetes, hyperlipidemia, hypothyroidism, chronic leukopenia, morbid obesity, who presents with abdominal pain.    Patient presents with abdominal pain that started 4 days prior to presentation.  He reports that his symptoms started after eating [employer] coleslaw.  He endorsed diarrhea, and right lower quadrant abdominal pain.  Associated symptoms include decreased p.o. intake, decreased appetite, generalized weakness, leg cramping, nausea and vomiting.  Additionally, he endorses decreased urination and intermittent dysuria.    In the ED, the patient was hemodynamically stable.  Labs remarkable for an elevated creatinine (8.3 baseline of 0.9), leukopenia (1.4- below baseline), microcytic anemia (9.6), elevated BNP (123).  VBG showed a pH of 7.24, HC03 of 18.1.  UA showed +3 leukocyte esterase, 46 RBCs, 42 WBCs, +1 protein, and many bacteria.  CT abdomen and pelvis showed findings suggestive of acute appendicitis, with bilateral perinephric stranding/possible ascending UTI without evidence of hydronephrosis. Surgery recommended medical management.  Patient was given 500 mL of LR, ceftriaxone, Flagyl.  He was admitted for further management.    Overview/Hospital Course:  63-year-old male with a past medical history of AFib (on Xarelto), hypertension, type 2 diabetes, hyperlipidemia, hypothyroidism, chronic leukopenia, morbid obesity admitted on 05/04/2024 for further evaluation of abdominal pain associated with nausea, vomiting, diarrhea, and decreased p.o. intake.   Admits generalized weakness.  Patient found to have  hyperphosphatemia, acute renal failure, possible appendicitis, and UTI/pyelonephritis.  CT abdomen with Dilated appendix measuring 12 mm with surrounding periappendiceal stranding/inflammatory change, concern for acute appendicitis, no evidence of abscess or perforation.  Also noted bilateral perinephric stranding, potential ascending urinary tract infection.  Creatinine 8.3 on admission.  Urinalysis red blood, many bacteria, and WBCs.  Blood culture ordered, NGTD.  Urine culture with Enterococcus species, susceptibilities pending.  General surgery consulted-no plans for acute surgical intervention.  Agree with IV antibiotics.  ID consulted. Nephrology consulted-continue on bicarb gtt. Pt now with worsening leukopenia, hematology consulted.     Follow-up For:  Patient Active Problem List    Diagnosis Date Noted Date Diagnosed    MICHELLE (acute kidney injury) 05/04/2024     Pyelonephritis 05/05/2024     Acute appendicitis 05/04/2024     Leukopenia 12/03/2020     PAF (paroxysmal atrial fibrillation) 09/16/2019      Discharge Planning   CASA:    Discharge Plan A: Home with family   Discharge Delays: None known at this time    Interval History:   Subjective: Russell Santos is being followed for MICHELLE (acute kidney injury). No acute events overnight. Sitting up in bed eating breakfast independently. Denies fever/chills or abdominal pain. Tolerating current diet. Able to ambulate independently. Complaining of right shoulder pain. Will order CT scan of shoulder and consult orthopedics. He remains on IV abx    Symptoms: Improved. The patient denies shortness of breath, malaise, cough, chest pain, weakness, headache, chest pressure, anorexia, diarrhea and anxiety.     Diet: Regular. Adequate intake. The patient denies nausea and vomiting.    Last Bowel Movement: 05/09/24    Activity Level: Normal    Pain: The patient Denies pain     Review of Systems   Constitutional:  Negative for  chills and fever.   Respiratory:  Negative for cough and shortness of breath.    Cardiovascular:  Negative for chest pain and palpitations.   Gastrointestinal:  Negative for abdominal pain.        Scheduled Meds:   amiodarone  200 mg Oral Daily    atorvastatin  80 mg Oral Daily    calcium carbonate  1,000 mg Oral BID    ergocalciferol  50,000 Units Oral Q7 Days    ferrous gluconate  324 mg Oral BID WM    levothyroxine  200 mcg Oral Before breakfast    LIDOcaine  1 patch Transdermal Q24H    metoprolol tartrate  12.5 mg Oral BID    mupirocin   Nasal BID    piperacillin-tazobactam (Zosyn) IV (PEDS and ADULTS) (extended infusion is not appropriate)  4.5 g Intravenous Q12H    polyethylene glycol  17 g Oral Daily    rivaroxaban  15 mg Oral Daily with dinner    sodium bicarbonate  1,300 mg Oral BID     Continuous Infusions:  PRN Meds:.  Current Facility-Administered Medications:     acetaminophen, 650 mg, Oral, Q8H PRN    acetaminophen, 650 mg, Oral, Q4H PRN    albuterol-ipratropium, 3 mL, Nebulization, Q4H PRN    aluminum-magnesium hydroxide-simethicone, 30 mL, Oral, QID PRN    dextrose 10%, 12.5 g, Intravenous, PRN    dextrose 10%, 25 g, Intravenous, PRN    gabapentin, 600 mg, Oral, TID PRN    glucagon (human recombinant), 1 mg, Intramuscular, PRN    glucose, 16 g, Oral, PRN    glucose, 24 g, Oral, PRN    insulin aspart U-100, 0-5 Units, Subcutaneous, QID (AC + HS) PRN    loperamide, 2 mg, Oral, QID PRN    magnesium oxide, 800 mg, Oral, PRN    magnesium oxide, 800 mg, Oral, PRN    melatonin, 6 mg, Oral, Nightly PRN    naloxone, 0.02 mg, Intravenous, PRN    ondansetron, 4 mg, Intravenous, Q8H PRN    potassium bicarbonate, 35 mEq, Oral, PRN    potassium bicarbonate, 50 mEq, Oral, PRN    potassium bicarbonate, 60 mEq, Oral, PRN    potassium, sodium phosphates, 2 packet, Oral, PRN    potassium, sodium phosphates, 2 packet, Oral, PRN    potassium, sodium phosphates, 2 packet, Oral, PRN    prochlorperazine, 5 mg, Intravenous,  Q6H PRN    simethicone, 1 tablet, Oral, QID PRN    sodium chloride 0.9%, 10 mL, Intravenous, Q12H PRN      Objective:     Vitals:    05/09/24 2131 05/09/24 2306 05/10/24 0425 05/10/24 0707   BP: (!) 140/88 (!) 137/94 (!) 157/95 (!) 163/98   BP Location:    Left arm   Patient Position:  Lying Lying Sitting   Pulse: 65 (!) 53 (!) 59 (!) 57   Resp:  18 18 16   Temp:  98.2 °F (36.8 °C) 97.7 °F (36.5 °C) 97.8 °F (36.6 °C)   TempSrc:  Oral Oral Oral   SpO2:  100% 99% 100%   Weight:       Height:           No data found.    Intake/Output Summary (Last 24 hours) at 5/10/2024 0909  Last data filed at 5/9/2024 1630  Gross per 24 hour   Intake 240 ml   Output --   Net 240 ml     Net IO Since Admission: -9,441.03 mL [05/10/24 0909]    Physical Exam  Vitals and nursing note reviewed.   Constitutional:       General: He is awake. He is not in acute distress.     Appearance: Normal appearance. He is well-developed and well-groomed. He is not toxic-appearing.   HENT:      Head: Normocephalic and atraumatic.   Cardiovascular:      Rate and Rhythm: Normal rate.   Pulmonary:      Effort: No tachypnea, accessory muscle usage or respiratory distress.   Abdominal:      General: There is no distension.   Neurological:      Mental Status: He is alert and oriented to person, place, and time. Mental status is at baseline.   Psychiatric:         Mood and Affect: Mood normal.         Behavior: Behavior normal. Behavior is cooperative.         Thought Content: Thought content normal.       Significant Labs: All pertinent labs within the past 24 hours have been reviewed.    BMP (Last 3 Results):   Recent Labs   Lab 05/06/24  0424 05/07/24  0424 05/08/24  0632 05/09/24  0548 05/10/24  0500   GLU 74 106 88 99 84   * 139 144 141 143   K 4.8 4.3 3.6 3.9 3.9    108 110 106 110   CO2 14* 20* 24 24 22*   BUN 61* 51* 39* 29* 20   CREATININE 7.4* 5.8* 4.3* 3.1* 2.2*   CALCIUM 6.4* 6.4* 6.9* 7.4* 7.6*   MG 1.7 1.5* 1.4*  --   --      CBC (Last  3 Results):   Recent Labs   Lab 05/08/24  0632 05/09/24  0548 05/10/24  0500   WBC 0.93* 0.95* 0.81*   RBC 3.58* 4.05* 3.78*   HGB 8.2* 9.1* 8.6*   HCT 25.9* 30.1* 28.1*    378 347   MCV 72* 74* 74*   MCH 22.9* 22.5* 22.8*   MCHC 31.7* 30.2* 30.6*     Microbiology Results (last 7 days)       Procedure Component Value Units Date/Time    Blood culture [[id number]] Collected: 05/05/24 0708    Order Status: Completed Specimen: Blood from Peripheral, Hand, Left Updated: 05/09/24 0903     Blood Culture, Routine No Growth after 4 days.    Blood culture [[id number]] Collected: 05/05/24 0708    Order Status: Completed Specimen: Blood from Peripheral, Wrist, Right Updated: 05/09/24 0903     Blood Culture, Routine No Growth after 4 days.    Urine culture [[id number]]  (Abnormal)  (Susceptibility) Collected: 05/04/24 1541    Order Status: Completed Specimen: Urine Updated: 05/06/24 1151     Urine Culture, Routine ENTEROCOCCUS FAECALIS  >100,000 cfu/ml      Narrative:      Specimen Source->Urine            Significant Imaging: I have reviewed all pertinent imaging results/findings within the past 24 hours.  CT Abdomen Pelvis  Without Contrast  Narrative: EXAMINATION:  CT ABDOMEN PELVIS WITHOUT CONTRAST    CLINICAL HISTORY:  Abdominal pain, acute, nonlocalized;    TECHNIQUE:  Low dose axial images, sagittal and coronal reformations were obtained from the lung bases to the pubic symphysis.  Oral contrast was not administered.    COMPARISON:  None    FINDINGS:  The visualized portion of the heart is unremarkable.  Mild bibasilar atelectatic changes are seen.  No evidence of focal consolidation or pleural effusion.    No significant hepatic abnormality seen on this noncontrast exam.  There is no intra-or extrahepatic biliary ductal dilatation.  There is cholelithiasis.  The stomach, pancreas, spleen, and adrenal glands are unremarkable.    Single nonobstructing right renal stone is seen.  No evidence of left-sided renal  stones.  No right or left-sided hydronephrosis is seen.  No stones are seen along the ureteral courses.  There is bilateral fairly symmetric appearing perinephric stranding which extends inferiorly along the ureteral courses and retroperitoneal region.  Urinary bladder is nondistended.  Prostate is unremarkable.    Appendix is dilated measuring 12 mm with periappendiceal stranding/inflammatory change, also noting aforementioned findings extending near this region.  No evidence of bowel obstruction.  No free air or free fluid.    Aorta tapers normally with mild atherosclerosis.    No acute osseous abnormality identified.  Degenerative changes and prominent anterior bridging osteophytes are seen throughout the spine.  There is mild grade 1 anterolisthesis of L5 on S1 secondary to bilateral L5 pars defects.  Subcutaneous soft tissues show no significant abnormalities.  Impression: 1. Dilated appendix measuring 12 mm with surrounding periappendiceal stranding/inflammatory change.  Findings are suggestive for acute appendicitis.  No evidence of abscess or perforation.  Surgical consultation is recommended.  2. Fairly symmetric appearing bilateral perinephric stranding which extends inferiorly along the ureteral courses and in the right lower quadrant periappendiceal region.  Uncertain if this may relate to chronic change versus acute infectious/inflammatory process as no prior studies are available for comparison.  Potential ascending urinary tract infection could present with such appearance in the right clinical setting.  Correlate with clinical symptoms and urinalysis.  3. Single nonobstructing right renal stone.  4. Cholelithiasis.  5. Additional findings as detailed above.  This report was flagged in Epic as abnormal.    Electronically signed by: Raulito Keys MD  Date:    05/04/2024  Time:    17:44      Review of Systems   Constitutional:  Negative for chills and fever.   Respiratory:  Negative for cough and  shortness of breath.    Cardiovascular:  Negative for chest pain and palpitations.   Gastrointestinal:  Negative for abdominal pain.     Physical Exam  Vitals and nursing note reviewed.   Constitutional:       General: He is awake. He is not in acute distress.     Appearance: Normal appearance. He is well-developed and well-groomed. He is not toxic-appearing.   HENT:      Head: Normocephalic and atraumatic.   Cardiovascular:      Rate and Rhythm: Normal rate.   Pulmonary:      Effort: No tachypnea, accessory muscle usage or respiratory distress.   Abdominal:      General: There is no distension.   Neurological:      Mental Status: He is alert and oriented to person, place, and time. Mental status is at baseline.   Psychiatric:         Mood and Affect: Mood normal.         Behavior: Behavior normal. Behavior is cooperative.         Thought Content: Thought content normal.         Assessment/Plan:      * MICHELLE (acute kidney injury)  Lab Results   Component Value Date    CREATININE 2.2 (H) 05/10/2024     Patient with acute kidney injury/acute renal failure likely due to pre-renal azotemia due to IVVD  Cr on admit 8.3  Baseline creatinine 0.9   Nephrology consulted and following  Treated with IVF's  Sr Cr improving but still above baseline  Cont to monitor with daily labs   Avoid nephrotoxins.   Renally dose all medications   Monitor events that may lead to decreased renal perfusion (hypovolemia, hypotension, sepsis).   Monitor urine output (goal 0.5 mL/kg/hr) to assure that no obstruction precipitates worsening in GFR.  Cont sodium bicarb supplement.    Pyelonephritis  -patient with complaints difficulty urinating and dysuria with MICHELLE on likely CKD  -CT abdomen showed bilateral perinephric stranding which extends inferiorly along the ureteral courses and in the right lower quadrant periappendiceal region. Potential ascending urinary tract infection  -Urine culture with Enterococcus   -ID consulted switch to  Zosyn    Antibiotics (From admission, onward)      Start     Stop Route Frequency Ordered    05/06/24 1330  piperacillin-tazobactam (ZOSYN) 4.5 g in dextrose 5 % in water (D5W) 100 mL IVPB (MB+)         -- IV Every 12 hours (non-standard times) 05/06/24 1218    05/06/24 1045  mupirocin 2 % ointment         05/11/24 0859 Nasl 2 times daily 05/06/24 0932          Cultures were taken-   Microbiology Results (last 7 days)       Procedure Component Value Units Date/Time    Blood culture [[id number]] Collected: 05/05/24 0708    Order Status: Completed Specimen: Blood from Peripheral, Hand, Left Updated: 05/09/24 0903     Blood Culture, Routine No Growth after 4 days.    Blood culture [[id number]] Collected: 05/05/24 0708    Order Status: Completed Specimen: Blood from Peripheral, Wrist, Right Updated: 05/09/24 0903     Blood Culture, Routine No Growth after 4 days.    Urine culture [[id number]]  (Abnormal)  (Susceptibility) Collected: 05/04/24 1541    Order Status: Completed Specimen: Urine Updated: 05/06/24 1151     Urine Culture, Routine ENTEROCOCCUS FAECALIS  >100,000 cfu/ml      Narrative:      Specimen Source->Urine              Acute appendicitis  -CT abdomen and pelvis showed findings suggestive of acute appendicitis, with bilateral perinephric stranding/possible ascending UTI.  -General surgery recommended medical management  -Symptomatic control  -ID consulted switch to Zosyn.  -pain resolved      Leukopenia  Lab Results   Component Value Date    WBC 0.81 (LL) 05/10/2024     chronic most likely has familial component  Patient had a bone marrow biopsy in 2020, which was unrevealing  Most likely worsened secondary to acute illness and antibiotics  Hematology consulted, recommending outpatient follow up.   Continue to monitor     PAF (paroxysmal atrial fibrillation)  Pulse Readings from Last 3 Encounters:   05/10/24 (!) 57   10/13/23 62   07/27/23 60     Cont rate control with Beta Blocker and Amiodarone  [id number]  Score: 1.   Cont anticoagulation with:  Anticoagulants       Ordered     Route Frequency Start Stop    05/09/24 0851  rivaroxaban         Oral With dinner 05/09/24 1700 --             Hyperphosphatemia  -phosphorus level elevated at 5.8 on admission   -likely elevated in the setting of acute renal failure   -nephrology consulted   -monitor level      Hypothyroidism  Continue home Synthroid        History of DVT (deep vein thrombosis)  Cont Xarelto     Morbid obesity  Body mass index is 44.7 kg/m². Morbid obesity complicates all aspects of disease management from diagnostic modalities to treatment. Weight loss encouraged and health benefits explained to patient.         Diabetes mellitus, type 2  Patient's FSGs are controlled on current medication regimen.  Last A1c reviewed-   Lab Results   Component Value Date    HGBA1C 6.3 (H) 08/08/2022     Most recent fingerstick glucose reviewed-   Recent Labs   Lab 05/09/24  1143 05/09/24  1545 05/09/24  1951 05/10/24  0706   POCTGLUCOSE 101 107 205* 83       Current correctional scale  Low  Maintain anti-hyperglycemic dose as follows-   Antihyperglycemics (From admission, onward)      Start     Stop Route Frequency Ordered    05/04/24 2049  insulin aspart U-100 pen 0-5 Units         -- SubQ Before meals & nightly PRN 05/04/24 1949          Hold Oral hypoglycemics while patient is in the hospital.    Hyperlipidemia  Continue home statin       Essential hypertension  Chronic, controlled. Latest blood pressure and vitals reviewed-     Temp:  [97.7 °F (36.5 °C)-98.5 °F (36.9 °C)]   Pulse:  [53-65]   Resp:  [16-18]   BP: (137-168)/(88-98)   SpO2:  [96 %-100 %] .   Home meds for hypertension were reviewed and noted below.   Hypertension Medications               metoprolol tartrate (LOPRESSOR) 25 MG tablet Take 1 tablet (25 mg total) by mouth 2 (two) times daily.    olmesartan (BENICAR) 40 MG tablet Take 1 tablet (40 mg total) by mouth once daily.            While in the hospital,  will manage blood pressure as follows; Adjust home antihypertensive regimen as follows- hold nephrotoxic medications, monitor BP    Will utilize p.r.n. blood pressure medication only if patient's blood pressure greater than 180/110 and he develops symptoms such as worsening chest pain or shortness of breath.      VTE Risk Mitigation (From admission, onward)           Ordered     rivaroxaban tablet 15 mg  With dinner         05/09/24 0851     IP VTE HIGH RISK PATIENT  Once         05/04/24 1949     Place sequential compression device  Until discontinued         05/04/24 1949                          I have completed this tele-visit without the assistance of a telepresenter.    The attending portion of this evaluation, treatment, and documentation was performed per Nicole Patel MD via Telemedicine AudioVisual using the secure [employer] software platform with 2 way audio/video. The provider was located off-site and the patient is located in the hospital. The aforementioned video software was utilized to document the relevant history and physical exam    Nicole Patel MD  Department of Hospital Medicine   [facility]

## 2024-05-10 NOTE — SUBJECTIVE & OBJECTIVE
Interval History: Events noted. Patient reports new onset left shoulder pain. Denies any fever or chills.     Review of Systems   All other systems reviewed and are negative.    Objective:     Vital Signs (Most Recent):  Temp: 97.8 °F (36.6 °C) (05/10/24 0707)  Pulse: (!) 57 (05/10/24 0707)  Resp: 16 (05/10/24 0707)  BP: (!) 163/98 (05/10/24 0707)  SpO2: 100 % (05/10/24 0707) Vital Signs (24h Range):  Temp:  [97.7 °F (36.5 °C)-98.5 °F (36.9 °C)] 97.8 °F (36.6 °C)  Pulse:  [53-65] 57  Resp:  [16-18] 16  SpO2:  [96 %-100 %] 100 %  BP: (137-168)/(88-98) 163/98     Weight: (!) 137.3 kg (302 lb 11.1 oz)  Body mass index is 44.7 kg/m².    Estimated Creatinine Clearance: 48.5 mL/min (A) (based on SCr of 2.2 mg/dL (H)).     Physical Exam  Vitals and nursing note reviewed.   Constitutional:       General: He is not in acute distress.     Appearance: Normal appearance. He is not ill-appearing.   HENT:      Head: Normocephalic and atraumatic.   Eyes:      Extraocular Movements: Extraocular movements intact.      Pupils: Pupils are equal, round, and reactive to light.   Cardiovascular:      Rate and Rhythm: Normal rate.      Heart sounds: No murmur heard.  Pulmonary:      Breath sounds: No wheezing or rhonchi.   Musculoskeletal:         General: Swelling and tenderness present. No signs of injury. Normal range of motion.      Comments: Tender along left clavicle   Neurological:      General: No focal deficit present.      Mental Status: He is alert and oriented to person, place, and time.   Psychiatric:         Mood and Affect: Mood normal.         Thought Content: Thought content normal.          Significant Labs: BMP:   Recent Labs   Lab 05/10/24  0500   GLU 84      K 3.9      CO2 22*   BUN 20   CREATININE 2.2*   CALCIUM 7.6*     CBC:   Recent Labs   Lab 05/09/24  0548 05/10/24  0500   WBC 0.95* 0.81*   HGB 9.1* 8.6*   HCT 30.1* 28.1*    347     Microbiology Results (last 7 days)       Procedure Component  Value Units Date/Time    Blood culture [[id number]] Collected: 05/05/24 0708    Order Status: Completed Specimen: Blood from Peripheral, Hand, Left Updated: 05/09/24 0903     Blood Culture, Routine No Growth after 4 days.    Blood culture [[id number]] Collected: 05/05/24 0708    Order Status: Completed Specimen: Blood from Peripheral, Wrist, Right Updated: 05/09/24 0903     Blood Culture, Routine No Growth after 4 days.    Urine culture [[id number]]  (Abnormal)  (Susceptibility) Collected: 05/04/24 1541    Order Status: Completed Specimen: Urine Updated: 05/06/24 1151     Urine Culture, Routine ENTEROCOCCUS FAECALIS  >100,000 cfu/ml      Narrative:      Specimen Source->Urine            Significant Imaging: None

## 2024-05-10 NOTE — ASSESSMENT & PLAN NOTE
-CT abdomen and pelvis showed findings suggestive of acute appendicitis, with bilateral perinephric stranding/possible ascending UTI.  -General surgery recommended medical management  -Symptomatic control  -ID consulted switch to Zosyn.  -pain resolved

## 2024-05-10 NOTE — PROGRESS NOTES
Date of Admission:5/4/2024    SUBJECTIVE:notes feeling rt shoulder pain    Current Facility-Administered Medications   Medication    acetaminophen tablet 650 mg    acetaminophen tablet 650 mg    albuterol-ipratropium 2.5 mg-0.5 mg/3 mL nebulizer solution 3 mL    aluminum-magnesium hydroxide-simethicone 200-200-20 mg/5 mL suspension 30 mL    amiodarone tablet 200 mg    atorvastatin tablet 80 mg    calcium carbonate 200 mg calcium (500 mg) chewable tablet 1,000 mg    dextrose 10% bolus 125 mL 125 mL    dextrose 10% bolus 250 mL 250 mL    ergocalciferol capsule 50,000 Units    ferrous gluconate tablet 324 mg    gabapentin capsule 600 mg    glucagon (human recombinant) injection 1 mg    glucose chewable tablet 16 g    glucose chewable tablet 24 g    insulin aspart U-100 pen 0-5 Units    levothyroxine tablet 200 mcg    LIDOcaine 5 % patch 1 patch    loperamide capsule 2 mg    magnesium oxide tablet 800 mg    magnesium oxide tablet 800 mg    melatonin tablet 6 mg    metoprolol tartrate (LOPRESSOR) split tablet 12.5 mg    mupirocin 2 % ointment    naloxone 0.4 mg/mL injection 0.02 mg    ondansetron injection 4 mg    piperacillin-tazobactam (ZOSYN) 4.5 g in dextrose 5 % in water (D5W) 100 mL IVPB (MB+)    polyethylene glycol packet 17 g    potassium bicarbonate disintegrating tablet 35 mEq    potassium bicarbonate disintegrating tablet 50 mEq    potassium bicarbonate disintegrating tablet 60 mEq    potassium, sodium phosphates 280-160-250 mg packet 2 packet    potassium, sodium phosphates 280-160-250 mg packet 2 packet    potassium, sodium phosphates 280-160-250 mg packet 2 packet    prochlorperazine injection Soln 5 mg    rivaroxaban tablet 15 mg    simethicone chewable tablet 80 mg    sodium bicarbonate tablet 1,300 mg    sodium chloride 0.9% flush 10 mL       Wt Readings from Last 3 Encounters:   05/05/24 (!) 137.3 kg (302 lb 11.1 oz)   10/13/23 (!) 154.2 kg (340 lb)   07/27/23 (!) 141.4 kg (311 lb 11.7 oz)     Temp  Readings from Last 3 Encounters:   05/10/24 97.8 °F (36.6 °C) (Oral)   10/13/23 98.2 °F (36.8 °C) (Oral)   08/11/22 98.7 °F (37.1 °C) (Oral)     BP Readings from Last 3 Encounters:   05/10/24 (!) 163/98   10/13/23 132/88   07/27/23 120/78     Pulse Readings from Last 3 Encounters:   05/10/24 (!) 57   10/13/23 62   07/27/23 60       Intake/Output Summary (Last 24 hours) at 5/10/2024 0921  Last data filed at 5/9/2024 1630  Gross per 24 hour   Intake 240 ml   Output --   Net 240 ml       PE:  GEN:wd male in nad  HEENT:ncat,eomi,mmm  CVS:s1s2 regular  PULM:ctab   ABD:soft,nd  EXT:trace leg edema  NEURO:awake,alert    Recent Labs   Lab 05/10/24  0500   GLU 84      K 3.9      CO2 22*   BUN 20   CREATININE 2.2*   CALCIUM 7.6*         Lab Results   Component Value Date    .8 (H) 05/06/2024    CALCIUM 7.6 (L) 05/10/2024    CAION 0.89 (L) 05/06/2024    PHOS 3.3 05/08/2024       Recent Labs   Lab 05/10/24  0500   WBC 0.81*   RBC 3.78*   HGB 8.6*   HCT 28.1*      MCV 74*   MCH 22.8*   MCHC 30.6*           A/P:  1.boris. creatinine better.   2.anemia. hg low. No prbc indicated. Iron def. Hg ok.  3.leukopenia. worse. Neutropenic precautions. Consulted heme.  Following.  Still low. Cont isolations. Can he go home?  4.uti. seen by id. Enterococcus faecalis uti. Tx per id. Still on zosyn. Augmentin for dc?? Cans we switch-doing better.  5.appendicitis. tailor antibiotics with id for uti coverage too.  6.hyperphospatemia. resolved.  7.lily. cont  cpap as needed.  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 Vit d def. Cont tx.  9.metabolic acidosis. Better. Cont tx.  10.hypomag. replete as needed.

## 2024-05-10 NOTE — ASSESSMENT & PLAN NOTE
Lab Results   Component Value Date    CREATININE 2.2 (H) 05/10/2024     Patient with acute kidney injury/acute renal failure likely due to pre-renal azotemia due to IVVD  Cr on admit 8.3  Baseline creatinine 0.9   Nephrology consulted and following  Treated with IVF's  Sr Cr improving but still above baseline  Cont to monitor with daily labs   Avoid nephrotoxins.   Renally dose all medications   Monitor events that may lead to decreased renal perfusion (hypovolemia, hypotension, sepsis).   Monitor urine output (goal 0.5 mL/kg/hr) to assure that no obstruction precipitates worsening in GFR.  Cont sodium bicarb supplement.

## 2024-05-10 NOTE — ASSESSMENT & PLAN NOTE
Lab Results   Component Value Date    WBC 0.81 (LL) 05/10/2024     chronic most likely has familial component  Patient had a bone marrow biopsy in 2020, which was unrevealing  Most likely worsened secondary to acute illness and antibiotics  Hematology consulted, recommending outpatient follow up.   Continue to monitor

## 2024-05-10 NOTE — PROGRESS NOTES
[facility]  Infectious Disease  Progress Note    Patient Name: Russell Santos  MRN: [id number]  Admission Date: 5/4/2024  Length of Stay: 6 days  Attending Physician: Nicole Orozco MD  Primary Care Provider: Roxanne Phillip -    Isolation Status: Airborne and Contact and Enhanced Respiratory  Assessment/Plan:      Acute appendicitis  64 yo man with acute appendicitis by CT.   Seen by Surgery and cleared for medical management.    Recommendations  On Zosyn (depending on resolution of neutropenia?), then Augmentin x 10 days     Pyelonephritis  Enterococcus in urine alone.  Covered with Zosyn.  Treat x 14 days total abx.    Leukopenia  History of same, now worse with no improvement. Followed by Heme.      Chapincito Juan MD  Infectious Disease  [facility]    Subjective:     Principal Problem:MICHELLE (acute kidney injury)    HPI: Mr. Santos is a 64 yo man with atrial fibrillation admitted with abdominal pain. ED imaging revealed findings suggestive of acute appendicitis, with bilateral perinephric stranding/possible ascending UTI without evidence of hydronephrosis. He was started on empiric abx and ID is consulted.     The patient tells me that he developed abdominal discomfort after eating a quart of coleslaw from [employer] last Tuesday night. He started feeling poorly but delayed coming to the ED. Nevertheless, he presented on 5/4 and his work-up revealed acute appendicitis, possible bilateral pyelonephritis, a non-obstructing renal stone, and cholelithiasis.    He was started on empiric Zosyn and vancomycin. Blood cultures are NGTD while his urine grew a pen-S E.faecalis.      Interval History: Events noted. Patient reports new onset left shoulder pain. Denies any fever or chills.     Review of Systems   All other systems reviewed and are negative.    Objective:     Vital Signs (Most Recent):  Temp: 97.8 °F (36.6 °C) (05/10/24 0707)  Pulse: (!) 57 (05/10/24 0707)  Resp: 16 (05/10/24 0707)  BP: (!)  163/98 (05/10/24 0707)  SpO2: 100 % (05/10/24 0707) Vital Signs (24h Range):  Temp:  [97.7 °F (36.5 °C)-98.5 °F (36.9 °C)] 97.8 °F (36.6 °C)  Pulse:  [53-65] 57  Resp:  [16-18] 16  SpO2:  [96 %-100 %] 100 %  BP: (137-168)/(88-98) 163/98     Weight: (!) 137.3 kg (302 lb 11.1 oz)  Body mass index is 44.7 kg/m².    Estimated Creatinine Clearance: 48.5 mL/min (A) (based on SCr of 2.2 mg/dL (H)).     Physical Exam  Vitals and nursing note reviewed.   Constitutional:       General: He is not in acute distress.     Appearance: Normal appearance. He is not ill-appearing.   HENT:      Head: Normocephalic and atraumatic.   Eyes:      Extraocular Movements: Extraocular movements intact.      Pupils: Pupils are equal, round, and reactive to light.   Cardiovascular:      Rate and Rhythm: Normal rate.      Heart sounds: No murmur heard.  Pulmonary:      Breath sounds: No wheezing or rhonchi.   Musculoskeletal:         General: Swelling and tenderness present. No signs of injury. Normal range of motion.      Comments: Tender along left clavicle   Neurological:      General: No focal deficit present.      Mental Status: He is alert and oriented to person, place, and time.   Psychiatric:         Mood and Affect: Mood normal.         Thought Content: Thought content normal.          Significant Labs: BMP:   Recent Labs   Lab 05/10/24  0500   GLU 84      K 3.9      CO2 22*   BUN 20   CREATININE 2.2*   CALCIUM 7.6*     CBC:   Recent Labs   Lab 05/09/24  0548 05/10/24  0500   WBC 0.95* 0.81*   HGB 9.1* 8.6*   HCT 30.1* 28.1*    347     Microbiology Results (last 7 days)       Procedure Component Value Units Date/Time    Blood culture [[id number]] Collected: 05/05/24 0708    Order Status: Completed Specimen: Blood from Peripheral, Hand, Left Updated: 05/09/24 0903     Blood Culture, Routine No Growth after 4 days.    Blood culture [[id number]] Collected: 05/05/24 0708    Order Status: Completed Specimen: Blood from  Peripheral, Wrist, Right Updated: 05/09/24 0903     Blood Culture, Routine No Growth after 4 days.    Urine culture [[id number]]  (Abnormal)  (Susceptibility) Collected: 05/04/24 1541    Order Status: Completed Specimen: Urine Updated: 05/06/24 1151     Urine Culture, Routine ENTEROCOCCUS FAECALIS  >100,000 cfu/ml      Narrative:      Specimen Source->Urine            Significant Imaging: None

## 2024-05-10 NOTE — ASSESSMENT & PLAN NOTE
Pulse Readings from Last 3 Encounters:   05/10/24 (!) 57   10/13/23 62   07/27/23 60     Cont rate control with Beta Blocker and Amiodarone  [id number] Score: 1.   Cont anticoagulation with:  Anticoagulants       Ordered     Route Frequency Start Stop    05/09/24 0851  rivaroxaban         Oral With dinner 05/09/24 1700 --

## 2024-05-10 NOTE — ASSESSMENT & PLAN NOTE
-patient with complaints difficulty urinating and dysuria with MICHELLE on likely CKD  -CT abdomen showed bilateral perinephric stranding which extends inferiorly along the ureteral courses and in the right lower quadrant periappendiceal region. Potential ascending urinary tract infection  -Urine culture with Enterococcus   -ID consulted switch to Zosyn    Antibiotics (From admission, onward)      Start     Stop Route Frequency Ordered    05/06/24 1330  piperacillin-tazobactam (ZOSYN) 4.5 g in dextrose 5 % in water (D5W) 100 mL IVPB (MB+)         -- IV Every 12 hours (non-standard times) 05/06/24 1218    05/06/24 1045  mupirocin 2 % ointment         05/11/24 0859 Nasl 2 times daily 05/06/24 0932          Cultures were taken-   Microbiology Results (last 7 days)       Procedure Component Value Units Date/Time    Blood culture [[id number]] Collected: 05/05/24 0708    Order Status: Completed Specimen: Blood from Peripheral, Hand, Left Updated: 05/09/24 0903     Blood Culture, Routine No Growth after 4 days.    Blood culture [[id number]] Collected: 05/05/24 0708    Order Status: Completed Specimen: Blood from Peripheral, Wrist, Right Updated: 05/09/24 0903     Blood Culture, Routine No Growth after 4 days.    Urine culture [[id number]]  (Abnormal)  (Susceptibility) Collected: 05/04/24 1541    Order Status: Completed Specimen: Urine Updated: 05/06/24 1151     Urine Culture, Routine ENTEROCOCCUS FAECALIS  >100,000 cfu/ml      Narrative:      Specimen Source->Urine

## 2024-05-10 NOTE — SUBJECTIVE & OBJECTIVE
Follow-up For:  Patient Active Problem List    Diagnosis Date Noted Date Diagnosed    MICHELLE (acute kidney injury) 05/04/2024     Pyelonephritis 05/05/2024     Acute appendicitis 05/04/2024     Leukopenia 12/03/2020     PAF (paroxysmal atrial fibrillation) 09/16/2019      Discharge Planning   CASA:    Discharge Plan A: Home with family   Discharge Delays: None known at this time    Interval History:   Subjective: Russell Santos is being followed for MICHELLE (acute kidney injury). No acute events overnight. Sitting up in bed eating breakfast independently. Denies fever/chills or abdominal pain. Tolerating current diet. Able to ambulate independently. Complaining of right shoulder pain. Will order CT scan of shoulder and consult orthopedics. He remains on IV abx    Symptoms: Improved. The patient denies shortness of breath, malaise, cough, chest pain, weakness, headache, chest pressure, anorexia, diarrhea and anxiety.     Diet: Regular. Adequate intake. The patient denies nausea and vomiting.    Last Bowel Movement: 05/09/24    Activity Level: Normal    Pain: The patient Denies pain     Review of Systems   Constitutional:  Negative for chills and fever.   Respiratory:  Negative for cough and shortness of breath.    Cardiovascular:  Negative for chest pain and palpitations.   Gastrointestinal:  Negative for abdominal pain.        Scheduled Meds:   amiodarone  200 mg Oral Daily    atorvastatin  80 mg Oral Daily    calcium carbonate  1,000 mg Oral BID    ergocalciferol  50,000 Units Oral Q7 Days    ferrous gluconate  324 mg Oral BID WM    levothyroxine  200 mcg Oral Before breakfast    LIDOcaine  1 patch Transdermal Q24H    metoprolol tartrate  12.5 mg Oral BID    mupirocin   Nasal BID    piperacillin-tazobactam (Zosyn) IV (PEDS and ADULTS) (extended infusion is not appropriate)  4.5 g Intravenous Q12H    polyethylene glycol  17 g Oral Daily    rivaroxaban  15 mg Oral Daily with dinner    sodium bicarbonate  1,300 mg Oral BID      Continuous Infusions:  PRN Meds:.  Current Facility-Administered Medications:     acetaminophen, 650 mg, Oral, Q8H PRN    acetaminophen, 650 mg, Oral, Q4H PRN    albuterol-ipratropium, 3 mL, Nebulization, Q4H PRN    aluminum-magnesium hydroxide-simethicone, 30 mL, Oral, QID PRN    dextrose 10%, 12.5 g, Intravenous, PRN    dextrose 10%, 25 g, Intravenous, PRN    gabapentin, 600 mg, Oral, TID PRN    glucagon (human recombinant), 1 mg, Intramuscular, PRN    glucose, 16 g, Oral, PRN    glucose, 24 g, Oral, PRN    insulin aspart U-100, 0-5 Units, Subcutaneous, QID (AC + HS) PRN    loperamide, 2 mg, Oral, QID PRN    magnesium oxide, 800 mg, Oral, PRN    magnesium oxide, 800 mg, Oral, PRN    melatonin, 6 mg, Oral, Nightly PRN    naloxone, 0.02 mg, Intravenous, PRN    ondansetron, 4 mg, Intravenous, Q8H PRN    potassium bicarbonate, 35 mEq, Oral, PRN    potassium bicarbonate, 50 mEq, Oral, PRN    potassium bicarbonate, 60 mEq, Oral, PRN    potassium, sodium phosphates, 2 packet, Oral, PRN    potassium, sodium phosphates, 2 packet, Oral, PRN    potassium, sodium phosphates, 2 packet, Oral, PRN    prochlorperazine, 5 mg, Intravenous, Q6H PRN    simethicone, 1 tablet, Oral, QID PRN    sodium chloride 0.9%, 10 mL, Intravenous, Q12H PRN      Objective:     Vitals:    05/09/24 2131 05/09/24 2306 05/10/24 0425 05/10/24 0707   BP: (!) 140/88 (!) 137/94 (!) 157/95 (!) 163/98   BP Location:    Left arm   Patient Position:  Lying Lying Sitting   Pulse: 65 (!) 53 (!) 59 (!) 57   Resp:  18 18 16   Temp:  98.2 °F (36.8 °C) 97.7 °F (36.5 °C) 97.8 °F (36.6 °C)   TempSrc:  Oral Oral Oral   SpO2:  100% 99% 100%   Weight:       Height:           No data found.    Intake/Output Summary (Last 24 hours) at 5/10/2024 0909  Last data filed at 5/9/2024 1630  Gross per 24 hour   Intake 240 ml   Output --   Net 240 ml     Net IO Since Admission: -9,441.03 mL [05/10/24 0909]    Physical Exam  Vitals and nursing note reviewed.   Constitutional:        General: He is awake. He is not in acute distress.     Appearance: Normal appearance. He is well-developed and well-groomed. He is not toxic-appearing.   HENT:      Head: Normocephalic and atraumatic.   Cardiovascular:      Rate and Rhythm: Normal rate.   Pulmonary:      Effort: No tachypnea, accessory muscle usage or respiratory distress.   Abdominal:      General: There is no distension.   Neurological:      Mental Status: He is alert and oriented to person, place, and time. Mental status is at baseline.   Psychiatric:         Mood and Affect: Mood normal.         Behavior: Behavior normal. Behavior is cooperative.         Thought Content: Thought content normal.       Significant Labs: All pertinent labs within the past 24 hours have been reviewed.    BMP (Last 3 Results):   Recent Labs   Lab 05/06/24  0424 05/07/24  0424 05/08/24  0632 05/09/24  0548 05/10/24  0500   GLU 74 106 88 99 84   * 139 144 141 143   K 4.8 4.3 3.6 3.9 3.9    108 110 106 110   CO2 14* 20* 24 24 22*   BUN 61* 51* 39* 29* 20   CREATININE 7.4* 5.8* 4.3* 3.1* 2.2*   CALCIUM 6.4* 6.4* 6.9* 7.4* 7.6*   MG 1.7 1.5* 1.4*  --   --      CBC (Last 3 Results):   Recent Labs   Lab 05/08/24  0632 05/09/24  0548 05/10/24  0500   WBC 0.93* 0.95* 0.81*   RBC 3.58* 4.05* 3.78*   HGB 8.2* 9.1* 8.6*   HCT 25.9* 30.1* 28.1*    378 347   MCV 72* 74* 74*   MCH 22.9* 22.5* 22.8*   MCHC 31.7* 30.2* 30.6*     Microbiology Results (last 7 days)       Procedure Component Value Units Date/Time    Blood culture [[id number]] Collected: 05/05/24 0708    Order Status: Completed Specimen: Blood from Peripheral, Hand, Left Updated: 05/09/24 0903     Blood Culture, Routine No Growth after 4 days.    Blood culture [[id number]] Collected: 05/05/24 0708    Order Status: Completed Specimen: Blood from Peripheral, Wrist, Right Updated: 05/09/24 0903     Blood Culture, Routine No Growth after 4 days.    Urine culture [[id number]]  (Abnormal)  (Susceptibility)  Collected: 05/04/24 1541    Order Status: Completed Specimen: Urine Updated: 05/06/24 1151     Urine Culture, Routine ENTEROCOCCUS FAECALIS  >100,000 cfu/ml      Narrative:      Specimen Source->Urine            Significant Imaging: I have reviewed all pertinent imaging results/findings within the past 24 hours.  CT Abdomen Pelvis  Without Contrast  Narrative: EXAMINATION:  CT ABDOMEN PELVIS WITHOUT CONTRAST    CLINICAL HISTORY:  Abdominal pain, acute, nonlocalized;    TECHNIQUE:  Low dose axial images, sagittal and coronal reformations were obtained from the lung bases to the pubic symphysis.  Oral contrast was not administered.    COMPARISON:  None    FINDINGS:  The visualized portion of the heart is unremarkable.  Mild bibasilar atelectatic changes are seen.  No evidence of focal consolidation or pleural effusion.    No significant hepatic abnormality seen on this noncontrast exam.  There is no intra-or extrahepatic biliary ductal dilatation.  There is cholelithiasis.  The stomach, pancreas, spleen, and adrenal glands are unremarkable.    Single nonobstructing right renal stone is seen.  No evidence of left-sided renal stones.  No right or left-sided hydronephrosis is seen.  No stones are seen along the ureteral courses.  There is bilateral fairly symmetric appearing perinephric stranding which extends inferiorly along the ureteral courses and retroperitoneal region.  Urinary bladder is nondistended.  Prostate is unremarkable.    Appendix is dilated measuring 12 mm with periappendiceal stranding/inflammatory change, also noting aforementioned findings extending near this region.  No evidence of bowel obstruction.  No free air or free fluid.    Aorta tapers normally with mild atherosclerosis.    No acute osseous abnormality identified.  Degenerative changes and prominent anterior bridging osteophytes are seen throughout the spine.  There is mild grade 1 anterolisthesis of L5 on S1 secondary to bilateral L5 pars  defects.  Subcutaneous soft tissues show no significant abnormalities.  Impression: 1. Dilated appendix measuring 12 mm with surrounding periappendiceal stranding/inflammatory change.  Findings are suggestive for acute appendicitis.  No evidence of abscess or perforation.  Surgical consultation is recommended.  2. Fairly symmetric appearing bilateral perinephric stranding which extends inferiorly along the ureteral courses and in the right lower quadrant periappendiceal region.  Uncertain if this may relate to chronic change versus acute infectious/inflammatory process as no prior studies are available for comparison.  Potential ascending urinary tract infection could present with such appearance in the right clinical setting.  Correlate with clinical symptoms and urinalysis.  3. Single nonobstructing right renal stone.  4. Cholelithiasis.  5. Additional findings as detailed above.  This report was flagged in Epic as abnormal.    Electronically signed by: Raulito Keys MD  Date:    05/04/2024  Time:    17:44      Review of Systems   Constitutional:  Negative for chills and fever.   Respiratory:  Negative for cough and shortness of breath.    Cardiovascular:  Negative for chest pain and palpitations.   Gastrointestinal:  Negative for abdominal pain.     Physical Exam  Vitals and nursing note reviewed.   Constitutional:       General: He is awake. He is not in acute distress.     Appearance: Normal appearance. He is well-developed and well-groomed. He is not toxic-appearing.   HENT:      Head: Normocephalic and atraumatic.   Cardiovascular:      Rate and Rhythm: Normal rate.   Pulmonary:      Effort: No tachypnea, accessory muscle usage or respiratory distress.   Abdominal:      General: There is no distension.   Neurological:      Mental Status: He is alert and oriented to person, place, and time. Mental status is at baseline.   Psychiatric:         Mood and Affect: Mood normal.         Behavior: Behavior normal.  Behavior is cooperative.         Thought Content: Thought content normal.

## 2024-05-10 NOTE — CONSULTS
"[facility] - Med Surg  Orthopedics  Consult Note    Patient Name: Russell Santos  MRN: [id number]  Admission Date: 5/4/2024  Hospital Length of Stay: 6 days  Attending Provider: Nicole Orozco MD  Primary Care Provider: Roxanne Phillip -    Patient information was obtained from patient.     Consults  Subjective:     Principal Problem:MICHELLE (acute kidney injury)    Chief Complaint:   Chief Complaint   Patient presents with    Abdominal Pain    Urinary Retention     Pt c/o lower right side abdominal pain and "No urine output since yesterday"        HPI: Patient admitted for acute appendicitis and pyelonephritis being treated with medical management. He began complaining of right shoulder pain. Patient reports he did not have any pain prior to admission to the hospital. It started when he was pulling himself up in the bed, grabbing onto the rails. He reports he has a bad back, so has to use his arms to pull him up. This pulling motion/certain movements cause discomfort in the shoulder. He has had occasional pain such as this in the past. He is right hand dominant.     Past Medical History:   Diagnosis Date    Acquired hypothyroidism     Atrial fibrillation 09/16/2019    Diabetes mellitus     High cholesterol     History of DVT (deep vein thrombosis)     History of pulmonary embolism     Hypertension     LARRY (obstructive sleep apnea)     S/P IVC filter        Past Surgical History:   Procedure Laterality Date    THYROIDECTOMY, PARTIAL  2006    TONSILLECTOMY      TREATMENT OF CARDIAC ARRHYTHMIA  9/16/2019    Procedure: Cardioversion or Defibrillation;  Surgeon: Deven Vasquez MD;  Location: [facility] CATH LAB;  Service: Cardiology;;       Review of patient's allergies indicates:   Allergen Reactions    Contrast media Hives       Current Facility-Administered Medications   Medication    acetaminophen tablet 650 mg    acetaminophen tablet 650 mg    albuterol-ipratropium 2.5 mg-0.5 mg/3 mL nebulizer solution 3 mL    " aluminum-magnesium hydroxide-simethicone 200-200-20 mg/5 mL suspension 30 mL    amiodarone tablet 200 mg    atorvastatin tablet 80 mg    calcium carbonate 200 mg calcium (500 mg) chewable tablet 1,000 mg    dextrose 10% bolus 125 mL 125 mL    dextrose 10% bolus 250 mL 250 mL    ergocalciferol capsule 50,000 Units    ferrous gluconate tablet 324 mg    gabapentin capsule 600 mg    glucagon (human recombinant) injection 1 mg    glucose chewable tablet 16 g    glucose chewable tablet 24 g    insulin aspart U-100 pen 0-5 Units    levothyroxine tablet 200 mcg    LIDOcaine 5 % patch 1 patch    loperamide capsule 2 mg    magnesium oxide tablet 800 mg    magnesium oxide tablet 800 mg    melatonin tablet 6 mg    metoprolol tartrate (LOPRESSOR) split tablet 12.5 mg    mupirocin 2 % ointment    naloxone 0.4 mg/mL injection 0.02 mg    ondansetron injection 4 mg    piperacillin-tazobactam (ZOSYN) 4.5 g in dextrose 5 % in water (D5W) 100 mL IVPB (MB+)    polyethylene glycol packet 17 g    potassium bicarbonate disintegrating tablet 35 mEq    potassium bicarbonate disintegrating tablet 50 mEq    potassium bicarbonate disintegrating tablet 60 mEq    potassium, sodium phosphates 280-160-250 mg packet 2 packet    potassium, sodium phosphates 280-160-250 mg packet 2 packet    potassium, sodium phosphates 280-160-250 mg packet 2 packet    prochlorperazine injection Soln 5 mg    rivaroxaban tablet 15 mg    simethicone chewable tablet 80 mg    sodium bicarbonate tablet 1,300 mg    sodium chloride 0.9% flush 10 mL     Family History    Family history is unknown by patient.       Tobacco Use    Smoking status: Never    Smokeless tobacco: Never   Substance and Sexual Activity    Alcohol use: Not Currently     Comment: occasionally    Drug use: No    Sexual activity: Not Currently     Review of Systems:   Pertinent as above.       Objective:     Vital Signs (Most Recent):  Temp: 97.6 °F (36.4 °C) (05/10/24 1128)  Pulse: 60 (05/10/24  "1128)  Resp: 16 (05/10/24 1128)  BP: (!) 141/95 (05/10/24 1128)  SpO2: 99 % (05/10/24 1128) Vital Signs (24h Range):  Temp:  [97.6 °F (36.4 °C)-98.5 °F (36.9 °C)] 97.6 °F (36.4 °C)  Pulse:  [53-65] 60  Resp:  [16-18] 16  SpO2:  [99 %-100 %] 99 %  BP: (137-168)/(88-98) 141/95     Weight: (!) 137.3 kg (302 lb 11.1 oz)  Height: 5' 9" (175.3 cm)  Body mass index is 44.7 kg/m².      Intake/Output Summary (Last 24 hours) at 5/10/2024 1215  Last data filed at 5/10/2024 0830  Gross per 24 hour   Intake 360 ml   Output 300 ml   Net 60 ml       Physical Exam:  NAD, AAOx3, nonlabored respirations.   R shoulder without swelling, warmth, or erythema. Nontender to palpation. Discomfort with ROM past  degrees FF and abduction. NVI to hand.     Significant Labs: All pertinent labs within the past 24 hours have been reviewed.    Significant Imaging:  CT L shoulder does not show any effusion or fluid collection. Cystic changes in the humeral head at the greater tuberosity.  Mild degenerative changes of the glenohumeral and AC joints.     Assessment/Plan:   Right shoulder pain  - Exam and CT consistent with chronic rotator cuff tendinopathy.   - Symptomatic treatment - ice, NSAIDs  - PT/OT for ROM.   - Follow up as outpatient.    Kylie Mccormick MD  Orthopedics  [facility]         "

## 2024-05-10 NOTE — ASSESSMENT & PLAN NOTE
Patient's FSGs are controlled on current medication regimen.  Last A1c reviewed-   Lab Results   Component Value Date    HGBA1C 6.3 (H) 08/08/2022     Most recent fingerstick glucose reviewed-   Recent Labs   Lab 05/09/24  1143 05/09/24  1545 05/09/24  1951 05/10/24  0706   POCTGLUCOSE 101 107 205* 83       Current correctional scale  Low  Maintain anti-hyperglycemic dose as follows-   Antihyperglycemics (From admission, onward)    Start     Stop Route Frequency Ordered    05/04/24 2049  insulin aspart U-100 pen 0-5 Units         -- SubQ Before meals & nightly PRN 05/04/24 1949        Hold Oral hypoglycemics while patient is in the hospital.

## 2024-05-11 VITALS
BODY MASS INDEX: 44.83 KG/M2 | DIASTOLIC BLOOD PRESSURE: 92 MMHG | HEART RATE: 65 BPM | HEIGHT: 69 IN | SYSTOLIC BLOOD PRESSURE: 157 MMHG | RESPIRATION RATE: 14 BRPM | TEMPERATURE: 98 F | WEIGHT: 302.69 LBS | OXYGEN SATURATION: 100 %

## 2024-05-11 LAB
ACANTHOCYTES BLD QL SMEAR: PRESENT
ANION GAP SERPL CALC-SCNC: 6 MMOL/L (ref 8–16)
ANISOCYTOSIS BLD QL SMEAR: SLIGHT
BASOPHILS # BLD AUTO: 0.03 K/UL (ref 0–0.2)
BASOPHILS NFR BLD: 3.2 % (ref 0–1.9)
BUN SERPL-MCNC: 17 MG/DL (ref 8–23)
BURR CELLS BLD QL SMEAR: ABNORMAL
CALCIUM SERPL-MCNC: 8 MG/DL (ref 8.7–10.5)
CHLORIDE SERPL-SCNC: 111 MMOL/L (ref 95–110)
CO2 SERPL-SCNC: 25 MMOL/L (ref 23–29)
CREAT SERPL-MCNC: 1.7 MG/DL (ref 0.5–1.4)
DIFFERENTIAL METHOD BLD: ABNORMAL
EOSINOPHIL # BLD AUTO: 0.1 K/UL (ref 0–0.5)
EOSINOPHIL NFR BLD: 9.5 % (ref 0–8)
ERYTHROCYTE [DISTWIDTH] IN BLOOD BY AUTOMATED COUNT: 18.4 % (ref 11.5–14.5)
EST. GFR  (NO RACE VARIABLE): 45 ML/MIN/1.73 M^2
GLUCOSE SERPL-MCNC: 101 MG/DL (ref 70–110)
HCT VFR BLD AUTO: 27.5 % (ref 40–54)
HGB BLD-MCNC: 8.4 G/DL (ref 14–18)
HYPOCHROMIA BLD QL SMEAR: ABNORMAL
IMM GRANULOCYTES # BLD AUTO: 0 K/UL (ref 0–0.04)
IMM GRANULOCYTES NFR BLD AUTO: 0 % (ref 0–0.5)
LYMPHOCYTES # BLD AUTO: 0.8 K/UL (ref 1–4.8)
LYMPHOCYTES NFR BLD: 81.1 % (ref 18–48)
MCH RBC QN AUTO: 23 PG (ref 27–31)
MCHC RBC AUTO-ENTMCNC: 30.5 G/DL (ref 32–36)
MCV RBC AUTO: 75 FL (ref 82–98)
MONOCYTES # BLD AUTO: 0 K/UL (ref 0.3–1)
MONOCYTES NFR BLD: 2.1 % (ref 4–15)
NEUTROPHILS # BLD AUTO: 0 K/UL (ref 1.8–7.7)
NEUTROPHILS NFR BLD: 4.1 % (ref 38–73)
NRBC BLD-RTO: 0 /100 WBC
OVALOCYTES BLD QL SMEAR: ABNORMAL
PLATELET # BLD AUTO: 334 K/UL (ref 150–450)
PLATELET BLD QL SMEAR: ABNORMAL
PMV BLD AUTO: 9 FL (ref 9.2–12.9)
POCT GLUCOSE: 251 MG/DL (ref 70–110)
POCT GLUCOSE: 91 MG/DL (ref 70–110)
POCT GLUCOSE: 97 MG/DL (ref 70–110)
POIKILOCYTOSIS BLD QL SMEAR: ABNORMAL
POTASSIUM SERPL-SCNC: 3.8 MMOL/L (ref 3.5–5.1)
RBC # BLD AUTO: 3.66 M/UL (ref 4.6–6.2)
SODIUM SERPL-SCNC: 142 MMOL/L (ref 136–145)
SPHEROCYTES BLD QL SMEAR: ABNORMAL
WBC # BLD AUTO: 0.95 K/UL (ref 3.9–12.7)

## 2024-05-11 PROCEDURE — 25000003 PHARM REV CODE 250: Performed by: INTERNAL MEDICINE

## 2024-05-11 PROCEDURE — 80048 BASIC METABOLIC PNL TOTAL CA: CPT | Performed by: HOSPITALIST

## 2024-05-11 PROCEDURE — 25000003 PHARM REV CODE 250: Performed by: STUDENT IN AN ORGANIZED HEALTH CARE EDUCATION/TRAINING PROGRAM

## 2024-05-11 PROCEDURE — 63600175 PHARM REV CODE 636 W HCPCS: Performed by: INTERNAL MEDICINE

## 2024-05-11 PROCEDURE — 36415 COLL VENOUS BLD VENIPUNCTURE: CPT | Performed by: HOSPITALIST

## 2024-05-11 PROCEDURE — 85025 COMPLETE CBC W/AUTO DIFF WBC: CPT | Performed by: HOSPITALIST

## 2024-05-11 PROCEDURE — 99233 SBSQ HOSP IP/OBS HIGH 50: CPT | Mod: ,,, | Performed by: INTERNAL MEDICINE

## 2024-05-11 PROCEDURE — 25000003 PHARM REV CODE 250: Performed by: HOSPITALIST

## 2024-05-11 PROCEDURE — 94761 N-INVAS EAR/PLS OXIMETRY MLT: CPT

## 2024-05-11 PROCEDURE — 99900035 HC TECH TIME PER 15 MIN (STAT)

## 2024-05-11 RX ORDER — AMOXICILLIN AND CLAVULANATE POTASSIUM 875; 125 MG/1; MG/1
1 TABLET, FILM COATED ORAL EVERY 12 HOURS
Qty: 20 TABLET | Refills: 0 | Status: SHIPPED | OUTPATIENT
Start: 2024-05-11 | End: 2024-05-21

## 2024-05-11 RX ORDER — CALCIUM CARBONATE 200(500)MG
1000 TABLET,CHEWABLE ORAL 2 TIMES DAILY
Qty: 120 TABLET | Refills: 11 | Status: SHIPPED | OUTPATIENT
Start: 2024-05-11 | End: 2025-05-11

## 2024-05-11 RX ORDER — SODIUM BICARBONATE 650 MG/1
1300 TABLET ORAL 2 TIMES DAILY
Qty: 120 TABLET | Refills: 11 | Status: SHIPPED | OUTPATIENT
Start: 2024-05-11 | End: 2025-05-11

## 2024-05-11 RX ORDER — HYDRALAZINE HYDROCHLORIDE 20 MG/ML
5 INJECTION INTRAMUSCULAR; INTRAVENOUS EVERY 6 HOURS PRN
Status: DISCONTINUED | OUTPATIENT
Start: 2024-05-11 | End: 2024-05-11 | Stop reason: HOSPADM

## 2024-05-11 RX ORDER — FERROUS GLUCONATE 324(37.5)
324 TABLET ORAL 2 TIMES DAILY WITH MEALS
Qty: 60 TABLET | Refills: 11 | Status: SHIPPED | OUTPATIENT
Start: 2024-05-11 | End: 2025-05-11

## 2024-05-11 RX ORDER — AMOXICILLIN AND CLAVULANATE POTASSIUM 875; 125 MG/1; MG/1
1 TABLET, FILM COATED ORAL EVERY 12 HOURS
Status: DISCONTINUED | OUTPATIENT
Start: 2024-05-11 | End: 2024-05-11 | Stop reason: HOSPADM

## 2024-05-11 RX ADMIN — METOPROLOL TARTRATE 12.5 MG: 25 TABLET, FILM COATED ORAL at 08:05

## 2024-05-11 RX ADMIN — SODIUM BICARBONATE 1300 MG: 325 TABLET ORAL at 08:05

## 2024-05-11 RX ADMIN — Medication 324 MG: at 08:05

## 2024-05-11 RX ADMIN — ATORVASTATIN CALCIUM 80 MG: 40 TABLET, FILM COATED ORAL at 08:05

## 2024-05-11 RX ADMIN — CALCIUM CARBONATE (ANTACID) CHEW TAB 500 MG 1000 MG: 500 CHEW TAB at 08:05

## 2024-05-11 RX ADMIN — AMOXICILLIN AND CLAVULANATE POTASSIUM 1 TABLET: 875; 125 TABLET, FILM COATED ORAL at 12:05

## 2024-05-11 RX ADMIN — LIDOCAINE 1 PATCH: 50 PATCH TOPICAL at 12:05

## 2024-05-11 RX ADMIN — LEVOTHYROXINE SODIUM 200 MCG: 0.1 TABLET ORAL at 05:05

## 2024-05-11 RX ADMIN — PIPERACILLIN SODIUM AND TAZOBACTAM SODIUM 4.5 G: 4; .5 INJECTION, POWDER, LYOPHILIZED, FOR SOLUTION INTRAVENOUS at 01:05

## 2024-05-11 RX ADMIN — AMIODARONE HYDROCHLORIDE 200 MG: 200 TABLET ORAL at 08:05

## 2024-05-11 NOTE — PROGRESS NOTES
[facility]  Infectious Disease  Progress Note    Patient Name: Russell Santos  MRN: [id number]  Admission Date: 5/4/2024  Length of Stay: 7 days  Attending Physician: Nicole Orozco MD  Primary Care Provider: Roxanne Phillip -    Isolation Status: Airborne and Contact and Enhanced Respiratory  Assessment/Plan:      Acute appendicitis    62 yo man with acute appendicitis by CT. On empiric Zosyn. Chronic neutropenia being managed by Hematology.  Clinically improved. Change to Augmentin x 10 days.    Thank you for your consult. I will sign off. Please contact us if you have any additional questions.    Chapincito Juan MD  Infectious Disease  [facility]    Subjective:     Principal Problem:MICHELLE (acute kidney injury)    HPI: Mr. Santos is a 62 yo man with atrial fibrillation admitted with abdominal pain. ED imaging revealed findings suggestive of acute appendicitis, with bilateral perinephric stranding/possible ascending UTI without evidence of hydronephrosis. He was started on empiric abx and ID is consulted.     The patient tells me that he developed abdominal discomfort after eating a quart of coleslaw from [employer] last Tuesday night. He started feeling poorly but delayed coming to the ED. Nevertheless, he presented on 5/4 and his work-up revealed acute appendicitis, possible bilateral pyelonephritis, a non-obstructing renal stone, and cholelithiasis.    He was started on empiric Zosyn and vancomycin. Blood cultures are NGTD while his urine grew a pen-S E.faecalis.      Interval History: Feels better today. Seen by Ortho. Wants to go home.    Review of Systems   All other systems reviewed and are negative.    Objective:     Vital Signs (Most Recent):  Temp: 98.2 °F (36.8 °C) (05/11/24 0743)  Pulse: 67 (05/11/24 0800)  Resp: 16 (05/11/24 0800)  BP: (!) 153/91 (05/11/24 0743)  SpO2: 98 % (05/11/24 0800) Vital Signs (24h Range):  Temp:  [97.5 °F (36.4 °C)-98.2 °F (36.8 °C)] 98.2 °F (36.8 °C)  Pulse:   [56-68] 67  Resp:  [16-18] 16  SpO2:  [96 %-100 %] 98 %  BP: (135-166)/() 153/91     Weight: (!) 137.3 kg (302 lb 11.1 oz)  Body mass index is 44.7 kg/m².    Estimated Creatinine Clearance: 62.8 mL/min (A) (based on SCr of 1.7 mg/dL (H)).     Physical Exam  Vitals and nursing note reviewed.   Constitutional:       Appearance: Normal appearance.   HENT:      Head: Normocephalic and atraumatic.   Eyes:      Extraocular Movements: Extraocular movements intact.      Pupils: Pupils are equal, round, and reactive to light.   Abdominal:      General: There is no distension.      Tenderness: There is no abdominal tenderness. There is no guarding or rebound.      Hernia: No hernia is present.   Neurological:      Mental Status: He is alert.   Psychiatric:         Mood and Affect: Mood normal.         Behavior: Behavior normal.         Thought Content: Thought content normal.         Judgment: Judgment normal.          Significant Labs: CBC:   Recent Labs   Lab 05/10/24  0500 05/11/24  0454   WBC 0.81* 0.95*   HGB 8.6* 8.4*   HCT 28.1* 27.5*    334     Microbiology Results (last 7 days)       Procedure Component Value Units Date/Time    Blood culture [[id number]] Collected: 05/05/24 0708    Order Status: Completed Specimen: Blood from Peripheral, Hand, Left Updated: 05/09/24 0903     Blood Culture, Routine No Growth after 4 days.    Blood culture [[id number]] Collected: 05/05/24 0708    Order Status: Completed Specimen: Blood from Peripheral, Wrist, Right Updated: 05/09/24 0903     Blood Culture, Routine No Growth after 4 days.    Urine culture [[id number]]  (Abnormal)  (Susceptibility) Collected: 05/04/24 1541    Order Status: Completed Specimen: Urine Updated: 05/06/24 1151     Urine Culture, Routine ENTEROCOCCUS FAECALIS  >100,000 cfu/ml      Narrative:      Specimen Source->Urine            Significant Imaging: I have reviewed all pertinent imaging results/findings within the past 24 hours.

## 2024-05-11 NOTE — PLAN OF CARE
Problem: Infection  Goal: Absence of Infection Signs and Symptoms  Outcome: Progressing  Intervention: Prevent or Manage Infection  Flowsheets (Taken 5/11/2024 0712)  Isolation Precautions:   precautions maintained   protective     Problem: Adult Inpatient Plan of Care  Goal: Absence of Hospital-Acquired Illness or Injury  Outcome: Progressing  Intervention: Prevent Skin Injury  Flowsheets (Taken 5/11/2024 0712)  Body Position: position changed independently  Device Skin Pressure Protection: positioning supports utilized  Intervention: Prevent and Manage VTE (Venous Thromboembolism) Risk  Flowsheets (Taken 5/11/2024 0712)  VTE Prevention/Management: ambulation promoted  Intervention: Prevent Infection  Flowsheets (Taken 5/11/2024 0712)  Infection Prevention:   hand hygiene promoted   single patient room provided   rest/sleep promoted   personal protective equipment utilized  Goal: Optimal Comfort and Wellbeing  Outcome: Progressing  Intervention: Monitor Pain and Promote Comfort  Flowsheets (Taken 5/11/2024 0712)  Pain Management Interventions: relaxation techniques promoted     Problem: Diabetes Comorbidity  Goal: Blood Glucose Level Within Targeted Range  Outcome: Progressing  Intervention: Monitor and Manage Glycemia  Flowsheets (Taken 5/11/2024 0712)  Glycemic Management: blood glucose monitored     Problem: Acute Kidney Injury/Impairment  Goal: Effective Renal Function  Outcome: Progressing

## 2024-05-11 NOTE — PLAN OF CARE
Problem: Infection  Goal: Absence of Infection Signs and Symptoms  Outcome: Adequate for Care Transition     Problem: Adult Inpatient Plan of Care  Goal: Plan of Care Review  Outcome: Adequate for Care Transition  Goal: Patient-Specific Goal (Individualized)  Outcome: Adequate for Care Transition  Goal: Absence of Hospital-Acquired Illness or Injury  Outcome: Adequate for Care Transition  Goal: Optimal Comfort and Wellbeing  Outcome: Adequate for Care Transition  Goal: Readiness for Transition of Care  Outcome: Adequate for Care Transition     Problem: Bariatric Environmental Safety  Goal: Safety Maintained with Care  Outcome: Adequate for Care Transition     Problem: Diabetes Comorbidity  Goal: Blood Glucose Level Within Targeted Range  Outcome: Adequate for Care Transition     Problem: Acute Kidney Injury/Impairment  Goal: Fluid and Electrolyte Balance  Outcome: Adequate for Care Transition  Goal: Improved Oral Intake  Outcome: Adequate for Care Transition  Goal: Effective Renal Function  Outcome: Adequate for Care Transition

## 2024-05-11 NOTE — SUBJECTIVE & OBJECTIVE
Interval History: Feels better today. Seen by Ortho. Wants to go home.    Review of Systems   All other systems reviewed and are negative.    Objective:     Vital Signs (Most Recent):  Temp: 98.2 °F (36.8 °C) (05/11/24 0743)  Pulse: 67 (05/11/24 0800)  Resp: 16 (05/11/24 0800)  BP: (!) 153/91 (05/11/24 0743)  SpO2: 98 % (05/11/24 0800) Vital Signs (24h Range):  Temp:  [97.5 °F (36.4 °C)-98.2 °F (36.8 °C)] 98.2 °F (36.8 °C)  Pulse:  [56-68] 67  Resp:  [16-18] 16  SpO2:  [96 %-100 %] 98 %  BP: (135-166)/() 153/91     Weight: (!) 137.3 kg (302 lb 11.1 oz)  Body mass index is 44.7 kg/m².    Estimated Creatinine Clearance: 62.8 mL/min (A) (based on SCr of 1.7 mg/dL (H)).     Physical Exam  Vitals and nursing note reviewed.   Constitutional:       Appearance: Normal appearance.   HENT:      Head: Normocephalic and atraumatic.   Eyes:      Extraocular Movements: Extraocular movements intact.      Pupils: Pupils are equal, round, and reactive to light.   Abdominal:      General: There is no distension.      Tenderness: There is no abdominal tenderness. There is no guarding or rebound.      Hernia: No hernia is present.   Neurological:      Mental Status: He is alert.   Psychiatric:         Mood and Affect: Mood normal.         Behavior: Behavior normal.         Thought Content: Thought content normal.         Judgment: Judgment normal.          Significant Labs: CBC:   Recent Labs   Lab 05/10/24  0500 05/11/24  0454   WBC 0.81* 0.95*   HGB 8.6* 8.4*   HCT 28.1* 27.5*    334     Microbiology Results (last 7 days)       Procedure Component Value Units Date/Time    Blood culture [[id number]] Collected: 05/05/24 0708    Order Status: Completed Specimen: Blood from Peripheral, Hand, Left Updated: 05/09/24 0903     Blood Culture, Routine No Growth after 4 days.    Blood culture [[id number]] Collected: 05/05/24 0708    Order Status: Completed Specimen: Blood from Peripheral, Wrist, Right Updated: 05/09/24 0903      Blood Culture, Routine No Growth after 4 days.    Urine culture [[id number]]  (Abnormal)  (Susceptibility) Collected: 05/04/24 1541    Order Status: Completed Specimen: Urine Updated: 05/06/24 1151     Urine Culture, Routine ENTEROCOCCUS FAECALIS  >100,000 cfu/ml      Narrative:      Specimen Source->Urine            Significant Imaging: I have reviewed all pertinent imaging results/findings within the past 24 hours.

## 2024-05-11 NOTE — ASSESSMENT & PLAN NOTE
Pulse Readings from Last 3 Encounters:   05/11/24 65   10/13/23 62   07/27/23 60     Cont rate control with Beta Blocker and Amiodarone  [id number] Score: 1.   Cont anticoagulation with:  Anticoagulants     Ordered     Route Frequency Start Stop    05/09/24 0851  rivaroxaban         Oral With dinner 05/09/24 1700 --

## 2024-05-11 NOTE — PLAN OF CARE
05/11/24 1228   Medicare Message   Important Message from Medicare regarding Discharge Appeal Rights Given to patient/caregiver;Explained to patient/caregiver;Signed/date by patient/caregiver   Date IMM was signed 05/11/24   Time IMM was signed 1228     CM met with patient at bedside and explained IMM. Patient verbalized understanding and signed and dated IMM. Copy placed in patient's folder.

## 2024-05-11 NOTE — PLAN OF CARE
Case Management Final Discharge Note    *Discharge Disposition: Home with Family    *New DME ordered/ Company name: None    *Relevant SDOH/ Transition of Care Barriers: None    *Primary Caretaker and contact information: Sara Tom- Spouse     *Scheduled Followup Appointment: Refer to AVS    *Referrals Placed: None    *Transportation: Private Vehicle       CM educated patient on discharge plan and services. Bedside nurse notified. Patient clear to discharge from Case Management standpoint.        05/11/24 1242   Final Note   Assessment Type Final Discharge Note   Anticipated Discharge Disposition Home   Hospital Resources/Appts/Education Provided Appointments scheduled and added to AVS   Post-Acute Status   Post-Acute Authorization   (Home with Family)

## 2024-05-11 NOTE — ASSESSMENT & PLAN NOTE
-patient with complaints difficulty urinating and dysuria with MICHELLE on likely CKD  -CT abdomen showed bilateral perinephric stranding which extends inferiorly along the ureteral courses and in the right lower quadrant periappendiceal region. Potential ascending urinary tract infection  -Urine culture with Enterococcus   -ID consulted switch to Zosyn  Stable to discharge with Augmentin x 10 days    Antibiotics (From admission, onward)      Start     Stop Route Frequency Ordered    05/11/24 1215  amoxicillin-clavulanate 875-125mg per tablet 1 tablet         -- Oral Every 12 hours 05/11/24 1102    05/06/24 1045  mupirocin 2 % ointment         05/11/24 0859 Nasl 2 times daily 05/06/24 0932          Cultures were taken-   Microbiology Results (last 7 days)       Procedure Component Value Units Date/Time    Blood culture [[id number]] Collected: 05/05/24 0708    Order Status: Completed Specimen: Blood from Peripheral, Hand, Left Updated: 05/09/24 0903     Blood Culture, Routine No Growth after 4 days.    Blood culture [[id number]] Collected: 05/05/24 0708    Order Status: Completed Specimen: Blood from Peripheral, Wrist, Right Updated: 05/09/24 0903     Blood Culture, Routine No Growth after 4 days.    Urine culture [[id number]]  (Abnormal)  (Susceptibility) Collected: 05/04/24 1541    Order Status: Completed Specimen: Urine Updated: 05/06/24 1151     Urine Culture, Routine ENTEROCOCCUS FAECALIS  >100,000 cfu/ml      Narrative:      Specimen Source->Urine

## 2024-05-11 NOTE — DISCHARGE SUMMARY
[facility]  Discharge Summary      Patient Name: Russell Santos  MRN: [id number]  Patient Class: IP- Inpatient  Admission Date: 5/4/2024  Hospital Length of Stay: 7 days  Discharge Date and Time:  05/11/2024 12:01 PM  Attending Physician: Nicole Orozco MD   Discharging Provider: Nicole Patel MD  Primary Care Provider: Marjan [facility] -      HPI:   This is a 63-year-old male with a past medical history of AFib (on Xarelto), hypertension, type 2 diabetes, hyperlipidemia, hypothyroidism, chronic leukopenia, morbid obesity, who presents with abdominal pain.    Patient presents with abdominal pain that started 4 days prior to presentation.  He reports that his symptoms started after eating [employer] coleslaw.  He endorsed diarrhea, and right lower quadrant abdominal pain.  Associated symptoms include decreased p.o. intake, decreased appetite, generalized weakness, leg cramping, nausea and vomiting.  Additionally, he endorses decreased urination and intermittent dysuria.    In the ED, the patient was hemodynamically stable.  Labs remarkable for an elevated creatinine (8.3 baseline of 0.9), leukopenia (1.4- below baseline), microcytic anemia (9.6), elevated BNP (123).  VBG showed a pH of 7.24, HC03 of 18.1.  UA showed +3 leukocyte esterase, 46 RBCs, 42 WBCs, +1 protein, and many bacteria.  CT abdomen and pelvis showed findings suggestive of acute appendicitis, with bilateral perinephric stranding/possible ascending UTI without evidence of hydronephrosis. Surgery recommended medical management.  Patient was given 500 mL of LR, ceftriaxone, Flagyl.  He was admitted for further management.    * No surgery found *      Hospital Course:   63-year-old male with a past medical history of AFib (on Xarelto), hypertension, type 2 diabetes, hyperlipidemia, hypothyroidism, chronic leukopenia, morbid obesity admitted on 05/04/2024 for further evaluation of abdominal pain associated with nausea, vomiting,  diarrhea, and decreased p.o. intake.  Admits generalized weakness.  Patient found to have  hyperphosphatemia, acute renal failure, possible appendicitis, and UTI/pyelonephritis.  CT abdomen with Dilated appendix measuring 12 mm with surrounding periappendiceal stranding/inflammatory change, concern for acute appendicitis, no evidence of abscess or perforation.  Also noted bilateral perinephric stranding, potential ascending urinary tract infection.  Creatinine 8.3 on admission.  Urinalysis red blood, many bacteria, and WBCs.  Blood culture ordered, NGTD.  Urine culture with Enterococcus species, susceptibilities pending.  General surgery consulted-no plans for acute surgical intervention.  Agree with IV antibiotics.  ID consulted. Nephrology consulted-continue on bicarb gtt. Pt now with worsening leukopenia, hematology consulted.      Goals of Care Treatment Preferences:  Code Status: Full Code      Consults:   Consults (From admission, onward)          Status Ordering Provider     Inpatient consult to Orthopedic Surgery  Once        Provider:  (Not yet assigned)    Acknowledged SIMONE ISAAC     Inpatient consult to Orthopedic Surgery  Once        Provider:  Bobbi Childress MD    Acknowledged SIMONE ISAAC     Inpatient virtual consult to Hospital Medicine  Once        Provider:  Digna Colbert MD    Completed MICHELLE TALAMANTES III     Inpatient consult to Hematology  Once        Provider:  Jennifer Marquez MD    Completed HANS DUNHAM     Inpatient consult to Infectious Diseases  Once        Provider:  Chapincito Juan MD    Completed INGRID SOUZA     Inpatient consult to General Surgery  Once        Provider:  Joseph Leal MD    Completed ELLY WHITING     Inpatient consult to Nephrology  Once        Provider:  Josefina Rajan MD    Completed ELLY WHITING            Cardiac/Vascular  Hyperlipidemia  Continue home statin       Essential hypertension  Chronic, controlled. Latest blood pressure  and vitals reviewed-     Temp:  [97.5 °F (36.4 °C)-98.4 °F (36.9 °C)]   Pulse:  [56-68]   Resp:  [14-18]   BP: (135-166)/()   SpO2:  [96 %-100 %] .   Home meds for hypertension were reviewed and noted below.   Hypertension Medications               metoprolol tartrate (LOPRESSOR) 25 MG tablet Take 1 tablet (25 mg total) by mouth 2 (two) times daily.    olmesartan (BENICAR) 40 MG tablet Take 1 tablet (40 mg total) by mouth once daily.            While in the hospital, will manage blood pressure as follows; Adjust home antihypertensive regimen as follows- hold nephrotoxic medications, monitor BP    Will utilize p.r.n. blood pressure medication only if patient's blood pressure greater than 180/110 and he develops symptoms such as worsening chest pain or shortness of breath.    PAF (paroxysmal atrial fibrillation)  Pulse Readings from Last 3 Encounters:   05/11/24 65   10/13/23 62   07/27/23 60     Cont rate control with Beta Blocker and Amiodarone  [id number] Score: 1.   Cont anticoagulation with:  Anticoagulants       Ordered     Route Frequency Start Stop    05/09/24 0851  rivaroxaban         Oral With dinner 05/09/24 1700 --             Renal/  * MICHELLE (acute kidney injury)  Lab Results   Component Value Date    CREATININE 1.7 (H) 05/11/2024     Patient with acute kidney injury/acute renal failure likely due to pre-renal azotemia due to IVVD  Cr on admit 8.3  Baseline creatinine 0.9   Nephrology consulted and following  Treated with IVF's  Sr Cr improving but still above baseline  Cont to monitor with daily labs   Avoid nephrotoxins.   Renally dose all medications   Monitor events that may lead to decreased renal perfusion (hypovolemia, hypotension, sepsis).   Monitor urine output (goal 0.5 mL/kg/hr) to assure that no obstruction precipitates worsening in GFR.  Cont sodium bicarb supplement.    Hyperphosphatemia  -phosphorus level elevated at 5.8 on admission   -likely elevated in the setting of acute renal  failure   -nephrology consulted   -monitor level      ID  Pyelonephritis  -patient with complaints difficulty urinating and dysuria with MICHELLE on likely CKD  -CT abdomen showed bilateral perinephric stranding which extends inferiorly along the ureteral courses and in the right lower quadrant periappendiceal region. Potential ascending urinary tract infection  -Urine culture with Enterococcus   -ID consulted switch to Zosyn  Stable to discharge with Augmentin x 10 days    Antibiotics (From admission, onward)      Start     Stop Route Frequency Ordered    05/11/24 1215  amoxicillin-clavulanate 875-125mg per tablet 1 tablet         -- Oral Every 12 hours 05/11/24 1102    05/06/24 1045  mupirocin 2 % ointment         05/11/24 0859 Nasl 2 times daily 05/06/24 0932          Cultures were taken-   Microbiology Results (last 7 days)       Procedure Component Value Units Date/Time    Blood culture [[id number]] Collected: 05/05/24 0708    Order Status: Completed Specimen: Blood from Peripheral, Hand, Left Updated: 05/09/24 0903     Blood Culture, Routine No Growth after 4 days.    Blood culture [[id number]] Collected: 05/05/24 0708    Order Status: Completed Specimen: Blood from Peripheral, Wrist, Right Updated: 05/09/24 0903     Blood Culture, Routine No Growth after 4 days.    Urine culture [[id number]]  (Abnormal)  (Susceptibility) Collected: 05/04/24 1541    Order Status: Completed Specimen: Urine Updated: 05/06/24 1151     Urine Culture, Routine ENTEROCOCCUS FAECALIS  >100,000 cfu/ml      Narrative:      Specimen Source->Urine              Hematology  History of DVT (deep vein thrombosis)  Cont Xarelto     Oncology  Leukopenia  Lab Results   Component Value Date    WBC 0.95 (LL) 05/11/2024     chronic most likely has familial component  Patient had a bone marrow biopsy in 2020, which was unrevealing  Most likely worsened secondary to acute illness and antibiotics  Hematology consulted, recommending outpatient follow up.    Continue to monitor     Endocrine  Hypothyroidism  Continue home Synthroid        Morbid obesity  Body mass index is 44.7 kg/m². Morbid obesity complicates all aspects of disease management from diagnostic modalities to treatment. Weight loss encouraged and health benefits explained to patient.         Diabetes mellitus, type 2  Patient's FSGs are controlled on current medication regimen.  Last A1c reviewed-   Lab Results   Component Value Date    HGBA1C 6.3 (H) 08/08/2022     Most recent fingerstick glucose reviewed-   Recent Labs   Lab 05/10/24  1630 05/10/24  2117 05/11/24  0738 05/11/24  1149   POCTGLUCOSE 175* 251* 97 91       Current correctional scale  Low  Maintain anti-hyperglycemic dose as follows-   Antihyperglycemics (From admission, onward)      Start     Stop Route Frequency Ordered    05/04/24 2049  insulin aspart U-100 pen 0-5 Units         -- SubQ Before meals & nightly PRN 05/04/24 1949          Hold Oral hypoglycemics while patient is in the hospital.    GI  Acute appendicitis  -CT abdomen and pelvis showed findings suggestive of acute appendicitis, with bilateral perinephric stranding/possible ascending UTI.  -General surgery recommended medical management  -Symptomatic control  -ID consulted switch to Zosyn.  -pain resolved      Orthopedic  Right shoulder pain  PT/OT  Pain control         Final Active Diagnoses:    Diagnosis Date Noted POA    PRINCIPAL PROBLEM:  MICHELLE (acute kidney injury) [N17.9] 05/04/2024 Yes    Pyelonephritis [N12] 05/05/2024 Yes    Acute appendicitis [K35.80] 05/04/2024 Yes    Leukopenia [D72.819] 12/03/2020 Yes    PAF (paroxysmal atrial fibrillation) [I48.0] 09/16/2019 Yes    Right shoulder pain [M25.511] 05/10/2024 Unknown    Hyperphosphatemia [E83.39] 05/05/2024 Yes    History of DVT (deep vein thrombosis) [Z86.718] 08/08/2022 Not Applicable    Hypothyroidism [E03.9] 08/08/2022 Yes    Diabetes mellitus, type 2 [E11.9] 10/17/2017 Yes    Essential hypertension [I10]  10/17/2017 Yes    Hyperlipidemia [E78.5] 10/17/2017 Yes    Morbid obesity [E66.01] 10/17/2017 Yes      Problems Resolved During this Admission:       Discharged Condition: good    Disposition: Home or Self Care    Follow Up:   Follow-up Information       Roxanne Phillip - Follow up in 1 week(s).    Why: For follow up and review of hosptial course   Contact information:  [place] [id number]  [phone]                           Patient Instructions:      Ambulatory referral/consult to Hematology / Oncology   Standing Status: Future   Referral Priority: Routine Referral Type: Consultation   Referral Reason: Specialty Services Required   Requested Specialty: Hematology and Oncology   Number of Visits Requested: 1     Ambulatory referral/consult to Nephrology   Standing Status: Future   Referral Priority: Routine Referral Type: Consultation   Referral Reason: Specialty Services Required   Requested Specialty: Nephrology   Number of Visits Requested: 1     Diet diabetic     Notify your health care provider if you experience any of the following:  temperature >100.4     Activity as tolerated       Significant Diagnostic Studies: N/A    Pending Diagnostic Studies:       None           Medications:  Reconciled Home Medications:      Medication List        START taking these medications      amoxicillin-clavulanate 875-125mg 875-125 mg per tablet  Commonly known as: AUGMENTIN  Take 1 tablet by mouth every 12 (twelve) hours. for 10 days     calcium carbonate 200 mg calcium (500 mg) chewable tablet  Commonly known as: TUMS  Take 2 tablets (1,000 mg total) by mouth 2 (two) times daily.     ferrous gluconate 324 mg (37.5 mg iron) Tab tablet  Take 1 tablet (324 mg total) by mouth 2 (two) times daily with meals.     sodium bicarbonate 650 MG tablet  Take 2 tablets (1,300 mg total) by mouth 2 (two) times daily.            CHANGE how you take these medications      rivaroxaban 15 mg Tab  Commonly known as:  XARELTO  Take 1 tablet (15 mg total) by mouth daily with dinner or evening meal.  What changed:   medication strength  how much to take            CONTINUE taking these medications      acetaminophen 500 MG tablet  Commonly known as: TYLENOL  Take 500 mg by mouth every 6 (six) hours as needed for Pain.     amiodarone 200 MG Tab  Commonly known as: PACERONE  Take 1 tablet (200 mg total) by mouth once daily.     cholecalciferol (vitamin D3) 50 mcg (2,000 unit) Tab  Commonly known as: VITAMIN D3  Take by mouth once daily.     EPINEPHrine 0.3 mg/0.3 mL Atin  Commonly known as: EPIPEN     ergocalciferol 50,000 unit Cap  Commonly known as: ERGOCALCIFEROL  Take 50,000 Units by mouth every 7 days.     fish oil-omega-3 fatty acids 300-1,000 mg capsule  Take 1 capsule by mouth 2 (two) times a day.     gabapentin 600 MG tablet  Commonly known as: NEURONTIN  Take 600 mg by mouth 3 (three) times daily.     levothyroxine 200 MCG tablet  Commonly known as: SYNTHROID  Take 200 mcg by mouth before breakfast.     metFORMIN 1000 MG tablet  Commonly known as: GLUCOPHAGE  Take 1,000 mg by mouth 2 (two) times daily with meals.     metoprolol tartrate 25 MG tablet  Commonly known as: LOPRESSOR  Take 1 tablet (25 mg total) by mouth 2 (two) times daily.     MOVANTIK 25 mg tablet  Generic drug: naloxegoL  Take 25 mg by mouth once daily.     omeprazole 20 MG capsule  Commonly known as: PRILOSEC     rosuvastatin 40 MG Tab  Commonly known as: CRESTOR  Take 10 mg by mouth every evening.     sildenafiL 50 MG tablet  Commonly known as: VIAGRA  Take 1 tablet (50 mg total) by mouth daily as needed for Erectile Dysfunction.     testosterone 200 mg Pllt  by Implant route.     triamcinolone acetonide 0.5% 0.5 % Crea  Commonly known as: KENALOG  APPLY TOPICALLY A SMALL AMOUNT TO THE AFFECTED AREA(S) ONCE DAILY     TRUE METRIX GLUCOSE TEST STRIP Strp  Generic drug: blood sugar diagnostic     TRUEPLUS LANCETS 33 gauge Misc  Generic drug: lancets             STOP taking these medications      doxycycline 100 MG tablet  Commonly known as: VIBRA-TABS     ferrous sulfate 325 (65 FE) MG EC tablet     HYDROcodone-acetaminophen  mg per tablet  Commonly known as: NORCO     olmesartan 40 MG tablet  Commonly known as: BENICAR              Indwelling Lines/Drains at time of discharge:   Lines/Drains/Airways       None                   Time spent on the discharge of patient: 45 minutes         The attending portion of this evaluation, treatment, and documentation was performed per Nicole Patel MD via Telemedicine AudioVisual using the secure [employer] software platform with 2 way audio/video. The provider was located off-site and the patient is located in the hospital. The aforementioned video software was utilized to document the relevant history and physical exam    Nicole Patel MD  Department of Hospital Medicine  [facility]

## 2024-05-11 NOTE — ASSESSMENT & PLAN NOTE
Chronic, controlled. Latest blood pressure and vitals reviewed-     Temp:  [97.5 °F (36.4 °C)-98.4 °F (36.9 °C)]   Pulse:  [56-68]   Resp:  [14-18]   BP: (135-166)/()   SpO2:  [96 %-100 %] .   Home meds for hypertension were reviewed and noted below.   Hypertension Medications               metoprolol tartrate (LOPRESSOR) 25 MG tablet Take 1 tablet (25 mg total) by mouth 2 (two) times daily.    olmesartan (BENICAR) 40 MG tablet Take 1 tablet (40 mg total) by mouth once daily.            While in the hospital, will manage blood pressure as follows; Adjust home antihypertensive regimen as follows- hold nephrotoxic medications, monitor BP    Will utilize p.r.n. blood pressure medication only if patient's blood pressure greater than 180/110 and he develops symptoms such as worsening chest pain or shortness of breath.

## 2024-05-11 NOTE — ASSESSMENT & PLAN NOTE
Lab Results   Component Value Date    CREATININE 1.7 (H) 05/11/2024     Patient with acute kidney injury/acute renal failure likely due to pre-renal azotemia due to IVVD  Cr on admit 8.3  Baseline creatinine 0.9   Nephrology consulted and following  Treated with IVF's  Sr Cr improving but still above baseline  Cont to monitor with daily labs   Avoid nephrotoxins.   Renally dose all medications   Monitor events that may lead to decreased renal perfusion (hypovolemia, hypotension, sepsis).   Monitor urine output (goal 0.5 mL/kg/hr) to assure that no obstruction precipitates worsening in GFR.  Cont sodium bicarb supplement.

## 2024-05-11 NOTE — ASSESSMENT & PLAN NOTE
Lab Results   Component Value Date    WBC 0.95 (LL) 05/11/2024     chronic most likely has familial component  Patient had a bone marrow biopsy in 2020, which was unrevealing  Most likely worsened secondary to acute illness and antibiotics  Hematology consulted, recommending outpatient follow up.   Continue to monitor

## 2024-05-11 NOTE — ASSESSMENT & PLAN NOTE
Patient's FSGs are controlled on current medication regimen.  Last A1c reviewed-   Lab Results   Component Value Date    HGBA1C 6.3 (H) 08/08/2022     Most recent fingerstick glucose reviewed-   Recent Labs   Lab 05/10/24  1630 05/10/24  2117 05/11/24  0738 05/11/24  1149   POCTGLUCOSE 175* 251* 97 91       Current correctional scale  Low  Maintain anti-hyperglycemic dose as follows-   Antihyperglycemics (From admission, onward)    Start     Stop Route Frequency Ordered    05/04/24 2049  insulin aspart U-100 pen 0-5 Units         -- SubQ Before meals & nightly PRN 05/04/24 1949        Hold Oral hypoglycemics while patient is in the hospital.

## 2024-05-11 NOTE — PROGRESS NOTES
Date of Admission:5/4/2024    SUBJECTIVE:notes feeling better    Current Facility-Administered Medications   Medication    acetaminophen tablet 650 mg    acetaminophen tablet 650 mg    albuterol-ipratropium 2.5 mg-0.5 mg/3 mL nebulizer solution 3 mL    aluminum-magnesium hydroxide-simethicone 200-200-20 mg/5 mL suspension 30 mL    amiodarone tablet 200 mg    atorvastatin tablet 80 mg    calcium carbonate 200 mg calcium (500 mg) chewable tablet 1,000 mg    dextrose 10% bolus 125 mL 125 mL    dextrose 10% bolus 250 mL 250 mL    ergocalciferol capsule 50,000 Units    ferrous gluconate tablet 324 mg    gabapentin capsule 600 mg    glucagon (human recombinant) injection 1 mg    glucose chewable tablet 16 g    glucose chewable tablet 24 g    hydrALAZINE injection 5 mg    insulin aspart U-100 pen 0-5 Units    levothyroxine tablet 200 mcg    LIDOcaine 5 % patch 1 patch    loperamide capsule 2 mg    magnesium oxide tablet 800 mg    magnesium oxide tablet 800 mg    melatonin tablet 6 mg    metoprolol tartrate (LOPRESSOR) split tablet 12.5 mg    mupirocin 2 % ointment    naloxone 0.4 mg/mL injection 0.02 mg    ondansetron injection 4 mg    piperacillin-tazobactam (ZOSYN) 4.5 g in dextrose 5 % in water (D5W) 100 mL IVPB (MB+)    polyethylene glycol packet 17 g    potassium bicarbonate disintegrating tablet 35 mEq    potassium bicarbonate disintegrating tablet 50 mEq    potassium bicarbonate disintegrating tablet 60 mEq    potassium, sodium phosphates 280-160-250 mg packet 2 packet    potassium, sodium phosphates 280-160-250 mg packet 2 packet    potassium, sodium phosphates 280-160-250 mg packet 2 packet    prochlorperazine injection Soln 5 mg    rivaroxaban tablet 15 mg    simethicone chewable tablet 80 mg    sodium bicarbonate tablet 1,300 mg    sodium chloride 0.9% flush 10 mL       Wt Readings from Last 3 Encounters:   05/05/24 (!) 137.3 kg (302 lb 11.1 oz)   10/13/23 (!) 154.2 kg (340 lb)   07/27/23 (!) 141.4 kg (311 lb  "11.7 oz)     Temp Readings from Last 3 Encounters:   05/11/24 97.5 °F (36.4 °C) (Oral)   10/13/23 98.2 °F (36.8 °C) (Oral)   08/11/22 98.7 °F (37.1 °C) (Oral)     BP Readings from Last 3 Encounters:   05/11/24 (!) 166/90   10/13/23 132/88   07/27/23 120/78     Pulse Readings from Last 3 Encounters:   05/11/24 (!) 56   10/13/23 62   07/27/23 60       Intake/Output Summary (Last 24 hours) at 5/11/2024 0717  Last data filed at 5/11/2024 0705  Gross per 24 hour   Intake 1372.46 ml   Output 1300 ml   Net 72.46 ml       PE:  GEN:wd male in nad  HEENT:ncat,eomi,mmm, bipap mask  CVS:s1s2 regular  PULM:no rales  ABD:soft,nd  EXT:trace leg edema  NEURO:awake,alert    Recent Labs   Lab 05/11/24  0454         K 3.8   *   CO2 25   BUN 17   CREATININE 1.7*   CALCIUM 8.0*         Lab Results   Component Value Date    .8 (H) 05/06/2024    CALCIUM 8.0 (L) 05/11/2024    CAION 0.89 (L) 05/06/2024    PHOS 3.3 05/08/2024       No results for input(s): "WBC", "RBC", "HGB", "HCT", "PLT", "MCV", "MCH", "MCHC" in the last 24 hours.          A/P:  1.boris. creatinine better.  Resolving nicely.  2.anemia. hg low. No prbc indicated. Iron def. Hg ok.  3.leukopenia. worse. Neutropenic precautions. Consulted heme.  Following.  Still low. Cont isolations. Can he go home?  4.uti. seen by id. Enterococcus faecalis uti. Tx per id. Still on zosyn. Augmentin for dc?? Cans we switch-doing better.  5.appendicitis. tailor antibiotics with id for uti coverage too.  6.hyperphospatemia. resolved.  7.lily. cont  cpap as needed.  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 Vit d def. Cont tx.  9.metabolic acidosis. Better. Cont tx.  10.hypomag. replete as needed.  Renal wise, ok for discharge. Will sign off. Please reconsult as needed. Thanks. Pt asking to home.    "

## 2024-05-11 NOTE — ASSESSMENT & PLAN NOTE
62 yo man with acute appendicitis by CT. On empiric Zosyn. Chronic neutropenia being managed by Hematology.  Clinically improved. Change to Augmentin x 10 days.

## 2024-05-22 ENCOUNTER — LAB VISIT (OUTPATIENT)
Dept: LAB | Facility: HOSPITAL | Age: 64
End: 2024-05-22
Payer: MEDICARE

## 2024-05-22 ENCOUNTER — OFFICE VISIT (OUTPATIENT)
Dept: HEMATOLOGY/ONCOLOGY | Facility: CLINIC | Age: 64
End: 2024-05-22
Payer: MEDICARE

## 2024-05-22 VITALS
DIASTOLIC BLOOD PRESSURE: 85 MMHG | HEART RATE: 63 BPM | BODY MASS INDEX: 43.82 KG/M2 | HEIGHT: 69 IN | WEIGHT: 295.88 LBS | OXYGEN SATURATION: 99 % | SYSTOLIC BLOOD PRESSURE: 125 MMHG

## 2024-05-22 DIAGNOSIS — Z79.899 OTHER LONG TERM (CURRENT) DRUG THERAPY: ICD-10-CM

## 2024-05-22 DIAGNOSIS — D64.9 ANEMIA, UNSPECIFIED TYPE: ICD-10-CM

## 2024-05-22 DIAGNOSIS — D56.3 ALPHA THALASSEMIA TRAIT: ICD-10-CM

## 2024-05-22 DIAGNOSIS — D72.819 LEUKOPENIA, UNSPECIFIED TYPE: Primary | ICD-10-CM

## 2024-05-22 DIAGNOSIS — I48.0 PAF (PAROXYSMAL ATRIAL FIBRILLATION): ICD-10-CM

## 2024-05-22 DIAGNOSIS — Z87.19 HISTORY OF APPENDICITIS: ICD-10-CM

## 2024-05-22 LAB
ACANTHOCYTES BLD QL SMEAR: PRESENT
ALBUMIN SERPL BCP-MCNC: 3.6 G/DL (ref 3.5–5.2)
ALP SERPL-CCNC: 48 U/L (ref 55–135)
ALT SERPL W/O P-5'-P-CCNC: 16 U/L (ref 10–44)
ANION GAP SERPL CALC-SCNC: 14 MMOL/L (ref 8–16)
ANISOCYTOSIS BLD QL SMEAR: SLIGHT
AST SERPL-CCNC: 23 U/L (ref 10–40)
BASOPHILS # BLD AUTO: ABNORMAL K/UL (ref 0–0.2)
BASOPHILS NFR BLD: 2 % (ref 0–1.9)
BILIRUB SERPL-MCNC: 0.5 MG/DL (ref 0.1–1)
BUN SERPL-MCNC: 9 MG/DL (ref 8–23)
BURR CELLS BLD QL SMEAR: ABNORMAL
CALCIUM SERPL-MCNC: 9.4 MG/DL (ref 8.7–10.5)
CHLORIDE SERPL-SCNC: 104 MMOL/L (ref 95–110)
CO2 SERPL-SCNC: 24 MMOL/L (ref 23–29)
CREAT SERPL-MCNC: 1.1 MG/DL (ref 0.5–1.4)
DACRYOCYTES BLD QL SMEAR: ABNORMAL
DIFFERENTIAL METHOD BLD: ABNORMAL
DOHLE BOD BLD QL SMEAR: PRESENT
EOSINOPHIL # BLD AUTO: ABNORMAL K/UL (ref 0–0.5)
EOSINOPHIL NFR BLD: 2 % (ref 0–8)
ERYTHROCYTE [DISTWIDTH] IN BLOOD BY AUTOMATED COUNT: 18.6 % (ref 11.5–14.5)
EST. GFR  (NO RACE VARIABLE): >60 ML/MIN/1.73 M^2
FERRITIN SERPL-MCNC: 79 NG/ML (ref 20–300)
GIANT PLATELETS BLD QL SMEAR: PRESENT
GLUCOSE SERPL-MCNC: 90 MG/DL (ref 70–110)
HCT VFR BLD AUTO: 35.2 % (ref 40–54)
HGB BLD-MCNC: 10.2 G/DL (ref 14–18)
HYPOCHROMIA BLD QL SMEAR: ABNORMAL
IMM GRANULOCYTES # BLD AUTO: ABNORMAL K/UL (ref 0–0.04)
IMM GRANULOCYTES NFR BLD AUTO: ABNORMAL % (ref 0–0.5)
IRON SERPL-MCNC: 56 UG/DL (ref 45–160)
LYMPHOCYTES # BLD AUTO: ABNORMAL K/UL (ref 1–4.8)
LYMPHOCYTES NFR BLD: 47 % (ref 18–48)
MCH RBC QN AUTO: 23.4 PG (ref 27–31)
MCHC RBC AUTO-ENTMCNC: 29 G/DL (ref 32–36)
MCV RBC AUTO: 81 FL (ref 82–98)
MONOCYTES # BLD AUTO: ABNORMAL K/UL (ref 0.3–1)
MONOCYTES NFR BLD: 5 % (ref 4–15)
NEUTROPHILS NFR BLD: 43 % (ref 38–73)
NEUTS BAND NFR BLD MANUAL: 1 %
NRBC BLD-RTO: 0 /100 WBC
OVALOCYTES BLD QL SMEAR: ABNORMAL
PLATELET # BLD AUTO: 266 K/UL (ref 150–450)
PLATELET BLD QL SMEAR: ABNORMAL
PMV BLD AUTO: 10.8 FL (ref 9.2–12.9)
POIKILOCYTOSIS BLD QL SMEAR: ABNORMAL
POTASSIUM SERPL-SCNC: 3.9 MMOL/L (ref 3.5–5.1)
PROT SERPL-MCNC: 7.4 G/DL (ref 6–8.4)
RBC # BLD AUTO: 4.36 M/UL (ref 4.6–6.2)
SATURATED IRON: 16 % (ref 20–50)
SCHISTOCYTES BLD QL SMEAR: ABNORMAL
SCHISTOCYTES BLD QL SMEAR: PRESENT
SODIUM SERPL-SCNC: 142 MMOL/L (ref 136–145)
SPHEROCYTES BLD QL SMEAR: ABNORMAL
TARGETS BLD QL SMEAR: ABNORMAL
TOTAL IRON BINDING CAPACITY: 340 UG/DL (ref 250–450)
TRANSFERRIN SERPL-MCNC: 230 MG/DL (ref 200–375)
VIT B12 SERPL-MCNC: 225 PG/ML (ref 210–950)
WBC # BLD AUTO: 1.66 K/UL (ref 3.9–12.7)

## 2024-05-22 PROCEDURE — 3074F SYST BP LT 130 MM HG: CPT | Mod: CPTII,S$GLB,, | Performed by: INTERNAL MEDICINE

## 2024-05-22 PROCEDURE — 86334 IMMUNOFIX E-PHORESIS SERUM: CPT | Mod: 26,,, | Performed by: PATHOLOGY

## 2024-05-22 PROCEDURE — 82728 ASSAY OF FERRITIN: CPT | Performed by: INTERNAL MEDICINE

## 2024-05-22 PROCEDURE — 80053 COMPREHEN METABOLIC PANEL: CPT | Performed by: INTERNAL MEDICINE

## 2024-05-22 PROCEDURE — 84165 PROTEIN E-PHORESIS SERUM: CPT | Mod: 26,,, | Performed by: PATHOLOGY

## 2024-05-22 PROCEDURE — 83540 ASSAY OF IRON: CPT | Performed by: INTERNAL MEDICINE

## 2024-05-22 PROCEDURE — 1159F MED LIST DOCD IN RCRD: CPT | Mod: CPTII,S$GLB,, | Performed by: INTERNAL MEDICINE

## 2024-05-22 PROCEDURE — 82607 VITAMIN B-12: CPT | Performed by: INTERNAL MEDICINE

## 2024-05-22 PROCEDURE — 3079F DIAST BP 80-89 MM HG: CPT | Mod: CPTII,S$GLB,, | Performed by: INTERNAL MEDICINE

## 2024-05-22 PROCEDURE — 99214 OFFICE O/P EST MOD 30 MIN: CPT | Mod: S$GLB,,, | Performed by: INTERNAL MEDICINE

## 2024-05-22 PROCEDURE — 36415 COLL VENOUS BLD VENIPUNCTURE: CPT | Mod: PO | Performed by: INTERNAL MEDICINE

## 2024-05-22 PROCEDURE — 85027 COMPLETE CBC AUTOMATED: CPT | Performed by: INTERNAL MEDICINE

## 2024-05-22 PROCEDURE — 86334 IMMUNOFIX E-PHORESIS SERUM: CPT | Performed by: INTERNAL MEDICINE

## 2024-05-22 PROCEDURE — 99999 PR PBB SHADOW E&M-EST. PATIENT-LVL IV: CPT | Mod: PBBFAC,,, | Performed by: INTERNAL MEDICINE

## 2024-05-22 PROCEDURE — 83921 ORGANIC ACID SINGLE QUANT: CPT | Performed by: INTERNAL MEDICINE

## 2024-05-22 PROCEDURE — 4010F ACE/ARB THERAPY RXD/TAKEN: CPT | Mod: CPTII,S$GLB,, | Performed by: INTERNAL MEDICINE

## 2024-05-22 PROCEDURE — 85007 BL SMEAR W/DIFF WBC COUNT: CPT | Performed by: INTERNAL MEDICINE

## 2024-05-22 PROCEDURE — 84165 PROTEIN E-PHORESIS SERUM: CPT | Performed by: INTERNAL MEDICINE

## 2024-05-22 PROCEDURE — 1111F DSCHRG MED/CURRENT MED MERGE: CPT | Mod: CPTII,S$GLB,, | Performed by: INTERNAL MEDICINE

## 2024-05-22 PROCEDURE — 3008F BODY MASS INDEX DOCD: CPT | Mod: CPTII,S$GLB,, | Performed by: INTERNAL MEDICINE

## 2024-05-22 NOTE — SUBJECTIVE & OBJECTIVE
Past Medical History:   Diagnosis Date    Atrial fibrillation 09/16/2019    Diabetes mellitus     High cholesterol     Hypertension        Past Surgical History:   Procedure Laterality Date    THYROIDECTOMY, PARTIAL  2006    TONSILLECTOMY         Review of patient's allergies indicates:   Allergen Reactions    Contrast media Hives       No current facility-administered medications on file prior to encounter.      Current Outpatient Medications on File Prior to Encounter   Medication Sig    felodipine (PLENDIL) 10 MG 24 hr tablet Take 10 mg by mouth once daily.    hydrocodone-acetaminophen 10-325mg (NORCO)  mg Tab Take 1 tablet by mouth every 8 (eight) hours as needed for Pain.    losartan-hydrochlorothiazide 50-12.5 mg (HYZAAR) 50-12.5 mg per tablet Take 1 tablet by mouth once daily.    lubiprostone (AMITIZA) 24 MCG Cap Take 24 mcg by mouth 2 (two) times daily.    meloxicam (MOBIC) 15 MG tablet Take 15 mg by mouth once daily.    metformin (GLUCOPHAGE) 1000 MG tablet Take 1,000 mg by mouth 2 (two) times daily with meals.    simvastatin (ZOCOR) 40 MG tablet Take 40 mg by mouth every evening.    acetaminophen (TYLENOL) 325 MG tablet Take 2 tablets (650 mg total) by mouth every 6 (six) hours as needed for Temperature greater than (100.4).    metoprolol tartrate (LOPRESSOR) 25 MG tablet Take 25 mg by mouth once daily. PRN HR greater than 100    olmesartan-hydrochlorothiazide (BENICAR HCT) 40-25 mg per tablet Take 1 tablet by mouth once daily.     Family History     None        Tobacco Use    Smoking status: Never Smoker    Smokeless tobacco: Never Used   Substance and Sexual Activity    Alcohol use: Not Currently     Comment: occasionally    Drug use: No    Sexual activity: Not Currently     Review of Systems   Constitution: Positive for diaphoresis. Negative for chills, fever and malaise/fatigue.   HENT: Negative for nosebleeds.    Eyes: Negative for blurred vision and double vision.    Cardiovascular: Negative for chest pain, claudication, cyanosis, dyspnea on exertion, leg swelling, orthopnea, palpitations, paroxysmal nocturnal dyspnea and syncope.   Respiratory: Negative for cough, shortness of breath and wheezing.    Skin: Negative for dry skin and poor wound healing.   Musculoskeletal: Negative for back pain, joint swelling and myalgias.   Gastrointestinal: Negative for abdominal pain, nausea and vomiting.   Genitourinary: Negative for hematuria.   Neurological: Positive for dizziness. Negative for headaches, numbness, seizures and weakness.   Psychiatric/Behavioral: Negative for altered mental status and depression.     Objective:     Vital Signs (Most Recent):  Temp: 97.5 °F (36.4 °C) (09/16/19 0804)  Pulse: (!) 116 (09/16/19 0915)  Resp: (!) 24 (09/16/19 0915)  BP: 133/81 (09/16/19 0915)  SpO2: 99 % (09/16/19 0915) Vital Signs (24h Range):  Temp:  [97.5 °F (36.4 °C)-98.1 °F (36.7 °C)] 97.5 °F (36.4 °C)  Pulse:  [] 116  Resp:  [18-31] 24  SpO2:  [92 %-100 %] 99 %  BP: ()/(50-87) 133/81     Weight: (!) 148.5 kg (327 lb 6.1 oz)  Body mass index is 48.35 kg/m².    SpO2: 99 %  O2 Device (Oxygen Therapy): CPAP      Intake/Output Summary (Last 24 hours) at 9/16/2019 0929  Last data filed at 9/16/2019 0230  Gross per 24 hour   Intake 500 ml   Output --   Net 500 ml       Lines/Drains/Airways     Peripheral Intravenous Line                 Peripheral IV - Single Lumen 09/16/19 0000 18 G Left Antecubital less than 1 day                Physical Exam   Constitutional: He is oriented to person, place, and time. He appears well-developed and well-nourished.   HENT:   Head: Normocephalic and atraumatic.   Eyes: Pupils are equal, round, and reactive to light. Conjunctivae and EOM are normal. No scleral icterus.   Neck: Normal range of motion. Neck supple. No JVD present. No tracheal deviation present. No thyromegaly present.   Cardiovascular: S1 normal and S2 normal. An irregular rhythm  present. Tachycardia present. Exam reveals no gallop and no friction rub.   No murmur heard.  Pulmonary/Chest: Effort normal and breath sounds normal. No respiratory distress. He has no wheezes. He has no rales. He exhibits no tenderness.   Abdominal: Soft. He exhibits no distension.   obese   Musculoskeletal: Normal range of motion. He exhibits no edema.   Neurological: He is alert and oriented to person, place, and time. He has normal strength. No cranial nerve deficit.   Skin: Skin is warm and dry. No rash noted.   Psychiatric: He has a normal mood and affect. His behavior is normal.       Current Medications:   aspirin  81 mg Oral Daily    enoxaparin  1 mg/kg Subcutaneous Q12H    famotidine  20 mg Oral BID    metoprolol tartrate  25 mg Oral BID      sodium chloride 0.9% 100 mL/hr at 09/16/19 0542     acetaminophen, influenza, ondansetron    Laboratory (all labs reviewed):  CBC:  Recent Labs   Lab 10/17/17  2347 10/19/17  0603 09/16/19  0122   WBC 7.60 4.43 4.82   Hemoglobin 11.3 L 10.9 L 12.6 L   Hematocrit 35.1 L 34.1 L 41.5   Platelets 225 188 261       CHEMISTRIES:  Recent Labs   Lab 10/17/17  2345 10/18/17  0548 10/19/17  0603 09/16/19  0122   Glucose 164 H 133 H  --  113 H   Sodium 140 139  --  144   Potassium 3.7 3.7  --  3.7   BUN, Bld 10 8  --  12   Creatinine 0.7 0.7  --  0.7   eGFR if  >60 >60  --  >60   eGFR if non  >60 >60  --  >60   Calcium 9.0 9.3  --  9.3   Magnesium  --  1.8 1.9 1.7       CARDIAC BIOMARKERS:  Recent Labs   Lab 10/17/17  2345 10/18/17  0548 10/18/17  1207 09/16/19  0122 09/16/19  0748   Troponin I 0.046 H 0.063 H 0.037 H <0.006 <0.006       COAGS:  Recent Labs   Lab 10/17/17  2345   INR 1.1       LIPIDS/LFTS:  Recent Labs   Lab 10/17/17  2345 10/18/17  0548 09/16/19  0122   Cholesterol  --  118 L  --    Triglycerides  --  61  --    HDL  --  36 L  --    LDL Cholesterol  --  69.8  --    Non-HDL Cholesterol  --  82  --    AST 25 29 20   ALT 23 26  20       BNP:  Recent Labs   Lab 09/16/19  0122    H       TSH:  Recent Labs   Lab 10/17/17  2345 09/16/19  0122   TSH 0.178 L 1.604       Free T4:  Recent Labs   Lab 10/17/17  2345   Free T4 1.16       Diagnostic Results:  ECG (personally reviewed and interpreted tracing(s)):  9/16/19 0118 , PVC, NSSTTW changes.  AF new vs 10/17/17    Chest X-Ray (personally reviewed and interpreted image(s)): 9/16/19 NAD, elev R diaph    Echo: 10/18/17 (repeat pending)    1 - Normal left ventricular systolic function (EF 65-70%).     2 - No wall motion abnormalities.     3 - Concentric hypertrophy.     4 - Mildly to moderately enlarged aortic root, 4.3 cm.     5 - Trivial tricuspid regurgitation.     6 - Pulmonary hypertension. The estimated PA systolic pressure is 63 mmHg.          128

## 2024-05-22 NOTE — PROGRESS NOTES
Subjective     Patient ID: Russell Santos is a 63 y.o. male.    Chief Complaint: new patient  Reason For Consult: Hospital follow up for Leukopenia  HPI     63-year-old male with a past medical history of AFib (on Xarelto), hypertension, type 2 diabetes, hyperlipidemia, hypothyroidism, chronic leukopenia, morbid obesity who was admitted 5/4/24  for abdominal pain associated with nausea, vomiting, diarrhea, and decreased p.o. intake. In ED he was found to have hyperphosphatemia, acute renal failure, and UTI/pyelonephritis. CT abdomen concerning for dilated appendix measuring 12 mm with surrounding periappendiceal stranding/inflammatory and bilateral perinephric stranding, potential ascending urinary tract infection.   Creatinine 8.3 on admission.  Urinalysis red blood, many bacteria, and WBCs.  Blood culture ordered, NGTD.  Urine culture with Enterococcus species, General surgery consulted-no plans for acute surgical intervention.  Patient treated with antibiotics. He was started on empiric Zosyn and vancomycin. Blood cultures are NGTD while his urine grew a pen-S E.faecalis. He was switched to Augmentin on discharge on 5/11/24 Hematology consulted during hospitalization for worsening leukopenia.           Less fatigued  No fevers  He completed course of abx yesterday  No abd pain  No N/V    Review of Systems   Constitutional:  Positive for fatigue. Negative for appetite change, fever and unexpected weight change.   HENT:  Negative for mouth sores.    Eyes:  Negative for visual disturbance.   Respiratory:  Negative for cough and shortness of breath.    Cardiovascular:  Negative for chest pain.   Gastrointestinal:  Negative for abdominal pain and diarrhea.   Genitourinary:  Negative for frequency.   Musculoskeletal:  Negative for back pain.   Integumentary:  Negative for rash.   Neurological:  Negative for headaches.   Hematological:  Negative for adenopathy.   Psychiatric/Behavioral:  The patient is not nervous/anxious.  "          Objective   Vitals:    05/22/24 0851   BP: 125/85   Pulse: 63   SpO2: 99%   Weight: 134.2 kg (295 lb 13.7 oz)   Height: 5' 9" (1.753 m)       Physical Exam  Constitutional:       Appearance: He is well-developed.   HENT:      Head: Normocephalic.      Mouth/Throat:      Pharynx: No oropharyngeal exudate.   Eyes:      General: No scleral icterus.        Right eye: No discharge.         Left eye: No discharge.      Conjunctiva/sclera: Conjunctivae normal.   Neck:      Thyroid: No thyromegaly.   Cardiovascular:      Rate and Rhythm: Normal rate and regular rhythm.      Heart sounds: Normal heart sounds. No murmur heard.  Pulmonary:      Effort: Pulmonary effort is normal.      Breath sounds: Normal breath sounds. No wheezing or rales.   Abdominal:      General: Bowel sounds are normal.      Palpations: Abdomen is soft.      Tenderness: There is no abdominal tenderness. There is no guarding or rebound.   Musculoskeletal:         General: No swelling. Normal range of motion.      Cervical back: Normal range of motion and neck supple.   Lymphadenopathy:      Cervical: No cervical adenopathy.      Upper Body:      Right upper body: No supraclavicular adenopathy.      Left upper body: No supraclavicular adenopathy.   Skin:     Findings: No erythema or rash.   Neurological:      Mental Status: He is alert and oriented to person, place, and time.      Cranial Nerves: No cranial nerve deficit.   Psychiatric:         Mood and Affect: Mood normal.         Behavior: Behavior normal.           BONE MARROW, LEFT ILIAC CREST, ASPIRATE, CLOT, AND CORE BIOPSY:  --Overall normocellular marrow, 30-60%, with trilineage hematopoiesis, see  comment  --Focal atypical small B-cell clusters identified by immunohistochemical  stain, pending additional study, see comment  --No increase in blasts  --Adequate megakaryocytes  --Stainable iron appears decreased  COMMENT:  Concomitantly submitted flow cytometric analysis detects " no  diagnostic abnormal hematopoietic population.  B cells are polyclonal with a  subset of hematogones detected, and T-cells are immunophenotypically  unremarkable.  The blast gate is not increased.  The patient has a noted history of alpha thalassemia carrier with a single  alpha globin gene deletion which explains the persistent microcytosis.  The  only other substantial finding on this sampling is a few scattered atypical  small B-cell cluster by CD20 immunohistochemical stain.  These could be  reactive, particularly with the polyclonal B-cells detected by flow  cytometry.  However, for further evaluation, the section will be sent for  B-cell receptor gene rearrangements by PCR, and these results will be  reported in a supplemental report when available.  Correlate clinically and with any additional cytogenetic and molecular              Assessment and Plan         1. Leukopenia, unspecified type        2. Anemia, unspecified type  CBC Auto Differential    Comprehensive Metabolic Panel    Ferritin    Iron and TIBC    Vitamin B12    MMA    PROTEIN ELECTROPHORESIS, SERUM    IMMUNOFIXATION ELECTROPHORESIS, SERUM      3. Alpha thalassemia trait        4. Other long term (current) drug therapy  Vitamin B12      5. PAF (paroxysmal atrial fibrillation)        6. 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

## 2024-05-22 NOTE — Clinical Note
ALL LABS TODAY INCLUDING CBC FOLLOW UP IN 3 WEEKS WITH CBC ONLY PRIOR TO F/U LABS AT [facility]

## 2024-05-23 DIAGNOSIS — D70.9 NEUTROPENIA, UNSPECIFIED TYPE: ICD-10-CM

## 2024-05-23 DIAGNOSIS — D64.9 ANEMIA, UNSPECIFIED TYPE: Primary | ICD-10-CM

## 2024-05-23 DIAGNOSIS — D72.819 LEUKOPENIA, UNSPECIFIED TYPE: ICD-10-CM

## 2024-05-23 LAB
ALBUMIN SERPL ELPH-MCNC: 3.67 G/DL (ref 3.35–5.55)
ALPHA1 GLOB SERPL ELPH-MCNC: 0.41 G/DL (ref 0.17–0.41)
ALPHA2 GLOB SERPL ELPH-MCNC: 0.69 G/DL (ref 0.43–0.99)
B-GLOBULIN SERPL ELPH-MCNC: 0.85 G/DL (ref 0.5–1.1)
GAMMA GLOB SERPL ELPH-MCNC: 1.39 G/DL (ref 0.67–1.58)
INTERPRETATION SERPL IFE-IMP: NORMAL
PATHOLOGIST INTERPRETATION IFE: NORMAL
PATHOLOGIST INTERPRETATION SPE: NORMAL
PROT SERPL-MCNC: 7 G/DL (ref 6–8.4)

## 2024-05-28 LAB — METHYLMALONATE SERPL-SCNC: 0.18 UMOL/L

## 2024-06-11 ENCOUNTER — LAB VISIT (OUTPATIENT)
Dept: LAB | Facility: HOSPITAL | Age: 64
End: 2024-06-11
Payer: MEDICARE

## 2024-06-11 DIAGNOSIS — D72.819 LEUKOPENIA, UNSPECIFIED TYPE: ICD-10-CM

## 2024-06-11 DIAGNOSIS — D70.9 NEUTROPENIA, UNSPECIFIED TYPE: ICD-10-CM

## 2024-06-11 DIAGNOSIS — D64.9 ANEMIA, UNSPECIFIED TYPE: ICD-10-CM

## 2024-06-11 LAB
ACANTHOCYTES BLD QL SMEAR: PRESENT
ANISOCYTOSIS BLD QL SMEAR: SLIGHT
BASOPHILS # BLD AUTO: 0.05 K/UL (ref 0–0.2)
BASOPHILS NFR BLD: 1.2 % (ref 0–1.9)
DIFFERENTIAL METHOD BLD: ABNORMAL
EOSINOPHIL # BLD AUTO: 0.2 K/UL (ref 0–0.5)
EOSINOPHIL NFR BLD: 3.8 % (ref 0–8)
ERYTHROCYTE [DISTWIDTH] IN BLOOD BY AUTOMATED COUNT: 18.3 % (ref 11.5–14.5)
HCT VFR BLD AUTO: 35.8 % (ref 40–54)
HGB BLD-MCNC: 10.6 G/DL (ref 14–18)
HYPOCHROMIA BLD QL SMEAR: ABNORMAL
IMM GRANULOCYTES # BLD AUTO: 0.01 K/UL (ref 0–0.04)
IMM GRANULOCYTES NFR BLD AUTO: 0.2 % (ref 0–0.5)
LYMPHOCYTES # BLD AUTO: 1.1 K/UL (ref 1–4.8)
LYMPHOCYTES NFR BLD: 26.5 % (ref 18–48)
MCH RBC QN AUTO: 23.1 PG (ref 27–31)
MCHC RBC AUTO-ENTMCNC: 29.6 G/DL (ref 32–36)
MCV RBC AUTO: 78 FL (ref 82–98)
MONOCYTES # BLD AUTO: 0.1 K/UL (ref 0.3–1)
MONOCYTES NFR BLD: 2.3 % (ref 4–15)
NEUTROPHILS # BLD AUTO: 2.8 K/UL (ref 1.8–7.7)
NEUTROPHILS NFR BLD: 66 % (ref 38–73)
NRBC BLD-RTO: 0 /100 WBC
OVALOCYTES BLD QL SMEAR: ABNORMAL
PLATELET # BLD AUTO: 259 K/UL (ref 150–450)
PLATELET BLD QL SMEAR: ABNORMAL
PMV BLD AUTO: 10.2 FL (ref 9.2–12.9)
POIKILOCYTOSIS BLD QL SMEAR: SLIGHT
RBC # BLD AUTO: 4.59 M/UL (ref 4.6–6.2)
WBC # BLD AUTO: 4.26 K/UL (ref 3.9–12.7)

## 2024-06-11 PROCEDURE — 85025 COMPLETE CBC W/AUTO DIFF WBC: CPT | Performed by: INTERNAL MEDICINE

## 2024-06-11 PROCEDURE — 36415 COLL VENOUS BLD VENIPUNCTURE: CPT | Mod: PO | Performed by: INTERNAL MEDICINE

## 2024-06-13 ENCOUNTER — OFFICE VISIT (OUTPATIENT)
Dept: HEMATOLOGY/ONCOLOGY | Facility: CLINIC | Age: 64
End: 2024-06-13
Payer: MEDICARE

## 2024-06-13 VITALS
HEIGHT: 69 IN | WEIGHT: 281.31 LBS | OXYGEN SATURATION: 99 % | BODY MASS INDEX: 41.67 KG/M2 | SYSTOLIC BLOOD PRESSURE: 120 MMHG | DIASTOLIC BLOOD PRESSURE: 79 MMHG | HEART RATE: 62 BPM

## 2024-06-13 DIAGNOSIS — I48.0 PAF (PAROXYSMAL ATRIAL FIBRILLATION): ICD-10-CM

## 2024-06-13 DIAGNOSIS — D56.3 ALPHA THALASSEMIA TRAIT: ICD-10-CM

## 2024-06-13 DIAGNOSIS — D72.819 LEUKOPENIA, UNSPECIFIED TYPE: Primary | ICD-10-CM

## 2024-06-13 DIAGNOSIS — D64.9 ANEMIA, UNSPECIFIED TYPE: ICD-10-CM

## 2024-06-13 PROCEDURE — 99999 PR PBB SHADOW E&M-EST. PATIENT-LVL III: CPT | Mod: PBBFAC,,, | Performed by: INTERNAL MEDICINE

## 2024-06-13 PROCEDURE — 1159F MED LIST DOCD IN RCRD: CPT | Mod: CPTII,S$GLB,, | Performed by: INTERNAL MEDICINE

## 2024-06-13 PROCEDURE — 3074F SYST BP LT 130 MM HG: CPT | Mod: CPTII,S$GLB,, | Performed by: INTERNAL MEDICINE

## 2024-06-13 PROCEDURE — 4010F ACE/ARB THERAPY RXD/TAKEN: CPT | Mod: CPTII,S$GLB,, | Performed by: INTERNAL MEDICINE

## 2024-06-13 PROCEDURE — 3078F DIAST BP <80 MM HG: CPT | Mod: CPTII,S$GLB,, | Performed by: INTERNAL MEDICINE

## 2024-06-13 PROCEDURE — 99213 OFFICE O/P EST LOW 20 MIN: CPT | Mod: S$GLB,,, | Performed by: INTERNAL MEDICINE

## 2024-06-13 PROCEDURE — 3008F BODY MASS INDEX DOCD: CPT | Mod: CPTII,S$GLB,, | Performed by: INTERNAL MEDICINE

## 2024-06-13 NOTE — PROGRESS NOTES
Subjective     Patient ID: Russell Santos is a 63 y.o. male.    Chief Complaint: No chief complaint on file.  Diagnosis:Leukopenia  HPI     63-year-old male with a past medical history of AFib (on Xarelto), hypertension, type 2 diabetes, hyperlipidemia, hypothyroidism, chronic leukopenia, morbid obesity who was admitted 5/4/24  for abdominal pain associated with nausea, vomiting, diarrhea, and decreased p.o. intake. In ED he was found to have hyperphosphatemia, acute renal failure, and UTI/pyelonephritis. CT abdomen concerning for dilated appendix measuring 12 mm with surrounding periappendiceal stranding/inflammatory and bilateral perinephric stranding, potential ascending urinary tract infection.   Creatinine 8.3 on admission.  Urinalysis red blood, many bacteria, and WBCs.  Blood culture ordered, NGTD.  Urine culture with Enterococcus species, General surgery consulted-no plans for acute surgical intervention.  Patient treated with antibiotics. He was started on empiric Zosyn and vancomycin. Blood cultures are NGTD while his urine grew a pen-S E.faecalis. He was switched to Augmentin on discharge on 5/11/24 Hematology consulted during hospitalization for worsening leukopenia.       Interval Hx: Doing well  Ambulates with cane  He continues with chronic back pain  No fevers  No abd pain  No N/V    Review of Systems   Constitutional:  Positive for fatigue. Negative for appetite change, fever and unexpected weight change.   HENT:  Negative for mouth sores.    Eyes:  Negative for visual disturbance.   Respiratory:  Negative for cough and shortness of breath.    Cardiovascular:  Negative for chest pain.   Gastrointestinal:  Negative for abdominal pain and diarrhea.   Genitourinary:  Negative for frequency.   Musculoskeletal:  Positive for back pain (chronic).   Integumentary:  Negative for rash.   Neurological:  Negative for headaches.   Hematological:  Negative for adenopathy.   Psychiatric/Behavioral:  The patient is  "not nervous/anxious.           Objective   Vitals:    06/13/24 0820   BP: 120/79   Pulse: 62   SpO2: 99%   Weight: 127.6 kg (281 lb 4.9 oz)   Height: 5' 9" (1.753 m)       Physical Exam  Constitutional:       Appearance: He is well-developed.   HENT:      Head: Normocephalic.      Mouth/Throat:      Pharynx: No oropharyngeal exudate.   Eyes:      General: No scleral icterus.        Right eye: No discharge.         Left eye: No discharge.      Conjunctiva/sclera: Conjunctivae normal.   Neck:      Thyroid: No thyromegaly.   Cardiovascular:      Rate and Rhythm: Normal rate and regular rhythm.      Heart sounds: Normal heart sounds. No murmur heard.  Pulmonary:      Effort: Pulmonary effort is normal.      Breath sounds: Normal breath sounds. No wheezing or rales.   Abdominal:      General: Bowel sounds are normal.      Palpations: Abdomen is soft.      Tenderness: There is no abdominal tenderness. There is no guarding or rebound.   Musculoskeletal:         General: No swelling.      Cervical back: Normal range of motion and neck supple.      Comments: Ambulates with cane   Lymphadenopathy:      Cervical: No cervical adenopathy.      Upper Body:      Right upper body: No supraclavicular adenopathy.      Left upper body: No supraclavicular adenopathy.   Skin:     Findings: No erythema or rash.   Neurological:      Mental Status: He is alert and oriented to person, place, and time.      Cranial Nerves: No cranial nerve deficit.   Psychiatric:         Mood and Affect: Mood normal.         Behavior: Behavior normal.           BONE MARROW, LEFT ILIAC CREST, ASPIRATE, CLOT, AND CORE BIOPSY:  --Overall normocellular marrow, 30-60%, with trilineage hematopoiesis, see  comment  --Focal atypical small B-cell clusters identified by immunohistochemical  stain, pending additional study, see comment  --No increase in blasts  --Adequate megakaryocytes  --Stainable iron appears decreased  COMMENT:  Concomitantly submitted flow " cytometric analysis detects no  diagnostic abnormal hematopoietic population.  B cells are polyclonal with a  subset of hematogones detected, and T-cells are immunophenotypically  unremarkable.  The blast gate is not increased.  The patient has a noted history of alpha thalassemia carrier with a single  alpha globin gene deletion which explains the persistent microcytosis.  The  only other substantial finding on this sampling is a few scattered atypical  small B-cell cluster by CD20 immunohistochemical stain.  These could be  reactive, particularly with the polyclonal B-cells detected by flow  cytometry.  However, for further evaluation, the section will be sent for  B-cell receptor gene rearrangements by PCR, and these results will be  reported in a supplemental report when available.  Correlate clinically and with any additional cytogenetic and molecular       Lab Results   Component Value Date    WBC 4.26 06/11/2024    HGB 10.6 (L) 06/11/2024    HCT 35.8 (L) 06/11/2024    MCV 78 (L) 06/11/2024     06/11/2024              Assessment and Plan         1. Anemia, unspecified type        2. Leukopenia, unspecified type        3. Neutropenia, unspecified type        4. 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

## 2024-06-20 ENCOUNTER — TELEPHONE (OUTPATIENT)
Dept: UROLOGY | Facility: CLINIC | Age: 64
End: 2024-06-20
Payer: MEDICARE

## 2024-06-20 NOTE — TELEPHONE ENCOUNTER
----- Message from Soheila Mcdowell sent at 6/20/2024  2:39 PM CDT -----  Regarding: self    Type: Patient Call Back     Who called:self     What is the request in detail:pt is calling with concerns of urine output     Can the clinic reply by [facility]? No     Would the patient rather a call back or a response via [facility]? Call back     Best call back number: [phone]       Additional Information:     Thank you.

## 2024-06-21 ENCOUNTER — HOSPITAL ENCOUNTER (INPATIENT)
Facility: HOSPITAL | Age: 64
LOS: 6 days | Discharge: HOME | DRG: 683 | End: 2024-06-27
Attending: STUDENT IN AN ORGANIZED HEALTH CARE EDUCATION/TRAINING PROGRAM | Admitting: HOSPITALIST
Payer: MEDICARE

## 2024-06-21 DIAGNOSIS — N17.9 AKI (ACUTE KIDNEY INJURY): Primary | ICD-10-CM

## 2024-06-21 DIAGNOSIS — E66.01 MORBID OBESITY: ICD-10-CM

## 2024-06-21 DIAGNOSIS — I10 ESSENTIAL HYPERTENSION: ICD-10-CM

## 2024-06-21 DIAGNOSIS — N39.0 RECURRENT UTI: ICD-10-CM

## 2024-06-21 DIAGNOSIS — R91.1 PULMONARY NODULE: ICD-10-CM

## 2024-06-21 DIAGNOSIS — R07.9 CHEST PAIN: ICD-10-CM

## 2024-06-21 DIAGNOSIS — E87.1 HYPONATREMIA: ICD-10-CM

## 2024-06-21 DIAGNOSIS — D72.819 LEUKOPENIA, UNSPECIFIED TYPE: ICD-10-CM

## 2024-06-21 DIAGNOSIS — E11.22 TYPE 2 DIABETES MELLITUS WITH CHRONIC KIDNEY DISEASE, WITHOUT LONG-TERM CURRENT USE OF INSULIN, UNSPECIFIED CKD STAGE: Chronic | ICD-10-CM

## 2024-06-21 DIAGNOSIS — M79.5 FOREIGN BODY (FB) IN SOFT TISSUE: ICD-10-CM

## 2024-06-21 DIAGNOSIS — N20.0 KIDNEY STONE: ICD-10-CM

## 2024-06-21 LAB
ALBUMIN SERPL BCP-MCNC: 3 G/DL (ref 3.5–5.2)
ALP SERPL-CCNC: 61 U/L (ref 55–135)
ALT SERPL W/O P-5'-P-CCNC: 13 U/L (ref 10–44)
ANION GAP SERPL CALC-SCNC: 13 MMOL/L (ref 8–16)
AST SERPL-CCNC: 21 U/L (ref 10–40)
BACTERIA #/AREA URNS HPF: ABNORMAL /HPF
BASOPHILS # BLD AUTO: 0.01 K/UL (ref 0–0.2)
BASOPHILS NFR BLD: 0.4 % (ref 0–1.9)
BILIRUB SERPL-MCNC: 0.6 MG/DL (ref 0.1–1)
BILIRUB UR QL STRIP: NEGATIVE
BUN SERPL-MCNC: 53 MG/DL (ref 8–23)
CALCIUM SERPL-MCNC: 8 MG/DL (ref 8.7–10.5)
CHLORIDE SERPL-SCNC: 97 MMOL/L (ref 95–110)
CLARITY UR: ABNORMAL
CO2 SERPL-SCNC: 17 MMOL/L (ref 23–29)
COLOR UR: YELLOW
CREAT SERPL-MCNC: 6.7 MG/DL (ref 0.5–1.4)
CREAT UR-MCNC: 50.6 MG/DL (ref 23–375)
DIFFERENTIAL METHOD BLD: ABNORMAL
EOSINOPHIL # BLD AUTO: 0.2 K/UL (ref 0–0.5)
EOSINOPHIL NFR BLD: 6.7 % (ref 0–8)
ERYTHROCYTE [DISTWIDTH] IN BLOOD BY AUTOMATED COUNT: 18.2 % (ref 11.5–14.5)
EST. GFR  (NO RACE VARIABLE): 9 ML/MIN/1.73 M^2
GLUCOSE SERPL-MCNC: 113 MG/DL (ref 70–110)
GLUCOSE UR QL STRIP: NEGATIVE
HCT VFR BLD AUTO: 29 % (ref 40–54)
HGB BLD-MCNC: 9 G/DL (ref 14–18)
HGB UR QL STRIP: ABNORMAL
HYALINE CASTS #/AREA URNS LPF: 4 /LPF
IMM GRANULOCYTES # BLD AUTO: 0.01 K/UL (ref 0–0.04)
IMM GRANULOCYTES NFR BLD AUTO: 0.4 % (ref 0–0.5)
INR PPP: 1.2 (ref 0.8–1.2)
KETONES UR QL STRIP: NEGATIVE
LACTATE SERPL-SCNC: 0.9 MMOL/L (ref 0.5–2.2)
LEUKOCYTE ESTERASE UR QL STRIP: ABNORMAL
LYMPHOCYTES # BLD AUTO: 0.5 K/UL (ref 1–4.8)
LYMPHOCYTES NFR BLD: 18.6 % (ref 18–48)
MCH RBC QN AUTO: 23 PG (ref 27–31)
MCHC RBC AUTO-ENTMCNC: 31 G/DL (ref 32–36)
MCV RBC AUTO: 74 FL (ref 82–98)
MICROSCOPIC COMMENT: ABNORMAL
MONOCYTES # BLD AUTO: 0 K/UL (ref 0.3–1)
MONOCYTES NFR BLD: 1.5 % (ref 4–15)
NEUTROPHILS # BLD AUTO: 2 K/UL (ref 1.8–7.7)
NEUTROPHILS NFR BLD: 72.4 % (ref 38–73)
NITRITE UR QL STRIP: NEGATIVE
NON-SQ EPI CELLS #/AREA URNS HPF: 2 /HPF
NRBC BLD-RTO: 0 /100 WBC
PH UR STRIP: 6 [PH] (ref 5–8)
PLATELET # BLD AUTO: 263 K/UL (ref 150–450)
PMV BLD AUTO: 9.3 FL (ref 9.2–12.9)
POCT GLUCOSE: 103 MG/DL (ref 70–110)
POTASSIUM SERPL-SCNC: 4.4 MMOL/L (ref 3.5–5.1)
PROT SERPL-MCNC: 6.8 G/DL (ref 6–8.4)
PROT UR QL STRIP: ABNORMAL
PROTHROMBIN TIME: 13.2 SEC (ref 9–12.5)
RBC # BLD AUTO: 3.92 M/UL (ref 4.6–6.2)
RBC #/AREA URNS HPF: 17 /HPF (ref 0–4)
SODIUM SERPL-SCNC: 127 MMOL/L (ref 136–145)
SODIUM UR-SCNC: 39 MMOL/L (ref 20–250)
SP GR UR STRIP: 1.01 (ref 1–1.03)
SQUAMOUS #/AREA URNS HPF: 10 /HPF
UNIDENT CRYS URNS QL MICRO: ABNORMAL
URN SPEC COLLECT METH UR: ABNORMAL
UROBILINOGEN UR STRIP-ACNC: NEGATIVE EU/DL
WBC # BLD AUTO: 2.69 K/UL (ref 3.9–12.7)
WBC #/AREA URNS HPF: >100 /HPF (ref 0–5)

## 2024-06-21 PROCEDURE — 85610 PROTHROMBIN TIME: CPT | Performed by: INTERNAL MEDICINE

## 2024-06-21 PROCEDURE — 84540 ASSAY OF URINE/UREA-N: CPT

## 2024-06-21 PROCEDURE — 21400001 HC TELEMETRY ROOM

## 2024-06-21 PROCEDURE — 81000 URINALYSIS NONAUTO W/SCOPE: CPT

## 2024-06-21 PROCEDURE — 84300 ASSAY OF URINE SODIUM: CPT

## 2024-06-21 PROCEDURE — 25000003 PHARM REV CODE 250: Performed by: INTERNAL MEDICINE

## 2024-06-21 PROCEDURE — 63600175 PHARM REV CODE 636 W HCPCS: Performed by: STUDENT IN AN ORGANIZED HEALTH CARE EDUCATION/TRAINING PROGRAM

## 2024-06-21 PROCEDURE — 96361 HYDRATE IV INFUSION ADD-ON: CPT

## 2024-06-21 PROCEDURE — 87088 URINE BACTERIA CULTURE: CPT

## 2024-06-21 PROCEDURE — 87077 CULTURE AEROBIC IDENTIFY: CPT

## 2024-06-21 PROCEDURE — 87086 URINE CULTURE/COLONY COUNT: CPT

## 2024-06-21 PROCEDURE — 99285 EMERGENCY DEPT VISIT HI MDM: CPT | Mod: 25

## 2024-06-21 PROCEDURE — 96374 THER/PROPH/DIAG INJ IV PUSH: CPT

## 2024-06-21 PROCEDURE — 63600175 PHARM REV CODE 636 W HCPCS: Performed by: INTERNAL MEDICINE

## 2024-06-21 PROCEDURE — 83605 ASSAY OF LACTIC ACID: CPT | Performed by: INTERNAL MEDICINE

## 2024-06-21 PROCEDURE — 25000003 PHARM REV CODE 250: Performed by: STUDENT IN AN ORGANIZED HEALTH CARE EDUCATION/TRAINING PROGRAM

## 2024-06-21 PROCEDURE — 80053 COMPREHEN METABOLIC PANEL: CPT

## 2024-06-21 PROCEDURE — 11000001 HC ACUTE MED/SURG PRIVATE ROOM

## 2024-06-21 PROCEDURE — 85025 COMPLETE CBC W/AUTO DIFF WBC: CPT

## 2024-06-21 PROCEDURE — 82570 ASSAY OF URINE CREATININE: CPT

## 2024-06-21 PROCEDURE — 87186 SC STD MICRODIL/AGAR DIL: CPT

## 2024-06-21 RX ORDER — SODIUM CHLORIDE 0.9 % (FLUSH) 0.9 %
10 SYRINGE (ML) INJECTION EVERY 12 HOURS PRN
Status: DISCONTINUED | OUTPATIENT
Start: 2024-06-21 | End: 2024-06-27 | Stop reason: HOSPADM

## 2024-06-21 RX ORDER — LEVOTHYROXINE SODIUM 100 UG/1
200 TABLET ORAL
Status: DISCONTINUED | OUTPATIENT
Start: 2024-06-22 | End: 2024-06-27 | Stop reason: HOSPADM

## 2024-06-21 RX ORDER — IBUPROFEN 200 MG
24 TABLET ORAL
Status: DISCONTINUED | OUTPATIENT
Start: 2024-06-21 | End: 2024-06-27 | Stop reason: HOSPADM

## 2024-06-21 RX ORDER — IBUPROFEN 200 MG
16 TABLET ORAL
Status: DISCONTINUED | OUTPATIENT
Start: 2024-06-21 | End: 2024-06-27 | Stop reason: HOSPADM

## 2024-06-21 RX ORDER — LIDOCAINE 50 MG/G
1 PATCH TOPICAL
Status: DISCONTINUED | OUTPATIENT
Start: 2024-06-21 | End: 2024-06-21

## 2024-06-21 RX ORDER — AMIODARONE HYDROCHLORIDE 200 MG/1
200 TABLET ORAL DAILY
COMMUNITY

## 2024-06-21 RX ORDER — AMIODARONE HYDROCHLORIDE 200 MG/1
200 TABLET ORAL DAILY
Status: DISCONTINUED | OUTPATIENT
Start: 2024-06-22 | End: 2024-06-27 | Stop reason: HOSPADM

## 2024-06-21 RX ORDER — NALOXONE HCL 0.4 MG/ML
0.02 VIAL (ML) INJECTION
Status: DISCONTINUED | OUTPATIENT
Start: 2024-06-21 | End: 2024-06-27 | Stop reason: HOSPADM

## 2024-06-21 RX ORDER — HEPARIN SODIUM 5000 [USP'U]/ML
5000 INJECTION, SOLUTION INTRAVENOUS; SUBCUTANEOUS EVERY 8 HOURS
Status: DISCONTINUED | OUTPATIENT
Start: 2024-06-21 | End: 2024-06-22

## 2024-06-21 RX ORDER — ATORVASTATIN CALCIUM 40 MG/1
80 TABLET, FILM COATED ORAL NIGHTLY
Status: DISCONTINUED | OUTPATIENT
Start: 2024-06-21 | End: 2024-06-27 | Stop reason: HOSPADM

## 2024-06-21 RX ORDER — POLYETHYLENE GLYCOL 3350 17 G/17G
17 POWDER, FOR SOLUTION ORAL 2 TIMES DAILY PRN
Status: DISCONTINUED | OUTPATIENT
Start: 2024-06-21 | End: 2024-06-27 | Stop reason: HOSPADM

## 2024-06-21 RX ORDER — GABAPENTIN 300 MG/1
300 CAPSULE ORAL NIGHTLY
Status: DISCONTINUED | OUTPATIENT
Start: 2024-06-21 | End: 2024-06-27 | Stop reason: HOSPADM

## 2024-06-21 RX ORDER — INSULIN ASPART 100 [IU]/ML
0-5 INJECTION, SOLUTION INTRAVENOUS; SUBCUTANEOUS
Status: DISCONTINUED | OUTPATIENT
Start: 2024-06-21 | End: 2024-06-27 | Stop reason: HOSPADM

## 2024-06-21 RX ORDER — HYDRALAZINE HYDROCHLORIDE 20 MG/ML
5 INJECTION INTRAMUSCULAR; INTRAVENOUS EVERY 6 HOURS PRN
Status: DISCONTINUED | OUTPATIENT
Start: 2024-06-21 | End: 2024-06-27 | Stop reason: HOSPADM

## 2024-06-21 RX ORDER — GLUCAGON 1 MG
1 KIT INJECTION
Status: DISCONTINUED | OUTPATIENT
Start: 2024-06-21 | End: 2024-06-27 | Stop reason: HOSPADM

## 2024-06-21 RX ORDER — OLMESARTAN MEDOXOMIL 40 MG/1
40 TABLET ORAL DAILY
Status: ON HOLD | COMMUNITY
Start: 2024-06-08 | End: 2024-06-27 | Stop reason: HOSPADM

## 2024-06-21 RX ORDER — ACETAMINOPHEN 325 MG/1
650 TABLET ORAL
Status: COMPLETED | OUTPATIENT
Start: 2024-06-21 | End: 2024-06-21

## 2024-06-21 RX ORDER — ACETAMINOPHEN 325 MG/1
650 TABLET ORAL EVERY 4 HOURS PRN
Status: DISCONTINUED | OUTPATIENT
Start: 2024-06-21 | End: 2024-06-27 | Stop reason: HOSPADM

## 2024-06-21 RX ORDER — ONDANSETRON HYDROCHLORIDE 2 MG/ML
4 INJECTION, SOLUTION INTRAVENOUS EVERY 6 HOURS PRN
Status: DISCONTINUED | OUTPATIENT
Start: 2024-06-21 | End: 2024-06-27 | Stop reason: HOSPADM

## 2024-06-21 RX ORDER — POLYETHYLENE GLYCOL 3350 17 G/17G
17 POWDER, FOR SOLUTION ORAL ONCE
Status: COMPLETED | OUTPATIENT
Start: 2024-06-21 | End: 2024-06-21

## 2024-06-21 RX ORDER — HYDROMORPHONE HYDROCHLORIDE 1 MG/ML
0.5 INJECTION, SOLUTION INTRAMUSCULAR; INTRAVENOUS; SUBCUTANEOUS
Status: COMPLETED | OUTPATIENT
Start: 2024-06-21 | End: 2024-06-21

## 2024-06-21 RX ORDER — DOCUSATE SODIUM 100 MG/1
100 CAPSULE, LIQUID FILLED ORAL 2 TIMES DAILY
Status: DISCONTINUED | OUTPATIENT
Start: 2024-06-22 | End: 2024-06-27 | Stop reason: HOSPADM

## 2024-06-21 RX ORDER — LIDOCAINE 50 MG/G
1 PATCH TOPICAL
Status: DISCONTINUED | OUTPATIENT
Start: 2024-06-21 | End: 2024-06-22

## 2024-06-21 RX ORDER — TALC
6 POWDER (GRAM) TOPICAL NIGHTLY PRN
Status: DISCONTINUED | OUTPATIENT
Start: 2024-06-21 | End: 2024-06-27 | Stop reason: HOSPADM

## 2024-06-21 RX ORDER — GABAPENTIN 600 MG/1
1 TABLET ORAL NIGHTLY
COMMUNITY
Start: 2024-05-21

## 2024-06-21 RX ADMIN — SODIUM CHLORIDE 1000 ML: 9 INJECTION, SOLUTION INTRAVENOUS at 07:06

## 2024-06-21 RX ADMIN — SODIUM CHLORIDE 1000 ML: 0.9 INJECTION, SOLUTION INTRAVENOUS at 02:06

## 2024-06-21 RX ADMIN — ATORVASTATIN CALCIUM 80 MG: 40 TABLET, FILM COATED ORAL at 08:06

## 2024-06-21 RX ADMIN — ACETAMINOPHEN 650 MG: 325 TABLET ORAL at 03:06

## 2024-06-21 RX ADMIN — SODIUM CHLORIDE 1000 ML: 0.9 INJECTION, SOLUTION INTRAVENOUS at 06:06

## 2024-06-21 RX ADMIN — HYDROMORPHONE HYDROCHLORIDE 0.5 MG: 1 INJECTION, SOLUTION INTRAMUSCULAR; INTRAVENOUS; SUBCUTANEOUS at 03:06

## 2024-06-21 RX ADMIN — GABAPENTIN 300 MG: 300 CAPSULE ORAL at 08:06

## 2024-06-21 RX ADMIN — VANCOMYCIN HYDROCHLORIDE 1000 MG: 1 INJECTION, POWDER, LYOPHILIZED, FOR SOLUTION INTRAVENOUS at 07:06

## 2024-06-21 RX ADMIN — HEPARIN SODIUM 5000 UNITS: 5000 INJECTION INTRAVENOUS; SUBCUTANEOUS at 11:06

## 2024-06-21 RX ADMIN — LIDOCAINE 1 PATCH: 700 PATCH TOPICAL at 03:06

## 2024-06-21 RX ADMIN — POLYETHYLENE GLYCOL 3350 17 G: 17 POWDER, FOR SOLUTION ORAL at 11:06

## 2024-06-21 NOTE — ED NOTES
Bed: 04main  Expected date:   Expected time:   Means of arrival:   Comments:  Salvador Andrea   Name band;

## 2024-06-21 NOTE — ED TRIAGE NOTES
Recently seen for MICHELLE and UTI and treated w antibiotics. Pt reports dysuria and bladder spasms that started 3 days ago. Pt also reports Generalized weakness and fatigue, denies sob, cp, abdominal pain. Pt aaox4

## 2024-06-21 NOTE — ED PROVIDER NOTES
Encounter Date: 6/21/2024       History     Chief Complaint   Patient presents with    Urinary Tract Infection     Recently seen for MICHELLE and UTI and treated w antibiotics. Pt reports dysuria and bladder spasms. Generalized weakness and fatigue      63-year-old male presents with back pain, bilateral leg weakness, generalized fatigue as well as reduced urine output.  Patient has been recently admitted and discharged from the hospital due to pyelonephritis and renal failure.  The patient says he has a retired  and while he does have back pain this was worse than usual.  No fevers.  No saddle anesthesia, bilateral leg numbness although patient says that some point he felt that his legs were numb however this is difficult for him to discern and then he recanted this assertion.  He does report constipation which is new for him.  No abdominal pain.  No chest pain shortness a breath.  He says he is started noticing that his urine output reduced 4 or 5 days ago although this morning he did noticed that he did at 200 cc of output.    No saddle anesthesia, no perirectal anesthesia, no trauma or falls, no intravenous drug abuse, ESRD on HD, recent spinal instrumentation, urinary or bowel incontinence or retention.         Review of patient's allergies indicates:   Allergen Reactions    Contrast media Hives     Past Medical History:   Diagnosis Date    Acquired hypothyroidism     Atrial fibrillation 09/16/2019    Diabetes mellitus     High cholesterol     History of DVT (deep vein thrombosis)     History of pulmonary embolism     Hypertension     LARRY (obstructive sleep apnea)     S/P IVC filter      Past Surgical History:   Procedure Laterality Date    THYROIDECTOMY, PARTIAL  2006    TONSILLECTOMY      TREATMENT OF CARDIAC ARRHYTHMIA  9/16/2019    Procedure: Cardioversion or Defibrillation;  Surgeon: Deven Vasquez MD;  Location: [facility] CATH LAB;  Service: Cardiology;;     Family History   Family history unknown: Yes      Social History     Tobacco Use    Smoking status: Never    Smokeless tobacco: Never   Substance Use Topics    Alcohol use: Not Currently     Comment: occasionally    Drug use: No     Review of Systems    Physical Exam     Initial Vitals   BP Pulse Resp Temp SpO2   06/21/24 1355 06/21/24 1355 06/21/24 1352 06/21/24 1352 06/21/24 1355   95/61 62 18 98.4 °F (36.9 °C) 99 %      MAP       --                Physical Exam    Nursing note and vitals reviewed.  Constitutional: He appears well-developed and well-nourished.   HENT:   Head: Normocephalic and atraumatic.   Eyes: EOM are normal. Pupils are equal, round, and reactive to light.   Neck: Neck supple.   Normal range of motion.  Cardiovascular:  Normal rate and regular rhythm.           Pulmonary/Chest: Breath sounds normal. No respiratory distress.   Abdominal: Abdomen is soft. There is no abdominal tenderness.   Musculoskeletal:         General: No tenderness or edema. Normal range of motion.      Cervical back: Normal range of motion and neck supple.      Comments: Full motor sensation to the lower extremities.  Able to lift both legs off the bed.  No objective saddle anesthesias.     Neurological: He is alert and oriented to person, place, and time. GCS score is 15. GCS eye subscore is 4. GCS verbal subscore is 5. GCS motor subscore is 6.   Skin: Skin is warm and dry. Capillary refill takes less than 2 seconds.   Psychiatric: He has a normal mood and affect. Thought content normal.         ED Course   Procedures  Labs Reviewed   URINALYSIS, REFLEX TO URINE CULTURE - Abnormal; Notable for the following components:       Result Value    Appearance, UA Cloudy (*)     Protein, UA 1+ (*)     Occult Blood UA 2+ (*)     Leukocytes, UA 3+ (*)     All other components within normal limits    Narrative:     Specimen Source->Urine   CBC W/ AUTO DIFFERENTIAL - Abnormal; Notable for the following components:    WBC 2.69 (*)     RBC 3.92 (*)     Hemoglobin 9.0 (*)      Hematocrit 29.0 (*)     MCV 74 (*)     MCH 23.0 (*)     MCHC 31.0 (*)     RDW 18.2 (*)     Lymph # 0.5 (*)     Mono # 0.0 (*)     Mono % 1.5 (*)     All other components within normal limits   COMPREHENSIVE METABOLIC PANEL - Abnormal; Notable for the following components:    Sodium 127 (*)     CO2 17 (*)     Glucose 113 (*)     BUN 53 (*)     Creatinine 6.7 (*)     Calcium 8.0 (*)     Albumin 3.0 (*)     eGFR 9 (*)     All other components within normal limits   URINALYSIS MICROSCOPIC - Abnormal; Notable for the following components:    RBC, UA 17 (*)     WBC, UA >100 (*)     Bacteria Moderate (*)     Non-Squam Epith 2 (*)     Hyaline Casts, UA 4 (*)     All other components within normal limits    Narrative:     Specimen Source->Urine   CULTURE, URINE   PROCALCITONIN   CREATININE, URINE, RANDOM   SODIUM, URINE, RANDOM   UREA NITROGEN, URINE, RANDOM   CREATININE, URINE, RANDOM    Narrative:     Specimen Source->Urine   SODIUM, URINE, RANDOM    Narrative:     Specimen Source->Urine   LACTIC ACID, PLASMA   PROTIME-INR   UREA NITROGEN, URINE, RANDOM   POCT GLUCOSE MONITORING CONTINUOUS          Imaging Results              CT Abdomen Pelvis  Without Contrast (In process)                      MRI Lumbar Spine Without Contrast (Final result)  Result time 06/21/24 18:24:07      Final result by Chevy Collins MD (06/21/24 18:24:07)                   Impression:      Grade I L5-S1 spondylolisthesis with associated severe bilateral neural foraminal narrowing and marked narrowing the lateral recesses.  There is impinging on the exiting bilateral L5 nerve roots.    Moderate narrowing of the spinal canal at the L5-S1 level secondary to the degree of anterolisthesis and uncovering of the disc.    Additional multilevel degenerative changes in the lumbar spine as above.      Electronically signed by: Chevy Collins MD  Date:    06/21/2024  Time:    18:24               Narrative:    EXAMINATION:  MRI LUMBAR SPINE WITHOUT  CONTRAST    CLINICAL HISTORY:  bilateral leg weakness;    TECHNIQUE:  Multiplanar, multisequence MR images were acquired from the thoracolumbar junction to the sacrum without the administration of contrast.    COMPARISON:  CT scan dated 05/04/2024.    FINDINGS:  There is unchanged appearance of grade 1 anterolisthesis of L5 on S1 with bilateral pars defects.  The remainder of the lumbar alignment is within normal limits.    The vertebral body heights are maintained.  The bone marrow signal is within normal limits.  There is no evidence of a fracture.    The conus terminates at the level of L1.  The cauda equina nerve roots are within normal limits.    There is hypertrophy of the posterior elements.  There is multilevel disc desiccation.  Evaluation of the individual disc levels reveals the following:    L1-L2, there is a disc bulge along with facet hypertrophy and ligamentum flavum hypertrophy.  The spinal canal is within normal limits.  There is mild bilateral neural foraminal narrowing.    L2-L3, there is a disc bulge along with facet hypertrophy and ligamentum flavum hypertrophy.  The spinal canal is within normal limits.  There is mild bilateral neural foraminal narrowing.    L3-L4, there is a disc bulge along with facet hypertrophy and ligamentum flavum hypertrophy.  There is mild spinal canal narrowing.  There is mild bilateral neural foraminal narrowing.    L4-L5, there is a disc bulge along with facet hypertrophy and ligamentum flavum hypertrophy.  There is narrowing the lateral recesses.  There is mild narrowing of the spinal canal.  There is moderate bilateral neural foraminal narrowing.    L5-S1, there is a disc bulge along with facet hypertrophy and ligamentum flavum hypertrophy.  There is superimposed central disc protrusion.  There are bilateral foraminal disc protrusions too.  There is impinging on the exiting bilateral S1 nerve roots.  There is severe bilateral neural foraminal narrowing.    There is  atrophy of the paraspinous musculature.  There is edema within the subcutaneous tissues of the lower lumbar spine.  The remainder of the paraspinal soft tissues are within normal limits.                                       X-Ray Orbit Eye Foreign Body Bilat (Final result)  Result time 06/21/24 16:23:48      Final result by Noe Mckeon DO (06/21/24 16:23:48)                   Impression:      Please see above      Electronically signed by: Noe Mckeon DO  Date:    06/21/2024  Time:    16:23               Narrative:    EXAMINATION:  XR ORBIT EYE FOREIGN BODY BILAT    CLINICAL HISTORY:  Residual foreign body in soft tissue    TECHNIQUE:  AP, lateral and oblique views of the orbits bilaterally    COMPARISON:  None    FINDINGS:  No evidence for radiopaque foreign body projects over the orbits bilaterally.  Bony orbits are grossly intact.  No significant opacification visualized paranasal sinuses.  Further evaluation as warranted clinically.                                       Medications   LIDOcaine 5 % patch 1 patch (1 patch Transdermal Patch Applied 6/21/24 1514)   vancomycin (VANCOCIN) 1,000 mg in D5W 250 mL IVPB (Vial-Mate) (has no administration in time range)   sodium chloride 0.9% bolus 1,000 mL 1,000 mL (has no administration in time range)   sodium chloride 0.9% flush 10 mL (has no administration in time range)   naloxone 0.4 mg/mL injection 0.02 mg (has no administration in time range)   heparin (porcine) injection 5,000 Units (has no administration in time range)   acetaminophen tablet 650 mg (has no administration in time range)   polyethylene glycol packet 17 g (has no administration in time range)   ondansetron injection 4 mg (has no administration in time range)   melatonin tablet 6 mg (has no administration in time range)   glucose chewable tablet 16 g (has no administration in time range)   glucose chewable tablet 24 g (has no administration in time range)   glucagon (human recombinant)  injection 1 mg (has no administration in time range)   insulin aspart U-100 pen 0-5 Units (has no administration in time range)   dextrose 10% bolus 125 mL 125 mL (has no administration in time range)   dextrose 10% bolus 250 mL 250 mL (has no administration in time range)   hydrALAZINE injection 5 mg (has no administration in time range)   amiodarone tablet 200 mg (has no administration in time range)   gabapentin capsule 300 mg (has no administration in time range)   levothyroxine tablet 200 mcg (has no administration in time range)   atorvastatin tablet 80 mg (has no administration in time range)   sodium chloride 0.9% bolus 1,000 mL 1,000 mL (0 mLs Intravenous Stopped 6/21/24 1613)   HYDROmorphone injection 0.5 mg (0.5 mg Intravenous Given 6/21/24 1515)   acetaminophen tablet 650 mg (650 mg Oral Given 6/21/24 1515)   sodium chloride 0.9% bolus 1,000 mL 1,000 mL (1,000 mLs Intravenous New Bag 6/21/24 1833)     Medical Decision Making  Hemodynamically stable. Afebrile. Phonating and protecting the airway spontaneously. No clinical evidence for cardiovascular instability or impending airway compromise. Examination as above.  Prior medical records reviewed. Per ED course. Current co-morbidities considered that will impact clinical decision making include as above.    Plan:  Labs, bladder scan, urine, MRI lumbar spine.       Amount and/or Complexity of Data Reviewed  Radiology: ordered.    Risk  OTC drugs.  Prescription drug management.  Decision regarding hospitalization.               ED Course as of 06/21/24 1943 Fri Jun 21, 2024   1417 Recent admission note reviewed, hx of episode of renal failure previously.  [BG]   1509 Labs reviewed.  CBC stable.  CMP showing acute renal failure that has returned, most recent creatinine was 1.0 from a month ago. [BG]   1817 Prior admission notes showing MICHELLE at that time was due to IVVD.  [BG]   1835 MRI reviewed.  [BG]      ED Course User Index  [BG] Adrian Cabral MD              Will admit for return of MICHELLE.     Critical care time spent on the evaluation and treatment of severe organ dysfunction, review of pertinent labs and imaging studies, discussions with consulting providers and discussions with patient/family: 35 minutes.                Clinical Impression:  Final diagnoses:  [M79.5] Foreign body (FB) in soft tissue          ED Disposition Condition    Admit                 Adrian Cabral MD  06/21/24 1940

## 2024-06-21 NOTE — ADMISSIONCARE
AdmissionCare    Guideline: Renal Failure (Acute) - INPT, Inpatient    Based on the indications selected for the patient, the bed status of Inpatient was determined to be MET    The following indications were selected as present at the time of evaluation of the patient:      - Clinical Indications for Admission to Inpatient Care            - Admission is indicated for 1 or more of the following:      - Within past 7 days, rise in serum creatinine to 3 times its baseline value or higher    AdmissionCare documentation entered by: Nicole Orozco    [facility], 28th edition, Copyright © 2024 [facility], [facility] All Rights Reserved.  [id number]:00

## 2024-06-22 PROBLEM — L02.91 SOFT TISSUE ABSCESS: Status: RESOLVED | Noted: 2022-08-09 | Resolved: 2024-06-22

## 2024-06-22 PROBLEM — I48.92 ATRIAL FLUTTER WITH RAPID VENTRICULAR RESPONSE: Status: RESOLVED | Noted: 2022-08-08 | Resolved: 2024-06-22

## 2024-06-22 PROBLEM — E83.39 HYPERPHOSPHATEMIA: Status: RESOLVED | Noted: 2024-05-05 | Resolved: 2024-06-22

## 2024-06-22 PROBLEM — N30.90 CYSTITIS: Status: ACTIVE | Noted: 2024-06-22

## 2024-06-22 PROBLEM — U07.1 LAB TEST POSITIVE FOR DETECTION OF COVID-19 VIRUS: Status: RESOLVED | Noted: 2022-08-08 | Resolved: 2024-06-22

## 2024-06-22 PROBLEM — R55 NEAR SYNCOPE: Status: RESOLVED | Noted: 2019-09-16 | Resolved: 2024-06-22

## 2024-06-22 PROBLEM — K35.80 ACUTE APPENDICITIS: Status: RESOLVED | Noted: 2024-05-04 | Resolved: 2024-06-22

## 2024-06-22 PROBLEM — E11.29 TYPE 2 DIABETES MELLITUS WITH KIDNEY COMPLICATION, WITHOUT LONG-TERM CURRENT USE OF INSULIN: Chronic | Status: ACTIVE | Noted: 2017-10-17

## 2024-06-22 PROBLEM — E87.1 HYPONATREMIA: Status: ACTIVE | Noted: 2024-06-22

## 2024-06-22 LAB
ACANTHOCYTES BLD QL SMEAR: PRESENT
ANION GAP SERPL CALC-SCNC: 13 MMOL/L (ref 8–16)
ANISOCYTOSIS BLD QL SMEAR: ABNORMAL
BASOPHILS NFR BLD: 1.7 % (ref 0–1.9)
BUN SERPL-MCNC: 52 MG/DL (ref 8–23)
BURR CELLS BLD QL SMEAR: ABNORMAL
C3 SERPL-MCNC: 129 MG/DL (ref 50–180)
CALCIUM SERPL-MCNC: 8 MG/DL (ref 8.7–10.5)
CHLORIDE SERPL-SCNC: 97 MMOL/L (ref 95–110)
CO2 SERPL-SCNC: 16 MMOL/L (ref 23–29)
CREAT SERPL-MCNC: 6.4 MG/DL (ref 0.5–1.4)
DIFFERENTIAL METHOD BLD: ABNORMAL
EOSINOPHIL NFR BLD: 0 % (ref 0–8)
EOSINOPHIL URNS QL WRIGHT STN: NORMAL
ERYTHROCYTE [DISTWIDTH] IN BLOOD BY AUTOMATED COUNT: 18.4 % (ref 11.5–14.5)
EST. GFR  (NO RACE VARIABLE): 9 ML/MIN/1.73 M^2
ESTIMATED AVG GLUCOSE: 111 MG/DL (ref 68–131)
GLUCOSE SERPL-MCNC: 130 MG/DL (ref 70–110)
GROUP A STREP, MOLECULAR: NEGATIVE
HBA1C MFR BLD: 5.5 % (ref 4–5.6)
HCT VFR BLD AUTO: 31.5 % (ref 40–54)
HGB BLD-MCNC: 9.7 G/DL (ref 14–18)
HYPOCHROMIA BLD QL SMEAR: ABNORMAL
IMM GRANULOCYTES # BLD AUTO: ABNORMAL K/UL (ref 0–0.04)
IMM GRANULOCYTES NFR BLD AUTO: ABNORMAL % (ref 0–0.5)
LYMPHOCYTES NFR BLD: 20 % (ref 18–48)
MCH RBC QN AUTO: 23.2 PG (ref 27–31)
MCHC RBC AUTO-ENTMCNC: 30.8 G/DL (ref 32–36)
MCV RBC AUTO: 75 FL (ref 82–98)
MONOCYTES NFR BLD: 8.3 % (ref 4–15)
NEUTROPHILS NFR BLD: 66.7 % (ref 38–73)
NEUTS BAND NFR BLD MANUAL: 3.3 %
NRBC BLD-RTO: 0 /100 WBC
OVALOCYTES BLD QL SMEAR: ABNORMAL
PLATELET # BLD AUTO: 272 K/UL (ref 150–450)
PLATELET BLD QL SMEAR: ABNORMAL
PMV BLD AUTO: 9.3 FL (ref 9.2–12.9)
POCT GLUCOSE: 120 MG/DL (ref 70–110)
POCT GLUCOSE: 127 MG/DL (ref 70–110)
POCT GLUCOSE: 170 MG/DL (ref 70–110)
POIKILOCYTOSIS BLD QL SMEAR: ABNORMAL
POTASSIUM SERPL-SCNC: 4.2 MMOL/L (ref 3.5–5.1)
PROCALCITONIN SERPL IA-MCNC: 1.54 NG/ML
RBC # BLD AUTO: 4.19 M/UL (ref 4.6–6.2)
SODIUM SERPL-SCNC: 126 MMOL/L (ref 136–145)
SPHEROCYTES BLD QL SMEAR: ABNORMAL
TOXIC GRANULES BLD QL SMEAR: ABNORMAL
UUN UR-MCNC: 140 MG/DL (ref 140–1050)
WBC # BLD AUTO: 1.9 K/UL (ref 3.9–12.7)

## 2024-06-22 PROCEDURE — 99900035 HC TECH TIME PER 15 MIN (STAT)

## 2024-06-22 PROCEDURE — 86160 COMPLEMENT ANTIGEN: CPT | Performed by: INTERNAL MEDICINE

## 2024-06-22 PROCEDURE — A4217 STERILE WATER/SALINE, 500 ML: HCPCS | Performed by: INTERNAL MEDICINE

## 2024-06-22 PROCEDURE — 11000001 HC ACUTE MED/SURG PRIVATE ROOM

## 2024-06-22 PROCEDURE — 25000003 PHARM REV CODE 250: Performed by: STUDENT IN AN ORGANIZED HEALTH CARE EDUCATION/TRAINING PROGRAM

## 2024-06-22 PROCEDURE — 87205 SMEAR GRAM STAIN: CPT | Performed by: INTERNAL MEDICINE

## 2024-06-22 PROCEDURE — 87651 STREP A DNA AMP PROBE: CPT | Performed by: INTERNAL MEDICINE

## 2024-06-22 PROCEDURE — 94761 N-INVAS EAR/PLS OXIMETRY MLT: CPT

## 2024-06-22 PROCEDURE — 80048 BASIC METABOLIC PNL TOTAL CA: CPT | Performed by: INTERNAL MEDICINE

## 2024-06-22 PROCEDURE — 25000003 PHARM REV CODE 250: Performed by: INTERNAL MEDICINE

## 2024-06-22 PROCEDURE — 63600175 PHARM REV CODE 636 W HCPCS: Performed by: STUDENT IN AN ORGANIZED HEALTH CARE EDUCATION/TRAINING PROGRAM

## 2024-06-22 PROCEDURE — 85007 BL SMEAR W/DIFF WBC COUNT: CPT | Performed by: INTERNAL MEDICINE

## 2024-06-22 PROCEDURE — 85027 COMPLETE CBC AUTOMATED: CPT | Performed by: INTERNAL MEDICINE

## 2024-06-22 PROCEDURE — 83036 HEMOGLOBIN GLYCOSYLATED A1C: CPT | Performed by: INTERNAL MEDICINE

## 2024-06-22 PROCEDURE — 36415 COLL VENOUS BLD VENIPUNCTURE: CPT | Performed by: INTERNAL MEDICINE

## 2024-06-22 PROCEDURE — 5A09357 ASSISTANCE WITH RESPIRATORY VENTILATION, LESS THAN 24 CONSECUTIVE HOURS, CONTINUOUS POSITIVE AIRWAY PRESSURE: ICD-10-PCS | Performed by: INTERNAL MEDICINE

## 2024-06-22 PROCEDURE — 63600175 PHARM REV CODE 636 W HCPCS: Performed by: INTERNAL MEDICINE

## 2024-06-22 PROCEDURE — 21400001 HC TELEMETRY ROOM

## 2024-06-22 PROCEDURE — 84145 PROCALCITONIN (PCT): CPT | Performed by: INTERNAL MEDICINE

## 2024-06-22 RX ORDER — OXYCODONE AND ACETAMINOPHEN 5; 325 MG/1; MG/1
1 TABLET ORAL EVERY 4 HOURS PRN
Status: DISCONTINUED | OUTPATIENT
Start: 2024-06-22 | End: 2024-06-27 | Stop reason: HOSPADM

## 2024-06-22 RX ORDER — HEPARIN SODIUM 5000 [USP'U]/ML
5000 INJECTION, SOLUTION INTRAVENOUS; SUBCUTANEOUS EVERY 8 HOURS
Status: DISCONTINUED | OUTPATIENT
Start: 2024-06-22 | End: 2024-06-27 | Stop reason: HOSPADM

## 2024-06-22 RX ADMIN — SODIUM BICARBONATE: 84 INJECTION, SOLUTION INTRAVENOUS at 10:06

## 2024-06-22 RX ADMIN — HEPARIN SODIUM 5000 UNITS: 5000 INJECTION INTRAVENOUS; SUBCUTANEOUS at 09:06

## 2024-06-22 RX ADMIN — LEVOTHYROXINE SODIUM 200 MCG: 0.1 TABLET ORAL at 06:06

## 2024-06-22 RX ADMIN — SODIUM CHLORIDE, POTASSIUM CHLORIDE, SODIUM LACTATE AND CALCIUM CHLORIDE 1000 ML: 600; 310; 30; 20 INJECTION, SOLUTION INTRAVENOUS at 07:06

## 2024-06-22 RX ADMIN — ATORVASTATIN CALCIUM 80 MG: 40 TABLET, FILM COATED ORAL at 08:06

## 2024-06-22 RX ADMIN — HEPARIN SODIUM 5000 UNITS: 5000 INJECTION INTRAVENOUS; SUBCUTANEOUS at 01:06

## 2024-06-22 RX ADMIN — DOCUSATE SODIUM 100 MG: 100 CAPSULE, LIQUID FILLED ORAL at 08:06

## 2024-06-22 RX ADMIN — OXYCODONE HYDROCHLORIDE AND ACETAMINOPHEN 1 TABLET: 5; 325 TABLET ORAL at 08:06

## 2024-06-22 RX ADMIN — SODIUM BICARBONATE: 84 INJECTION, SOLUTION INTRAVENOUS at 09:06

## 2024-06-22 RX ADMIN — OXYCODONE HYDROCHLORIDE AND ACETAMINOPHEN 1 TABLET: 5; 325 TABLET ORAL at 03:06

## 2024-06-22 RX ADMIN — GABAPENTIN 300 MG: 300 CAPSULE ORAL at 08:06

## 2024-06-22 RX ADMIN — MEROPENEM 500 MG: 500 INJECTION INTRAVENOUS at 10:06

## 2024-06-22 RX ADMIN — MEROPENEM 500 MG: 500 INJECTION INTRAVENOUS at 09:06

## 2024-06-22 NOTE — ASSESSMENT & PLAN NOTE
- Previous culture with enteroccus and discharged with augmentin  - given vanc in ED - add on meropenem for now for ESBL coverage and hypotension this AM  - f/u urine cultures and de-escalate

## 2024-06-22 NOTE — ASSESSMENT & PLAN NOTE
Patient with Paroxysmal (<7 days) atrial fibrillation which is controlled currently with Beta Blocker and Amiodarone. Patient is currently in sinus rhythm.[id number] Score: 1.  Anticoagulation indicated. Anticoagulation done with Xarelto 15mg daily .  Will hold for now given MICHELLE and possible need for renal bx if no significant improvement in renal function.  Will resume tomorrow if renal function improves.

## 2024-06-22 NOTE — NURSING
...............[facility]  Nurses Note -- 4 Eyes      6/22/2024       Skin assessed on: Admit      [x] No Pressure Injuries Present    []Prevention Measures Documented    [] Yes LDA  for Pressure Injury Previously documented     [] Yes New Pressure Injury Discovered   [] LDA for New Pressure Injury Added      Attending RN:  Kellie Sales RN     Second RN:  LAM Barkley

## 2024-06-22 NOTE — SUBJECTIVE & OBJECTIVE
Past Medical History:   Diagnosis Date    Acquired hypothyroidism     Atrial fibrillation 09/16/2019    Diabetes mellitus     High cholesterol     History of DVT (deep vein thrombosis)     History of pulmonary embolism     Hypertension     LARRY (obstructive sleep apnea)     S/P IVC filter        Past Surgical History:   Procedure Laterality Date    THYROIDECTOMY, PARTIAL  2006    TONSILLECTOMY      TREATMENT OF CARDIAC ARRHYTHMIA  9/16/2019    Procedure: Cardioversion or Defibrillation;  Surgeon: Deven Vasquez MD;  Location: [facility] CATH LAB;  Service: Cardiology;;       Review of patient's allergies indicates:   Allergen Reactions    Contrast media Hives       No current facility-administered medications on file prior to encounter.     Current Outpatient Medications on File Prior to Encounter   Medication Sig    acetaminophen (TYLENOL) 500 MG tablet Take 500 mg by mouth every 6 (six) hours as needed for Pain.    amiodarone (PACERONE) 200 MG Tab Take 200 mg by mouth once daily.    calcium carbonate (TUMS) 200 mg calcium (500 mg) chewable tablet Take 2 tablets (1,000 mg total) by mouth 2 (two) times daily.    cholecalciferol, vitamin D3, (VITAMIN D3) 50 mcg (2,000 unit) Tab Take by mouth once daily.    EPINEPHrine (EPIPEN) 0.3 mg/0.3 mL AtIn     ergocalciferol (ERGOCALCIFEROL) 50,000 unit Cap Take 50,000 Units by mouth every 7 days.    ferrous gluconate 324 mg (37.5 mg iron) Tab tablet Take 1 tablet (324 mg total) by mouth 2 (two) times daily with meals.    gabapentin (NEURONTIN) 600 MG tablet Take 1 tablet by mouth nightly.    levothyroxine (SYNTHROID) 200 MCG tablet Take 200 mcg by mouth before breakfast.    metformin (GLUCOPHAGE) 1000 MG tablet Take 1,000 mg by mouth 2 (two) times daily with meals.    metoprolol tartrate (LOPRESSOR) 25 MG tablet Take 1 tablet (25 mg total) by mouth 2 (two) times daily.    olmesartan (BENICAR) 40 MG tablet Take 40 mg by mouth once daily.    omega-3 fatty acids/fish oil (FISH  OIL-OMEGA-3 FATTY ACIDS) 300-1,000 mg capsule Take 1 capsule by mouth 2 (two) times a day.    omeprazole (PRILOSEC) 20 MG capsule     rosuvastatin (CRESTOR) 40 MG Tab Take 10 mg by mouth every evening.    [DISCONTINUED] sotalol (BETAPACE) 80 MG tablet Take by mouth 2 (two) times daily.     Family History    Family history is unknown by patient.       Tobacco Use    Smoking status: Never    Smokeless tobacco: Never   Substance and Sexual Activity    Alcohol use: Not Currently     Comment: occasionally    Drug use: No    Sexual activity: Not Currently     Review of Systems   Constitutional:  Positive for fatigue. Negative for activity change, appetite change, chills, diaphoresis and fever.   HENT:  Negative for congestion, rhinorrhea and sore throat.    Eyes:  Negative for visual disturbance.   Respiratory:  Negative for cough, chest tightness, shortness of breath and wheezing.    Cardiovascular:  Negative for chest pain, palpitations and leg swelling.   Gastrointestinal:  Negative for abdominal pain, constipation, nausea and vomiting.   Genitourinary:  Positive for dysuria and flank pain. Negative for decreased urine volume.   Musculoskeletal:  Negative for arthralgias, joint swelling and myalgias.   Neurological:  Positive for weakness. Negative for dizziness, light-headedness and headaches.   Psychiatric/Behavioral:  The patient is not nervous/anxious.      Objective:     Vital Signs (Most Recent):  Temp: 97.9 °F (36.6 °C) (06/21/24 2325)  Pulse: (!) 56 (06/22/24 0253)  Resp: 16 (06/21/24 2325)  BP: 110/72 (06/21/24 2325)  SpO2: 100 % (06/21/24 2325) Vital Signs (24h Range):  Temp:  [97.5 °F (36.4 °C)-98.4 °F (36.9 °C)] 97.9 °F (36.6 °C)  Pulse:  [56-64] 56  Resp:  [16-20] 16  SpO2:  [96 %-100 %] 100 %  BP: ()/(53-75) 110/72     Weight: 132.5 kg (292 lb 1.8 oz)  Body mass index is 43.14 kg/m².     Physical Exam  Vitals and nursing note reviewed.   Constitutional:       General: He is not in acute distress.      Appearance: Normal appearance. He is obese. He is not ill-appearing, toxic-appearing or diaphoretic.   HENT:      Head: Normocephalic and atraumatic.      Nose: Nose normal. No congestion or rhinorrhea.      Mouth/Throat:      Mouth: Mucous membranes are moist.      Pharynx: Oropharynx is clear. No oropharyngeal exudate or posterior oropharyngeal erythema.   Eyes:      General: No scleral icterus.     Extraocular Movements: Extraocular movements intact.      Conjunctiva/sclera: Conjunctivae normal.      Pupils: Pupils are equal, round, and reactive to light.   Neck:      Vascular: No carotid bruit.   Cardiovascular:      Rate and Rhythm: Normal rate and regular rhythm.      Pulses: Normal pulses.      Heart sounds: Normal heart sounds. No murmur heard.     No friction rub. No gallop.   Pulmonary:      Effort: Pulmonary effort is normal. No respiratory distress.      Breath sounds: Normal breath sounds. No wheezing, rhonchi or rales.   Abdominal:      General: Bowel sounds are normal. There is no distension.      Palpations: Abdomen is soft.      Tenderness: There is no abdominal tenderness. There is no right CVA tenderness, left CVA tenderness, guarding or rebound.   Musculoskeletal:         General: No swelling. Normal range of motion.      Cervical back: Normal range of motion and neck supple.      Right lower leg: No edema.      Left lower leg: No edema.   Skin:     General: Skin is warm.      Capillary Refill: Capillary refill takes less than 2 seconds.      Coloration: Skin is not pale.   Neurological:      General: No focal deficit present.      Mental Status: He is alert and oriented to person, place, and time.      Cranial Nerves: No cranial nerve deficit.      Motor: No weakness.      Coordination: Coordination normal.   Psychiatric:         Mood and Affect: Mood normal.         Behavior: Behavior normal.              CRANIAL NERVES     CN III, IV, VI   Pupils are equal, round, and reactive to light.        Significant Labs: All pertinent labs within the past 24 hours have been reviewed.  Recent Lab Results         06/21/24  1944   06/21/24  1943   06/21/24  1741   06/21/24  1415        Albumin       3.0       ALP       61       ALT       13       Anion Gap       13       Appearance, UA     Cloudy         AST       21       Bacteria, UA     Moderate         Baso #       0.01       Basophil %       0.4       Bilirubin (UA)     Negative         BILIRUBIN TOTAL       0.6  Comment: For infants and newborns, interpretation of results should be based  on gestational age, weight and in agreement with clinical  observations.    Premature Infant recommended reference ranges:  Up to 24 hours.............<8.0 mg/dL  Up to 48 hours............<12.0 mg/dL  3-5 days..................<15.0 mg/dL  6-29 days.................<15.0 mg/dL         BUN       53       Calcium       8.0       Chloride       97       CO2       17       Color, UA     Yellow         Creatinine       6.7       Urine Creatinine     50.6         Differential Method       Automated       eGFR       9       Eos #       0.2       Eos %       6.7       Glucose       113       Glucose, UA     Negative         Gran # (ANC)       2.0       Gran %       72.4       Hematocrit       29.0       Hemoglobin       9.0       Hyaline Casts, UA     4         Immature Grans (Abs)       0.01  Comment: Mild elevation in immature granulocytes is non specific and   can be seen in a variety of conditions including stress response,   acute inflammation, trauma and pregnancy. Correlation with other   laboratory and clinical findings is essential.         Immature Granulocytes       0.4       INR 1.2  Comment: Coumadin Therapy:  2.0 - 3.0 for INR for all indicators except mechanical heart valves  and antiphospholipid syndromes which should use 2.5 - 3.5.               Ketones, UA     Negative         Lactic Acid Level 0.9  Comment: Falsely low lactic acid results can be found in samples    containing >=13.0 mg/dL total bilirubin and/or >=3.5 mg/dL   direct bilirubin.               Leukocyte Esterase, UA     3+         Lymph #       0.5       Lymph %       18.6       MCH       23.0       MCHC       31.0       MCV       74       Microscopic Comment     SEE COMMENT  Comment: Other formed elements not mentioned in the report are not   present in the microscopic examination.            Mono #       0.0       Mono %       1.5       MPV       9.3       NITRITE UA     Negative         NON-SQUAM EPITH     2         nRBC       0       Blood, UA     2+         pH, UA     6.0         Platelet Count       263       POCT Glucose   103           Potassium       4.4       PROTEIN TOTAL       6.8       Protein, UA     1+  Comment: Recommend a 24 hour urine protein or a urine   protein/creatinine ratio if globulin induced proteinuria is  clinically suspected.           PT 13.2             RBC       3.92       RBC, UA     17         RDW       18.2       Sodium       127       Sodium, Urine     39  Comment: The random urine reference ranges provided were established   for 24 hour urine collections.  No reference ranges exist for  random urine specimens.  Correlate clinically.           Spec Grav UA     1.010         Specimen UA     Urine, Clean Catch         Squam Epithel, UA     10         Unclassified Crystals     Moderate         UROBILINOGEN UA     Negative         WBC, UA     >100         WBC       2.69               Significant Imaging: I have reviewed all pertinent imaging results/findings within the past 24 hours.    Component Value Units Date/Time    CT Abdomen Pelvis Without Contrast [[id number]] (Abnormal) Resulted: 06/21/24 2033   Order Status: Completed Updated: 06/21/24 2036   Narrative:     EXAMINATION:  CT ABDOMEN PELVIS WITHOUT    CLINICAL HISTORY:  Dysuria and bladder spasms.    TECHNIQUE:  5 mm unenhanced axial images from the lung bases through the greater trochanters were performed.  Coronal and  sagittal reformatted images were provided.    COMPARISON:  05/04/2024    FINDINGS:  Within the limits of a noncontrast examination, the liver, spleen, pancreas, left kidney and adrenal glands are unremarkable.  The gallbladder contains multiple gallstones.    There is a 6 mm nonobstructing right renal calculus.    There is no gross abdominal adenopathy or ascites.  Moderate atherosclerotic changes are present.    The appendix is dilated measuring up to 10 mm.  On the previous examination, it was also dilated and measured 12 mm.  There is no periappendiceal stranding or fluid.  There are no pelvic masses or adenopathy.  There is no free pelvic fluid.  Mild circumferential thickening of the urinary bladder wall is seen.    At the lung bases, there is mild basilar atelectatic change.  There are multiple micro nodules.  There are coronary arterial calcifications.    Spondylolysis and spondylolisthesis are seen.   Impression:       Cholelithiasis.    6 mm nonobstructing right renal calculus.    Mild circumferential thickening of the urinary bladder.  Consider correlation with urinalysis to evaluate for acute cystitis.    Dilated appendix measuring 10 mm.  No periappendiceal infiltration.    Micronodules at the lung bases.  For multiple solid nodules all <6 mm, Fleischner Society 2017 guidelines recommend no routine follow up for a low risk patient, or follow up with non-contrast chest CT at 12 months after discovery in a high risk patient.    This report was flagged in Epic as abnormal.      Electronically signed by: Roxana Maciel  Date: 06/21/2024  Time: 20:33   MRI Lumbar Spine Without Contrast [[id number]] Resulted: 06/21/24 1824   Order Status: Completed Updated: 06/21/24 1826   Narrative:     EXAMINATION:  MRI LUMBAR SPINE WITHOUT CONTRAST    CLINICAL HISTORY:  bilateral leg weakness;    TECHNIQUE:  Multiplanar, multisequence MR images were acquired from the thoracolumbar junction to the sacrum without the  administration of contrast.    COMPARISON:  CT scan dated 05/04/2024.    FINDINGS:  There is unchanged appearance of grade 1 anterolisthesis of L5 on S1 with bilateral pars defects.  The remainder of the lumbar alignment is within normal limits.    The vertebral body heights are maintained.  The bone marrow signal is within normal limits.  There is no evidence of a fracture.    The conus terminates at the level of L1.  The cauda equina nerve roots are within normal limits.    There is hypertrophy of the posterior elements.  There is multilevel disc desiccation.  Evaluation of the individual disc levels reveals the following:    L1-L2, there is a disc bulge along with facet hypertrophy and ligamentum flavum hypertrophy.  The spinal canal is within normal limits.  There is mild bilateral neural foraminal narrowing.    L2-L3, there is a disc bulge along with facet hypertrophy and ligamentum flavum hypertrophy.  The spinal canal is within normal limits.  There is mild bilateral neural foraminal narrowing.    L3-L4, there is a disc bulge along with facet hypertrophy and ligamentum flavum hypertrophy.  There is mild spinal canal narrowing.  There is mild bilateral neural foraminal narrowing.    L4-L5, there is a disc bulge along with facet hypertrophy and ligamentum flavum hypertrophy.  There is narrowing the lateral recesses.  There is mild narrowing of the spinal canal.  There is moderate bilateral neural foraminal narrowing.    L5-S1, there is a disc bulge along with facet hypertrophy and ligamentum flavum hypertrophy.  There is superimposed central disc protrusion.  There are bilateral foraminal disc protrusions too.  There is impinging on the exiting bilateral S1 nerve roots.  There is severe bilateral neural foraminal narrowing.    There is atrophy of the paraspinous musculature.  There is edema within the subcutaneous tissues of the lower lumbar spine.  The remainder of the paraspinal soft tissues are within normal  limits.   Impression:       Grade I L5-S1 spondylolisthesis with associated severe bilateral neural foraminal narrowing and marked narrowing the lateral recesses.  There is impinging on the exiting bilateral L5 nerve roots.    Moderate narrowing of the spinal canal at the L5-S1 level secondary to the degree of anterolisthesis and uncovering of the disc.    Additional multilevel degenerative changes in the lumbar spine as above.      Electronically signed by: Chevy Collins MD  Date: 06/21/2024  Time: 18:24   X-Ray Orbit Eye Foreign Body Bilat [[id number]] Resulted: 06/21/24 1623   Order Status: Completed Updated: 06/21/24 1626   Narrative:     EXAMINATION:  XR ORBIT EYE FOREIGN BODY BILAT    CLINICAL HISTORY:  Residual foreign body in soft tissue    TECHNIQUE:  AP, lateral and oblique views of the orbits bilaterally    COMPARISON:  None    FINDINGS:  No evidence for radiopaque foreign body projects over the orbits bilaterally.  Bony orbits are grossly intact.  No significant opacification visualized paranasal sinuses.  Further evaluation as warranted clinically.   Impression:       Please see above      Electronically signed by: Noe Mckeon DO  Date: 06/21/2024  Time: 16:23

## 2024-06-22 NOTE — NURSING
PER handoff received from LAM Hopkins     Pt resting in bed quietly. NAD noted. No c/o pain.     Fall and safety precautions maintained. Bed alarm activated and audible.. Bed locked in lowest position, with side rails up x2. Call bell and personal items within reach

## 2024-06-22 NOTE — PROGRESS NOTES
Date of Admission:6/21/2024    SUBJECTIVE:notes feeling fair    Current Facility-Administered Medications   Medication    acetaminophen tablet 650 mg    amiodarone tablet 200 mg    atorvastatin tablet 80 mg    dextrose 10% bolus 125 mL 125 mL    dextrose 10% bolus 250 mL 250 mL    docusate sodium capsule 100 mg    gabapentin capsule 300 mg    glucagon (human recombinant) injection 1 mg    glucose chewable tablet 16 g    glucose chewable tablet 24 g    heparin (porcine) injection 5,000 Units    hydrALAZINE injection 5 mg    insulin aspart U-100 pen 0-5 Units    levothyroxine tablet 200 mcg    melatonin tablet 6 mg    meropenem (MERREM) 500 mg in 0.9% NaCl 100 mL IVPB (MB+)    naloxone 0.4 mg/mL injection 0.02 mg    ondansetron injection 4 mg    polyethylene glycol packet 17 g    sodium chloride 0.9% flush 10 mL       Wt Readings from Last 3 Encounters:   06/21/24 132.5 kg (292 lb 1.8 oz)   06/13/24 127.6 kg (281 lb 4.9 oz)   05/22/24 134.2 kg (295 lb 13.7 oz)     Temp Readings from Last 3 Encounters:   06/22/24 97.4 °F (36.3 °C)   05/11/24 98.4 °F (36.9 °C) (Oral)   10/13/23 98.2 °F (36.8 °C) (Oral)     BP Readings from Last 3 Encounters:   06/22/24 (!) 125/93   06/13/24 120/79   05/22/24 125/85     Pulse Readings from Last 3 Encounters:   06/22/24 (!) 59   06/13/24 62   05/22/24 63       Intake/Output Summary (Last 24 hours) at 6/22/2024 0859  Last data filed at 6/21/2024 2329  Gross per 24 hour   Intake 2239 ml   Output 300 ml   Net 1939 ml       PE:  GEN:wd male in nad  HEENT:ncat,eomi,mm  CVS:s1s2 regular  PULM:ctab  ABD:bs,soft  EXT:no leg edema  NEURO:awake    Recent Labs   Lab 06/22/24  0628   *   *   K 4.2   CL 97   CO2 16*   BUN 52*   CREATININE 6.4*   CALCIUM 8.0*       Lab Results   Component Value Date    .8 (H) 05/06/2024    CALCIUM 8.0 (L) 06/22/2024    CAION 0.89 (L) 05/06/2024    PHOS 3.3 05/08/2024       Recent Labs   Lab 06/22/24  0628   WBC 1.90*   RBC 4.19*   HGB 9.7*   HCT  31.5*      MCV 75*   MCH 23.2*   MCHC 30.8*           A/P:  1.boris. last boris resolved. Last week's labs were ok. Ck urine eos. Ck pvr.  2.anemia. no prbc indicated.  3.metabolic acidosis. Bicarb gtt.  4.htn.no ace/arb.  5.uti. following cx. Watch for esbl.  6.dm2. following sugars.

## 2024-06-22 NOTE — PROGRESS NOTES
[facility] Medicine  Progress Note    Patient Name: Russell Santos  MRN: [id number]  Patient Class: IP- Inpatient   Admission Date: 6/21/2024  Length of Stay: 1 days  Attending Physician: Luca Stoll MD  Primary Care Provider: Roxanne Phillip -        Subjective:     Principal Problem:MICHELLE (acute kidney injury)        HPI:  63 y.o. AAM with h/o paroxsymal afib (on xarelto 15mg PO daily and amiodarone 200mg PO daily), essential HTN, HLD, NIDDM type 2, and Chronic Leukopenia (sees Dr. Marquez) presents to the ER with c/o decresed uop and back pain. Was recently admitted to [facility] from 5/4-5/11 for MICHELLE and UTI (MICHELLE 8.8 initial creat and resolved upon d/c with creatinine 1.1-followed with Dr. Middleton and d/c home on Augmentin which he complete for Enterococcus).  States dysuira and bladder spasms for the past 3 days. Generalized weakness and fatigue. No fevers or chills. No N/V/abdominal pain. Back pain worsen than his chronic back pain.  NO chest pain, SOB/LYNCH, palpation, or lower extremity edema.     States she followed up with Nephrology on 6/11 and was doing well. Does note that he had a brandt placed when he was hospitalized and had discomfort when it was d/c. States he also recently started back on his Benicar 40mg daily for BP control.     Labs to the ER concerning for recurrent MICHELLE with BUN 53/Creatine 67.  Bicar 17.  WBC 2.69 but dose have a h/o Leukopenia being follow by Dr. Marquez.  Urine concerning for recurrent UTI.  Lactic acid normal. SBP noted to be low/normal ranging . IVF started.     CT non-contrast abd/pelvis:  Impression:  Cholelithiasis.  6 mm nonobstructing right renal calculus.  Mild circumferential thickening of the urinary bladder.  Consider correlation with urinalysis to evaluate for acute cystitis.  Dilated appendix measuring 10 mm.  No periappendiceal infiltration.  Micro nodules at the lung bases.  For multiple solid nodules all <6 mm, Fleischner Society 2017  guidelines recommend no routine follow up for a low risk patient, or follow up with non-contrast chest CT at 12 months after discovery in a high risk patient.      Started on Vanc due to recent hospitalization and cultures.     Because this patient's clinical condition (MICHELLE) is guarded and requires frequent lab checks, IVF, IV abx which required adjustments based on daily renal function, and monitor for worsening vitals due to developing sepsis , care in an alternative location isn't clinically appropriate for the reasons stated above.  We have been consulted for admission to inpatient tele.               Overview/Hospital Course:  No notes on file    Interval History: no acute issues, having low blood pressure this AM -- given bolus of fluids with improvement.     Review of Systems  Objective:     Vital Signs (Most Recent):  Temp: 97.4 °F (36.3 °C) (06/22/24 0730)  Pulse: 62 (06/22/24 1043)  Resp: 20 (06/22/24 0730)  BP: (!) 125/93 (06/22/24 0849)  SpO2: 99 % (06/22/24 0730) Vital Signs (24h Range):  Temp:  [97.4 °F (36.3 °C)-98.4 °F (36.9 °C)] 97.4 °F (36.3 °C)  Pulse:  [52-72] 62  Resp:  [16-20] 20  SpO2:  [96 %-100 %] 99 %  BP: ()/(46-93) 125/93     Weight: 132.5 kg (292 lb 1.8 oz)  Body mass index is 43.14 kg/m².    Intake/Output Summary (Last 24 hours) at 6/22/2024 1126  Last data filed at 6/22/2024 1102  Gross per 24 hour   Intake 2239 ml   Output 600 ml   Net 1639 ml         Physical Exam  Vitals and nursing note reviewed.   Constitutional:       General: He is not in acute distress.     Appearance: Normal appearance. He is obese.   Cardiovascular:      Rate and Rhythm: Normal rate and regular rhythm.      Pulses: Normal pulses.      Heart sounds: Normal heart sounds. No murmur heard.  Pulmonary:      Effort: Pulmonary effort is normal. No respiratory distress.      Breath sounds: Normal breath sounds.   Abdominal:      General: Bowel sounds are normal. There is no distension.      Palpations: Abdomen  is soft.   Musculoskeletal:         General: No swelling.   Skin:     General: Skin is warm.      Coloration: Skin is not pale.   Neurological:      General: No focal deficit present.      Mental Status: He is alert and oriented to person, place, and time.   Psychiatric:         Mood and Affect: Mood normal.         Behavior: Behavior normal.             Significant Labs: All pertinent labs within the past 24 hours have been reviewed.    Significant Imaging: I have reviewed all pertinent imaging results/findings within the past 24 hours.    Assessment/Plan:      * MICHELLE (acute kidney injury)  Presents with acute kidney injury, BUN 53/C r 6.7. FENa 4.1% suggestive of intrinsic etiology.  - Nephrology consulted  - Hold ARB.  - treat underlying UTI      Hyponatremia  Patient has hyponatremia which is controlled,We will aim to correct the sodium by 4-6mEq in 24 hours. We will monitor sodium Daily. The hyponatremia is due to renal insufficiency. We will obtain the following studies: Urine sodium, urine osmolality, serum osmolality or TSH, T4. We will treat the hyponatremia with IV fluids as follows: sodium bicarb. The patient's sodium results have been reviewed and are listed below.  Recent Labs   Lab 06/22/24  0628   *       Cystitis  - Previous culture with enteroccus and discharged with augmentin  - given vanc in ED - add on meropenem for now for ESBL coverage and hypotension this AM  - f/u urine cultures and de-escalate      Hypothyroidism  Resume home levothyroxine      Leukopenia  Noted to be slightly lower than baseline (3-4). No c/o fevers or chills. NO abscess on CT abd/pelvis noted. Will monitor for now. No indications for neutropenic precautions at this time.       PAF (paroxysmal atrial fibrillation)  Patient with Paroxysmal (<7 days) atrial fibrillation which is controlled currently with Beta Blocker and Amiodarone. Patient is currently in sinus rhythm.[id number] Score: 1.  Anticoagulation indicated.  Anticoagulation done with Xarelto 15mg daily .  Will hold for now given MICHELLE and possible need for renal bx if no significant improvement in renal function.  Will resume tomorrow if renal function improves.     Type 2 diabetes mellitus with kidney complication, without long-term current use of insulin  Patient's FSGs are controlled on current medication regimen.  Last A1c reviewed-   Lab Results   Component Value Date    HGBA1C 6.3 (H) 08/08/2022     Most recent fingerstick glucose reviewed-   Recent Labs   Lab 06/21/24  1943 06/22/24  0733   POCTGLUCOSE 103 120*       Current correctional scale  Low  Maintain anti-hyperglycemic dose as follows-   Antihyperglycemics (From admission, onward)      Start     Stop Route Frequency Ordered    06/21/24 2009  insulin aspart U-100 pen 0-5 Units         -- SubQ Before meals & nightly PRN 06/21/24 1909          Hold Oral hypoglycemics while patient is in the hospital. Follow up on A1c.     Hyperlipidemia  Resume stain     Essential hypertension  Chronic, controlled. Latest blood pressure and vitals reviewed-     Temp:  [97.4 °F (36.3 °C)-98.4 °F (36.9 °C)]   Pulse:  [52-72]   Resp:  [16-20]   BP: ()/(46-93)   SpO2:  [96 %-100 %] .   Home meds for hypertension were reviewed and noted below.   Hypertension Medications               metoprolol tartrate (LOPRESSOR) 25 MG tablet Take 1 tablet (25 mg total) by mouth 2 (two) times daily.    olmesartan (BENICAR) 40 MG tablet Take 40 mg by mouth once daily.            While in the hospital, will manage blood pressure as follows; Adjust home antihypertensive regimen as follows- Hold benicar for now and resume metoprolol with hold parameters for low/normal BP and HR.     Will utilize p.r.n. blood pressure medication only if patient's blood pressure greater than 180/110 and he develops symptoms such as worsening chest pain or shortness of breath.      VTE Risk Mitigation (From admission, onward)           Ordered     heparin  (porcine) injection 5,000 Units  Every 8 hours         06/22/24 0515     IP VTE HIGH RISK PATIENT  Once         06/21/24 1908     Place sequential compression device  Until discontinued         06/21/24 1908                    Discharge Planning   CASA:      Code Status: Full Code   Is the patient medically ready for discharge?:     Reason for patient still in hospital (select all that apply): Treatment                     Luca Stoll MD  Department of [facility] Medicine   [facility]

## 2024-06-22 NOTE — ASSESSMENT & PLAN NOTE
Chronic, controlled. Latest blood pressure and vitals reviewed-     Temp:  [97.4 °F (36.3 °C)-98.4 °F (36.9 °C)]   Pulse:  [52-72]   Resp:  [16-20]   BP: ()/(46-93)   SpO2:  [96 %-100 %] .   Home meds for hypertension were reviewed and noted below.   Hypertension Medications               metoprolol tartrate (LOPRESSOR) 25 MG tablet Take 1 tablet (25 mg total) by mouth 2 (two) times daily.    olmesartan (BENICAR) 40 MG tablet Take 40 mg by mouth once daily.            While in the hospital, will manage blood pressure as follows; Adjust home antihypertensive regimen as follows- Hold benicar for now and resume metoprolol with hold parameters for low/normal BP and HR.     Will utilize p.r.n. blood pressure medication only if patient's blood pressure greater than 180/110 and he develops symptoms such as worsening chest pain or shortness of breath.

## 2024-06-22 NOTE — NURSING
[facility] [place]  Nurses Note -- 4 Eyes      6/22/2024       Skin assessed on: Q Shift      [x] No Pressure Injuries Present    [x]Prevention Measures Documented    [] Yes LDA  for Pressure Injury Previously documented     [] Yes New Pressure Injury Discovered   [] LDA for New Pressure Injury Added      Attending RN:  Dalila Rivera RN     Second RN:  LAM Hopkins

## 2024-06-22 NOTE — ASSESSMENT & PLAN NOTE
Patient has hyponatremia which is controlled,We will aim to correct the sodium by 4-6mEq in 24 hours. We will monitor sodium Daily. The hyponatremia is due to renal insufficiency. We will obtain the following studies: Urine sodium, urine osmolality, serum osmolality or TSH, T4. We will treat the hyponatremia with IV fluids as follows: sodium bicarb. The patient's sodium results have been reviewed and are listed below.  Recent Labs   Lab 06/22/24  0628   *

## 2024-06-22 NOTE — PLAN OF CARE
Problem: Adult Inpatient Plan of Care  Goal: Plan of Care Review  Outcome: Progressing  Goal: Patient-Specific Goal (Individualized)  Outcome: Progressing  Goal: Absence of Hospital-Acquired Illness or Injury  Outcome: Progressing  Goal: Optimal Comfort and Wellbeing  Outcome: Progressing  Goal: Readiness for Transition of Care  Outcome: Progressing     Problem: Infection  Goal: Absence of Infection Signs and Symptoms  Outcome: Progressing     Problem: Diabetes Comorbidity  Goal: Blood Glucose Level Within Targeted Range  Outcome: Progressing     Problem: Acute Kidney Injury/Impairment  Goal: Fluid and Electrolyte Balance  Outcome: Progressing  Goal: Improved Oral Intake  Outcome: Progressing  Goal: Effective Renal Function  Outcome: Progressing     Problem: Bariatric Environmental Safety  Goal: Safety Maintained with Care  Outcome: Progressing     Problem: Skin Injury Risk Increased  Goal: Skin Health and Integrity  Outcome: Progressing

## 2024-06-22 NOTE — ASSESSMENT & PLAN NOTE
Patient with Paroxysmal (<7 days) atrial fibrillation which is controlled currently with Beta Blocker and Amiodarone. Patient is currently in sinus rhythm.[id number] Score: 1.  Anticoagulation indicated. Anticoagulation done with Xarelto 15mg daily .  Will hold for now given MICHELLE and possible need for renal bx if no significant improvement in renal function.  Will resume tomorrow if renal function improves.

## 2024-06-22 NOTE — ASSESSMENT & PLAN NOTE
Presents with acute kidney injury, BUN 53/C r 6.7. FENa 4.1% suggestive of intrinsic etiology.  - Nephrology consulted  - Hold ARB.  - treat underlying UTI

## 2024-06-22 NOTE — ASSESSMENT & PLAN NOTE
Noted to be slightly lower than baseline (3-4). No c/o fevers or chills. NO abscess on CT abd/pelvis noted. Will monitor for now. No indications for neutropenic precautions at this time.

## 2024-06-22 NOTE — PLAN OF CARE
Problem: Adult Inpatient Plan of Care  Goal: Plan of Care Review  Outcome: Progressing  Goal: Patient-Specific Goal (Individualized)  Outcome: Progressing  Goal: Absence of Hospital-Acquired Illness or Injury  Outcome: Progressing  Goal: Optimal Comfort and Wellbeing  Outcome: Progressing  Goal: Readiness for Transition of Care  Outcome: Progressing     Problem: Infection  Goal: Absence of Infection Signs and Symptoms  Outcome: Progressing     Problem: Diabetes Comorbidity  Goal: Blood Glucose Level Within Targeted Range  Outcome: Progressing     Problem: Acute Kidney Injury/Impairment  Goal: Fluid and Electrolyte Balance  Outcome: Progressing  Goal: Improved Oral Intake  Outcome: Progressing  Goal: Effective Renal Function  Outcome: Progressing     Problem: Bariatric Environmental Safety  Goal: Safety Maintained with Care  Outcome: Progressing

## 2024-06-22 NOTE — SUBJECTIVE & OBJECTIVE
Interval History: no acute issues, having low blood pressure this AM -- given bolus of fluids with improvement.     Review of Systems  Objective:     Vital Signs (Most Recent):  Temp: 97.4 °F (36.3 °C) (06/22/24 0730)  Pulse: 62 (06/22/24 1043)  Resp: 20 (06/22/24 0730)  BP: (!) 125/93 (06/22/24 0849)  SpO2: 99 % (06/22/24 0730) Vital Signs (24h Range):  Temp:  [97.4 °F (36.3 °C)-98.4 °F (36.9 °C)] 97.4 °F (36.3 °C)  Pulse:  [52-72] 62  Resp:  [16-20] 20  SpO2:  [96 %-100 %] 99 %  BP: ()/(46-93) 125/93     Weight: 132.5 kg (292 lb 1.8 oz)  Body mass index is 43.14 kg/m².    Intake/Output Summary (Last 24 hours) at 6/22/2024 1126  Last data filed at 6/22/2024 1102  Gross per 24 hour   Intake 2239 ml   Output 600 ml   Net 1639 ml         Physical Exam  Vitals and nursing note reviewed.   Constitutional:       General: He is not in acute distress.     Appearance: Normal appearance. He is obese.   Cardiovascular:      Rate and Rhythm: Normal rate and regular rhythm.      Pulses: Normal pulses.      Heart sounds: Normal heart sounds. No murmur heard.  Pulmonary:      Effort: Pulmonary effort is normal. No respiratory distress.      Breath sounds: Normal breath sounds.   Abdominal:      General: Bowel sounds are normal. There is no distension.      Palpations: Abdomen is soft.   Musculoskeletal:         General: No swelling.   Skin:     General: Skin is warm.      Coloration: Skin is not pale.   Neurological:      General: No focal deficit present.      Mental Status: He is alert and oriented to person, place, and time.   Psychiatric:         Mood and Affect: Mood normal.         Behavior: Behavior normal.             Significant Labs: All pertinent labs within the past 24 hours have been reviewed.    Significant Imaging: I have reviewed all pertinent imaging results/findings within the past 24 hours.

## 2024-06-22 NOTE — ASSESSMENT & PLAN NOTE
Follow up with repeat labs in am. IVF started and urine lytes ordered. If no significant change I renal function in am will consider nephrology consult. States he started back his benicar and noted BP to be low/normal. Will hold this as a possible cause adding to his MICHELLE. Suspect continued UTI vs. post infectious glomerulonephritis(will order C3). Started on IV vacn and f/u on cultures.

## 2024-06-22 NOTE — H&P
[facility] [place] [facility]  [facility]  [facility] & Physical    Patient Name: Russell Santos  MRN: [id number]  Patient Class: IP- Inpatient  Admission Date: 6/21/2024  Attending Physician: Dr. Xiomara Gonzalez   Primary Care Provider: Roxanne Phillip -         Patient information was obtained from patient, past medical records, and ER records.     Subjective:     Principal Problem:MICHELLE (acute kidney injury)    Chief Complaint:   Chief Complaint   Patient presents with    Urinary Tract Infection     Recently seen for MICHELLE and UTI and treated w antibiotics. Pt reports dysuria and bladder spasms. Generalized weakness and fatigue         HPI: 63 y.o. AAM with h/o paroxsymal afib (on xarelto 15mg PO daily and amiodarone 200mg PO daily), essential HTN, HLD, NIDDM type 2, and Chronic Leukopenia (sees Dr. Marquez) presents to the ER with c/o decresed uop and back pain. Was recently admitted to [facility] from 5/4-5/11 for MICHELLE and UTI (MICHELLE 8.8 initial creat and resolved upon d/c with creatinine 1.1-followed with Dr. Middleton and d/c home on Augmentin which he complete for Enterococcus).  States dysuira and bladder spasms for the past 3 days. Generalized weakness and fatigue. No fevers or chills. No N/V/abdominal pain. Back pain worsen than his chronic back pain.  NO chest pain, SOB/LYNCH, palpation, or lower extremity edema.     States she followed up with Nephrology on 6/11 and was doing well. Does note that he had a brandt placed when he was hospitalized and had discomfort when it was d/c. States he also recently started back on his Benicar 40mg daily for BP control.     Labs to the ER concerning for recurrent MICHELLE with BUN 53/Creatine 67.  Bicar 17.  WBC 2.69 but dose have a h/o Leukopenia being follow by Dr. Marquez.  Urine concerning for recurrent UTI.  Lactic acid normal. SBP noted to be low/normal ranging . IVF started.     CT non-contrast abd/pelvis:  Impression:  Cholelithiasis.  6 mm nonobstructing right renal  calculus.  Mild circumferential thickening of the urinary bladder.  Consider correlation with urinalysis to evaluate for acute cystitis.  Dilated appendix measuring 10 mm.  No periappendiceal infiltration.  Micro nodules at the lung bases.  For multiple solid nodules all <6 mm, Fleischner Society 2017 guidelines recommend no routine follow up for a low risk patient, or follow up with non-contrast chest CT at 12 months after discovery in a high risk patient.      Started on Vanc due to recent hospitalization and cultures.     Because this patient's clinical condition (MICHELLE) is guarded and requires frequent lab checks, IVF, IV abx which required adjustments based on daily renal function, and monitor for worsening vitals due to developing sepsis , care in an alternative location isn't clinically appropriate for the reasons stated above.  We have been consulted for admission to inpatient tele.               Past Medical History:   Diagnosis Date    Acquired hypothyroidism     Atrial fibrillation 09/16/2019    Diabetes mellitus     High cholesterol     History of DVT (deep vein thrombosis)     History of pulmonary embolism     Hypertension     LARRY (obstructive sleep apnea)     S/P IVC filter        Past Surgical History:   Procedure Laterality Date    THYROIDECTOMY, PARTIAL  2006    TONSILLECTOMY      TREATMENT OF CARDIAC ARRHYTHMIA  9/16/2019    Procedure: Cardioversion or Defibrillation;  Surgeon: Deven Vasquez MD;  Location: [facility] CATH LAB;  Service: Cardiology;;       Review of patient's allergies indicates:   Allergen Reactions    Contrast media Hives       No current facility-administered medications on file prior to encounter.     Current Outpatient Medications on File Prior to Encounter   Medication Sig    acetaminophen (TYLENOL) 500 MG tablet Take 500 mg by mouth every 6 (six) hours as needed for Pain.    amiodarone (PACERONE) 200 MG Tab Take 200 mg by mouth once daily.    calcium carbonate (TUMS) 200 mg  calcium (500 mg) chewable tablet Take 2 tablets (1,000 mg total) by mouth 2 (two) times daily.    cholecalciferol, vitamin D3, (VITAMIN D3) 50 mcg (2,000 unit) Tab Take by mouth once daily.    EPINEPHrine (EPIPEN) 0.3 mg/0.3 mL AtIn     ergocalciferol (ERGOCALCIFEROL) 50,000 unit Cap Take 50,000 Units by mouth every 7 days.    ferrous gluconate 324 mg (37.5 mg iron) Tab tablet Take 1 tablet (324 mg total) by mouth 2 (two) times daily with meals.    gabapentin (NEURONTIN) 600 MG tablet Take 1 tablet by mouth nightly.    levothyroxine (SYNTHROID) 200 MCG tablet Take 200 mcg by mouth before breakfast.    metformin (GLUCOPHAGE) 1000 MG tablet Take 1,000 mg by mouth 2 (two) times daily with meals.    metoprolol tartrate (LOPRESSOR) 25 MG tablet Take 1 tablet (25 mg total) by mouth 2 (two) times daily.    olmesartan (BENICAR) 40 MG tablet Take 40 mg by mouth once daily.    omega-3 fatty acids/fish oil (FISH OIL-OMEGA-3 FATTY ACIDS) 300-1,000 mg capsule Take 1 capsule by mouth 2 (two) times a day.    omeprazole (PRILOSEC) 20 MG capsule     rosuvastatin (CRESTOR) 40 MG Tab Take 10 mg by mouth every evening.    [DISCONTINUED] sotalol (BETAPACE) 80 MG tablet Take by mouth 2 (two) times daily.     Family History    Family history is unknown by patient.       Tobacco Use    Smoking status: Never    Smokeless tobacco: Never   Substance and Sexual Activity    Alcohol use: Not Currently     Comment: occasionally    Drug use: No    Sexual activity: Not Currently     Review of Systems   Constitutional:  Positive for fatigue. Negative for activity change, appetite change, chills, diaphoresis and fever.   HENT:  Negative for congestion, rhinorrhea and sore throat.    Eyes:  Negative for visual disturbance.   Respiratory:  Negative for cough, chest tightness, shortness of breath and wheezing.    Cardiovascular:  Negative for chest pain, palpitations and leg swelling.   Gastrointestinal:  Negative for abdominal pain, constipation,  nausea and vomiting.   Genitourinary:  Positive for dysuria and flank pain. Negative for decreased urine volume.   Musculoskeletal:  Negative for arthralgias, joint swelling and myalgias.   Neurological:  Positive for weakness. Negative for dizziness, light-headedness and headaches.   Psychiatric/Behavioral:  The patient is not nervous/anxious.      Objective:     Vital Signs (Most Recent):  Temp: 97.9 °F (36.6 °C) (06/21/24 2325)  Pulse: (!) 56 (06/22/24 0253)  Resp: 16 (06/21/24 2325)  BP: 110/72 (06/21/24 2325)  SpO2: 100 % (06/21/24 2325) Vital Signs (24h Range):  Temp:  [97.5 °F (36.4 °C)-98.4 °F (36.9 °C)] 97.9 °F (36.6 °C)  Pulse:  [56-64] 56  Resp:  [16-20] 16  SpO2:  [96 %-100 %] 100 %  BP: ()/(53-75) 110/72     Weight: 132.5 kg (292 lb 1.8 oz)  Body mass index is 43.14 kg/m².     Physical Exam  Vitals and nursing note reviewed.   Constitutional:       General: He is not in acute distress.     Appearance: Normal appearance. He is obese. He is not ill-appearing, toxic-appearing or diaphoretic.   HENT:      Head: Normocephalic and atraumatic.      Nose: Nose normal. No congestion or rhinorrhea.      Mouth/Throat:      Mouth: Mucous membranes are moist.      Pharynx: Oropharynx is clear. No oropharyngeal exudate or posterior oropharyngeal erythema.   Eyes:      General: No scleral icterus.     Extraocular Movements: Extraocular movements intact.      Conjunctiva/sclera: Conjunctivae normal.      Pupils: Pupils are equal, round, and reactive to light.   Neck:      Vascular: No carotid bruit.   Cardiovascular:      Rate and Rhythm: Normal rate and regular rhythm.      Pulses: Normal pulses.      Heart sounds: Normal heart sounds. No murmur heard.     No friction rub. No gallop.   Pulmonary:      Effort: Pulmonary effort is normal. No respiratory distress.      Breath sounds: Normal breath sounds. No wheezing, rhonchi or rales.   Abdominal:      General: Bowel sounds are normal. There is no distension.       Palpations: Abdomen is soft.      Tenderness: There is no abdominal tenderness. There is no right CVA tenderness, left CVA tenderness, guarding or rebound.   Musculoskeletal:         General: No swelling. Normal range of motion.      Cervical back: Normal range of motion and neck supple.      Right lower leg: No edema.      Left lower leg: No edema.   Skin:     General: Skin is warm.      Capillary Refill: Capillary refill takes less than 2 seconds.      Coloration: Skin is not pale.   Neurological:      General: No focal deficit present.      Mental Status: He is alert and oriented to person, place, and time.      Cranial Nerves: No cranial nerve deficit.      Motor: No weakness.      Coordination: Coordination normal.   Psychiatric:         Mood and Affect: Mood normal.         Behavior: Behavior normal.              CRANIAL NERVES     CN III, IV, VI   Pupils are equal, round, and reactive to light.       Significant Labs: All pertinent labs within the past 24 hours have been reviewed.  Recent Lab Results         06/21/24  1944   06/21/24  1943   06/21/24  1741   06/21/24  1415        Albumin       3.0       ALP       61       ALT       13       Anion Gap       13       Appearance, UA     Cloudy         AST       21       Bacteria, UA     Moderate         Baso #       0.01       Basophil %       0.4       Bilirubin (UA)     Negative         BILIRUBIN TOTAL       0.6  Comment: For infants and newborns, interpretation of results should be based  on gestational age, weight and in agreement with clinical  observations.    Premature Infant recommended reference ranges:  Up to 24 hours.............<8.0 mg/dL  Up to 48 hours............<12.0 mg/dL  3-5 days..................<15.0 mg/dL  6-29 days.................<15.0 mg/dL         BUN       53       Calcium       8.0       Chloride       97       CO2       17       Color, UA     Yellow         Creatinine       6.7       Urine Creatinine     50.6         Differential  Method       Automated       eGFR       9       Eos #       0.2       Eos %       6.7       Glucose       113       Glucose, UA     Negative         Gran # (ANC)       2.0       Gran %       72.4       Hematocrit       29.0       Hemoglobin       9.0       Hyaline Casts, UA     4         Immature Grans (Abs)       0.01  Comment: Mild elevation in immature granulocytes is non specific and   can be seen in a variety of conditions including stress response,   acute inflammation, trauma and pregnancy. Correlation with other   laboratory and clinical findings is essential.         Immature Granulocytes       0.4       INR 1.2  Comment: Coumadin Therapy:  2.0 - 3.0 for INR for all indicators except mechanical heart valves  and antiphospholipid syndromes which should use 2.5 - 3.5.               Ketones, UA     Negative         Lactic Acid Level 0.9  Comment: Falsely low lactic acid results can be found in samples   containing >=13.0 mg/dL total bilirubin and/or >=3.5 mg/dL   direct bilirubin.               Leukocyte Esterase, UA     3+         Lymph #       0.5       Lymph %       18.6       MCH       23.0       MCHC       31.0       MCV       74       Microscopic Comment     SEE COMMENT  Comment: Other formed elements not mentioned in the report are not   present in the microscopic examination.            Mono #       0.0       Mono %       1.5       MPV       9.3       NITRITE UA     Negative         NON-SQUAM EPITH     2         nRBC       0       Blood, UA     2+         pH, UA     6.0         Platelet Count       263       POCT Glucose   103           Potassium       4.4       PROTEIN TOTAL       6.8       Protein, UA     1+  Comment: Recommend a 24 hour urine protein or a urine   protein/creatinine ratio if globulin induced proteinuria is  clinically suspected.           PT 13.2             RBC       3.92       RBC, UA     17         RDW       18.2       Sodium       127       Sodium, Urine     39  Comment: The  random urine reference ranges provided were established   for 24 hour urine collections.  No reference ranges exist for  random urine specimens.  Correlate clinically.           Spec Grav UA     1.010         Specimen UA     Urine, Clean Catch         Squam Epithel, UA     10         Unclassified Crystals     Moderate         UROBILINOGEN UA     Negative         WBC, UA     >100         WBC       2.69               Significant Imaging: I have reviewed all pertinent imaging results/findings within the past 24 hours.    Component Value Units Date/Time    CT Abdomen Pelvis Without Contrast [[id number]] (Abnormal) Resulted: 06/21/24 2033   Order Status: Completed Updated: 06/21/24 2036   Narrative:     EXAMINATION:  CT ABDOMEN PELVIS WITHOUT    CLINICAL HISTORY:  Dysuria and bladder spasms.    TECHNIQUE:  5 mm unenhanced axial images from the lung bases through the greater trochanters were performed.  Coronal and sagittal reformatted images were provided.    COMPARISON:  05/04/2024    FINDINGS:  Within the limits of a noncontrast examination, the liver, spleen, pancreas, left kidney and adrenal glands are unremarkable.  The gallbladder contains multiple gallstones.    There is a 6 mm nonobstructing right renal calculus.    There is no gross abdominal adenopathy or ascites.  Moderate atherosclerotic changes are present.    The appendix is dilated measuring up to 10 mm.  On the previous examination, it was also dilated and measured 12 mm.  There is no periappendiceal stranding or fluid.  There are no pelvic masses or adenopathy.  There is no free pelvic fluid.  Mild circumferential thickening of the urinary bladder wall is seen.    At the lung bases, there is mild basilar atelectatic change.  There are multiple micro nodules.  There are coronary arterial calcifications.    Spondylolysis and spondylolisthesis are seen.   Impression:       Cholelithiasis.    6 mm nonobstructing right renal calculus.    Mild circumferential  thickening of the urinary bladder.  Consider correlation with urinalysis to evaluate for acute cystitis.    Dilated appendix measuring 10 mm.  No periappendiceal infiltration.    Micronodules at the lung bases.  For multiple solid nodules all <6 mm, Fleischner Society 2017 guidelines recommend no routine follow up for a low risk patient, or follow up with non-contrast chest CT at 12 months after discovery in a high risk patient.    This report was flagged in Epic as abnormal.      Electronically signed by: Roxana Maciel  Date: 06/21/2024  Time: 20:33   MRI Lumbar Spine Without Contrast [[id number]] Resulted: 06/21/24 1824   Order Status: Completed Updated: 06/21/24 1826   Narrative:     EXAMINATION:  MRI LUMBAR SPINE WITHOUT CONTRAST    CLINICAL HISTORY:  bilateral leg weakness;    TECHNIQUE:  Multiplanar, multisequence MR images were acquired from the thoracolumbar junction to the sacrum without the administration of contrast.    COMPARISON:  CT scan dated 05/04/2024.    FINDINGS:  There is unchanged appearance of grade 1 anterolisthesis of L5 on S1 with bilateral pars defects.  The remainder of the lumbar alignment is within normal limits.    The vertebral body heights are maintained.  The bone marrow signal is within normal limits.  There is no evidence of a fracture.    The conus terminates at the level of L1.  The cauda equina nerve roots are within normal limits.    There is hypertrophy of the posterior elements.  There is multilevel disc desiccation.  Evaluation of the individual disc levels reveals the following:    L1-L2, there is a disc bulge along with facet hypertrophy and ligamentum flavum hypertrophy.  The spinal canal is within normal limits.  There is mild bilateral neural foraminal narrowing.    L2-L3, there is a disc bulge along with facet hypertrophy and ligamentum flavum hypertrophy.  The spinal canal is within normal limits.  There is mild bilateral neural foraminal narrowing.    L3-L4, there  is a disc bulge along with facet hypertrophy and ligamentum flavum hypertrophy.  There is mild spinal canal narrowing.  There is mild bilateral neural foraminal narrowing.    L4-L5, there is a disc bulge along with facet hypertrophy and ligamentum flavum hypertrophy.  There is narrowing the lateral recesses.  There is mild narrowing of the spinal canal.  There is moderate bilateral neural foraminal narrowing.    L5-S1, there is a disc bulge along with facet hypertrophy and ligamentum flavum hypertrophy.  There is superimposed central disc protrusion.  There are bilateral foraminal disc protrusions too.  There is impinging on the exiting bilateral S1 nerve roots.  There is severe bilateral neural foraminal narrowing.    There is atrophy of the paraspinous musculature.  There is edema within the subcutaneous tissues of the lower lumbar spine.  The remainder of the paraspinal soft tissues are within normal limits.   Impression:       Grade I L5-S1 spondylolisthesis with associated severe bilateral neural foraminal narrowing and marked narrowing the lateral recesses.  There is impinging on the exiting bilateral L5 nerve roots.    Moderate narrowing of the spinal canal at the L5-S1 level secondary to the degree of anterolisthesis and uncovering of the disc.    Additional multilevel degenerative changes in the lumbar spine as above.      Electronically signed by: Chevy Collins MD  Date: 06/21/2024  Time: 18:24   X-Ray Orbit Eye Foreign Body Bilat [[id number]] Resulted: 06/21/24 1623   Order Status: Completed Updated: 06/21/24 1626   Narrative:     EXAMINATION:  XR ORBIT EYE FOREIGN BODY BILAT    CLINICAL HISTORY:  Residual foreign body in soft tissue    TECHNIQUE:  AP, lateral and oblique views of the orbits bilaterally    COMPARISON:  None    FINDINGS:  No evidence for radiopaque foreign body projects over the orbits bilaterally.  Bony orbits are grossly intact.  No significant opacification visualized paranasal sinuses.   Further evaluation as warranted clinically.   Impression:       Please see above      Electronically signed by: Noe Mckeon DO  Date: 06/21/2024  Time: 16:23       Assessment/Plan:     * MICHELLE (acute kidney injury)  Follow up with repeat labs in am. IVF started and urine lytes ordered. If no significant change I renal function in am will consider nephrology consult. States he started back his benicar and noted BP to be low/normal. Will hold this as a possible cause adding to his MICHELLE. Suspect continued UTI vs. post infectious glomerulonephritis(will order C3). Started on IV vacn and f/u on cultures.     Cystitis  Treat with IV vanc for now. Pharmacy consulted for further help and adjustment of his doses due to MICHELLE. Follow up on cultures. NO signs of sepsis or septic shock at this time.       PAF (paroxysmal atrial fibrillation)  Patient with Paroxysmal (<7 days) atrial fibrillation which is controlled currently with Beta Blocker and Amiodarone. Patient is currently in sinus rhythm.[id number] Score: 1.  Anticoagulation indicated. Anticoagulation done with Xarelto 15mg daily .  Will hold for now given MICHELLE and possible need for renal bx if no significant improvement in renal function.  Will resume tomorrow if renal function improves.     Type 2 diabetes mellitus with kidney complication, without long-term current use of insulin  Patient's FSGs are controlled on current medication regimen.  Last A1c reviewed-   Lab Results   Component Value Date    HGBA1C 6.3 (H) 08/08/2022     Most recent fingerstick glucose reviewed-   Recent Labs   Lab 06/21/24  1943   POCTGLUCOSE 103     Current correctional scale  Low  Maintain anti-hyperglycemic dose as follows-   Antihyperglycemics (From admission, onward)      Start     Stop Route Frequency Ordered    06/21/24 2009  insulin aspart U-100 pen 0-5 Units         -- SubQ Before meals & nightly PRN 06/21/24 1909          Hold Oral hypoglycemics while patient is in the hospital. Follow  up on A1c.     Essential hypertension  Chronic, controlled. Latest blood pressure and vitals reviewed-     Temp:  [97.5 °F (36.4 °C)-98.4 °F (36.9 °C)]   Pulse:  [56-64]   Resp:  [16-20]   BP: ()/(53-75)   SpO2:  [96 %-100 %] .   Home meds for hypertension were reviewed and noted below.   Hypertension Medications               metoprolol tartrate (LOPRESSOR) 25 MG tablet Take 1 tablet (25 mg total) by mouth 2 (two) times daily.    olmesartan (BENICAR) 40 MG tablet Take 40 mg by mouth once daily.            While in the hospital, will manage blood pressure as follows; Adjust home antihypertensive regimen as follows- Hold benicar for now and resume metoprolol with hold parameters for low/normal BP and HR.     Will utilize p.r.n. blood pressure medication only if patient's blood pressure greater than 180/110 and he develops symptoms such as worsening chest pain or shortness of breath.    Hyperlipidemia  Resume stain     Leukopenia  Noted to be slightly lower than baseline (3-4). No c/o fevers or chills. NO abscess on CT abd/pelvis noted. Will monitor for now. No indications for neutropenic precautions at this time.       Hypothyroidism  Resume home levothyroxine        VTE Risk Mitigation (From admission, onward)           Ordered     heparin (porcine) injection 5,000 Units  Every 8 hours         06/21/24 1908     IP VTE HIGH RISK PATIENT  Once         06/21/24 1908     Place sequential compression device  Until discontinued         06/21/24 1908                       Code Status: FULL CODE         AdmissionCare    Guideline: Renal Failure (Acute) - INPT, Inpatient    Based on the indications selected for the patient, the bed status of Inpatient was determined to be MET    The following indications were selected as present at the time of evaluation of the patient:      - Clinical Indications for Admission to Inpatient Care            - Admission is indicated for 1 or more of the following:      - Within past 7  days, rise in serum creatinine to 3 times its baseline value or higher    AdmissionCare documentation entered by: Nicole Orozco    [facility], 28th edition, Copyright © 2024 [facility], [facility] All Rights Reserved.  [id number]:11-05:00    Xiomara Gonzalez MD  Department of Hospital Medicine  [facility] - Emergency Dept

## 2024-06-22 NOTE — ASSESSMENT & PLAN NOTE
Chronic, controlled. Latest blood pressure and vitals reviewed-     Temp:  [97.5 °F (36.4 °C)-98.4 °F (36.9 °C)]   Pulse:  [56-64]   Resp:  [16-20]   BP: ()/(53-75)   SpO2:  [96 %-100 %] .   Home meds for hypertension were reviewed and noted below.   Hypertension Medications               metoprolol tartrate (LOPRESSOR) 25 MG tablet Take 1 tablet (25 mg total) by mouth 2 (two) times daily.    olmesartan (BENICAR) 40 MG tablet Take 40 mg by mouth once daily.            While in the hospital, will manage blood pressure as follows; Adjust home antihypertensive regimen as follows- Hold benicar for now and resume metoprolol with hold parameters for low/normal BP and HR.     Will utilize p.r.n. blood pressure medication only if patient's blood pressure greater than 180/110 and he develops symptoms such as worsening chest pain or shortness of breath.

## 2024-06-22 NOTE — ASSESSMENT & PLAN NOTE
Treat with IV vanc for now. Pharmacy consulted for further help and adjustment of his doses due to MICHELLE. Follow up on cultures. NO signs of sepsis or septic shock at this time.

## 2024-06-22 NOTE — PLAN OF CARE
Case Management Assessment     [facility]  [facility]    Patient Arrived From: Home with family  Existing Help at Home: Sara- spouse    Barriers to Discharge: None    Discharge Plan:    A. Home with family   B. Other- tbd    CM discussed discharge planning with  pt. Pt is independent and uses the following equipment: bedside commode, straight cane and a bipap. Pt's family will provide transportation home when discharged.       06/22/24 1502   Discharge Assessment   Assessment Type Discharge Planning Assessment   Confirmed/corrected address, phone number and insurance Yes   Confirmed Demographics Correct on Facesheet   Source of Information patient   When was your last doctors appointment? 06/13/24   Communicated CASA with patient/caregiver Date not available/Unable to determine   Reason For Admission Acute Kidney Injury   People in Home spouse   Facility Arrived From: Home   Do you expect to return to your current living situation? Yes   Do you have help at home or someone to help you manage your care at home? Yes   Who are your caregiver(s) and their phone number(s)? TomSara (Spouse)  [phone]   Prior to hospitilization cognitive status: Alert/Oriented   Current cognitive status: Alert/Oriented   Walking or Climbing Stairs Difficulty yes   Walking or Climbing Stairs ambulation difficulty, requires equipment;stair climbing difficulty, requires equipment   Mobility Management straight cane   Dressing/Bathing Difficulty no   Equipment Currently Used at Home BIPAP;cane, straight;bedside commode   Readmission within 30 days? No   Patient currently being followed by outpatient case management? No   Do you currently have service(s) that help you manage your care at home? No   Do you take prescription medications? Yes   Do you have prescription coverage? Yes   Coverage [employer]   Do you have any problems affording any of your prescribed medications? No   Is the patient  taking medications as prescribed? yes   Who is going to help you get home at discharge? Sara- spouse   How do you get to doctors appointments? family or friend will provide   Are you on dialysis? No   Do you take coumadin? No  (Xarelto)   Discharge Plan A Home with family   Discharge Plan B Other   DME Needed Upon Discharge  other (see comments)  (tbd)   Discharge Plan discussed with: Patient   Transition of Care Barriers None   SDOH   (none)   Physical Activity   On average, how many days per week do you engage in moderate to strenuous exercise (like a brisk walk)? 0 days   On average, how many minutes do you engage in exercise at this level? 0 min   Financial Resource Strain   How hard is it for you to pay for the very basics like food, housing, medical care, and heating? Not very   Housing Stability   In the last 12 months, was there a time when you were not able to pay the mortgage or rent on time? N   At any time in the past 12 months, were you homeless or living in a shelter (including now)? N   Transportation Needs   Has the lack of transportation kept you from medical appointments, meetings, work or from getting things needed for daily living? No   Food Insecurity   Within the past 12 months, you worried that your food would run out before you got the money to buy more. Never true   Within the past 12 months, the food you bought just didn't last and you didn't have money to get more. Never true   Stress   Do you feel stress - tense, restless, nervous, or anxious, or unable to sleep at night because your mind is troubled all the time - these days? To some exte   Social Isolation   How often do you feel lonely or isolated from those around you?  Never   Alcohol Use   Q1: How often do you have a drink containing alcohol? Never   Q2: How many drinks containing alcohol do you have on a typical day when you are drinking? None   Q3: How often do you have six or more drinks on one occasion? Never   Utilities   In  the past 12 months has the electric, gas, oil, or water company threatened to shut off services in your home? No   Health Literacy   How often do you need to have someone help you when you read instructions, pamphlets, or other written material from your doctor or pharmacy? Never   OTHER   Name(s) of People in Home Sara- spouse

## 2024-06-22 NOTE — ASSESSMENT & PLAN NOTE
Patient's FSGs are controlled on current medication regimen.  Last A1c reviewed-   Lab Results   Component Value Date    HGBA1C 6.3 (H) 08/08/2022     Most recent fingerstick glucose reviewed-   Recent Labs   Lab 06/21/24  1943 06/22/24  0733   POCTGLUCOSE 103 120*       Current correctional scale  Low  Maintain anti-hyperglycemic dose as follows-   Antihyperglycemics (From admission, onward)    Start     Stop Route Frequency Ordered    06/21/24 2009  insulin aspart U-100 pen 0-5 Units         -- SubQ Before meals & nightly PRN 06/21/24 1909        Hold Oral hypoglycemics while patient is in the hospital. Follow up on A1c.

## 2024-06-22 NOTE — HPI
63 y.o. AAM with h/o paroxsymal afib (on xarelto 15mg PO daily and amiodarone 200mg PO daily), essential HTN, HLD, NIDDM type 2, and Chronic Leukopenia (sees Dr. Marquez) presents to the ER with c/o decresed uop and back pain. Was recently admitted to [facility] from 5/4-5/11 for MICHELLE and UTI (MICHELLE 8.8 initial creat and resolved upon d/c with creatinine 1.1-followed with Dr. Middleton and d/c home on Augmentin which he complete for Enterococcus).  States dysuira and bladder spasms for the past 3 days. Generalized weakness and fatigue. No fevers or chills. No N/V/abdominal pain. Back pain worsen than his chronic back pain.  NO chest pain, SOB/LYNCH, palpation, or lower extremity edema.     States she followed up with Nephrology on 6/11 and was doing well. Does note that he had a brandt placed when he was hospitalized and had discomfort when it was d/c. States he also recently started back on his Benicar 40mg daily for BP control.     Labs to the ER concerning for recurrent MICHELLE with BUN 53/Creatine 67.  Bicar 17.  WBC 2.69 but dose have a h/o Leukopenia being follow by Dr. Marquez.  Urine concerning for recurrent UTI.  Lactic acid normal. SBP noted to be low/normal ranging . IVF started.     CT non-contrast abd/pelvis:  Impression:  Cholelithiasis.  6 mm nonobstructing right renal calculus.  Mild circumferential thickening of the urinary bladder.  Consider correlation with urinalysis to evaluate for acute cystitis.  Dilated appendix measuring 10 mm.  No periappendiceal infiltration.  Micro nodules at the lung bases.  For multiple solid nodules all <6 mm, Fleischner Society 2017 guidelines recommend no routine follow up for a low risk patient, or follow up with non-contrast chest CT at 12 months after discovery in a high risk patient.      Started on Vanc due to recent hospitalization and cultures.     Because this patient's clinical condition (MICHELLE) is guarded and requires frequent lab checks, IVF, IV abx which required  adjustments based on daily renal function, and monitor for worsening vitals due to developing sepsis , care in an alternative location isn't clinically appropriate for the reasons stated above.  We have been consulted for admission to inpatient tele.

## 2024-06-22 NOTE — ASSESSMENT & PLAN NOTE
Patient's FSGs are controlled on current medication regimen.  Last A1c reviewed-   Lab Results   Component Value Date    HGBA1C 6.3 (H) 08/08/2022     Most recent fingerstick glucose reviewed-   Recent Labs   Lab 06/21/24  1943   POCTGLUCOSE 103     Current correctional scale  Low  Maintain anti-hyperglycemic dose as follows-   Antihyperglycemics (From admission, onward)      Start     Stop Route Frequency Ordered    06/21/24 2009  insulin aspart U-100 pen 0-5 Units         -- SubQ Before meals & nightly PRN 06/21/24 1909          Hold Oral hypoglycemics while patient is in the hospital. Follow up on A1c.

## 2024-06-23 LAB
ALBUMIN SERPL BCP-MCNC: 2.8 G/DL (ref 3.5–5.2)
ALP SERPL-CCNC: 60 U/L (ref 55–135)
ALT SERPL W/O P-5'-P-CCNC: 14 U/L (ref 10–44)
ANION GAP SERPL CALC-SCNC: 12 MMOL/L (ref 8–16)
AST SERPL-CCNC: 18 U/L (ref 10–40)
BACTERIA UR CULT: ABNORMAL
BACTERIA UR CULT: ABNORMAL
BASOPHILS # BLD AUTO: 0.02 K/UL (ref 0–0.2)
BASOPHILS NFR BLD: 1.3 % (ref 0–1.9)
BILIRUB SERPL-MCNC: 0.4 MG/DL (ref 0.1–1)
BUN SERPL-MCNC: 49 MG/DL (ref 8–23)
CALCIUM SERPL-MCNC: 7.7 MG/DL (ref 8.7–10.5)
CHLORIDE SERPL-SCNC: 94 MMOL/L (ref 95–110)
CO2 SERPL-SCNC: 21 MMOL/L (ref 23–29)
CREAT SERPL-MCNC: 6 MG/DL (ref 0.5–1.4)
DIFFERENTIAL METHOD BLD: ABNORMAL
EOSINOPHIL # BLD AUTO: 0.1 K/UL (ref 0–0.5)
EOSINOPHIL NFR BLD: 8.1 % (ref 0–8)
ERYTHROCYTE [DISTWIDTH] IN BLOOD BY AUTOMATED COUNT: 18 % (ref 11.5–14.5)
EST. GFR  (NO RACE VARIABLE): 10 ML/MIN/1.73 M^2
GLUCOSE SERPL-MCNC: 94 MG/DL (ref 70–110)
HCT VFR BLD AUTO: 29.1 % (ref 40–54)
HGB BLD-MCNC: 8.9 G/DL (ref 14–18)
IMM GRANULOCYTES # BLD AUTO: 0.02 K/UL (ref 0–0.04)
IMM GRANULOCYTES NFR BLD AUTO: 1.3 % (ref 0–0.5)
LYMPHOCYTES # BLD AUTO: 0.6 K/UL (ref 1–4.8)
LYMPHOCYTES NFR BLD: 38.1 % (ref 18–48)
MCH RBC QN AUTO: 22.5 PG (ref 27–31)
MCHC RBC AUTO-ENTMCNC: 30.6 G/DL (ref 32–36)
MCV RBC AUTO: 74 FL (ref 82–98)
MONOCYTES # BLD AUTO: 0 K/UL (ref 0.3–1)
MONOCYTES NFR BLD: 1.3 % (ref 4–15)
NEUTROPHILS # BLD AUTO: 0.8 K/UL (ref 1.8–7.7)
NEUTROPHILS NFR BLD: 49.9 % (ref 38–73)
NRBC BLD-RTO: 0 /100 WBC
PLATELET # BLD AUTO: 280 K/UL (ref 150–450)
PMV BLD AUTO: 9.3 FL (ref 9.2–12.9)
POCT GLUCOSE: 105 MG/DL (ref 70–110)
POCT GLUCOSE: 89 MG/DL (ref 70–110)
POCT GLUCOSE: 95 MG/DL (ref 70–110)
POTASSIUM SERPL-SCNC: 3.9 MMOL/L (ref 3.5–5.1)
PROT SERPL-MCNC: 6.4 G/DL (ref 6–8.4)
RBC # BLD AUTO: 3.95 M/UL (ref 4.6–6.2)
SODIUM SERPL-SCNC: 127 MMOL/L (ref 136–145)
WBC # BLD AUTO: 1.6 K/UL (ref 3.9–12.7)

## 2024-06-23 PROCEDURE — 36415 COLL VENOUS BLD VENIPUNCTURE: CPT | Performed by: STUDENT IN AN ORGANIZED HEALTH CARE EDUCATION/TRAINING PROGRAM

## 2024-06-23 PROCEDURE — 25000003 PHARM REV CODE 250: Performed by: STUDENT IN AN ORGANIZED HEALTH CARE EDUCATION/TRAINING PROGRAM

## 2024-06-23 PROCEDURE — 25000003 PHARM REV CODE 250: Performed by: INTERNAL MEDICINE

## 2024-06-23 PROCEDURE — 80053 COMPREHEN METABOLIC PANEL: CPT | Performed by: STUDENT IN AN ORGANIZED HEALTH CARE EDUCATION/TRAINING PROGRAM

## 2024-06-23 PROCEDURE — 85025 COMPLETE CBC W/AUTO DIFF WBC: CPT | Performed by: STUDENT IN AN ORGANIZED HEALTH CARE EDUCATION/TRAINING PROGRAM

## 2024-06-23 PROCEDURE — 21400001 HC TELEMETRY ROOM

## 2024-06-23 PROCEDURE — 63600175 PHARM REV CODE 636 W HCPCS: Performed by: INTERNAL MEDICINE

## 2024-06-23 PROCEDURE — 63600175 PHARM REV CODE 636 W HCPCS: Performed by: STUDENT IN AN ORGANIZED HEALTH CARE EDUCATION/TRAINING PROGRAM

## 2024-06-23 PROCEDURE — 11000001 HC ACUTE MED/SURG PRIVATE ROOM

## 2024-06-23 PROCEDURE — A4217 STERILE WATER/SALINE, 500 ML: HCPCS | Performed by: INTERNAL MEDICINE

## 2024-06-23 RX ADMIN — HEPARIN SODIUM 5000 UNITS: 5000 INJECTION INTRAVENOUS; SUBCUTANEOUS at 09:06

## 2024-06-23 RX ADMIN — GABAPENTIN 300 MG: 300 CAPSULE ORAL at 09:06

## 2024-06-23 RX ADMIN — LEVOTHYROXINE SODIUM 200 MCG: 0.1 TABLET ORAL at 05:06

## 2024-06-23 RX ADMIN — HEPARIN SODIUM 5000 UNITS: 5000 INJECTION INTRAVENOUS; SUBCUTANEOUS at 05:06

## 2024-06-23 RX ADMIN — DOCUSATE SODIUM 100 MG: 100 CAPSULE, LIQUID FILLED ORAL at 09:06

## 2024-06-23 RX ADMIN — SODIUM BICARBONATE: 84 INJECTION, SOLUTION INTRAVENOUS at 08:06

## 2024-06-23 RX ADMIN — MEROPENEM 500 MG: 500 INJECTION INTRAVENOUS at 09:06

## 2024-06-23 RX ADMIN — ATORVASTATIN CALCIUM 80 MG: 40 TABLET, FILM COATED ORAL at 09:06

## 2024-06-23 RX ADMIN — SODIUM BICARBONATE: 84 INJECTION, SOLUTION INTRAVENOUS at 04:06

## 2024-06-23 RX ADMIN — HEPARIN SODIUM 5000 UNITS: 5000 INJECTION INTRAVENOUS; SUBCUTANEOUS at 01:06

## 2024-06-23 RX ADMIN — OXYCODONE HYDROCHLORIDE AND ACETAMINOPHEN 1 TABLET: 5; 325 TABLET ORAL at 03:06

## 2024-06-23 NOTE — HOSPITAL COURSE
Mr. Santos is a 64 yo M admitted with acute kidney injury and acute cystitis. Started on antibiotics (last culture with enteroccocus faecalis). Broadened to meropenem. Urine cx with pseudmonas and e.coli. Ct abdomen/pelvis with 6 mm nonobstructing right renal calculus. Urology consulted-does not suspect stone is causing pt recurrent urinary infection. Nephrology consulted. On bicarb drip. Slow improvement. Chronic leukopenia,  ongoing with neutropenic precautions. Hematology consulted-Most likely recently worsened secondary to acute illness and antibiotics. ID consulted- agree with 3-5d antibiotics while inpatient for possible cystitis, but can stop at d/c. De-escalating meropenem to cefepime on 06/25/24. Renal function improving.  Discussed with Nephrology, okay to discharge with outpatient follow-up with nephrologist (Dr. Jennings).  Okay to resume home Xarelto per Nephrology.    Patient admits feeling better overall.  Urinating without difficulty.  Denies any dysuria or hematuria. Pt denies any fever, headaches, vision changes, chest pain, shortness of breath, palpitations, abdominal pain, nausea, vomiting, or any new weaknesses. Feels ready to go home. Patient's exam on discharge was as follow: Patient is alert and oriented, appears in no acute distress, heart with regular rate and rhythm, lungs clear to asculation with non-labored breathing, abdomen soft, and no new weaknesses or focal deficits seen. Bilateral lower extremities without any edema or calf tenderness.     Patient was counseled regarding any abnormal labs, differential diagnosis, treatment options, risk-benefit, lifestyle changes, prognosis, current condition, and medications. Patient was interactive and attentive.  Patient's questions were answered in a respectful and timely manner. Patient was instructed to follow-up with PCP within 1 week and to continue taking medications as prescribed.  Instructed to also follow up with Nephrology and Urology.  Also, extensively discussed the risks, benefits, and side effects of patient's medications. Discussed with patient about any medication changes. Patient verbalized understanding and agrees to treatment plan.  Patient is stable for discharge.  Patient has no other questions or concerns at this time.  ED precautions discussed with the patient.    Vital signs are stable. Ambulating without any difficulty. Tolerating p.o. intake without any nausea or vomiting. Afebrile for over 24 hours. Patient is in stable condition and has no questions or concerns. Patient will be discharge to home once transportation secured.  CM/SW to assist with discharge planning.     Vitals:    06/27/24 0814 06/27/24 1047 06/27/24 1051 06/27/24 1122   BP: 126/71   127/71   BP Location: Right arm   Right arm   Patient Position: Lying   Sitting   Pulse: 77 75  75   Resp: 18  17 18   Temp: 97.5 °F (36.4 °C)   98.1 °F (36.7 °C)   TempSrc: Oral   Oral   SpO2: 99%   100%   Weight:       Height:

## 2024-06-23 NOTE — ASSESSMENT & PLAN NOTE
Patient has hyponatremia which is controlled,We will aim to correct the sodium by 4-6mEq in 24 hours. We will monitor sodium Daily. The hyponatremia is due to renal insufficiency. We will obtain the following studies: Urine sodium, urine osmolality, serum osmolality or TSH, T4. We will treat the hyponatremia with IV fluids as follows: sodium bicarb. The patient's sodium results have been reviewed and are listed below.  Recent Labs   Lab 06/23/24  0618   *

## 2024-06-23 NOTE — PROGRESS NOTES
Date of Admission:6/21/2024    SUBJECTIVE:feeling weakness    Current Facility-Administered Medications   Medication    acetaminophen tablet 650 mg    amiodarone tablet 200 mg    atorvastatin tablet 80 mg    dextrose 10% bolus 125 mL 125 mL    dextrose 10% bolus 250 mL 250 mL    docusate sodium capsule 100 mg    gabapentin capsule 300 mg    glucagon (human recombinant) injection 1 mg    glucose chewable tablet 16 g    glucose chewable tablet 24 g    heparin (porcine) injection 5,000 Units    hydrALAZINE injection 5 mg    insulin aspart U-100 pen 0-5 Units    levothyroxine tablet 200 mcg    melatonin tablet 6 mg    meropenem (MERREM) 500 mg in 0.9% NaCl 100 mL IVPB (MB+)    naloxone 0.4 mg/mL injection 0.02 mg    ondansetron injection 4 mg    oxyCODONE-acetaminophen 5-325 mg per tablet 1 tablet    polyethylene glycol packet 17 g    sodium bicarbonate 100 mEq in sterile water 1,000 mL infusion    sodium chloride 0.9% flush 10 mL       Wt Readings from Last 3 Encounters:   06/21/24 132.5 kg (292 lb 1.8 oz)   06/13/24 127.6 kg (281 lb 4.9 oz)   05/22/24 134.2 kg (295 lb 13.7 oz)     Temp Readings from Last 3 Encounters:   06/23/24 97.2 °F (36.2 °C)   05/11/24 98.4 °F (36.9 °C) (Oral)   10/13/23 98.2 °F (36.8 °C) (Oral)     BP Readings from Last 3 Encounters:   06/23/24 105/69   06/13/24 120/79   05/22/24 125/85     Pulse Readings from Last 3 Encounters:   06/23/24 72   06/13/24 62   05/22/24 63       Intake/Output Summary (Last 24 hours) at 6/23/2024 1556  Last data filed at 6/23/2024 1238  Gross per 24 hour   Intake 580 ml   Output 1100 ml   Net -520 ml       PE:  GEN:wd male in nad  HEENT:ncat,eomi,mm  CVS:s1 s2 regular  PULM:ctab  ABD:bs,soft  EXT:no leg edema  NEURO:awake, alert    Recent Labs   Lab 06/23/24  0618   GLU 94   *   K 3.9   CL 94*   CO2 21*   BUN 49*   CREATININE 6.0*   CALCIUM 7.7*       Lab Results   Component Value Date    .8 (H) 05/06/2024    CALCIUM 7.7 (L) 06/23/2024    CAION 0.89 (L)  05/06/2024    PHOS 3.3 05/08/2024       Recent Labs   Lab 06/23/24  0618   WBC 1.60*   RBC 3.95*   HGB 8.9*   HCT 29.1*      MCV 74*   MCH 22.5*   MCHC 30.6*           A/P:  1.boris. cont ivfs. Urine eos negative.  2.anemia. hg dropping. No prbc indicated.  3.met acidosis. Cont gtt.  4.htn. no ace/arb for now.  5.uti. efaecalis last time. Following cx. Cont tx. Stone tx?  Cont esbl tx.  6.dm2. Following sugars. Sugars ok this am.  7.low wbc. Following. Neutropenic precaution.

## 2024-06-23 NOTE — PROGRESS NOTES
[facility] Medicine  Progress Note    Patient Name: Russell Santos  MRN: [id number]  Patient Class: IP- Inpatient   Admission Date: 6/21/2024  Length of Stay: 2 days  Attending Physician: Luca Stoll MD  Primary Care Provider: Roxanne Phillip -        Subjective:     Principal Problem:MICHELLE (acute kidney injury)        HPI:  63 y.o. AAM with h/o paroxsymal afib (on xarelto 15mg PO daily and amiodarone 200mg PO daily), essential HTN, HLD, NIDDM type 2, and Chronic Leukopenia (sees Dr. Marquez) presents to the ER with c/o decresed uop and back pain. Was recently admitted to [facility] from 5/4-5/11 for MICHELLE and UTI (MICHELLE 8.8 initial creat and resolved upon d/c with creatinine 1.1-followed with Dr. Middleton and d/c home on Augmentin which he complete for Enterococcus).  States dysuira and bladder spasms for the past 3 days. Generalized weakness and fatigue. No fevers or chills. No N/V/abdominal pain. Back pain worsen than his chronic back pain.  NO chest pain, SOB/LYNCH, palpation, or lower extremity edema.     States she followed up with Nephrology on 6/11 and was doing well. Does note that he had a brandt placed when he was hospitalized and had discomfort when it was d/c. States he also recently started back on his Benicar 40mg daily for BP control.     Labs to the ER concerning for recurrent MICHELLE with BUN 53/Creatine 67.  Bicar 17.  WBC 2.69 but dose have a h/o Leukopenia being follow by Dr. Marquez.  Urine concerning for recurrent UTI.  Lactic acid normal. SBP noted to be low/normal ranging . IVF started.     CT non-contrast abd/pelvis:  Impression:  Cholelithiasis.  6 mm nonobstructing right renal calculus.  Mild circumferential thickening of the urinary bladder.  Consider correlation with urinalysis to evaluate for acute cystitis.  Dilated appendix measuring 10 mm.  No periappendiceal infiltration.  Micro nodules at the lung bases.  For multiple solid nodules all <6 mm, Fleischner Society 2017  guidelines recommend no routine follow up for a low risk patient, or follow up with non-contrast chest CT at 12 months after discovery in a high risk patient.      Started on Vanc due to recent hospitalization and cultures.     Because this patient's clinical condition (MICHELLE) is guarded and requires frequent lab checks, IVF, IV abx which required adjustments based on daily renal function, and monitor for worsening vitals due to developing sepsis , care in an alternative location isn't clinically appropriate for the reasons stated above.  We have been consulted for admission to inpatient tele.               Overview/Hospital Course:  Mr. Santos is a 62 yo M admitted with acute kidney injury and acute cystitis. Started on antibiotics (last culture with enteroccocus faecalis). Broadened to meropenem. Nephrolgoy consulted. On bicarb drip. Slow improvement. Also noted to have right renal stone, possibly colonized? Unclear why recurrent UTI.    Interval History: no acute issues, he did have episodes of right side/flank pain overnight but resolved this AM. Renal function slightly improved today.    Review of Systems  Objective:     Vital Signs (Most Recent):  Temp: 97.2 °F (36.2 °C) (06/23/24 0732)  Pulse: (!) 57 (06/23/24 0732)  Resp: 20 (06/23/24 0732)  BP: 117/77 (06/23/24 0732)  SpO2: 100 % (06/23/24 0732) Vital Signs (24h Range):  Temp:  [97.2 °F (36.2 °C)-98.2 °F (36.8 °C)] 97.2 °F (36.2 °C)  Pulse:  [54-65] 57  Resp:  [18-20] 20  SpO2:  [97 %-100 %] 100 %  BP: (105-125)/(69-93) 117/77     Weight: 132.5 kg (292 lb 1.8 oz)  Body mass index is 43.14 kg/m².    Intake/Output Summary (Last 24 hours) at 6/23/2024 0747  Last data filed at 6/22/2024 2300  Gross per 24 hour   Intake 460 ml   Output 1000 ml   Net -540 ml         Physical Exam  Vitals and nursing note reviewed.   Constitutional:       General: He is not in acute distress.     Appearance: Normal appearance. He is obese.   Cardiovascular:      Rate and Rhythm:  Normal rate and regular rhythm.      Pulses: Normal pulses.      Heart sounds: Normal heart sounds. No murmur heard.  Pulmonary:      Effort: Pulmonary effort is normal. No respiratory distress.      Breath sounds: Normal breath sounds.   Abdominal:      General: Bowel sounds are normal. There is no distension.      Palpations: Abdomen is soft.   Musculoskeletal:         General: No swelling.   Skin:     General: Skin is warm.      Coloration: Skin is not pale.   Neurological:      General: No focal deficit present.      Mental Status: He is alert and oriented to person, place, and time.   Psychiatric:         Mood and Affect: Mood normal.         Behavior: Behavior normal.             Significant Labs: All pertinent labs within the past 24 hours have been reviewed.    Significant Imaging: I have reviewed all pertinent imaging results/findings within the past 24 hours.    Assessment/Plan:      * MICHELLE (acute kidney injury)  Presents with acute kidney injury, BUN 53/C r 6.7. FENa 4.1% suggestive of intrinsic etiology.  - Nephrology consulted  - Hold ARB.  - treat underlying UTI      Hyponatremia  Patient has hyponatremia which is controlled,We will aim to correct the sodium by 4-6mEq in 24 hours. We will monitor sodium Daily. The hyponatremia is due to renal insufficiency. We will obtain the following studies: Urine sodium, urine osmolality, serum osmolality or TSH, T4. We will treat the hyponatremia with IV fluids as follows: sodium bicarb. The patient's sodium results have been reviewed and are listed below.  Recent Labs   Lab 06/23/24  0618   *         Cystitis  - Previous culture with enteroccus and discharged with augmentin  - given vanc in ED - add on meropenem for now for ESBL coverage and hypotension this AM  - f/u urine cultures and de-escalate      Hypothyroidism  Resume home levothyroxine      Leukopenia  Noted to be slightly lower than baseline (3-4). No c/o fevers or chills. NO abscess on CT  abd/pelvis noted. Will monitor for now. No indications for neutropenic precautions at this time.       PAF (paroxysmal atrial fibrillation)  Patient with Paroxysmal (<7 days) atrial fibrillation which is controlled currently with Beta Blocker and Amiodarone. Patient is currently in sinus rhythm.[id number] Score: 1.  Anticoagulation indicated. Anticoagulation done with Xarelto 15mg daily .  Will hold for now given MICHELLE and possible need for renal bx if no significant improvement in renal function.  Will resume tomorrow if renal function improves.     Type 2 diabetes mellitus with kidney complication, without long-term current use of insulin  Patient's FSGs are controlled on current medication regimen.  Last A1c reviewed-   Lab Results   Component Value Date    HGBA1C 6.3 (H) 08/08/2022     Most recent fingerstick glucose reviewed-   Recent Labs   Lab 06/21/24 1943 06/22/24  0733   POCTGLUCOSE 103 120*       Current correctional scale  Low  Maintain anti-hyperglycemic dose as follows-   Antihyperglycemics (From admission, onward)      Start     Stop Route Frequency Ordered    06/21/24 2009  insulin aspart U-100 pen 0-5 Units         -- SubQ Before meals & nightly PRN 06/21/24 1909          Hold Oral hypoglycemics while patient is in the hospital. Follow up on A1c.     Hyperlipidemia  Resume stain     Essential hypertension  Chronic, controlled. Latest blood pressure and vitals reviewed-     Temp:  [97.4 °F (36.3 °C)-98.4 °F (36.9 °C)]   Pulse:  [52-72]   Resp:  [16-20]   BP: ()/(46-93)   SpO2:  [96 %-100 %] .   Home meds for hypertension were reviewed and noted below.   Hypertension Medications               metoprolol tartrate (LOPRESSOR) 25 MG tablet Take 1 tablet (25 mg total) by mouth 2 (two) times daily.    olmesartan (BENICAR) 40 MG tablet Take 40 mg by mouth once daily.            While in the hospital, will manage blood pressure as follows; Adjust home antihypertensive regimen as follows- Hold benicar  for now and resume metoprolol with hold parameters for low/normal BP and HR.     Will utilize p.r.n. blood pressure medication only if patient's blood pressure greater than 180/110 and he develops symptoms such as worsening chest pain or shortness of breath.      VTE Risk Mitigation (From admission, onward)           Ordered     heparin (porcine) injection 5,000 Units  Every 8 hours         06/22/24 0515     IP VTE HIGH RISK PATIENT  Once         06/21/24 1908     Place sequential compression device  Until discontinued         06/21/24 1908                    Discharge Planning   CASA:      Code Status: Full Code   Is the patient medically ready for discharge?:     Reason for patient still in hospital (select all that apply): Treatment  Discharge Plan A: Home with family                  Luca Stoll MD  Department of Hospital Medicine   [facility]

## 2024-06-23 NOTE — SUBJECTIVE & OBJECTIVE
Interval History: no acute issues, he did have episodes of right side/flank pain overnight but resolved this AM. Renal function slightly improved today.    Review of Systems  Objective:     Vital Signs (Most Recent):  Temp: 97.2 °F (36.2 °C) (06/23/24 0732)  Pulse: (!) 57 (06/23/24 0732)  Resp: 20 (06/23/24 0732)  BP: 117/77 (06/23/24 0732)  SpO2: 100 % (06/23/24 0732) Vital Signs (24h Range):  Temp:  [97.2 °F (36.2 °C)-98.2 °F (36.8 °C)] 97.2 °F (36.2 °C)  Pulse:  [54-65] 57  Resp:  [18-20] 20  SpO2:  [97 %-100 %] 100 %  BP: (105-125)/(69-93) 117/77     Weight: 132.5 kg (292 lb 1.8 oz)  Body mass index is 43.14 kg/m².    Intake/Output Summary (Last 24 hours) at 6/23/2024 0747  Last data filed at 6/22/2024 2300  Gross per 24 hour   Intake 460 ml   Output 1000 ml   Net -540 ml         Physical Exam  Vitals and nursing note reviewed.   Constitutional:       General: He is not in acute distress.     Appearance: Normal appearance. He is obese.   Cardiovascular:      Rate and Rhythm: Normal rate and regular rhythm.      Pulses: Normal pulses.      Heart sounds: Normal heart sounds. No murmur heard.  Pulmonary:      Effort: Pulmonary effort is normal. No respiratory distress.      Breath sounds: Normal breath sounds.   Abdominal:      General: Bowel sounds are normal. There is no distension.      Palpations: Abdomen is soft.   Musculoskeletal:         General: No swelling.   Skin:     General: Skin is warm.      Coloration: Skin is not pale.   Neurological:      General: No focal deficit present.      Mental Status: He is alert and oriented to person, place, and time.   Psychiatric:         Mood and Affect: Mood normal.         Behavior: Behavior normal.             Significant Labs: All pertinent labs within the past 24 hours have been reviewed.    Significant Imaging: I have reviewed all pertinent imaging results/findings within the past 24 hours.

## 2024-06-23 NOTE — NURSING
[facility] [place]  Nurses Note -- 4 Eyes      6/23/2024       Skin assessed on: Q Shift      [x] No Pressure Injuries Present    [x]Prevention Measures Documented    [] Yes LDA  for Pressure Injury Previously documented     [] Yes New Pressure Injury Discovered   [] LDA for New Pressure Injury Added      Attending RN:  Dalila Rivera RN     Second RN:  LAM Hopkins

## 2024-06-24 LAB
ACANTHOCYTES BLD QL SMEAR: PRESENT
ALBUMIN SERPL BCP-MCNC: 2.8 G/DL (ref 3.5–5.2)
ALP SERPL-CCNC: 70 U/L (ref 55–135)
ALT SERPL W/O P-5'-P-CCNC: 12 U/L (ref 10–44)
ANION GAP SERPL CALC-SCNC: 13 MMOL/L (ref 8–16)
ANISOCYTOSIS BLD QL SMEAR: ABNORMAL
AST SERPL-CCNC: 18 U/L (ref 10–40)
BASOPHILS # BLD AUTO: 0.02 K/UL (ref 0–0.2)
BASOPHILS NFR BLD: 1.3 % (ref 0–1.9)
BILIRUB SERPL-MCNC: 0.5 MG/DL (ref 0.1–1)
BUN SERPL-MCNC: 47 MG/DL (ref 8–23)
BURR CELLS BLD QL SMEAR: ABNORMAL
CALCIUM SERPL-MCNC: 7.8 MG/DL (ref 8.7–10.5)
CHLORIDE SERPL-SCNC: 94 MMOL/L (ref 95–110)
CO2 SERPL-SCNC: 22 MMOL/L (ref 23–29)
CREAT SERPL-MCNC: 5.4 MG/DL (ref 0.5–1.4)
DIFFERENTIAL METHOD BLD: ABNORMAL
EOSINOPHIL # BLD AUTO: 0.1 K/UL (ref 0–0.5)
EOSINOPHIL NFR BLD: 7.8 % (ref 0–8)
ERYTHROCYTE [DISTWIDTH] IN BLOOD BY AUTOMATED COUNT: 18.2 % (ref 11.5–14.5)
EST. GFR  (NO RACE VARIABLE): 11 ML/MIN/1.73 M^2
GLUCOSE SERPL-MCNC: 81 MG/DL (ref 70–110)
HCT VFR BLD AUTO: 29.4 % (ref 40–54)
HGB BLD-MCNC: 9.2 G/DL (ref 14–18)
HYPOCHROMIA BLD QL SMEAR: ABNORMAL
IMM GRANULOCYTES # BLD AUTO: 0.01 K/UL (ref 0–0.04)
IMM GRANULOCYTES NFR BLD AUTO: 0.7 % (ref 0–0.5)
LYMPHOCYTES # BLD AUTO: 0.6 K/UL (ref 1–4.8)
LYMPHOCYTES NFR BLD: 41.8 % (ref 18–48)
MCH RBC QN AUTO: 22.8 PG (ref 27–31)
MCHC RBC AUTO-ENTMCNC: 31.3 G/DL (ref 32–36)
MCV RBC AUTO: 73 FL (ref 82–98)
MONOCYTES # BLD AUTO: 0 K/UL (ref 0.3–1)
MONOCYTES NFR BLD: 2.6 % (ref 4–15)
NEUTROPHILS # BLD AUTO: 0.7 K/UL (ref 1.8–7.7)
NEUTROPHILS NFR BLD: 45.8 % (ref 38–73)
NRBC BLD-RTO: 0 /100 WBC
OVALOCYTES BLD QL SMEAR: ABNORMAL
PLATELET # BLD AUTO: 327 K/UL (ref 150–450)
PLATELET BLD QL SMEAR: ABNORMAL
PMV BLD AUTO: 9.6 FL (ref 9.2–12.9)
POCT GLUCOSE: 110 MG/DL (ref 70–110)
POCT GLUCOSE: 111 MG/DL (ref 70–110)
POCT GLUCOSE: 112 MG/DL (ref 70–110)
POCT GLUCOSE: 83 MG/DL (ref 70–110)
POCT GLUCOSE: 84 MG/DL (ref 70–110)
POIKILOCYTOSIS BLD QL SMEAR: ABNORMAL
POTASSIUM SERPL-SCNC: 3.7 MMOL/L (ref 3.5–5.1)
PROT SERPL-MCNC: 6.5 G/DL (ref 6–8.4)
RBC # BLD AUTO: 4.03 M/UL (ref 4.6–6.2)
SCHISTOCYTES BLD QL SMEAR: ABNORMAL
SODIUM SERPL-SCNC: 129 MMOL/L (ref 136–145)
SPHEROCYTES BLD QL SMEAR: ABNORMAL
WBC # BLD AUTO: 1.53 K/UL (ref 3.9–12.7)

## 2024-06-24 PROCEDURE — 27000207 HC ISOLATION

## 2024-06-24 PROCEDURE — 11000001 HC ACUTE MED/SURG PRIVATE ROOM

## 2024-06-24 PROCEDURE — 25000003 PHARM REV CODE 250: Performed by: INTERNAL MEDICINE

## 2024-06-24 PROCEDURE — 25000003 PHARM REV CODE 250: Performed by: STUDENT IN AN ORGANIZED HEALTH CARE EDUCATION/TRAINING PROGRAM

## 2024-06-24 PROCEDURE — 80053 COMPREHEN METABOLIC PANEL: CPT | Performed by: STUDENT IN AN ORGANIZED HEALTH CARE EDUCATION/TRAINING PROGRAM

## 2024-06-24 PROCEDURE — 94761 N-INVAS EAR/PLS OXIMETRY MLT: CPT

## 2024-06-24 PROCEDURE — 99222 1ST HOSP IP/OBS MODERATE 55: CPT | Mod: ,,, | Performed by: INTERNAL MEDICINE

## 2024-06-24 PROCEDURE — 63600175 PHARM REV CODE 636 W HCPCS: Performed by: STUDENT IN AN ORGANIZED HEALTH CARE EDUCATION/TRAINING PROGRAM

## 2024-06-24 PROCEDURE — 27000221 HC OXYGEN, UP TO 24 HOURS

## 2024-06-24 PROCEDURE — 21400001 HC TELEMETRY ROOM

## 2024-06-24 PROCEDURE — A4217 STERILE WATER/SALINE, 500 ML: HCPCS | Performed by: INTERNAL MEDICINE

## 2024-06-24 PROCEDURE — 63600175 PHARM REV CODE 636 W HCPCS: Performed by: INTERNAL MEDICINE

## 2024-06-24 PROCEDURE — 36415 COLL VENOUS BLD VENIPUNCTURE: CPT | Performed by: STUDENT IN AN ORGANIZED HEALTH CARE EDUCATION/TRAINING PROGRAM

## 2024-06-24 PROCEDURE — 99900035 HC TECH TIME PER 15 MIN (STAT)

## 2024-06-24 PROCEDURE — 85025 COMPLETE CBC W/AUTO DIFF WBC: CPT | Performed by: STUDENT IN AN ORGANIZED HEALTH CARE EDUCATION/TRAINING PROGRAM

## 2024-06-24 RX ORDER — SODIUM BICARBONATE 325 MG/1
650 TABLET ORAL 3 TIMES DAILY
Status: DISCONTINUED | OUTPATIENT
Start: 2024-06-24 | End: 2024-06-27 | Stop reason: HOSPADM

## 2024-06-24 RX ORDER — SODIUM CHLORIDE 9 MG/ML
INJECTION, SOLUTION INTRAVENOUS CONTINUOUS
Status: ACTIVE | OUTPATIENT
Start: 2024-06-24 | End: 2024-06-25

## 2024-06-24 RX ADMIN — SODIUM CHLORIDE: 9 INJECTION, SOLUTION INTRAVENOUS at 11:06

## 2024-06-24 RX ADMIN — OXYCODONE HYDROCHLORIDE AND ACETAMINOPHEN 1 TABLET: 5; 325 TABLET ORAL at 03:06

## 2024-06-24 RX ADMIN — SODIUM BICARBONATE 650 MG: 325 TABLET ORAL at 02:06

## 2024-06-24 RX ADMIN — LEVOTHYROXINE SODIUM 200 MCG: 0.1 TABLET ORAL at 05:06

## 2024-06-24 RX ADMIN — MEROPENEM 500 MG: 500 INJECTION INTRAVENOUS at 09:06

## 2024-06-24 RX ADMIN — ATORVASTATIN CALCIUM 80 MG: 40 TABLET, FILM COATED ORAL at 09:06

## 2024-06-24 RX ADMIN — SODIUM BICARBONATE 650 MG: 325 TABLET ORAL at 09:06

## 2024-06-24 RX ADMIN — DOCUSATE SODIUM 100 MG: 100 CAPSULE, LIQUID FILLED ORAL at 09:06

## 2024-06-24 RX ADMIN — HEPARIN SODIUM 5000 UNITS: 5000 INJECTION INTRAVENOUS; SUBCUTANEOUS at 05:06

## 2024-06-24 RX ADMIN — HEPARIN SODIUM 5000 UNITS: 5000 INJECTION INTRAVENOUS; SUBCUTANEOUS at 09:06

## 2024-06-24 RX ADMIN — HEPARIN SODIUM 5000 UNITS: 5000 INJECTION INTRAVENOUS; SUBCUTANEOUS at 02:06

## 2024-06-24 RX ADMIN — SODIUM BICARBONATE: 84 INJECTION, SOLUTION INTRAVENOUS at 03:06

## 2024-06-24 RX ADMIN — GABAPENTIN 300 MG: 300 CAPSULE ORAL at 09:06

## 2024-06-24 NOTE — SUBJECTIVE & OBJECTIVE
Interval History: feeling weak still. Reports in flank seems to be less. Urine culture growing pseudomonas and e coli. On meropenem. ID consulted.    Review of Systems  Objective:     Vital Signs (Most Recent):  Temp: 97.4 °F (36.3 °C) (06/24/24 1122)  Pulse: 60 (06/24/24 1122)  Resp: 19 (06/24/24 1122)  BP: 100/60 (06/24/24 1122)  SpO2: 100 % (06/24/24 1122) Vital Signs (24h Range):  Temp:  [97.4 °F (36.3 °C)-97.7 °F (36.5 °C)] 97.4 °F (36.3 °C)  Pulse:  [59-72] 60  Resp:  [18-20] 19  SpO2:  [93 %-100 %] 100 %  BP: (100-120)/(60-71) 100/60     Weight: 132.5 kg (292 lb 1.8 oz)  Body mass index is 43.14 kg/m².    Intake/Output Summary (Last 24 hours) at 6/24/2024 1202  Last data filed at 6/24/2024 0541  Gross per 24 hour   Intake 1920 ml   Output 1450 ml   Net 470 ml         Physical Exam  Vitals and nursing note reviewed.   Constitutional:       General: He is not in acute distress.     Appearance: Normal appearance. He is obese.   Cardiovascular:      Rate and Rhythm: Normal rate and regular rhythm.      Pulses: Normal pulses.      Heart sounds: Normal heart sounds. No murmur heard.  Pulmonary:      Effort: Pulmonary effort is normal. No respiratory distress.      Breath sounds: Normal breath sounds.   Abdominal:      General: Bowel sounds are normal. There is no distension.      Palpations: Abdomen is soft.   Musculoskeletal:         General: No swelling.   Skin:     General: Skin is warm.      Coloration: Skin is not pale.   Neurological:      General: No focal deficit present.      Mental Status: He is alert and oriented to person, place, and time.   Psychiatric:         Mood and Affect: Mood normal.         Behavior: Behavior normal.             Significant Labs: All pertinent labs within the past 24 hours have been reviewed.    Significant Imaging: I have reviewed all pertinent imaging results/findings within the past 24 hours.

## 2024-06-24 NOTE — ASSESSMENT & PLAN NOTE
- Previous culture with enteroccus and discharged with augmentin  - given vanc in ED - add on meropenem for now for ESBL coverage and hypotension   - Urine cultures with E coli and Pseudomonas - ID consulted for recommendations for recurrent UTI and multiple organisms  - has right renal stone - may be colonized? Has Urology appt Thursday, consider inpt consultation if still hospitalized

## 2024-06-24 NOTE — PROGRESS NOTES
Renal Progress Note    Date of Admission:  6/21/2024  1:59 PM    Length of Stay: 3  Days    Subjective: no new complaints    Objective:    Current Facility-Administered Medications   Medication    acetaminophen tablet 650 mg    amiodarone tablet 200 mg    atorvastatin tablet 80 mg    dextrose 10% bolus 125 mL 125 mL    dextrose 10% bolus 250 mL 250 mL    docusate sodium capsule 100 mg    gabapentin capsule 300 mg    glucagon (human recombinant) injection 1 mg    glucose chewable tablet 16 g    glucose chewable tablet 24 g    heparin (porcine) injection 5,000 Units    hydrALAZINE injection 5 mg    insulin aspart U-100 pen 0-5 Units    levothyroxine tablet 200 mcg    melatonin tablet 6 mg    meropenem (MERREM) 500 mg in 0.9% NaCl 100 mL IVPB (MB+)    naloxone 0.4 mg/mL injection 0.02 mg    ondansetron injection 4 mg    oxyCODONE-acetaminophen 5-325 mg per tablet 1 tablet    polyethylene glycol packet 17 g    sodium bicarbonate 100 mEq in sterile water 1,000 mL infusion    sodium chloride 0.9% flush 10 mL       Vitals:    06/24/24 0451 06/24/24 0541 06/24/24 0741 06/24/24 0806   BP:   104/68    BP Location:       Patient Position:       Pulse: 60  (!) 59 72   Resp:   19    Temp:   97.5 °F (36.4 °C)    TempSrc:       SpO2:   (!) 93%    Weight:  132.5 kg (292 lb 1.8 oz)     Height:           I/O last 3 completed shifts:  In: 2260 [P.O.:1080; I.V.:1180]  Out: 2550 [Urine:2550]  No intake/output data recorded.      Physical Exam:     General: NAD  Neck: supple  Heart: RRR  Lungs: unlabored breathing on bipap  Abdomen: n/a  Limbs: n/a  Neurologic: awake      Laboratories:    Recent Labs   Lab 06/24/24  0425   WBC 1.53*   RBC 4.03*   HGB 9.2*   HCT 29.4*      MCV 73*   MCH 22.8*   MCHC 31.3*       Recent Labs   Lab 06/24/24  0425   CALCIUM 7.8*   ALBUMIN 2.8*   PROT 6.5   *   K 3.7   CO2 22*   CL 94*   BUN 47*   CREATININE 5.4*   ALKPHOS 70   ALT 12   AST 18   BILITOT 0.5       No results  "for input(s): "COLORU", "CLARITYU", "SPECGRAV", "PHUR", "PROTEINUA", "GLUCOSEU", "BILIRUBINCON", "BLOODU", "WBCU", "RBCU", "BACTERIA", "MUCUS" in the last 24 hours.    Microbiology Results (last 7 days)       Procedure Component Value Units Date/Time    Urine culture [[id number]]  (Abnormal)  (Susceptibility) Collected: 06/21/24 1741    Order Status: Completed Specimen: Urine Updated: 06/24/24 0950     Urine Culture, Routine ESCHERICHIA COLI  10,000 - 49,999 cfu/ml        PSEUDOMONAS AERUGINOSA  50,000 - 99,999 cfu/ml      Narrative:      Specimen Source->Urine    Group A Strep, Molecular [[id number]] Collected: 06/22/24 0837    Order Status: Completed Specimen: Throat Updated: 06/22/24 0950     Group A Strep, Molecular Negative              Diagnostic Tests: n/a        Assessment:    64 y/o male admitted with:    - MICHELLE  - E-coli / Pseudomonas UTI   - Hyponatremia  - PAF  - DM-2  - HTN  - HLD  - Anemia et?  - Hypoalbuminemia        Plan:    - Antibiotics per admitting  - IVFs: to NS   - Watching Sme-gtojc-amrgj-acid/base and I&O  - Check iron stores  - Renal ADA diet + protein supplements                      "

## 2024-06-24 NOTE — ASSESSMENT & PLAN NOTE
Noted to be slightly lower than baseline (3-4). No c/o fevers or chills. NO abscess on CT abd/pelvis noted. Will monitor for now.   - Neutropenic precautions  - follows with Hematology as outpatient

## 2024-06-24 NOTE — ASSESSMENT & PLAN NOTE
Presents with acute kidney injury, BUN 53/C r 6.7. FENa 4.1% suggestive of intrinsic etiology.  - Nephrology consulted  - Hold ARB.  - treat underlying UTI  - slowly improving  - recheck in AM

## 2024-06-24 NOTE — ASSESSMENT & PLAN NOTE
Chronic, controlled. Latest blood pressure and vitals reviewed-     Temp:  [97.4 °F (36.3 °C)-97.7 °F (36.5 °C)]   Pulse:  [59-72]   Resp:  [18-20]   BP: (100-120)/(60-71)   SpO2:  [93 %-100 %] .   Home meds for hypertension were reviewed and noted below.   Hypertension Medications               metoprolol tartrate (LOPRESSOR) 25 MG tablet Take 1 tablet (25 mg total) by mouth 2 (two) times daily.    olmesartan (BENICAR) 40 MG tablet Take 40 mg by mouth once daily.            While in the hospital, will manage blood pressure as follows; Adjust home antihypertensive regimen as follows- Hold benicar for now and resume metoprolol with hold parameters for low/normal BP and HR.     Will utilize p.r.n. blood pressure medication only if patient's blood pressure greater than 180/110 and he develops symptoms such as worsening chest pain or shortness of breath.

## 2024-06-24 NOTE — PROGRESS NOTES
[facility] [place]  Nurses Note -- 4 Eyes      6/24/2024       Skin assessed on: Q Shift      [x] No Pressure Injuries Present    []Prevention Measures Documented    [] Yes LDA  for Pressure Injury Previously documented     [] Yes New Pressure Injury Discovered   [] LDA for New Pressure Injury Added      Attending RN:  Navjot Rubio RN     Second RN:  Kellie Sales RN

## 2024-06-24 NOTE — ASSESSMENT & PLAN NOTE
Patient has hyponatremia which is controlled,We will aim to correct the sodium by 4-6mEq in 24 hours. We will monitor sodium Daily. The hyponatremia is due to renal insufficiency. We will obtain the following studies: Urine sodium, urine osmolality, serum osmolality or TSH, T4. We will treat the hyponatremia with IV fluids as follows: sodium bicarb. The patient's sodium results have been reviewed and are listed below.  Recent Labs   Lab 06/24/24  0425   *     - recheck in AM

## 2024-06-24 NOTE — PROGRESS NOTES
[facility]  Progress Note    Patient Name: Russell Santos  MRN: [id number]  Patient Class: IP- Inpatient   Admission Date: 6/21/2024  Length of Stay: 3 days  Attending Physician: Luca Stoll MD  Primary Care Provider: Roxanne Phillip -        Subjective:     Principal Problem:MICHELLE (acute kidney injury)        HPI:  63 y.o. AAM with h/o paroxsymal afib (on xarelto 15mg PO daily and amiodarone 200mg PO daily), essential HTN, HLD, NIDDM type 2, and Chronic Leukopenia (sees Dr. Marquez) presents to the ER with c/o decresed uop and back pain. Was recently admitted to [facility] from 5/4-5/11 for MICHELLE and UTI (MICHELLE 8.8 initial creat and resolved upon d/c with creatinine 1.1-followed with Dr. Middleton and d/c home on Augmentin which he complete for Enterococcus).  States dysuira and bladder spasms for the past 3 days. Generalized weakness and fatigue. No fevers or chills. No N/V/abdominal pain. Back pain worsen than his chronic back pain.  NO chest pain, SOB/LYNCH, palpation, or lower extremity edema.     States she followed up with Nephrology on 6/11 and was doing well. Does note that he had a brandt placed when he was hospitalized and had discomfort when it was d/c. States he also recently started back on his Benicar 40mg daily for BP control.     Labs to the ER concerning for recurrent MICHELLE with BUN 53/Creatine 67.  Bicar 17.  WBC 2.69 but dose have a h/o Leukopenia being follow by Dr. Marquez.  Urine concerning for recurrent UTI.  Lactic acid normal. SBP noted to be low/normal ranging . IVF started.     CT non-contrast abd/pelvis:  Impression:  Cholelithiasis.  6 mm nonobstructing right renal calculus.  Mild circumferential thickening of the urinary bladder.  Consider correlation with urinalysis to evaluate for acute cystitis.  Dilated appendix measuring 10 mm.  No periappendiceal infiltration.  Micro nodules at the lung bases.  For multiple solid nodules all <6 mm, Fleischner Society 2017  guidelines recommend no routine follow up for a low risk patient, or follow up with non-contrast chest CT at 12 months after discovery in a high risk patient.      Started on Vanc due to recent hospitalization and cultures.     Because this patient's clinical condition (MICHELLE) is guarded and requires frequent lab checks, IVF, IV abx which required adjustments based on daily renal function, and monitor for worsening vitals due to developing sepsis , care in an alternative location isn't clinically appropriate for the reasons stated above.  We have been consulted for admission to inpatient tele.               Overview/Hospital Course:  Mr. Santos is a 62 yo M admitted with acute kidney injury and acute cystitis. Started on antibiotics (last culture with enteroccocus faecalis). Broadened to meropenem. Nephrolgoy consulted. On bicarb drip. Slow improvement. Also noted to have right renal stone, possibly colonized? Unclear why recurrent UTI. Chronic leukopenia,  ongoing with neutropenic precautions.    Interval History: feeling weak still. Reports in flank seems to be less. Urine culture growing pseudomonas and e coli. On meropenem. ID consulted.    Review of Systems  Objective:     Vital Signs (Most Recent):  Temp: 97.4 °F (36.3 °C) (06/24/24 1122)  Pulse: 60 (06/24/24 1122)  Resp: 19 (06/24/24 1122)  BP: 100/60 (06/24/24 1122)  SpO2: 100 % (06/24/24 1122) Vital Signs (24h Range):  Temp:  [97.4 °F (36.3 °C)-97.7 °F (36.5 °C)] 97.4 °F (36.3 °C)  Pulse:  [59-72] 60  Resp:  [18-20] 19  SpO2:  [93 %-100 %] 100 %  BP: (100-120)/(60-71) 100/60     Weight: 132.5 kg (292 lb 1.8 oz)  Body mass index is 43.14 kg/m².    Intake/Output Summary (Last 24 hours) at 6/24/2024 1202  Last data filed at 6/24/2024 0541  Gross per 24 hour   Intake 1920 ml   Output 1450 ml   Net 470 ml         Physical Exam  Vitals and nursing note reviewed.   Constitutional:       General: He is not in acute distress.     Appearance: Normal appearance. He is  obese.   Cardiovascular:      Rate and Rhythm: Normal rate and regular rhythm.      Pulses: Normal pulses.      Heart sounds: Normal heart sounds. No murmur heard.  Pulmonary:      Effort: Pulmonary effort is normal. No respiratory distress.      Breath sounds: Normal breath sounds.   Abdominal:      General: Bowel sounds are normal. There is no distension.      Palpations: Abdomen is soft.   Musculoskeletal:         General: No swelling.   Skin:     General: Skin is warm.      Coloration: Skin is not pale.   Neurological:      General: No focal deficit present.      Mental Status: He is alert and oriented to person, place, and time.   Psychiatric:         Mood and Affect: Mood normal.         Behavior: Behavior normal.             Significant Labs: All pertinent labs within the past 24 hours have been reviewed.    Significant Imaging: I have reviewed all pertinent imaging results/findings within the past 24 hours.    Assessment/Plan:      * MICHELLE (acute kidney injury)  Presents with acute kidney injury, BUN 53/C r 6.7. FENa 4.1% suggestive of intrinsic etiology.  - Nephrology consulted  - Hold ARB.  - treat underlying UTI  - slowly improving  - recheck in AM      Hyponatremia  Patient has hyponatremia which is controlled,We will aim to correct the sodium by 4-6mEq in 24 hours. We will monitor sodium Daily. The hyponatremia is due to renal insufficiency. We will obtain the following studies: Urine sodium, urine osmolality, serum osmolality or TSH, T4. We will treat the hyponatremia with IV fluids as follows: sodium bicarb. The patient's sodium results have been reviewed and are listed below.  Recent Labs   Lab 06/24/24  0425   *     - recheck in AM    Cystitis  - Previous culture with enteroccus and discharged with augmentin  - given vanc in ED - add on meropenem for now for ESBL coverage and hypotension   - Urine cultures with E coli and Pseudomonas - ID consulted for recommendations for recurrent UTI and  multiple organisms  - has right renal stone - may be colonized? Has Urology appt Thursday, consider inpt consultation if still hospitalized      Hypothyroidism  Resume home levothyroxine      Leukopenia  Noted to be slightly lower than baseline (3-4). No c/o fevers or chills. NO abscess on CT abd/pelvis noted. Will monitor for now.   - Neutropenic precautions  - follows with Hematology as outpatient      PAF (paroxysmal atrial fibrillation)  Patient with Paroxysmal (<7 days) atrial fibrillation which is controlled currently with Beta Blocker and Amiodarone. Patient is currently in sinus rhythm.[id number] Score: 1.  Anticoagulation indicated. Anticoagulation done with Xarelto 15mg daily .  Will hold for now given MICHELLE and possible need for renal bx if no significant improvement in renal function.  Will resume tomorrow if renal function improves.     Type 2 diabetes mellitus with kidney complication, without long-term current use of insulin  Patient's FSGs are controlled on current medication regimen.  Last A1c reviewed-   Lab Results   Component Value Date    HGBA1C 6.3 (H) 08/08/2022     Most recent fingerstick glucose reviewed-   Recent Labs   Lab 06/21/24  1943 06/22/24  0733   POCTGLUCOSE 103 120*       Current correctional scale  Low  Maintain anti-hyperglycemic dose as follows-   Antihyperglycemics (From admission, onward)      Start     Stop Route Frequency Ordered    06/21/24 2009  insulin aspart U-100 pen 0-5 Units         -- SubQ Before meals & nightly PRN 06/21/24 1909          Hold Oral hypoglycemics while patient is in the hospital. Follow up on A1c.     Hyperlipidemia  Resume stain     Essential hypertension  Chronic, controlled. Latest blood pressure and vitals reviewed-     Temp:  [97.4 °F (36.3 °C)-97.7 °F (36.5 °C)]   Pulse:  [59-72]   Resp:  [18-20]   BP: (100-120)/(60-71)   SpO2:  [93 %-100 %] .   Home meds for hypertension were reviewed and noted below.   Hypertension Medications                metoprolol tartrate (LOPRESSOR) 25 MG tablet Take 1 tablet (25 mg total) by mouth 2 (two) times daily.    olmesartan (BENICAR) 40 MG tablet Take 40 mg by mouth once daily.            While in the hospital, will manage blood pressure as follows; Adjust home antihypertensive regimen as follows- Hold benicar for now and resume metoprolol with hold parameters for low/normal BP and HR.     Will utilize p.r.n. blood pressure medication only if patient's blood pressure greater than 180/110 and he develops symptoms such as worsening chest pain or shortness of breath.      VTE Risk Mitigation (From admission, onward)           Ordered     heparin (porcine) injection 5,000 Units  Every 8 hours         06/22/24 0515     IP VTE HIGH RISK PATIENT  Once         06/21/24 1908     Place sequential compression device  Until discontinued         06/21/24 1908                    Discharge Planning   CASA:      Code Status: Full Code   Is the patient medically ready for discharge?:     Reason for patient still in hospital (select all that apply): Treatment  Discharge Plan A: Home with family                  Luca Stoll MD  Department of Hospital Medicine   [facility]

## 2024-06-25 ENCOUNTER — TELEPHONE (OUTPATIENT)
Dept: HEMATOLOGY/ONCOLOGY | Facility: CLINIC | Age: 64
End: 2024-06-25
Payer: MEDICARE

## 2024-06-25 PROBLEM — N39.0 RECURRENT UTI: Status: ACTIVE | Noted: 2024-06-25

## 2024-06-25 PROBLEM — N20.0 KIDNEY STONE: Status: ACTIVE | Noted: 2024-06-25

## 2024-06-25 LAB
ACANTHOCYTES BLD QL SMEAR: PRESENT
ALBUMIN SERPL BCP-MCNC: 2.7 G/DL (ref 3.5–5.2)
ALP SERPL-CCNC: 54 U/L (ref 55–135)
ALT SERPL W/O P-5'-P-CCNC: 12 U/L (ref 10–44)
ANION GAP SERPL CALC-SCNC: 10 MMOL/L (ref 8–16)
ANISOCYTOSIS BLD QL SMEAR: ABNORMAL
AST SERPL-CCNC: 19 U/L (ref 10–40)
BASOPHILS # BLD AUTO: 0.03 K/UL (ref 0–0.2)
BASOPHILS NFR BLD: 2.2 % (ref 0–1.9)
BILIRUB SERPL-MCNC: 0.3 MG/DL (ref 0.1–1)
BUN SERPL-MCNC: 44 MG/DL (ref 8–23)
BURR CELLS BLD QL SMEAR: ABNORMAL
CALCIUM SERPL-MCNC: 7.7 MG/DL (ref 8.7–10.5)
CHLORIDE SERPL-SCNC: 100 MMOL/L (ref 95–110)
CO2 SERPL-SCNC: 24 MMOL/L (ref 23–29)
CREAT SERPL-MCNC: 4.8 MG/DL (ref 0.5–1.4)
DIFFERENTIAL METHOD BLD: ABNORMAL
EOSINOPHIL # BLD AUTO: 0.1 K/UL (ref 0–0.5)
EOSINOPHIL NFR BLD: 6.5 % (ref 0–8)
ERYTHROCYTE [DISTWIDTH] IN BLOOD BY AUTOMATED COUNT: 18.2 % (ref 11.5–14.5)
EST. GFR  (NO RACE VARIABLE): 13 ML/MIN/1.73 M^2
FERRITIN SERPL-MCNC: 225 NG/ML (ref 20–300)
GLUCOSE SERPL-MCNC: 77 MG/DL (ref 70–110)
HCT VFR BLD AUTO: 26.9 % (ref 40–54)
HGB BLD-MCNC: 8.7 G/DL (ref 14–18)
HYPOCHROMIA BLD QL SMEAR: ABNORMAL
IMM GRANULOCYTES # BLD AUTO: 0 K/UL (ref 0–0.04)
IMM GRANULOCYTES NFR BLD AUTO: 0 % (ref 0–0.5)
IRON SERPL-MCNC: 63 UG/DL (ref 45–160)
LYMPHOCYTES # BLD AUTO: 0.6 K/UL (ref 1–4.8)
LYMPHOCYTES NFR BLD: 39.6 % (ref 18–48)
MCH RBC QN AUTO: 23.1 PG (ref 27–31)
MCHC RBC AUTO-ENTMCNC: 32.3 G/DL (ref 32–36)
MCV RBC AUTO: 71 FL (ref 82–98)
MONOCYTES # BLD AUTO: 0 K/UL (ref 0.3–1)
MONOCYTES NFR BLD: 1.4 % (ref 4–15)
NEUTROPHILS # BLD AUTO: 0.7 K/UL (ref 1.8–7.7)
NEUTROPHILS NFR BLD: 50.3 % (ref 38–73)
NRBC BLD-RTO: 0 /100 WBC
OVALOCYTES BLD QL SMEAR: ABNORMAL
PLATELET # BLD AUTO: 344 K/UL (ref 150–450)
PLATELET BLD QL SMEAR: ABNORMAL
PMV BLD AUTO: 9.2 FL (ref 9.2–12.9)
POCT GLUCOSE: 100 MG/DL (ref 70–110)
POCT GLUCOSE: 107 MG/DL (ref 70–110)
POCT GLUCOSE: 84 MG/DL (ref 70–110)
POIKILOCYTOSIS BLD QL SMEAR: ABNORMAL
POTASSIUM SERPL-SCNC: 3.9 MMOL/L (ref 3.5–5.1)
PROT SERPL-MCNC: 6 G/DL (ref 6–8.4)
RBC # BLD AUTO: 3.77 M/UL (ref 4.6–6.2)
SATURATED IRON: 28 % (ref 20–50)
SCHISTOCYTES BLD QL SMEAR: ABNORMAL
SODIUM SERPL-SCNC: 134 MMOL/L (ref 136–145)
SPHEROCYTES BLD QL SMEAR: ABNORMAL
TOTAL IRON BINDING CAPACITY: 228 UG/DL (ref 250–450)
TRANSFERRIN SERPL-MCNC: 154 MG/DL (ref 200–375)
WBC # BLD AUTO: 1.39 K/UL (ref 3.9–12.7)

## 2024-06-25 PROCEDURE — 63600175 PHARM REV CODE 636 W HCPCS: Performed by: STUDENT IN AN ORGANIZED HEALTH CARE EDUCATION/TRAINING PROGRAM

## 2024-06-25 PROCEDURE — 80053 COMPREHEN METABOLIC PANEL: CPT | Performed by: STUDENT IN AN ORGANIZED HEALTH CARE EDUCATION/TRAINING PROGRAM

## 2024-06-25 PROCEDURE — 36415 COLL VENOUS BLD VENIPUNCTURE: CPT | Performed by: STUDENT IN AN ORGANIZED HEALTH CARE EDUCATION/TRAINING PROGRAM

## 2024-06-25 PROCEDURE — 25000003 PHARM REV CODE 250: Performed by: STUDENT IN AN ORGANIZED HEALTH CARE EDUCATION/TRAINING PROGRAM

## 2024-06-25 PROCEDURE — 99223 1ST HOSP IP/OBS HIGH 75: CPT | Mod: ,,, | Performed by: STUDENT IN AN ORGANIZED HEALTH CARE EDUCATION/TRAINING PROGRAM

## 2024-06-25 PROCEDURE — 25000003 PHARM REV CODE 250: Performed by: INTERNAL MEDICINE

## 2024-06-25 PROCEDURE — 94660 CPAP INITIATION&MGMT: CPT

## 2024-06-25 PROCEDURE — 99223 1ST HOSP IP/OBS HIGH 75: CPT | Mod: ,,, | Performed by: UROLOGY

## 2024-06-25 PROCEDURE — 27000221 HC OXYGEN, UP TO 24 HOURS

## 2024-06-25 PROCEDURE — 85025 COMPLETE CBC W/AUTO DIFF WBC: CPT | Performed by: STUDENT IN AN ORGANIZED HEALTH CARE EDUCATION/TRAINING PROGRAM

## 2024-06-25 PROCEDURE — 11000001 HC ACUTE MED/SURG PRIVATE ROOM

## 2024-06-25 PROCEDURE — 83540 ASSAY OF IRON: CPT | Performed by: INTERNAL MEDICINE

## 2024-06-25 PROCEDURE — 99900035 HC TECH TIME PER 15 MIN (STAT)

## 2024-06-25 PROCEDURE — 82728 ASSAY OF FERRITIN: CPT | Performed by: INTERNAL MEDICINE

## 2024-06-25 PROCEDURE — 27000207 HC ISOLATION

## 2024-06-25 PROCEDURE — 94761 N-INVAS EAR/PLS OXIMETRY MLT: CPT

## 2024-06-25 PROCEDURE — 99232 SBSQ HOSP IP/OBS MODERATE 35: CPT | Mod: ,,, | Performed by: INTERNAL MEDICINE

## 2024-06-25 PROCEDURE — 63600175 PHARM REV CODE 636 W HCPCS: Performed by: INTERNAL MEDICINE

## 2024-06-25 PROCEDURE — 21400001 HC TELEMETRY ROOM

## 2024-06-25 RX ADMIN — SODIUM BICARBONATE 650 MG: 325 TABLET ORAL at 02:06

## 2024-06-25 RX ADMIN — SODIUM CHLORIDE: 9 INJECTION, SOLUTION INTRAVENOUS at 01:06

## 2024-06-25 RX ADMIN — ATORVASTATIN CALCIUM 80 MG: 40 TABLET, FILM COATED ORAL at 09:06

## 2024-06-25 RX ADMIN — HEPARIN SODIUM 5000 UNITS: 5000 INJECTION INTRAVENOUS; SUBCUTANEOUS at 05:06

## 2024-06-25 RX ADMIN — DOCUSATE SODIUM 100 MG: 100 CAPSULE, LIQUID FILLED ORAL at 09:06

## 2024-06-25 RX ADMIN — HEPARIN SODIUM 5000 UNITS: 5000 INJECTION INTRAVENOUS; SUBCUTANEOUS at 02:06

## 2024-06-25 RX ADMIN — LEVOTHYROXINE SODIUM 200 MCG: 0.1 TABLET ORAL at 05:06

## 2024-06-25 RX ADMIN — SODIUM BICARBONATE 650 MG: 325 TABLET ORAL at 09:06

## 2024-06-25 RX ADMIN — CEFEPIME 2 G: 2 INJECTION, POWDER, FOR SOLUTION INTRAVENOUS at 04:06

## 2024-06-25 RX ADMIN — HEPARIN SODIUM 5000 UNITS: 5000 INJECTION INTRAVENOUS; SUBCUTANEOUS at 09:06

## 2024-06-25 RX ADMIN — MEROPENEM 500 MG: 500 INJECTION INTRAVENOUS at 09:06

## 2024-06-25 RX ADMIN — GABAPENTIN 300 MG: 300 CAPSULE ORAL at 09:06

## 2024-06-25 NOTE — ASSESSMENT & PLAN NOTE
Patient has hyponatremia which is controlled,We will aim to correct the sodium by 4-6mEq in 24 hours. We will monitor sodium Daily. The hyponatremia is due to renal insufficiency. We will obtain the following studies: Urine sodium, urine osmolality, serum osmolality or TSH, T4. We will treat the hyponatremia with IV fluids as follows: sodium bicarb. The patient's sodium results have been reviewed and are listed below.  Recent Labs   Lab 06/25/24  0500   *     - recheck in AM  - improving   -nephrology following

## 2024-06-25 NOTE — ASSESSMENT & PLAN NOTE
Patient with Paroxysmal (<7 days) atrial fibrillation which is controlled currently with Beta Blocker and Amiodarone. Patient is currently in sinus rhythm.[id number] Score: 1.  Anticoagulation indicated. Anticoagulation done with Xarelto 15mg daily .  Will hold home Xarelto given MICHELLE and possible need for renal bx if no significant improvement in renal function.  Will resume if renal function improves and cleared by Nephrology.

## 2024-06-25 NOTE — ASSESSMENT & PLAN NOTE
- Previous culture with enteroccus and discharged with augmentin  - given vanc in ED - add on meropenem for now for ESBL coverage and hypotension   - Urine cultures with E coli and Pseudomonas  - ID consulted for recommendations for recurrent UTI and multiple organisms: agree with 3-5d antibiotics while inpatient for possible cystitis, but can stop at d/c.  - De-escalating meropenem to cefepime on 06/25/24.   - has right renal stone, urology consulted

## 2024-06-25 NOTE — ASSESSMENT & PLAN NOTE
Non-obstructing at the time of CT scan, 6/21  Will check RENEE given his episode of pain yesterday

## 2024-06-25 NOTE — CONSULTS
Hematology- Oncology Consult Note :     6/25/2024    Reason for consult: low WBC    The patient location is: hospital   The chief complaint leading to consultation is: low WBC  Visit type: Virtual visit with synchronous audio and video  Total time spent with patient: 15 minutes  Each patient to whom he or she provides medical services by telemedicine is:  (1) informed of the relationship between the physician and patient and the respective role of any other health care provider with respect to management of the patient; and (2) notified that he or she may decline to receive medical services by telemedicine and may withdraw from such care at any time.          HPI    Pt is a 63 y.o. AAM with h/o paroxsymal afib (on xarelto 15mg PO daily and amiodarone 200mg PO daily), essential HTN, HLD, NIDDM type 2, and Chronic Leukopenia (sees Dr. Marquez outpt) who presented to the ER with complaints of back pain. Labs to the ER concerning for recurrent MICHELLE with BUN 53/Creatine 67.  WBC 2.69 and hematology consulted for leukopenia.      Patient Active Problem List    Diagnosis Date Noted    Kidney stone 06/25/2024    Recurrent UTI 06/25/2024    Cystitis 06/22/2024    Hyponatremia 06/22/2024    Right shoulder pain 05/10/2024    Pyelonephritis 05/05/2024    MICHELLE (acute kidney injury) 05/04/2024    Hypotension 08/08/2022    History of DVT (deep vein thrombosis) 08/08/2022    Hypothyroidism 08/08/2022    Leukopenia 12/03/2020    PAF (paroxysmal atrial fibrillation) 09/16/2019    Essential hypertension 10/17/2017    Hyperlipidemia 10/17/2017    Type 2 diabetes mellitus with kidney complication, without long-term current use of insulin 10/17/2017    Morbid obesity 10/17/2017     Past Medical History:   Diagnosis Date    Acquired hypothyroidism     Atrial fibrillation 09/16/2019    Diabetes mellitus     High cholesterol     History of DVT (deep vein thrombosis)     History of pulmonary embolism     Hypertension     LARRY (obstructive sleep  apnea)     S/P IVC filter       Past Surgical History:   Procedure Laterality Date    THYROIDECTOMY, PARTIAL  2006    TONSILLECTOMY      TREATMENT OF CARDIAC ARRHYTHMIA  9/16/2019    Procedure: Cardioversion or Defibrillation;  Surgeon: Deven Vasquez MD;  Location: [facility] CATH LAB;  Service: Cardiology;;      Medications Prior to Admission   Medication Sig Dispense Refill Last Dose    acetaminophen (TYLENOL) 500 MG tablet Take 500 mg by mouth every 6 (six) hours as needed for Pain.       amiodarone (PACERONE) 200 MG Tab Take 200 mg by mouth once daily.       calcium carbonate (TUMS) 200 mg calcium (500 mg) chewable tablet Take 2 tablets (1,000 mg total) by mouth 2 (two) times daily. 120 tablet 11     cholecalciferol, vitamin D3, (VITAMIN D3) 50 mcg (2,000 unit) Tab Take by mouth once daily.       EPINEPHrine (EPIPEN) 0.3 mg/0.3 mL AtIn        ergocalciferol (ERGOCALCIFEROL) 50,000 unit Cap Take 50,000 Units by mouth every 7 days.       ferrous gluconate 324 mg (37.5 mg iron) Tab tablet Take 1 tablet (324 mg total) by mouth 2 (two) times daily with meals. 60 tablet 11     gabapentin (NEURONTIN) 600 MG tablet Take 1 tablet by mouth nightly.       levothyroxine (SYNTHROID) 200 MCG tablet Take 200 mcg by mouth before breakfast.       metformin (GLUCOPHAGE) 1000 MG tablet Take 1,000 mg by mouth 2 (two) times daily with meals.       metoprolol tartrate (LOPRESSOR) 25 MG tablet Take 1 tablet (25 mg total) by mouth 2 (two) times daily. 60 tablet 11     olmesartan (BENICAR) 40 MG tablet Take 40 mg by mouth once daily.       omega-3 fatty acids/fish oil (FISH OIL-OMEGA-3 FATTY ACIDS) 300-1,000 mg capsule Take 1 capsule by mouth 2 (two) times a day.       omeprazole (PRILOSEC) 20 MG capsule        rosuvastatin (CRESTOR) 40 MG Tab Take 10 mg by mouth every evening.        Review of patient's allergies indicates:   Allergen Reactions    Contrast media Hives      Social History     Tobacco Use    Smoking status: Never     Smokeless tobacco: Never   Substance Use Topics    Alcohol use: Not Currently     Comment: occasionally      Family History   Family history unknown: Yes        Review of Systems :  Review of Systems   Constitutional:  Positive for malaise/fatigue and weight loss. Negative for fever.   HENT:  Negative for hearing loss.    Eyes:  Negative for blurred vision and pain.   Respiratory:  Negative for cough and shortness of breath.    Cardiovascular:  Negative for chest pain and leg swelling.   Gastrointestinal:  Negative for abdominal pain, blood in stool, constipation, diarrhea, heartburn, melena, nausea and vomiting.   Genitourinary:  Negative for dysuria.   Musculoskeletal:  Negative for joint pain and myalgias.   Skin:  Negative for itching and rash.   Neurological:  Negative for dizziness.   Endo/Heme/Allergies:  Does not bruise/bleed easily.   Psychiatric/Behavioral:  Negative for depression. The patient is not nervous/anxious.        Physical Exam :  Wt Readings from Last 3 Encounters:   06/24/24 132.5 kg (292 lb 1.8 oz)   06/13/24 127.6 kg (281 lb 4.9 oz)   05/22/24 134.2 kg (295 lb 13.7 oz)     Temp Readings from Last 3 Encounters:   06/25/24 97.6 °F (36.4 °C)   05/11/24 98.4 °F (36.9 °C) (Oral)   10/13/23 98.2 °F (36.8 °C) (Oral)     BP Readings from Last 3 Encounters:   06/25/24 100/61   06/13/24 120/79   05/22/24 125/85     Pulse Readings from Last 3 Encounters:   06/25/24 65   06/13/24 62   05/22/24 63     Body mass index is 43.14 kg/m².    Physical Exam    Virtual visit    Pertinent Diagnostic studies:    Recent Results (from the past 24 hour(s))   POCT glucose    Collection Time: 06/24/24  4:18 PM   Result Value Ref Range    POCT Glucose 111 (H) 70 - 110 mg/dL   POCT glucose    Collection Time: 06/24/24  7:30 PM   Result Value Ref Range    POCT Glucose 112 (H) 70 - 110 mg/dL   Comprehensive Metabolic Panel    Collection Time: 06/25/24  5:00 AM   Result Value Ref Range    Sodium 134 (L) 136 - 145 mmol/L     Potassium 3.9 3.5 - 5.1 mmol/L    Chloride 100 95 - 110 mmol/L    CO2 24 23 - 29 mmol/L    Glucose 77 70 - 110 mg/dL    BUN 44 (H) 8 - 23 mg/dL    Creatinine 4.8 (H) 0.5 - 1.4 mg/dL    Calcium 7.7 (L) 8.7 - 10.5 mg/dL    Total Protein 6.0 6.0 - 8.4 g/dL    Albumin 2.7 (L) 3.5 - 5.2 g/dL    Total Bilirubin 0.3 0.1 - 1.0 mg/dL    Alkaline Phosphatase 54 (L) 55 - 135 U/L    AST 19 10 - 40 U/L    ALT 12 10 - 44 U/L    eGFR 13 (A) >60 mL/min/1.73 m^2    Anion Gap 10 8 - 16 mmol/L   CBC Auto Differential    Collection Time: 06/25/24  5:00 AM   Result Value Ref Range    WBC 1.39 (LL) 3.90 - 12.70 K/uL    RBC 3.77 (L) 4.60 - 6.20 M/uL    Hemoglobin 8.7 (L) 14.0 - 18.0 g/dL    Hematocrit 26.9 (L) 40.0 - 54.0 %    MCV 71 (L) 82 - 98 fL    MCH 23.1 (L) 27.0 - 31.0 pg    MCHC 32.3 32.0 - 36.0 g/dL    RDW 18.2 (H) 11.5 - 14.5 %    Platelets 344 150 - 450 K/uL    MPV 9.2 9.2 - 12.9 fL    Immature Granulocytes 0.0 0.0 - 0.5 %    Gran # (ANC) 0.7 (L) 1.8 - 7.7 K/uL    Immature Grans (Abs) 0.00 0.00 - 0.04 K/uL    Lymph # 0.6 (L) 1.0 - 4.8 K/uL    Mono # 0.0 (L) 0.3 - 1.0 K/uL    Eos # 0.1 0.0 - 0.5 K/uL    Baso # 0.03 0.00 - 0.20 K/uL    nRBC 0 0 /100 WBC    Gran % 50.3 38.0 - 73.0 %    Lymph % 39.6 18.0 - 48.0 %    Mono % 1.4 (L) 4.0 - 15.0 %    Eosinophil % 6.5 0.0 - 8.0 %    Basophil % 2.2 (H) 0.0 - 1.9 %    Platelet Estimate Appears normal     Aniso Moderate     Poik Moderate     Hypo Occasional     Ovalocytes Occasional     Mandi Cells Moderate     Spherocytes Occasional     Acanthocytes Present     Fragmented Cells Occasional     Differential Method Automated    Iron and TIBC    Collection Time: 06/25/24  5:00 AM   Result Value Ref Range    Iron 63 45 - 160 ug/dL    Transferrin 154 (L) 200 - 375 mg/dL    TIBC 228 (L) 250 - 450 ug/dL    Saturated Iron 28 20 - 50 %   Ferritin    Collection Time: 06/25/24  5:00 AM   Result Value Ref Range    Ferritin 225 20.0 - 300.0 ng/mL   POCT glucose    Collection Time: 06/25/24  7:27 AM    Result Value Ref Range    POCT Glucose 84 70 - 110 mg/dL       Assessment/Plan :     Leukopenia  -chronic and followed by Dr Marquez outpt  -Most likely has familial component  -Patient had a bone marrow biopsy in 2020, which was unrevealing,   -Most likely recently worsened secondary to acute illness and antibiotics  -, continue to monitor for fever on antibiotics and institute neutropenic precautions if they drop below 500      Anemia  -chronic and multifactorial  -Transfuse prbc PRN HB<7      UTI  -Most likely contributing to abnormal labs and cell counts  -pr primary team      Time spent on case: 30 minutes    Thank you for the consult, please contact us for any questions or concerns.    Lorena Moore MD   Hematology/oncology, [facility]

## 2024-06-25 NOTE — SUBJECTIVE & OBJECTIVE
Past Medical History:   Diagnosis Date    Acquired hypothyroidism     Atrial fibrillation 09/16/2019    Diabetes mellitus     High cholesterol     History of DVT (deep vein thrombosis)     History of pulmonary embolism     Hypertension     LARRY (obstructive sleep apnea)     S/P IVC filter        Past Surgical History:   Procedure Laterality Date    THYROIDECTOMY, PARTIAL  2006    TONSILLECTOMY      TREATMENT OF CARDIAC ARRHYTHMIA  9/16/2019    Procedure: Cardioversion or Defibrillation;  Surgeon: Deven Vasquez MD;  Location: [facility] CATH LAB;  Service: Cardiology;;       Review of patient's allergies indicates:   Allergen Reactions    Contrast media Hives       Family History    Family history is unknown by patient.         Tobacco Use    Smoking status: Never    Smokeless tobacco: Never   Substance and Sexual Activity    Alcohol use: Not Currently     Comment: occasionally    Drug use: No    Sexual activity: Not Currently       Review of Systems   Constitutional:  Negative for fever.   Respiratory:  Negative for chest tightness and wheezing.    Cardiovascular:  Negative for chest pain and palpitations.   Gastrointestinal:  Negative for abdominal pain, diarrhea, nausea and vomiting.   Genitourinary:  Positive for flank pain. Negative for difficulty urinating, dysuria and hematuria.   Musculoskeletal:  Negative for arthralgias.   Neurological:  Negative for dizziness.   Psychiatric/Behavioral:  Negative for confusion.        Objective:     Temp:  [97.4 °F (36.3 °C)-98.6 °F (37 °C)] 97.7 °F (36.5 °C)  Pulse:  [57-72] 58  Resp:  [19] 19  SpO2:  [76 %-100 %] 99 %  BP: (100-123)/(58-68) 101/60  Weight: 132.5 kg (292 lb 1.8 oz)  Body mass index is 43.14 kg/m².      Post Void Cath Residual Output (mL): 0 mL (06/22/24 1102)    Drains       None                    Physical Exam  Vitals and nursing note reviewed.   Constitutional:       Appearance: He is well-developed.   HENT:      Head: Normocephalic.   Eyes:       "Conjunctiva/sclera: Conjunctivae normal.   Neck:      Thyroid: No thyromegaly.      Trachea: No tracheal deviation.   Cardiovascular:      Rate and Rhythm: Normal rate.      Heart sounds: Normal heart sounds.   Pulmonary:      Effort: Pulmonary effort is normal. No respiratory distress.      Breath sounds: Normal breath sounds. No wheezing.   Abdominal:      General: Bowel sounds are normal.      Palpations: Abdomen is soft.      Tenderness: There is no abdominal tenderness. There is no rebound.      Hernia: No hernia is present.   Musculoskeletal:         General: No tenderness. Normal range of motion.      Cervical back: Normal range of motion and neck supple.   Lymphadenopathy:      Cervical: No cervical adenopathy.   Skin:     General: Skin is warm and dry.      Findings: No erythema or rash.   Neurological:      Mental Status: He is alert and oriented to person, place, and time.   Psychiatric:         Behavior: Behavior normal.         Thought Content: Thought content normal.         Judgment: Judgment normal.          Significant Labs:    BMP:  Recent Labs   Lab 06/23/24  0618 06/24/24  0425 06/25/24  0500   * 129* 134*   K 3.9 3.7 3.9   CL 94* 94* 100   CO2 21* 22* 24   BUN 49* 47* 44*   CREATININE 6.0* 5.4* 4.8*   CALCIUM 7.7* 7.8* 7.7*       CBC:  Recent Labs   Lab 06/23/24 0618 06/24/24  0425 06/25/24  0500   WBC 1.60* 1.53* 1.39*   HGB 8.9* 9.2* 8.7*   HCT 29.1* 29.4* 26.9*    327 344       Blood Culture: No results for input(s): "LABBLOO" in the last 168 hours.  Urine Culture:   Recent Labs   Lab 06/21/24  1741   LABURIN ESCHERICHIA COLI  10,000 - 49,999 cfu/ml  *  PSEUDOMONAS AERUGINOSA  50,000 - 99,999 cfu/ml  *       Significant Imaging:  CT: I have reviewed all results within the past 24 hours and my personal findings are:     CT Abdomen Pelvis  Without Contrast  Order: [id number]  Status: Final result       Visible to patient: Yes (not seen)       Next appt: 06/27/2024 at 01:40 PM in " Urology (Nicolette Orellana MD)    0 Result Notes  Details    Reading Physician Reading Date Result Priority   Roxana Maciel MD  [phone] 6/21/2024 STAT     Narrative & Impression  EXAMINATION:  CT ABDOMEN PELVIS WITHOUT     CLINICAL HISTORY:  Dysuria and bladder spasms.     TECHNIQUE:  5 mm unenhanced axial images from the lung bases through the greater trochanters were performed.  Coronal and sagittal reformatted images were provided.     COMPARISON:  05/04/2024     FINDINGS:  Within the limits of a noncontrast examination, the liver, spleen, pancreas, left kidney and adrenal glands are unremarkable.  The gallbladder contains multiple gallstones.     There is a 6 mm nonobstructing right renal calculus.     There is no gross abdominal adenopathy or ascites.  Moderate atherosclerotic changes are present.     The appendix is dilated measuring up to 10 mm.  On the previous examination, it was also dilated and measured 12 mm.  There is no periappendiceal stranding or fluid.  There are no pelvic masses or adenopathy.  There is no free pelvic fluid.  Mild circumferential thickening of the urinary bladder wall is seen.     At the lung bases, there is mild basilar atelectatic change.  There are multiple micro nodules.  There are coronary arterial calcifications.     Spondylolysis and spondylolisthesis are seen.     Impression:     Cholelithiasis.     6 mm nonobstructing right renal calculus.     Mild circumferential thickening of the urinary bladder.  Consider correlation with urinalysis to evaluate for acute cystitis.     Dilated appendix measuring 10 mm.  No periappendiceal infiltration.     Micronodules at the lung bases.  For multiple solid nodules all <6 mm, Fleischner Society 2017 guidelines recommend no routine follow up for a low risk patient, or follow up with non-contrast chest CT at 12 months after discovery in a high risk patient.     This report was flagged in Epic as abnormal.         Electronically signed by:Roxana Maciel  Date:                                            06/21/2024  Time:                                           20:33           Exam Ended: 06/21/24 19:11 CDT

## 2024-06-25 NOTE — ASSESSMENT & PLAN NOTE
-CT scan with right nonobstructing stone  -renal ultrasound with no hydronephrosis  -urology consulted

## 2024-06-25 NOTE — PROGRESS NOTES
[facility]  Infectious Disease  Progress Note    Patient Name: Russell Santos  MRN: [id number]  Admission Date: 6/21/2024  Length of Stay: 4 days  Attending Physician: Keri Porras-Ana PEARSON,   Primary Care Provider: Roxanne Phillip -    Isolation Status: Contact and Enhanced Respiratory  Assessment/Plan:      Renal/  Cystitis  63M with h/o a.fib, htn, t2dm, chronic leukopenia admitted 6/21 with decreased urine output, found to have MICHELLE. Denies urinary symptoms and afebrile and no systemic signs of infection. Recently treated for e.faecalis UTI with augmentin. UA this admit with 10 squam (contaminated) and Ucx now with pseudmonas and e.coli . Day #4 meropenem. Primary team wondering if the 6 mm nonobstructing right renal calculus could be nidus for relapsed urinary infection. Imaging with bladder thickening, but no pyleo.     UA grossly contaminated and organisms present at low colony count and pt not having urinary symptoms or systemic signs of infection. Not clear that pt has a uti and IDSA guidelines do not recommend treating asymptomatic bacteruria (unless urological procedure planned). The renal stone is unlikely nidus for relapsed infection given that different organism was present as last time    Recommendations:   - agree with 3-5d antibiotics while inpatient for possible cystitis, but can stop at d/c. Would consider de-escalating meropenem --> cefepime            Thank you for your consult. I will sign off. Please contact us if you have any additional questions.    Cristina Tavarez MD  [place]fect[place] Disease  [facility] [place] [facility]    Subjective:     Principal Problem:MICHELLE (acute kidney injury)    HPI: 63M with h/o a.fib, htn, t2dm, chronic leukopenia admitted 6/21 with decreased urine output. Also noticed several days of increased swelling in b/l legs, which is now improved. Denies urinary freq or urgency. Denies fever/chills. Reports chronic sob with exertion. Now having some dizziness with standing. Denies  "brandt as an outpatient. Denies abd pain or diarrhea.    Notably had admit 5/4-5/11 for MICHELLE (Cr 8.8 from 1.1) and UTI (treated with Augmentin for e.faecalis) .       Ct a/p:  Cholelithiasis.     6 mm nonobstructing right renal calculus.     Mild circumferential thickening of the urinary bladder.  Consider correlation with urinalysis to evaluate for acute cystitis.     Dilated appendix measuring 10 mm.  No periappendiceal infiltration.     Micronodules at the lung bases.  For multiple solid nodules all <6 mm, Fleischner Society 2017 guidelines recommend no routine follow up for a low risk patient, or follow up with non-contrast chest CT at 12 months after discovery in a high risk patient.        Procalcitonin high 1.54    UA- contaminated with 10 squam  RBC, UA 0 - 4 /hpf 17 High    WBC, UA 0 - 5 /hpf >100 High    Bacteria None-Occ /hpf Moderate Abnormal    Squam Epithel, UA /hpf 10   Non-Squam Epith <1/hpf /hpf 2 Abnormal    Hyaline Casts, UA 0-1/lpf /lpf 4 Abnormal               Escherichia coli Pseudomonas aeruginosa       CULTURE, URINE CULTURE, URINE     Amox/K Clav'ate >16/8 mcg/mL Resistant       Amp/Sulbactam >16/8 mcg/mL Resistant       Ampicillin >16 mcg/mL Resistant       Cefazolin <=2 mcg/mL Sensitive       Cefepime <=2 mcg/mL Sensitive <=2 mcg/mL Sensitive     Ceftriaxone <=1 mcg/mL Sensitive       Ciprofloxacin <=1 mcg/mL Sensitive <=1 mcg/mL Sensitive     Ertapenem <=0.5 mcg/mL Sensitive       Gentamicin <=4 mcg/mL Sensitive       Levofloxacin <=2 mcg/mL Sensitive <=2 mcg/mL Sensitive     Meropenem <=1 mcg/mL Sensitive <=1 mcg/mL Sensitive     Nitrofurantoin <=32 mcg/mL Sensitive       Piperacillin/Tazo <=16 mcg/mL Sensitive <=16 mcg/mL Sensitive     Tobramycin <=4 mcg/mL Sensitive       Trimeth/Sulfa <=2/38 mcg/mL Sensitive            Day #4 meropenem.       Afebrile    No recent EKG/Qtc    ID consulted for "multiple organism UTI, recurrent uti - antibiotic   Interval history: NAEO. Again, denies " urinary freq/urgency or other urinary symptoms (other than difficulty emptying bladder). Reports feeling like he was going to pass out and sweats and trouble straightening out back prior to arrival.      Past Surgical History:   Procedure Laterality Date    THYROIDECTOMY, PARTIAL  2006    TONSILLECTOMY      TREATMENT OF CARDIAC ARRHYTHMIA  9/16/2019    Procedure: Cardioversion or Defibrillation;  Surgeon: Deven Vasquez MD;  Location: [facility] CATH LAB;  Service: Cardiology;;       Review of patient's allergies indicates:   Allergen Reactions    Contrast media Hives       No current facility-administered medications on file prior to encounter.     Current Outpatient Medications on File Prior to Encounter   Medication Sig    acetaminophen (TYLENOL) 500 MG tablet Take 500 mg by mouth every 6 (six) hours as needed for Pain.    amiodarone (PACERONE) 200 MG Tab Take 200 mg by mouth once daily.    calcium carbonate (TUMS) 200 mg calcium (500 mg) chewable tablet Take 2 tablets (1,000 mg total) by mouth 2 (two) times daily.    cholecalciferol, vitamin D3, (VITAMIN D3) 50 mcg (2,000 unit) Tab Take by mouth once daily.    EPINEPHrine (EPIPEN) 0.3 mg/0.3 mL AtIn     ergocalciferol (ERGOCALCIFEROL) 50,000 unit Cap Take 50,000 Units by mouth every 7 days.    ferrous gluconate 324 mg (37.5 mg iron) Tab tablet Take 1 tablet (324 mg total) by mouth 2 (two) times daily with meals.    gabapentin (NEURONTIN) 600 MG tablet Take 1 tablet by mouth nightly.    levothyroxine (SYNTHROID) 200 MCG tablet Take 200 mcg by mouth before breakfast.    metformin (GLUCOPHAGE) 1000 MG tablet Take 1,000 mg by mouth 2 (two) times daily with meals.    metoprolol tartrate (LOPRESSOR) 25 MG tablet Take 1 tablet (25 mg total) by mouth 2 (two) times daily.    olmesartan (BENICAR) 40 MG tablet Take 40 mg by mouth once daily.    omega-3 fatty acids/fish oil (FISH OIL-OMEGA-3 FATTY ACIDS) 300-1,000 mg capsule Take 1 capsule by mouth 2 (two) times a day.     omeprazole (PRILOSEC) 20 MG capsule     rosuvastatin (CRESTOR) 40 MG Tab Take 10 mg by mouth every evening.    [DISCONTINUED] sotalol (BETAPACE) 80 MG tablet Take by mouth 2 (two) times daily.     Family History    Family history is unknown by patient.       Tobacco Use    Smoking status: Never    Smokeless tobacco: Never   Substance and Sexual Activity    Alcohol use: Not Currently     Comment: occasionally    Drug use: No    Sexual activity: Not Currently     Review of Systems   Constitutional:  Positive for fatigue. Negative for activity change, appetite change, chills, diaphoresis and fever.   HENT:  Negative for congestion, rhinorrhea and sore throat.    Eyes:  Negative for visual disturbance.   Respiratory:  Negative for cough, chest tightness, shortness of breath and wheezing.    Cardiovascular:  Negative for chest pain, palpitations and leg swelling.   Gastrointestinal:  Negative for abdominal pain, constipation, nausea and vomiting.   Genitourinary:  Negative for decreased urine volume, dysuria and flank pain.   Musculoskeletal:  Negative for arthralgias, joint swelling and myalgias.   Neurological:  Negative for dizziness, weakness, light-headedness and headaches.   Psychiatric/Behavioral:  The patient is not nervous/anxious.      Objective:     Vital Signs (Most Recent):  Temp: 97.6 °F (36.4 °C) (06/25/24 1125)  Pulse: 65 (06/25/24 1125)  Resp: 19 (06/25/24 1125)  BP: 100/61 (06/25/24 1125)  SpO2: 100 % (06/25/24 1125) Vital Signs (24h Range):  Temp:  [97.5 °F (36.4 °C)-98.6 °F (37 °C)] 97.6 °F (36.4 °C)  Pulse:  [57-71] 65  Resp:  [18-19] 19  SpO2:  [76 %-100 %] 100 %  BP: (100-123)/(58-64) 100/61     Weight: 132.5 kg (292 lb 1.8 oz)  Body mass index is 43.14 kg/m².     Physical Exam  Vitals and nursing note reviewed.   Constitutional:       General: He is not in acute distress.     Appearance: Normal appearance. He is obese. He is not ill-appearing, toxic-appearing or diaphoretic.   HENT:      Head:  Normocephalic and atraumatic.      Nose: Nose normal. No congestion or rhinorrhea.      Mouth/Throat:      Mouth: Mucous membranes are moist.      Pharynx: Oropharynx is clear. No oropharyngeal exudate or posterior oropharyngeal erythema.   Eyes:      General: No scleral icterus.     Extraocular Movements: Extraocular movements intact.      Conjunctiva/sclera: Conjunctivae normal.      Pupils: Pupils are equal, round, and reactive to light.   Neck:      Vascular: No carotid bruit.   Cardiovascular:      Rate and Rhythm: Normal rate and regular rhythm.      Pulses: Normal pulses.      Heart sounds: Normal heart sounds. No murmur heard.     No friction rub. No gallop.   Pulmonary:      Effort: Pulmonary effort is normal. No respiratory distress.      Breath sounds: Normal breath sounds. No wheezing, rhonchi or rales.   Abdominal:      General: Bowel sounds are normal. There is no distension.      Palpations: Abdomen is soft.      Tenderness: There is no abdominal tenderness. There is no right CVA tenderness, left CVA tenderness, guarding or rebound.   Musculoskeletal:         General: No swelling. Normal range of motion.      Cervical back: Normal range of motion and neck supple.      Right lower leg: No edema.      Left lower leg: No edema.   Skin:     General: Skin is warm.      Capillary Refill: Capillary refill takes less than 2 seconds.      Coloration: Skin is not pale.   Neurological:      General: No focal deficit present.      Mental Status: He is alert and oriented to person, place, and time.      Cranial Nerves: No cranial nerve deficit.      Motor: No weakness.      Coordination: Coordination normal.   Psychiatric:         Mood and Affect: Mood normal.         Behavior: Behavior normal.              CRANIAL NERVES     CN III, IV, VI   Pupils are equal, round, and reactive to light.       Significant Labs: All pertinent labs within the past 24 hours have been reviewed.  Recent Lab Results  (Last 5 results  in the past 24 hours)        06/25/24  1126   06/25/24  0727   06/25/24  0500   06/24/24  1930   06/24/24  1618        Acanthocytes     Present           Albumin     2.7           ALP     54           ALT     12           Anion Gap     10           Aniso     Moderate           AST     19           Baso #     0.03           Basophil %     2.2           BILIRUBIN TOTAL     0.3  Comment: For infants and newborns, interpretation of results should be based  on gestational age, weight and in agreement with clinical  observations.    Premature Infant recommended reference ranges:  Up to 24 hours.............<8.0 mg/dL  Up to 48 hours............<12.0 mg/dL  3-5 days..................<15.0 mg/dL  6-29 days.................<15.0 mg/dL             BUN     44           Mandi/Echinocytes     Moderate           Calcium     7.7           Chloride     100           CO2     24           Creatinine     4.8           Differential Method     Automated           eGFR     13           Eos #     0.1           Eos %     6.5           Ferritin     225           Fragmented Cells     Occasional           Glucose     77           Gran # (ANC)     0.7           Gran %     50.3           Hematocrit     26.9           Hemoglobin     8.7           Hypo     Occasional           Immature Grans (Abs)     0.00  Comment: Mild elevation in immature granulocytes is non specific and   can be seen in a variety of conditions including stress response,   acute inflammation, trauma and pregnancy. Correlation with other   laboratory and clinical findings is essential.             Immature Granulocytes     0.0           Iron     63           Lymph #     0.6           Lymph %     39.6           MCH     23.1           MCHC     32.3           MCV     71           Mono #     0.0           Mono %     1.4           MPV     9.2           nRBC     0           Ovalocytes     Occasional           Platelet Estimate     Appears normal           Platelet Count     344            POCT Glucose 100   84     112   111       Poikilocytosis     Moderate           Potassium     3.9           PROTEIN TOTAL     6.0           RBC     3.77           RDW     18.2           Saturated Iron     28           Sodium     134           Spherocytes     Occasional           TIBC     228           Transferrin     154           WBC     1.39  Comment: WBC critical result(s) called and verbal readback obtained from Kellie Sales @5:43am; 6/25/2024 by BLANK 06/25/2024 05:44                                    Significant Imaging: I have reviewed all pertinent imaging results/findings within the past 24 hours.    Component Value Units Date/Time    CT Abdomen Pelvis Without Contrast [[id number]] (Abnormal) Resulted: 06/21/24 2033   Order Status: Completed Updated: 06/21/24 2036   Narrative:     EXAMINATION:  CT ABDOMEN PELVIS WITHOUT    CLINICAL HISTORY:  Dysuria and bladder spasms.    TECHNIQUE:  5 mm unenhanced axial images from the lung bases through the greater trochanters were performed.  Coronal and sagittal reformatted images were provided.    COMPARISON:  05/04/2024    FINDINGS:  Within the limits of a noncontrast examination, the liver, spleen, pancreas, left kidney and adrenal glands are unremarkable.  The gallbladder contains multiple gallstones.    There is a 6 mm nonobstructing right renal calculus.    There is no gross abdominal adenopathy or ascites.  Moderate atherosclerotic changes are present.    The appendix is dilated measuring up to 10 mm.  On the previous examination, it was also dilated and measured 12 mm.  There is no periappendiceal stranding or fluid.  There are no pelvic masses or adenopathy.  There is no free pelvic fluid.  Mild circumferential thickening of the urinary bladder wall is seen.    At the lung bases, there is mild basilar atelectatic change.  There are multiple micro nodules.  There are coronary arterial calcifications.    Spondylolysis and spondylolisthesis are seen.    Impression:       Cholelithiasis.    6 mm nonobstructing right renal calculus.    Mild circumferential thickening of the urinary bladder.  Consider correlation with urinalysis to evaluate for acute cystitis.    Dilated appendix measuring 10 mm.  No periappendiceal infiltration.    Micronodules at the lung bases.  For multiple solid nodules all <6 mm, Fleischner Society 2017 guidelines recommend no routine follow up for a low risk patient, or follow up with non-contrast chest CT at 12 months after discovery in a high risk patient.    This report was flagged in Epic as abnormal.      Electronically signed by: Roxana Maciel  Date: 06/21/2024  Time: 20:33   MRI Lumbar Spine Without Contrast [[id number]] Resulted: 06/21/24 1824   Order Status: Completed Updated: 06/21/24 1826   Narrative:     EXAMINATION:  MRI LUMBAR SPINE WITHOUT CONTRAST    CLINICAL HISTORY:  bilateral leg weakness;    TECHNIQUE:  Multiplanar, multisequence MR images were acquired from the thoracolumbar junction to the sacrum without the administration of contrast.    COMPARISON:  CT scan dated 05/04/2024.    FINDINGS:  There is unchanged appearance of grade 1 anterolisthesis of L5 on S1 with bilateral pars defects.  The remainder of the lumbar alignment is within normal limits.    The vertebral body heights are maintained.  The bone marrow signal is within normal limits.  There is no evidence of a fracture.    The conus terminates at the level of L1.  The cauda equina nerve roots are within normal limits.    There is hypertrophy of the posterior elements.  There is multilevel disc desiccation.  Evaluation of the individual disc levels reveals the following:    L1-L2, there is a disc bulge along with facet hypertrophy and ligamentum flavum hypertrophy.  The spinal canal is within normal limits.  There is mild bilateral neural foraminal narrowing.    L2-L3, there is a disc bulge along with facet hypertrophy and ligamentum flavum hypertrophy.  The  spinal canal is within normal limits.  There is mild bilateral neural foraminal narrowing.    L3-L4, there is a disc bulge along with facet hypertrophy and ligamentum flavum hypertrophy.  There is mild spinal canal narrowing.  There is mild bilateral neural foraminal narrowing.    L4-L5, there is a disc bulge along with facet hypertrophy and ligamentum flavum hypertrophy.  There is narrowing the lateral recesses.  There is mild narrowing of the spinal canal.  There is moderate bilateral neural foraminal narrowing.    L5-S1, there is a disc bulge along with facet hypertrophy and ligamentum flavum hypertrophy.  There is superimposed central disc protrusion.  There are bilateral foraminal disc protrusions too.  There is impinging on the exiting bilateral S1 nerve roots.  There is severe bilateral neural foraminal narrowing.    There is atrophy of the paraspinous musculature.  There is edema within the subcutaneous tissues of the lower lumbar spine.  The remainder of the paraspinal soft tissues are within normal limits.   Impression:       Grade I L5-S1 spondylolisthesis with associated severe bilateral neural foraminal narrowing and marked narrowing the lateral recesses.  There is impinging on the exiting bilateral L5 nerve roots.    Moderate narrowing of the spinal canal at the L5-S1 level secondary to the degree of anterolisthesis and uncovering of the disc.    Additional multilevel degenerative changes in the lumbar spine as above.      Electronically signed by: Chevy Collins MD  Date: 06/21/2024  Time: 18:24   X-Ray Orbit Eye Foreign Body Bilat [[id number]] Resulted: 06/21/24 1623   Order Status: Completed Updated: 06/21/24 1626   Narrative:     EXAMINATION:  XR ORBIT EYE FOREIGN BODY BILAT    CLINICAL HISTORY:  Residual foreign body in soft tissue    TECHNIQUE:  AP, lateral and oblique views of the orbits bilaterally    COMPARISON:  None    FINDINGS:  No evidence for radiopaque foreign body projects over the orbits  bilaterally.  Bony orbits are grossly intact.  No significant opacification visualized paranasal sinuses.  Further evaluation as warranted clinically.   Impression:       Please see above      Electronically signed by: Noe Mckeon DO  Date: 06/21/2024  Time: 16:23

## 2024-06-25 NOTE — ASSESSMENT & PLAN NOTE
Chronic, controlled. Latest blood pressure and vitals reviewed-     Temp:  [97.5 °F (36.4 °C)-98.6 °F (37 °C)]   Pulse:  [57-71]   Resp:  [18-19]   BP: (100-123)/(58-64)   SpO2:  [76 %-100 %] .   Home meds for hypertension were reviewed and noted below.   Hypertension Medications               metoprolol tartrate (LOPRESSOR) 25 MG tablet Take 1 tablet (25 mg total) by mouth 2 (two) times daily.    olmesartan (BENICAR) 40 MG tablet Take 40 mg by mouth once daily.            While in the hospital, will manage blood pressure as follows; Adjust home antihypertensive regimen as follows- Hold benicar for now and resume metoprolol with hold parameters for low/normal BP and HR.     Will utilize p.r.n. blood pressure medication only if patient's blood pressure greater than 180/110 and he develops symptoms such as worsening chest pain or shortness of breath.

## 2024-06-25 NOTE — ASSESSMENT & PLAN NOTE
The cultures from May and current admission show different bacteria.  This suggests his stone is not a nidus for infection.    Consider repeat cystoscopy as an outpatient.

## 2024-06-25 NOTE — PLAN OF CARE
CM attempted to contact pt via telephone due to pt's isolation restrictions. CM left a VM.     Pt remains in the hospital due to needing continued medical care. Pt is having reoccurring UTI, acute Leukopenia and has MICHELLE. Urology and Hematology/Oncology was consulted today and is following.      CM will continue to follow-up as needed.    06/25/24 2460   Discharge Reassessment   Assessment Type Discharge Planning Reassessment   Did the patient's condition or plan change since previous assessment? Yes   Communicated CASA with patient/caregiver Yes   Discharge Plan A Home   Discharge Plan B Home   DME Needed Upon Discharge  none   Transition of Care Barriers None   Why the patient remains in the hospital Requires continued medical care   Post-Acute Status   Coverage [employer]   Discharge Delays None known at this time

## 2024-06-25 NOTE — SUBJECTIVE & OBJECTIVE
Interval history: NAEO. Again, denies urinary freq/urgency or other urinary symptoms (other than difficulty emptying bladder). Reports feeling like he was going to pass out and sweats and trouble straightening out back prior to arrival.      Past Surgical History:   Procedure Laterality Date    THYROIDECTOMY, PARTIAL  2006    TONSILLECTOMY      TREATMENT OF CARDIAC ARRHYTHMIA  9/16/2019    Procedure: Cardioversion or Defibrillation;  Surgeon: Deven Vasquez MD;  Location: [facility] CATH LAB;  Service: Cardiology;;       Review of patient's allergies indicates:   Allergen Reactions    Contrast media Hives       No current facility-administered medications on file prior to encounter.     Current Outpatient Medications on File Prior to Encounter   Medication Sig    acetaminophen (TYLENOL) 500 MG tablet Take 500 mg by mouth every 6 (six) hours as needed for Pain.    amiodarone (PACERONE) 200 MG Tab Take 200 mg by mouth once daily.    calcium carbonate (TUMS) 200 mg calcium (500 mg) chewable tablet Take 2 tablets (1,000 mg total) by mouth 2 (two) times daily.    cholecalciferol, vitamin D3, (VITAMIN D3) 50 mcg (2,000 unit) Tab Take by mouth once daily.    EPINEPHrine (EPIPEN) 0.3 mg/0.3 mL AtIn     ergocalciferol (ERGOCALCIFEROL) 50,000 unit Cap Take 50,000 Units by mouth every 7 days.    ferrous gluconate 324 mg (37.5 mg iron) Tab tablet Take 1 tablet (324 mg total) by mouth 2 (two) times daily with meals.    gabapentin (NEURONTIN) 600 MG tablet Take 1 tablet by mouth nightly.    levothyroxine (SYNTHROID) 200 MCG tablet Take 200 mcg by mouth before breakfast.    metformin (GLUCOPHAGE) 1000 MG tablet Take 1,000 mg by mouth 2 (two) times daily with meals.    metoprolol tartrate (LOPRESSOR) 25 MG tablet Take 1 tablet (25 mg total) by mouth 2 (two) times daily.    olmesartan (BENICAR) 40 MG tablet Take 40 mg by mouth once daily.    omega-3 fatty acids/fish oil (FISH OIL-OMEGA-3 FATTY ACIDS) 300-1,000 mg capsule Take 1  capsule by mouth 2 (two) times a day.    omeprazole (PRILOSEC) 20 MG capsule     rosuvastatin (CRESTOR) 40 MG Tab Take 10 mg by mouth every evening.    [DISCONTINUED] sotalol (BETAPACE) 80 MG tablet Take by mouth 2 (two) times daily.     Family History    Family history is unknown by patient.       Tobacco Use    Smoking status: Never    Smokeless tobacco: Never   Substance and Sexual Activity    Alcohol use: Not Currently     Comment: occasionally    Drug use: No    Sexual activity: Not Currently     Review of Systems   Constitutional:  Positive for fatigue. Negative for activity change, appetite change, chills, diaphoresis and fever.   HENT:  Negative for congestion, rhinorrhea and sore throat.    Eyes:  Negative for visual disturbance.   Respiratory:  Negative for cough, chest tightness, shortness of breath and wheezing.    Cardiovascular:  Negative for chest pain, palpitations and leg swelling.   Gastrointestinal:  Negative for abdominal pain, constipation, nausea and vomiting.   Genitourinary:  Negative for decreased urine volume, dysuria and flank pain.   Musculoskeletal:  Negative for arthralgias, joint swelling and myalgias.   Neurological:  Negative for dizziness, weakness, light-headedness and headaches.   Psychiatric/Behavioral:  The patient is not nervous/anxious.      Objective:     Vital Signs (Most Recent):  Temp: 97.6 °F (36.4 °C) (06/25/24 1125)  Pulse: 65 (06/25/24 1125)  Resp: 19 (06/25/24 1125)  BP: 100/61 (06/25/24 1125)  SpO2: 100 % (06/25/24 1125) Vital Signs (24h Range):  Temp:  [97.5 °F (36.4 °C)-98.6 °F (37 °C)] 97.6 °F (36.4 °C)  Pulse:  [57-71] 65  Resp:  [18-19] 19  SpO2:  [76 %-100 %] 100 %  BP: (100-123)/(58-64) 100/61     Weight: 132.5 kg (292 lb 1.8 oz)  Body mass index is 43.14 kg/m².     Physical Exam  Vitals and nursing note reviewed.   Constitutional:       General: He is not in acute distress.     Appearance: Normal appearance. He is obese. He is not ill-appearing,  toxic-appearing or diaphoretic.   HENT:      Head: Normocephalic and atraumatic.      Nose: Nose normal. No congestion or rhinorrhea.      Mouth/Throat:      Mouth: Mucous membranes are moist.      Pharynx: Oropharynx is clear. No oropharyngeal exudate or posterior oropharyngeal erythema.   Eyes:      General: No scleral icterus.     Extraocular Movements: Extraocular movements intact.      Conjunctiva/sclera: Conjunctivae normal.      Pupils: Pupils are equal, round, and reactive to light.   Neck:      Vascular: No carotid bruit.   Cardiovascular:      Rate and Rhythm: Normal rate and regular rhythm.      Pulses: Normal pulses.      Heart sounds: Normal heart sounds. No murmur heard.     No friction rub. No gallop.   Pulmonary:      Effort: Pulmonary effort is normal. No respiratory distress.      Breath sounds: Normal breath sounds. No wheezing, rhonchi or rales.   Abdominal:      General: Bowel sounds are normal. There is no distension.      Palpations: Abdomen is soft.      Tenderness: There is no abdominal tenderness. There is no right CVA tenderness, left CVA tenderness, guarding or rebound.   Musculoskeletal:         General: No swelling. Normal range of motion.      Cervical back: Normal range of motion and neck supple.      Right lower leg: No edema.      Left lower leg: No edema.   Skin:     General: Skin is warm.      Capillary Refill: Capillary refill takes less than 2 seconds.      Coloration: Skin is not pale.   Neurological:      General: No focal deficit present.      Mental Status: He is alert and oriented to person, place, and time.      Cranial Nerves: No cranial nerve deficit.      Motor: No weakness.      Coordination: Coordination normal.   Psychiatric:         Mood and Affect: Mood normal.         Behavior: Behavior normal.              CRANIAL NERVES     CN III, IV, VI   Pupils are equal, round, and reactive to light.       Significant Labs: All pertinent labs within the past 24 hours have  been reviewed.  Recent Lab Results  (Last 5 results in the past 24 hours)        06/25/24  1126   06/25/24  0727   06/25/24  0500   06/24/24  1930   06/24/24  1618        Acanthocytes     Present           Albumin     2.7           ALP     54           ALT     12           Anion Gap     10           Aniso     Moderate           AST     19           Baso #     0.03           Basophil %     2.2           BILIRUBIN TOTAL     0.3  Comment: For infants and newborns, interpretation of results should be based  on gestational age, weight and in agreement with clinical  observations.    Premature Infant recommended reference ranges:  Up to 24 hours.............<8.0 mg/dL  Up to 48 hours............<12.0 mg/dL  3-5 days..................<15.0 mg/dL  6-29 days.................<15.0 mg/dL             BUN     44           Mandi/Echinocytes     Moderate           Calcium     7.7           Chloride     100           CO2     24           Creatinine     4.8           Differential Method     Automated           eGFR     13           Eos #     0.1           Eos %     6.5           Ferritin     225           Fragmented Cells     Occasional           Glucose     77           Gran # (ANC)     0.7           Gran %     50.3           Hematocrit     26.9           Hemoglobin     8.7           Hypo     Occasional           Immature Grans (Abs)     0.00  Comment: Mild elevation in immature granulocytes is non specific and   can be seen in a variety of conditions including stress response,   acute inflammation, trauma and pregnancy. Correlation with other   laboratory and clinical findings is essential.             Immature Granulocytes     0.0           Iron     63           Lymph #     0.6           Lymph %     39.6           MCH     23.1           MCHC     32.3           MCV     71           Mono #     0.0           Mono %     1.4           MPV     9.2           nRBC     0           Ovalocytes     Occasional           Platelet Estimate      Appears normal           Platelet Count     344           POCT Glucose 100   84     112   111       Poikilocytosis     Moderate           Potassium     3.9           PROTEIN TOTAL     6.0           RBC     3.77           RDW     18.2           Saturated Iron     28           Sodium     134           Spherocytes     Occasional           TIBC     228           Transferrin     154           WBC     1.39  Comment: WBC critical result(s) called and verbal readback obtained from Kellie Sales @5:43am; 6/25/2024 by BLANK 06/25/2024 05:44                                    Significant Imaging: I have reviewed all pertinent imaging results/findings within the past 24 hours.    Component Value Units Date/Time    CT Abdomen Pelvis Without Contrast [[id number]] (Abnormal) Resulted: 06/21/24 2033   Order Status: Completed Updated: 06/21/24 2036   Narrative:     EXAMINATION:  CT ABDOMEN PELVIS WITHOUT    CLINICAL HISTORY:  Dysuria and bladder spasms.    TECHNIQUE:  5 mm unenhanced axial images from the lung bases through the greater trochanters were performed.  Coronal and sagittal reformatted images were provided.    COMPARISON:  05/04/2024    FINDINGS:  Within the limits of a noncontrast examination, the liver, spleen, pancreas, left kidney and adrenal glands are unremarkable.  The gallbladder contains multiple gallstones.    There is a 6 mm nonobstructing right renal calculus.    There is no gross abdominal adenopathy or ascites.  Moderate atherosclerotic changes are present.    The appendix is dilated measuring up to 10 mm.  On the previous examination, it was also dilated and measured 12 mm.  There is no periappendiceal stranding or fluid.  There are no pelvic masses or adenopathy.  There is no free pelvic fluid.  Mild circumferential thickening of the urinary bladder wall is seen.    At the lung bases, there is mild basilar atelectatic change.  There are multiple micro nodules.  There are coronary arterial  calcifications.    Spondylolysis and spondylolisthesis are seen.   Impression:       Cholelithiasis.    6 mm nonobstructing right renal calculus.    Mild circumferential thickening of the urinary bladder.  Consider correlation with urinalysis to evaluate for acute cystitis.    Dilated appendix measuring 10 mm.  No periappendiceal infiltration.    Micronodules at the lung bases.  For multiple solid nodules all <6 mm, Fleischner Society 2017 guidelines recommend no routine follow up for a low risk patient, or follow up with non-contrast chest CT at 12 months after discovery in a high risk patient.    This report was flagged in Epic as abnormal.      Electronically signed by: Roxana Maciel  Date: 06/21/2024  Time: 20:33   MRI Lumbar Spine Without Contrast [[id number]] Resulted: 06/21/24 1824   Order Status: Completed Updated: 06/21/24 1826   Narrative:     EXAMINATION:  MRI LUMBAR SPINE WITHOUT CONTRAST    CLINICAL HISTORY:  bilateral leg weakness;    TECHNIQUE:  Multiplanar, multisequence MR images were acquired from the thoracolumbar junction to the sacrum without the administration of contrast.    COMPARISON:  CT scan dated 05/04/2024.    FINDINGS:  There is unchanged appearance of grade 1 anterolisthesis of L5 on S1 with bilateral pars defects.  The remainder of the lumbar alignment is within normal limits.    The vertebral body heights are maintained.  The bone marrow signal is within normal limits.  There is no evidence of a fracture.    The conus terminates at the level of L1.  The cauda equina nerve roots are within normal limits.    There is hypertrophy of the posterior elements.  There is multilevel disc desiccation.  Evaluation of the individual disc levels reveals the following:    L1-L2, there is a disc bulge along with facet hypertrophy and ligamentum flavum hypertrophy.  The spinal canal is within normal limits.  There is mild bilateral neural foraminal narrowing.    L2-L3, there is a disc bulge along  with facet hypertrophy and ligamentum flavum hypertrophy.  The spinal canal is within normal limits.  There is mild bilateral neural foraminal narrowing.    L3-L4, there is a disc bulge along with facet hypertrophy and ligamentum flavum hypertrophy.  There is mild spinal canal narrowing.  There is mild bilateral neural foraminal narrowing.    L4-L5, there is a disc bulge along with facet hypertrophy and ligamentum flavum hypertrophy.  There is narrowing the lateral recesses.  There is mild narrowing of the spinal canal.  There is moderate bilateral neural foraminal narrowing.    L5-S1, there is a disc bulge along with facet hypertrophy and ligamentum flavum hypertrophy.  There is superimposed central disc protrusion.  There are bilateral foraminal disc protrusions too.  There is impinging on the exiting bilateral S1 nerve roots.  There is severe bilateral neural foraminal narrowing.    There is atrophy of the paraspinous musculature.  There is edema within the subcutaneous tissues of the lower lumbar spine.  The remainder of the paraspinal soft tissues are within normal limits.   Impression:       Grade I L5-S1 spondylolisthesis with associated severe bilateral neural foraminal narrowing and marked narrowing the lateral recesses.  There is impinging on the exiting bilateral L5 nerve roots.    Moderate narrowing of the spinal canal at the L5-S1 level secondary to the degree of anterolisthesis and uncovering of the disc.    Additional multilevel degenerative changes in the lumbar spine as above.      Electronically signed by: Chevy Collins MD  Date: 06/21/2024  Time: 18:24   X-Ray Orbit Eye Foreign Body Bilat [[id number]] Resulted: 06/21/24 1623   Order Status: Completed Updated: 06/21/24 1626   Narrative:     EXAMINATION:  XR ORBIT EYE FOREIGN BODY BILAT    CLINICAL HISTORY:  Residual foreign body in soft tissue    TECHNIQUE:  AP, lateral and oblique views of the orbits  bilaterally    COMPARISON:  None    FINDINGS:  No evidence for radiopaque foreign body projects over the orbits bilaterally.  Bony orbits are grossly intact.  No significant opacification visualized paranasal sinuses.  Further evaluation as warranted clinically.   Impression:       Please see above      Electronically signed by: Noe Mckeon DO  Date: 06/21/2024  Time: 16:23

## 2024-06-25 NOTE — HPI
63M with h/o a.fib, htn, t2dm, chronic leukopenia admitted 6/21 with decreased urine output. Also noticed several days of increased swelling in b/l legs, which is now improved. Denies urinary freq or urgency. Denies fever/chills. Reports chronic sob with exertion. Now having some dizziness with standing. Denies brandt as an outpatient. Denies abd pain or diarrhea.    Notably had admit 5/4-5/11 for MICHELLE (Cr 8.8 from 1.1) and UTI (treated with Augmentin for e.faecalis) .       Ct a/p:  Cholelithiasis.     6 mm nonobstructing right renal calculus.     Mild circumferential thickening of the urinary bladder.  Consider correlation with urinalysis to evaluate for acute cystitis.     Dilated appendix measuring 10 mm.  No periappendiceal infiltration.     Micronodules at the lung bases.  For multiple solid nodules all <6 mm, Fleischner Society 2017 guidelines recommend no routine follow up for a low risk patient, or follow up with non-contrast chest CT at 12 months after discovery in a high risk patient.        Procalcitonin high 1.54    UA- contaminated with 10 squam  RBC, UA 0 - 4 /hpf 17 High    WBC, UA 0 - 5 /hpf >100 High    Bacteria None-Occ /hpf Moderate Abnormal    Squam Epithel, UA /hpf 10   Non-Squam Epith <1/hpf /hpf 2 Abnormal    Hyaline Casts, UA 0-1/lpf /lpf 4 Abnormal               Escherichia coli Pseudomonas aeruginosa       CULTURE, URINE CULTURE, URINE     Amox/K Clav'ate >16/8 mcg/mL Resistant       Amp/Sulbactam >16/8 mcg/mL Resistant       Ampicillin >16 mcg/mL Resistant       Cefazolin <=2 mcg/mL Sensitive       Cefepime <=2 mcg/mL Sensitive <=2 mcg/mL Sensitive     Ceftriaxone <=1 mcg/mL Sensitive       Ciprofloxacin <=1 mcg/mL Sensitive <=1 mcg/mL Sensitive     Ertapenem <=0.5 mcg/mL Sensitive       Gentamicin <=4 mcg/mL Sensitive       Levofloxacin <=2 mcg/mL Sensitive <=2 mcg/mL Sensitive     Meropenem <=1 mcg/mL Sensitive <=1 mcg/mL Sensitive     Nitrofurantoin <=32 mcg/mL Sensitive        "Piperacillin/Tazo <=16 mcg/mL Sensitive <=16 mcg/mL Sensitive     Tobramycin <=4 mcg/mL Sensitive       Trimeth/Sulfa <=2/38 mcg/mL Sensitive            Day #4 meropenem.       Afebrile    No recent EKG/Qtc    ID consulted for "multiple organism UTI, recurrent uti - antibiotic   " Flu-like symptoms

## 2024-06-25 NOTE — SUBJECTIVE & OBJECTIVE
Past Medical History:   Diagnosis Date    Acquired hypothyroidism     Atrial fibrillation 09/16/2019    Diabetes mellitus     High cholesterol     History of DVT (deep vein thrombosis)     History of pulmonary embolism     Hypertension     LARRY (obstructive sleep apnea)     S/P IVC filter        Past Surgical History:   Procedure Laterality Date    THYROIDECTOMY, PARTIAL  2006    TONSILLECTOMY      TREATMENT OF CARDIAC ARRHYTHMIA  9/16/2019    Procedure: Cardioversion or Defibrillation;  Surgeon: Deven Vasquez MD;  Location: [facility] CATH LAB;  Service: Cardiology;;       Review of patient's allergies indicates:   Allergen Reactions    Contrast media Hives       No current facility-administered medications on file prior to encounter.     Current Outpatient Medications on File Prior to Encounter   Medication Sig    acetaminophen (TYLENOL) 500 MG tablet Take 500 mg by mouth every 6 (six) hours as needed for Pain.    amiodarone (PACERONE) 200 MG Tab Take 200 mg by mouth once daily.    calcium carbonate (TUMS) 200 mg calcium (500 mg) chewable tablet Take 2 tablets (1,000 mg total) by mouth 2 (two) times daily.    cholecalciferol, vitamin D3, (VITAMIN D3) 50 mcg (2,000 unit) Tab Take by mouth once daily.    EPINEPHrine (EPIPEN) 0.3 mg/0.3 mL AtIn     ergocalciferol (ERGOCALCIFEROL) 50,000 unit Cap Take 50,000 Units by mouth every 7 days.    ferrous gluconate 324 mg (37.5 mg iron) Tab tablet Take 1 tablet (324 mg total) by mouth 2 (two) times daily with meals.    gabapentin (NEURONTIN) 600 MG tablet Take 1 tablet by mouth nightly.    levothyroxine (SYNTHROID) 200 MCG tablet Take 200 mcg by mouth before breakfast.    metformin (GLUCOPHAGE) 1000 MG tablet Take 1,000 mg by mouth 2 (two) times daily with meals.    metoprolol tartrate (LOPRESSOR) 25 MG tablet Take 1 tablet (25 mg total) by mouth 2 (two) times daily.    olmesartan (BENICAR) 40 MG tablet Take 40 mg by mouth once daily.    omega-3 fatty acids/fish oil (FISH  OIL-OMEGA-3 FATTY ACIDS) 300-1,000 mg capsule Take 1 capsule by mouth 2 (two) times a day.    omeprazole (PRILOSEC) 20 MG capsule     rosuvastatin (CRESTOR) 40 MG Tab Take 10 mg by mouth every evening.    [DISCONTINUED] sotalol (BETAPACE) 80 MG tablet Take by mouth 2 (two) times daily.     Family History    Family history is unknown by patient.       Tobacco Use    Smoking status: Never    Smokeless tobacco: Never   Substance and Sexual Activity    Alcohol use: Not Currently     Comment: occasionally    Drug use: No    Sexual activity: Not Currently     Review of Systems   Constitutional:  Positive for fatigue. Negative for activity change, appetite change, chills, diaphoresis and fever.   HENT:  Negative for congestion, rhinorrhea and sore throat.    Eyes:  Negative for visual disturbance.   Respiratory:  Negative for cough, chest tightness, shortness of breath and wheezing.    Cardiovascular:  Negative for chest pain, palpitations and leg swelling.   Gastrointestinal:  Negative for abdominal pain, constipation, nausea and vomiting.   Genitourinary:  Positive for dysuria and flank pain. Negative for decreased urine volume.   Musculoskeletal:  Negative for arthralgias, joint swelling and myalgias.   Neurological:  Positive for weakness. Negative for dizziness, light-headedness and headaches.   Psychiatric/Behavioral:  The patient is not nervous/anxious.      Objective:     Vital Signs (Most Recent):  Temp: 97.7 °F (36.5 °C) (06/25/24 0726)  Pulse: (!) 57 (06/25/24 0743)  Resp: 18 (06/25/24 0730)  BP: 101/60 (06/25/24 0726)  SpO2: 97 % (06/25/24 0730) Vital Signs (24h Range):  Temp:  [97.4 °F (36.3 °C)-98.6 °F (37 °C)] 97.7 °F (36.5 °C)  Pulse:  [57-68] 57  Resp:  [18-19] 18  SpO2:  [76 %-100 %] 97 %  BP: (100-123)/(58-64) 101/60     Weight: 132.5 kg (292 lb 1.8 oz)  Body mass index is 43.14 kg/m².     Physical Exam  Vitals and nursing note reviewed.   Constitutional:       General: He is not in acute distress.      Appearance: Normal appearance. He is obese. He is not ill-appearing, toxic-appearing or diaphoretic.   HENT:      Head: Normocephalic and atraumatic.      Nose: Nose normal. No congestion or rhinorrhea.      Mouth/Throat:      Mouth: Mucous membranes are moist.      Pharynx: Oropharynx is clear. No oropharyngeal exudate or posterior oropharyngeal erythema.   Eyes:      General: No scleral icterus.     Extraocular Movements: Extraocular movements intact.      Conjunctiva/sclera: Conjunctivae normal.      Pupils: Pupils are equal, round, and reactive to light.   Neck:      Vascular: No carotid bruit.   Cardiovascular:      Rate and Rhythm: Normal rate and regular rhythm.      Pulses: Normal pulses.      Heart sounds: Normal heart sounds. No murmur heard.     No friction rub. No gallop.   Pulmonary:      Effort: Pulmonary effort is normal. No respiratory distress.      Breath sounds: Normal breath sounds. No wheezing, rhonchi or rales.   Abdominal:      General: Bowel sounds are normal. There is no distension.      Palpations: Abdomen is soft.      Tenderness: There is no abdominal tenderness. There is no right CVA tenderness, left CVA tenderness, guarding or rebound.   Musculoskeletal:         General: No swelling. Normal range of motion.      Cervical back: Normal range of motion and neck supple.      Right lower leg: No edema.      Left lower leg: No edema.   Skin:     General: Skin is warm.      Capillary Refill: Capillary refill takes less than 2 seconds.      Coloration: Skin is not pale.   Neurological:      General: No focal deficit present.      Mental Status: He is alert and oriented to person, place, and time.      Cranial Nerves: No cranial nerve deficit.      Motor: No weakness.      Coordination: Coordination normal.   Psychiatric:         Mood and Affect: Mood normal.         Behavior: Behavior normal.              CRANIAL NERVES     CN III, IV, VI   Pupils are equal, round, and reactive to light.        Significant Labs: All pertinent labs within the past 24 hours have been reviewed.  Recent Lab Results  (Last 5 results in the past 24 hours)        06/25/24  0727   06/25/24  0500   06/24/24  1930   06/24/24  1618   06/24/24  1121        Acanthocytes   Present             Albumin   2.7             ALP   54             ALT   12             Anion Gap   10             Aniso   Moderate             AST   19             Baso #   0.03             Basophil %   2.2             BILIRUBIN TOTAL   0.3  Comment: For infants and newborns, interpretation of results should be based  on gestational age, weight and in agreement with clinical  observations.    Premature Infant recommended reference ranges:  Up to 24 hours.............<8.0 mg/dL  Up to 48 hours............<12.0 mg/dL  3-5 days..................<15.0 mg/dL  6-29 days.................<15.0 mg/dL               BUN   44             Orient/Echinocytes   Moderate             Calcium   7.7             Chloride   100             CO2   24             Creatinine   4.8             Differential Method   Automated             eGFR   13             Eos #   0.1             Eos %   6.5             Ferritin   225             Fragmented Cells   Occasional             Glucose   77             Gran # (ANC)   0.7             Gran %   50.3             Hematocrit   26.9             Hemoglobin   8.7             Hypo   Occasional             Immature Grans (Abs)   0.00  Comment: Mild elevation in immature granulocytes is non specific and   can be seen in a variety of conditions including stress response,   acute inflammation, trauma and pregnancy. Correlation with other   laboratory and clinical findings is essential.               Immature Granulocytes   0.0             Iron   63             Lymph #   0.6             Lymph %   39.6             MCH   23.1             MCHC   32.3             MCV   71             Mono #   0.0             Mono %   1.4             MPV   9.2             nRBC   0              Ovalocytes   Occasional             Platelet Estimate   Appears normal             Platelet Count   344             POCT Glucose 84     112   111   84       Poikilocytosis   Moderate             Potassium   3.9             PROTEIN TOTAL   6.0             RBC   3.77             RDW   18.2             Saturated Iron   28             Sodium   134             Spherocytes   Occasional             TIBC   228             Transferrin   154             WBC   1.39  Comment: WBC critical result(s) called and verbal readback obtained from Kellie   Henrry @5:43am; 6/25/2024 by BLANK 06/25/2024 05:44                                      Significant Imaging: I have reviewed all pertinent imaging results/findings within the past 24 hours.    Component Value Units Date/Time    CT Abdomen Pelvis Without Contrast [[id number]] (Abnormal) Resulted: 06/21/24 2033   Order Status: Completed Updated: 06/21/24 2036   Narrative:     EXAMINATION:  CT ABDOMEN PELVIS WITHOUT    CLINICAL HISTORY:  Dysuria and bladder spasms.    TECHNIQUE:  5 mm unenhanced axial images from the lung bases through the greater trochanters were performed.  Coronal and sagittal reformatted images were provided.    COMPARISON:  05/04/2024    FINDINGS:  Within the limits of a noncontrast examination, the liver, spleen, pancreas, left kidney and adrenal glands are unremarkable.  The gallbladder contains multiple gallstones.    There is a 6 mm nonobstructing right renal calculus.    There is no gross abdominal adenopathy or ascites.  Moderate atherosclerotic changes are present.    The appendix is dilated measuring up to 10 mm.  On the previous examination, it was also dilated and measured 12 mm.  There is no periappendiceal stranding or fluid.  There are no pelvic masses or adenopathy.  There is no free pelvic fluid.  Mild circumferential thickening of the urinary bladder wall is seen.    At the lung bases, there is mild basilar atelectatic change.  There are  multiple micro nodules.  There are coronary arterial calcifications.    Spondylolysis and spondylolisthesis are seen.   Impression:       Cholelithiasis.    6 mm nonobstructing right renal calculus.    Mild circumferential thickening of the urinary bladder.  Consider correlation with urinalysis to evaluate for acute cystitis.    Dilated appendix measuring 10 mm.  No periappendiceal infiltration.    Micronodules at the lung bases.  For multiple solid nodules all <6 mm, Fleischner Society 2017 guidelines recommend no routine follow up for a low risk patient, or follow up with non-contrast chest CT at 12 months after discovery in a high risk patient.    This report was flagged in Epic as abnormal.      Electronically signed by: Roxana Maciel  Date: 06/21/2024  Time: 20:33   MRI Lumbar Spine Without Contrast [[id number]] Resulted: 06/21/24 1824   Order Status: Completed Updated: 06/21/24 1826   Narrative:     EXAMINATION:  MRI LUMBAR SPINE WITHOUT CONTRAST    CLINICAL HISTORY:  bilateral leg weakness;    TECHNIQUE:  Multiplanar, multisequence MR images were acquired from the thoracolumbar junction to the sacrum without the administration of contrast.    COMPARISON:  CT scan dated 05/04/2024.    FINDINGS:  There is unchanged appearance of grade 1 anterolisthesis of L5 on S1 with bilateral pars defects.  The remainder of the lumbar alignment is within normal limits.    The vertebral body heights are maintained.  The bone marrow signal is within normal limits.  There is no evidence of a fracture.    The conus terminates at the level of L1.  The cauda equina nerve roots are within normal limits.    There is hypertrophy of the posterior elements.  There is multilevel disc desiccation.  Evaluation of the individual disc levels reveals the following:    L1-L2, there is a disc bulge along with facet hypertrophy and ligamentum flavum hypertrophy.  The spinal canal is within normal limits.  There is mild bilateral neural  foraminal narrowing.    L2-L3, there is a disc bulge along with facet hypertrophy and ligamentum flavum hypertrophy.  The spinal canal is within normal limits.  There is mild bilateral neural foraminal narrowing.    L3-L4, there is a disc bulge along with facet hypertrophy and ligamentum flavum hypertrophy.  There is mild spinal canal narrowing.  There is mild bilateral neural foraminal narrowing.    L4-L5, there is a disc bulge along with facet hypertrophy and ligamentum flavum hypertrophy.  There is narrowing the lateral recesses.  There is mild narrowing of the spinal canal.  There is moderate bilateral neural foraminal narrowing.    L5-S1, there is a disc bulge along with facet hypertrophy and ligamentum flavum hypertrophy.  There is superimposed central disc protrusion.  There are bilateral foraminal disc protrusions too.  There is impinging on the exiting bilateral S1 nerve roots.  There is severe bilateral neural foraminal narrowing.    There is atrophy of the paraspinous musculature.  There is edema within the subcutaneous tissues of the lower lumbar spine.  The remainder of the paraspinal soft tissues are within normal limits.   Impression:       Grade I L5-S1 spondylolisthesis with associated severe bilateral neural foraminal narrowing and marked narrowing the lateral recesses.  There is impinging on the exiting bilateral L5 nerve roots.    Moderate narrowing of the spinal canal at the L5-S1 level secondary to the degree of anterolisthesis and uncovering of the disc.    Additional multilevel degenerative changes in the lumbar spine as above.      Electronically signed by: Chevy Collins MD  Date: 06/21/2024  Time: 18:24   X-Ray Orbit Eye Foreign Body Bilat [[id number]] Resulted: 06/21/24 1623   Order Status: Completed Updated: 06/21/24 1626   Narrative:     EXAMINATION:  XR ORBIT EYE FOREIGN BODY BILAT    CLINICAL HISTORY:  Residual foreign body in soft tissue    TECHNIQUE:  AP, lateral and oblique views of  the orbits bilaterally    COMPARISON:  None    FINDINGS:  No evidence for radiopaque foreign body projects over the orbits bilaterally.  Bony orbits are grossly intact.  No significant opacification visualized paranasal sinuses.  Further evaluation as warranted clinically.   Impression:       Please see above      Electronically signed by: Noe Mckeon DO  Date: 06/21/2024  Time: 16:23

## 2024-06-25 NOTE — CONSULTS
[facility] - Telemetry  Urology  Consult Note    Patient Name: Russell Santos  MRN: [id number]  Admission Date: 6/21/2024  Hospital Length of Stay: 4   Code Status: Full Code   Attending Provider: Maureen Porras DO   Consulting Provider: Benjie Castro MD  Primary Care Physician: Roxanne Phillip -  Principal Problem:MICHELLE (acute kidney injury)    Inpatient consult to Urology  Consult performed by: Benjie Castro MD  Consult ordered by: Maureen Porras DO          Subjective:     HPI:  Urolithiasis  Patient complains of right abdominal pain with radiation to the abdomen. Onset of symptoms was gradual starting 1 day ago with completely resolved course since that time. Patient describes the pain as aching, intermittent and rated as mild. The patient has had no nausea/no vomiting and no diaphoresis. There has been no fever or chills. The patient is not complaining of dysuria or frequency. Risk factors for urolithiasis: none.  Urology consulted for non-obstructing stone and recurrent UTI.  He had a hematuria work up by Dr. Orellana in 2023, this showed mild urethral stricture and BPH.      Past Medical History:   Diagnosis Date    Acquired hypothyroidism     Atrial fibrillation 09/16/2019    Diabetes mellitus     High cholesterol     History of DVT (deep vein thrombosis)     History of pulmonary embolism     Hypertension     LARRY (obstructive sleep apnea)     S/P IVC filter        Past Surgical History:   Procedure Laterality Date    THYROIDECTOMY, PARTIAL  2006    TONSILLECTOMY      TREATMENT OF CARDIAC ARRHYTHMIA  9/16/2019    Procedure: Cardioversion or Defibrillation;  Surgeon: Deven Vasquez MD;  Location: [facility] CATH LAB;  Service: Cardiology;;       Review of patient's allergies indicates:   Allergen Reactions    Contrast media Hives       Family History    Family history is unknown by patient.         Tobacco Use    Smoking status: Never    Smokeless tobacco: Never   Substance and Sexual Activity     Alcohol use: Not Currently     Comment: occasionally    Drug use: No    Sexual activity: Not Currently       Review of Systems   Constitutional:  Negative for fever.   Respiratory:  Negative for chest tightness and wheezing.    Cardiovascular:  Negative for chest pain and palpitations.   Gastrointestinal:  Negative for abdominal pain, diarrhea, nausea and vomiting.   Genitourinary:  Positive for flank pain. Negative for difficulty urinating, dysuria and hematuria.   Musculoskeletal:  Negative for arthralgias.   Neurological:  Negative for dizziness.   Psychiatric/Behavioral:  Negative for confusion.        Objective:     Temp:  [97.4 °F (36.3 °C)-98.6 °F (37 °C)] 97.7 °F (36.5 °C)  Pulse:  [57-72] 58  Resp:  [19] 19  SpO2:  [76 %-100 %] 99 %  BP: (100-123)/(58-68) 101/60  Weight: 132.5 kg (292 lb 1.8 oz)  Body mass index is 43.14 kg/m².      Post Void Cath Residual Output (mL): 0 mL (06/22/24 1102)    Drains       None                    Physical Exam  Vitals and nursing note reviewed.   Constitutional:       Appearance: He is well-developed.   HENT:      Head: Normocephalic.   Eyes:      Conjunctiva/sclera: Conjunctivae normal.   Neck:      Thyroid: No thyromegaly.      Trachea: No tracheal deviation.   Cardiovascular:      Rate and Rhythm: Normal rate.      Heart sounds: Normal heart sounds.   Pulmonary:      Effort: Pulmonary effort is normal. No respiratory distress.      Breath sounds: Normal breath sounds. No wheezing.   Abdominal:      General: Bowel sounds are normal.      Palpations: Abdomen is soft.      Tenderness: There is no abdominal tenderness. There is no rebound.      Hernia: No hernia is present.   Musculoskeletal:         General: No tenderness. Normal range of motion.      Cervical back: Normal range of motion and neck supple.   Lymphadenopathy:      Cervical: No cervical adenopathy.   Skin:     General: Skin is warm and dry.      Findings: No erythema or rash.   Neurological:      Mental  "Status: He is alert and oriented to person, place, and time.   Psychiatric:         Behavior: Behavior normal.         Thought Content: Thought content normal.         Judgment: Judgment normal.          Significant Labs:    BMP:  Recent Labs   Lab 06/23/24  0618 06/24/24 0425 06/25/24  0500   * 129* 134*   K 3.9 3.7 3.9   CL 94* 94* 100   CO2 21* 22* 24   BUN 49* 47* 44*   CREATININE 6.0* 5.4* 4.8*   CALCIUM 7.7* 7.8* 7.7*       CBC:  Recent Labs   Lab 06/23/24 0618 06/24/24 0425 06/25/24  0500   WBC 1.60* 1.53* 1.39*   HGB 8.9* 9.2* 8.7*   HCT 29.1* 29.4* 26.9*    327 344       Blood Culture: No results for input(s): "LABBLOO" in the last 168 hours.  Urine Culture:   Recent Labs   Lab 06/21/24  1741   LABURIN ESCHERICHIA COLI  10,000 - 49,999 cfu/ml  *  PSEUDOMONAS AERUGINOSA  50,000 - 99,999 cfu/ml  *       Significant Imaging:  CT: I have reviewed all results within the past 24 hours and my personal findings are:     CT Abdomen Pelvis  Without Contrast  Order: [id number]  Status: Final result       Visible to patient: Yes (not seen)       Next appt: 06/27/2024 at 01:40 PM in Urology (Nicolette Orellana MD)    0 Result Notes  Details    Reading Physician Reading Date Result Priority   Roxana Maciel MD  [phone] 6/21/2024 STAT     Narrative & Impression  EXAMINATION:  CT ABDOMEN PELVIS WITHOUT     CLINICAL HISTORY:  Dysuria and bladder spasms.     TECHNIQUE:  5 mm unenhanced axial images from the lung bases through the greater trochanters were performed.  Coronal and sagittal reformatted images were provided.     COMPARISON:  05/04/2024     FINDINGS:  Within the limits of a noncontrast examination, the liver, spleen, pancreas, left kidney and adrenal glands are unremarkable.  The gallbladder contains multiple gallstones.     There is a 6 mm nonobstructing right renal calculus.     There is no gross abdominal adenopathy or ascites.  Moderate atherosclerotic changes are present.     The " appendix is dilated measuring up to 10 mm.  On the previous examination, it was also dilated and measured 12 mm.  There is no periappendiceal stranding or fluid.  There are no pelvic masses or adenopathy.  There is no free pelvic fluid.  Mild circumferential thickening of the urinary bladder wall is seen.     At the lung bases, there is mild basilar atelectatic change.  There are multiple micro nodules.  There are coronary arterial calcifications.     Spondylolysis and spondylolisthesis are seen.     Impression:     Cholelithiasis.     6 mm nonobstructing right renal calculus.     Mild circumferential thickening of the urinary bladder.  Consider correlation with urinalysis to evaluate for acute cystitis.     Dilated appendix measuring 10 mm.  No periappendiceal infiltration.     Micronodules at the lung bases.  For multiple solid nodules all <6 mm, Fleischner Society 2017 guidelines recommend no routine follow up for a low risk patient, or follow up with non-contrast chest CT at 12 months after discovery in a high risk patient.     This report was flagged in Epic as abnormal.        Electronically signed by:Roxana Maciel  Date:                                            06/21/2024  Time:                                           20:33           Exam Ended: 06/21/24 19:11 CDT                           Assessment and Plan:     Recurrent UTI  The cultures from May and current admission show different bacteria.  This suggests his stone is not a nidus for infection.    Consider repeat cystoscopy as an outpatient.    Kidney stone  Non-obstructing at the time of CT scan, 6/21  Will check RENEE given his episode of pain yesterday    Cystitis  Treat with culture specific antibiotics for 10 days        VTE Risk Mitigation (From admission, onward)           Ordered     heparin (porcine) injection 5,000 Units  Every 8 hours         06/22/24 7715     IP VTE HIGH RISK PATIENT  Once         06/21/24 5756     Place sequential  compression device  Until discontinued         06/21/24 2381                    Thank you for your consult. I will follow-up with patient. Please contact us if you have any additional questions.    Benjie Castro MD  Urology  [facility]

## 2024-06-25 NOTE — PROGRESS NOTES
"                        Renal Progress Note    Date of Admission:  6/21/2024  1:59 PM    Length of Stay: 4  Days    Subjective: n/a    Objective:    Current Facility-Administered Medications   Medication    0.9%  NaCl infusion    acetaminophen tablet 650 mg    amiodarone tablet 200 mg    atorvastatin tablet 80 mg    dextrose 10% bolus 125 mL 125 mL    dextrose 10% bolus 250 mL 250 mL    docusate sodium capsule 100 mg    gabapentin capsule 300 mg    glucagon (human recombinant) injection 1 mg    glucose chewable tablet 16 g    glucose chewable tablet 24 g    heparin (porcine) injection 5,000 Units    hydrALAZINE injection 5 mg    insulin aspart U-100 pen 0-5 Units    levothyroxine tablet 200 mcg    melatonin tablet 6 mg    meropenem (MERREM) 500 mg in 0.9% NaCl 100 mL IVPB (MB+)    naloxone 0.4 mg/mL injection 0.02 mg    ondansetron injection 4 mg    oxyCODONE-acetaminophen 5-325 mg per tablet 1 tablet    polyethylene glycol packet 17 g    sodium bicarbonate tablet 650 mg    sodium chloride 0.9% flush 10 mL       Vitals:    06/25/24 0529 06/25/24 0530 06/25/24 0726 06/25/24 0743   BP:   101/60    BP Location:       Patient Position:       Pulse: 62  (!) 58 (!) 57   Resp: 19  19    Temp:   97.7 °F (36.5 °C)    TempSrc:       SpO2: 98% 96% 99%    Weight:       Height:           I/O last 3 completed shifts:  In: 3480 [P.O.:1320; I.V.:2160]  Out: 3600 [Urine:3600]  No intake/output data recorded.        Laboratories:    Recent Labs   Lab 06/25/24  0500   WBC 1.39*   RBC 3.77*   HGB 8.7*   HCT 26.9*      MCV 71*   MCH 23.1*   MCHC 32.3       Recent Labs   Lab 06/25/24  0500   CALCIUM 7.7*   ALBUMIN 2.7*   PROT 6.0   *   K 3.9   CO2 24      BUN 44*   CREATININE 4.8*   ALKPHOS 54*   ALT 12   AST 19   BILITOT 0.3       No results for input(s): "COLORU", "CLARITYU", "SPECGRAV", "PHUR", "PROTEINUA", "GLUCOSEU", "BILIRUBINCON", "BLOODU", "WBCU", "RBCU", "BACTERIA", "MUCUS" in the last 24 " hours.    Microbiology Results (last 7 days)       Procedure Component Value Units Date/Time    Urine culture [[id number]]  (Abnormal)  (Susceptibility) Collected: 06/21/24 1741    Order Status: Completed Specimen: Urine Updated: 06/24/24 0950     Urine Culture, Routine ESCHERICHIA COLI  10,000 - 49,999 cfu/ml        PSEUDOMONAS AERUGINOSA  50,000 - 99,999 cfu/ml      Narrative:      Specimen Source->Urine    Group A Strep, Molecular [[id number]] Collected: 06/22/24 0837    Order Status: Completed Specimen: Throat Updated: 06/22/24 0950     Group A Strep, Molecular Negative              Diagnostic Tests: n/a        Assessment:    62 y/o male admitted with:    - MICHELLE creatinine down some  - E-coli / Pseudomonas UTI   - Hyponatremia IMPROVING  - PAF  - DM-2  - HTN  - HLD  - Anemia of chronic illness likely  - Hypoalbuminemia        Plan:    - Antibiotics per admitting  - IVFs: to NS   - Watching [id number]-acid/base and I&O  - Check iron stores  - Renal ADA diet + protein supplements

## 2024-06-25 NOTE — ASSESSMENT & PLAN NOTE
63M with h/o a.fib, htn, t2dm, chronic leukopenia admitted 6/21 with decreased urine output, found to have MICHELLE. Denies urinary symptoms and afebrile and no systemic signs of infection. Recently treated for e.faecalis UTI with augmentin. UA this admit with 10 squam (contaminated) and Ucx now with pseudmonas and e.coli . Day #4 meropenem. Primary team wondering if the 6 mm nonobstructing right renal calculus could be nidus for relapsed urinary infection. Imaging with bladder thickening, but no pyleo.     UA grossly contaminated and organisms present at low colony count and pt not having urinary symptoms or systemic signs of infection. Not clear that pt has a uti and IDSA guidelines do not recommend treating asymptomatic bacteruria (unless urological procedure planned). The renal stone is unlikely nidus for relapsed infection given that different organism was present as last time    Recommendations:   - agree with 3-5d antibiotics while inpatient for possible cystitis, but can stop at d/c. Would consider de-escalating meropenem --> cefepime

## 2024-06-25 NOTE — ASSESSMENT & PLAN NOTE
Detail Level: Simple Noted to be slightly lower than baseline (3-4). No c/o fevers or chills. NO abscess on CT abd/pelvis noted. Will monitor for now.   - Neutropenic precautions  - hematology consulted: Most likely recently worsened secondary to acute illness and antibiotics.  - follows with Hematology as outpatient

## 2024-06-25 NOTE — CONSULTS
[facility] [place] [facility]  Infectious Disease  Consult Note    Patient Name: Russell Santos  MRN: [id number]  Admission Date: 6/21/2024  Hospital Length of Stay: 4 days  Attending Physician: Maureen Porras DO  Primary Care Provider: Roxanne Phillip -     Isolation Status: Contact and Enhanced Respiratory    Patient information was obtained from patient and ER records.      Inpatient consult to Infectious Diseases  Consult performed by: Cristina Tavarez MD  Consult ordered by: Luca Stoll MD        Assessment/Plan:     Renal/  Cystitis  63M with h/o a.fib, htn, t2dm, chronic leukopenia admitted 6/21 with decreased urine output, found to have MICHELLE. Denies urinary symptoms and afebrile and no systemic signs of infection. Recently treated for e.faecalis UTI with augmentin. UA this admit with 10 squam (contaminated) and Ucx now with pseudmonas and e.coli . Day #4 meropenem. Primary team wondering if the 6 mm nonobstructing right renal calculus could be nidus for relapsed urinary infection. Imaging with bladder thickening, but no pyleo.     UA grossly contaminated and organisms present at low colony count and pt not having urinary symptoms or systemic signs of infection. Not clear that pt has a uti and IDSA guidelines do not recommend treating asymptomatic bacteruria (unless urological procedure planned). The renal stone is unlikely nidus for relapsed infection given that different organism was present as last time    Recommendations:   - agree with 3-5d antibiotics while inpatient for possible cystitis, but can stop at d/c. Would consider de-escalating meropenem --> cefepime          Thank you for your consult. I will follow-up with patient. Please contact us if you have any additional questions.    Cristina Tavarez MD  Infectious Disease  [facility] [place] [facility]    Subjective:     Principal Problem: MICHELLE (acute kidney injury)    HPI: 63M with h/o a.fib, htn, t2dm, chronic leukopenia admitted 6/21 with decreased urine  output. Also noticed several days of increased swelling in b/l legs, which is now improved. Denies urinary freq or urgency. Denies fever/chills. Reports chronic sob with exertion. Now having some dizziness with standing. Denies brandt as an outpatient. Denies abd pain or diarrhea.    Notably had admit 5/4-5/11 for MICHELLE (Cr 8.8 from 1.1) and UTI (treated with Augmentin for e.faecalis) .       Ct a/p:  Cholelithiasis.     6 mm nonobstructing right renal calculus.     Mild circumferential thickening of the urinary bladder.  Consider correlation with urinalysis to evaluate for acute cystitis.     Dilated appendix measuring 10 mm.  No periappendiceal infiltration.     Micronodules at the lung bases.  For multiple solid nodules all <6 mm, Fleischner Society 2017 guidelines recommend no routine follow up for a low risk patient, or follow up with non-contrast chest CT at 12 months after discovery in a high risk patient.        Procalcitonin high 1.54    UA- contaminated with 10 squam  RBC, UA 0 - 4 /hpf 17 High    WBC, UA 0 - 5 /hpf >100 High    Bacteria None-Occ /hpf Moderate Abnormal    Squam Epithel, UA /hpf 10   Non-Squam Epith <1/hpf /hpf 2 Abnormal    Hyaline Casts, UA 0-1/lpf /lpf 4 Abnormal               Escherichia coli Pseudomonas aeruginosa       CULTURE, URINE CULTURE, URINE     Amox/K Clav'ate >16/8 mcg/mL Resistant       Amp/Sulbactam >16/8 mcg/mL Resistant       Ampicillin >16 mcg/mL Resistant       Cefazolin <=2 mcg/mL Sensitive       Cefepime <=2 mcg/mL Sensitive <=2 mcg/mL Sensitive     Ceftriaxone <=1 mcg/mL Sensitive       Ciprofloxacin <=1 mcg/mL Sensitive <=1 mcg/mL Sensitive     Ertapenem <=0.5 mcg/mL Sensitive       Gentamicin <=4 mcg/mL Sensitive       Levofloxacin <=2 mcg/mL Sensitive <=2 mcg/mL Sensitive     Meropenem <=1 mcg/mL Sensitive <=1 mcg/mL Sensitive     Nitrofurantoin <=32 mcg/mL Sensitive       Piperacillin/Tazo <=16 mcg/mL Sensitive <=16 mcg/mL Sensitive     Tobramycin <=4 mcg/mL  "Sensitive       Trimeth/Sulfa <=2/38 mcg/mL Sensitive            Day #4 meropenem.       Afebrile    No recent EKG/Qtc    ID consulted for "multiple organism UTI, recurrent uti - antibiotic     Past Medical History:   Diagnosis Date    Acquired hypothyroidism     Atrial fibrillation 09/16/2019    Diabetes mellitus     High cholesterol     History of DVT (deep vein thrombosis)     History of pulmonary embolism     Hypertension     LARRY (obstructive sleep apnea)     S/P IVC filter        Past Surgical History:   Procedure Laterality Date    THYROIDECTOMY, PARTIAL  2006    TONSILLECTOMY      TREATMENT OF CARDIAC ARRHYTHMIA  9/16/2019    Procedure: Cardioversion or Defibrillation;  Surgeon: Deven Vasquez MD;  Location: [facility] CATH LAB;  Service: Cardiology;;       Review of patient's allergies indicates:   Allergen Reactions    Contrast media Hives       No current facility-administered medications on file prior to encounter.     Current Outpatient Medications on File Prior to Encounter   Medication Sig    acetaminophen (TYLENOL) 500 MG tablet Take 500 mg by mouth every 6 (six) hours as needed for Pain.    amiodarone (PACERONE) 200 MG Tab Take 200 mg by mouth once daily.    calcium carbonate (TUMS) 200 mg calcium (500 mg) chewable tablet Take 2 tablets (1,000 mg total) by mouth 2 (two) times daily.    cholecalciferol, vitamin D3, (VITAMIN D3) 50 mcg (2,000 unit) Tab Take by mouth once daily.    EPINEPHrine (EPIPEN) 0.3 mg/0.3 mL AtIn     ergocalciferol (ERGOCALCIFEROL) 50,000 unit Cap Take 50,000 Units by mouth every 7 days.    ferrous gluconate 324 mg (37.5 mg iron) Tab tablet Take 1 tablet (324 mg total) by mouth 2 (two) times daily with meals.    gabapentin (NEURONTIN) 600 MG tablet Take 1 tablet by mouth nightly.    levothyroxine (SYNTHROID) 200 MCG tablet Take 200 mcg by mouth before breakfast.    metformin (GLUCOPHAGE) 1000 MG tablet Take 1,000 mg by mouth 2 (two) times daily with meals.    metoprolol tartrate " (LOPRESSOR) 25 MG tablet Take 1 tablet (25 mg total) by mouth 2 (two) times daily.    olmesartan (BENICAR) 40 MG tablet Take 40 mg by mouth once daily.    omega-3 fatty acids/fish oil (FISH OIL-OMEGA-3 FATTY ACIDS) 300-1,000 mg capsule Take 1 capsule by mouth 2 (two) times a day.    omeprazole (PRILOSEC) 20 MG capsule     rosuvastatin (CRESTOR) 40 MG Tab Take 10 mg by mouth every evening.    [DISCONTINUED] sotalol (BETAPACE) 80 MG tablet Take by mouth 2 (two) times daily.     Family History    Family history is unknown by patient.       Tobacco Use    Smoking status: Never    Smokeless tobacco: Never   Substance and Sexual Activity    Alcohol use: Not Currently     Comment: occasionally    Drug use: No    Sexual activity: Not Currently     Review of Systems   Constitutional:  Positive for fatigue. Negative for activity change, appetite change, chills, diaphoresis and fever.   HENT:  Negative for congestion, rhinorrhea and sore throat.    Eyes:  Negative for visual disturbance.   Respiratory:  Negative for cough, chest tightness, shortness of breath and wheezing.    Cardiovascular:  Negative for chest pain, palpitations and leg swelling.   Gastrointestinal:  Negative for abdominal pain, constipation, nausea and vomiting.   Genitourinary:  Positive for dysuria and flank pain. Negative for decreased urine volume.   Musculoskeletal:  Negative for arthralgias, joint swelling and myalgias.   Neurological:  Positive for weakness. Negative for dizziness, light-headedness and headaches.   Psychiatric/Behavioral:  The patient is not nervous/anxious.      Objective:     Vital Signs (Most Recent):  Temp: 97.7 °F (36.5 °C) (06/25/24 0726)  Pulse: (!) 57 (06/25/24 0743)  Resp: 18 (06/25/24 0730)  BP: 101/60 (06/25/24 0726)  SpO2: 97 % (06/25/24 0730) Vital Signs (24h Range):  Temp:  [97.4 °F (36.3 °C)-98.6 °F (37 °C)] 97.7 °F (36.5 °C)  Pulse:  [57-68] 57  Resp:  [18-19] 18  SpO2:  [76 %-100 %] 97 %  BP: (100-123)/(58-64) 101/60      Weight: 132.5 kg (292 lb 1.8 oz)  Body mass index is 43.14 kg/m².     Physical Exam  Vitals and nursing note reviewed.   Constitutional:       General: He is not in acute distress.     Appearance: Normal appearance. He is obese. He is not ill-appearing, toxic-appearing or diaphoretic.   HENT:      Head: Normocephalic and atraumatic.      Nose: Nose normal. No congestion or rhinorrhea.      Mouth/Throat:      Mouth: Mucous membranes are moist.      Pharynx: Oropharynx is clear. No oropharyngeal exudate or posterior oropharyngeal erythema.   Eyes:      General: No scleral icterus.     Extraocular Movements: Extraocular movements intact.      Conjunctiva/sclera: Conjunctivae normal.      Pupils: Pupils are equal, round, and reactive to light.   Neck:      Vascular: No carotid bruit.   Cardiovascular:      Rate and Rhythm: Normal rate and regular rhythm.      Pulses: Normal pulses.      Heart sounds: Normal heart sounds. No murmur heard.     No friction rub. No gallop.   Pulmonary:      Effort: Pulmonary effort is normal. No respiratory distress.      Breath sounds: Normal breath sounds. No wheezing, rhonchi or rales.   Abdominal:      General: Bowel sounds are normal. There is no distension.      Palpations: Abdomen is soft.      Tenderness: There is no abdominal tenderness. There is no right CVA tenderness, left CVA tenderness, guarding or rebound.   Musculoskeletal:         General: No swelling. Normal range of motion.      Cervical back: Normal range of motion and neck supple.      Right lower leg: No edema.      Left lower leg: No edema.   Skin:     General: Skin is warm.      Capillary Refill: Capillary refill takes less than 2 seconds.      Coloration: Skin is not pale.   Neurological:      General: No focal deficit present.      Mental Status: He is alert and oriented to person, place, and time.      Cranial Nerves: No cranial nerve deficit.      Motor: No weakness.      Coordination: Coordination normal.    Psychiatric:         Mood and Affect: Mood normal.         Behavior: Behavior normal.              CRANIAL NERVES     CN III, IV, VI   Pupils are equal, round, and reactive to light.       Significant Labs: All pertinent labs within the past 24 hours have been reviewed.  Recent Lab Results  (Last 5 results in the past 24 hours)        06/25/24  0727   06/25/24  0500   06/24/24  1930   06/24/24  1618   06/24/24  1121        Acanthocytes   Present             Albumin   2.7             ALP   54             ALT   12             Anion Gap   10             Aniso   Moderate             AST   19             Baso #   0.03             Basophil %   2.2             BILIRUBIN TOTAL   0.3  Comment: For infants and newborns, interpretation of results should be based  on gestational age, weight and in agreement with clinical  observations.    Premature Infant recommended reference ranges:  Up to 24 hours.............<8.0 mg/dL  Up to 48 hours............<12.0 mg/dL  3-5 days..................<15.0 mg/dL  6-29 days.................<15.0 mg/dL               BUN   44             Mandi/Echinocytes   Moderate             Calcium   7.7             Chloride   100             CO2   24             Creatinine   4.8             Differential Method   Automated             eGFR   13             Eos #   0.1             Eos %   6.5             Ferritin   225             Fragmented Cells   Occasional             Glucose   77             Gran # (ANC)   0.7             Gran %   50.3             Hematocrit   26.9             Hemoglobin   8.7             Hypo   Occasional             Immature Grans (Abs)   0.00  Comment: Mild elevation in immature granulocytes is non specific and   can be seen in a variety of conditions including stress response,   acute inflammation, trauma and pregnancy. Correlation with other   laboratory and clinical findings is essential.               Immature Granulocytes   0.0             Iron   63             Lymph #   0.6              Lymph %   39.6             MCH   23.1             MCHC   32.3             MCV   71             Mono #   0.0             Mono %   1.4             MPV   9.2             nRBC   0             Ovalocytes   Occasional             Platelet Estimate   Appears normal             Platelet Count   344             POCT Glucose 84     112   111   84       Poikilocytosis   Moderate             Potassium   3.9             PROTEIN TOTAL   6.0             RBC   3.77             RDW   18.2             Saturated Iron   28             Sodium   134             Spherocytes   Occasional             TIBC   228             Transferrin   154             WBC   1.39  Comment: WBC critical result(s) called and verbal readback obtained from Kellie Sales @5:43am; 6/25/2024 by BLANK 06/25/2024 05:44                                      Significant Imaging: I have reviewed all pertinent imaging results/findings within the past 24 hours.    Component Value Units Date/Time    CT Abdomen Pelvis Without Contrast [[id number]] (Abnormal) Resulted: 06/21/24 2033   Order Status: Completed Updated: 06/21/24 2036   Narrative:     EXAMINATION:  CT ABDOMEN PELVIS WITHOUT    CLINICAL HISTORY:  Dysuria and bladder spasms.    TECHNIQUE:  5 mm unenhanced axial images from the lung bases through the greater trochanters were performed.  Coronal and sagittal reformatted images were provided.    COMPARISON:  05/04/2024    FINDINGS:  Within the limits of a noncontrast examination, the liver, spleen, pancreas, left kidney and adrenal glands are unremarkable.  The gallbladder contains multiple gallstones.    There is a 6 mm nonobstructing right renal calculus.    There is no gross abdominal adenopathy or ascites.  Moderate atherosclerotic changes are present.    The appendix is dilated measuring up to 10 mm.  On the previous examination, it was also dilated and measured 12 mm.  There is no periappendiceal stranding or fluid.  There are no pelvic masses or  adenopathy.  There is no free pelvic fluid.  Mild circumferential thickening of the urinary bladder wall is seen.    At the lung bases, there is mild basilar atelectatic change.  There are multiple micro nodules.  There are coronary arterial calcifications.    Spondylolysis and spondylolisthesis are seen.   Impression:       Cholelithiasis.    6 mm nonobstructing right renal calculus.    Mild circumferential thickening of the urinary bladder.  Consider correlation with urinalysis to evaluate for acute cystitis.    Dilated appendix measuring 10 mm.  No periappendiceal infiltration.    Micronodules at the lung bases.  For multiple solid nodules all <6 mm, Fleischner Society 2017 guidelines recommend no routine follow up for a low risk patient, or follow up with non-contrast chest CT at 12 months after discovery in a high risk patient.    This report was flagged in Epic as abnormal.      Electronically signed by: Roxana Maciel  Date: 06/21/2024  Time: 20:33   MRI Lumbar Spine Without Contrast [[id number]] Resulted: 06/21/24 1824   Order Status: Completed Updated: 06/21/24 1826   Narrative:     EXAMINATION:  MRI LUMBAR SPINE WITHOUT CONTRAST    CLINICAL HISTORY:  bilateral leg weakness;    TECHNIQUE:  Multiplanar, multisequence MR images were acquired from the thoracolumbar junction to the sacrum without the administration of contrast.    COMPARISON:  CT scan dated 05/04/2024.    FINDINGS:  There is unchanged appearance of grade 1 anterolisthesis of L5 on S1 with bilateral pars defects.  The remainder of the lumbar alignment is within normal limits.    The vertebral body heights are maintained.  The bone marrow signal is within normal limits.  There is no evidence of a fracture.    The conus terminates at the level of L1.  The cauda equina nerve roots are within normal limits.    There is hypertrophy of the posterior elements.  There is multilevel disc desiccation.  Evaluation of the individual disc levels reveals the  following:    L1-L2, there is a disc bulge along with facet hypertrophy and ligamentum flavum hypertrophy.  The spinal canal is within normal limits.  There is mild bilateral neural foraminal narrowing.    L2-L3, there is a disc bulge along with facet hypertrophy and ligamentum flavum hypertrophy.  The spinal canal is within normal limits.  There is mild bilateral neural foraminal narrowing.    L3-L4, there is a disc bulge along with facet hypertrophy and ligamentum flavum hypertrophy.  There is mild spinal canal narrowing.  There is mild bilateral neural foraminal narrowing.    L4-L5, there is a disc bulge along with facet hypertrophy and ligamentum flavum hypertrophy.  There is narrowing the lateral recesses.  There is mild narrowing of the spinal canal.  There is moderate bilateral neural foraminal narrowing.    L5-S1, there is a disc bulge along with facet hypertrophy and ligamentum flavum hypertrophy.  There is superimposed central disc protrusion.  There are bilateral foraminal disc protrusions too.  There is impinging on the exiting bilateral S1 nerve roots.  There is severe bilateral neural foraminal narrowing.    There is atrophy of the paraspinous musculature.  There is edema within the subcutaneous tissues of the lower lumbar spine.  The remainder of the paraspinal soft tissues are within normal limits.   Impression:       Grade I L5-S1 spondylolisthesis with associated severe bilateral neural foraminal narrowing and marked narrowing the lateral recesses.  There is impinging on the exiting bilateral L5 nerve roots.    Moderate narrowing of the spinal canal at the L5-S1 level secondary to the degree of anterolisthesis and uncovering of the disc.    Additional multilevel degenerative changes in the lumbar spine as above.      Electronically signed by: Chevy Collins MD  Date: 06/21/2024  Time: 18:24   X-Ray Orbit Eye Foreign Body Bilat [[id number]] Resulted: 06/21/24 1623   Order Status: Completed Updated:  06/21/24 1626   Narrative:     EXAMINATION:  XR ORBIT EYE FOREIGN BODY BILAT    CLINICAL HISTORY:  Residual foreign body in soft tissue    TECHNIQUE:  AP, lateral and oblique views of the orbits bilaterally    COMPARISON:  None    FINDINGS:  No evidence for radiopaque foreign body projects over the orbits bilaterally.  Bony orbits are grossly intact.  No significant opacification visualized paranasal sinuses.  Further evaluation as warranted clinically.   Impression:       Please see above      Electronically signed by: Noe Mckeon DO  Date: 06/21/2024  Time: 16:23

## 2024-06-25 NOTE — HPI
Urolithiasis  Patient complains of right abdominal pain with radiation to the abdomen. Onset of symptoms was gradual starting 1 day ago with completely resolved course since that time. Patient describes the pain as aching, intermittent and rated as mild. The patient has had no nausea/no vomiting and no diaphoresis. There has been no fever or chills. The patient is not complaining of dysuria or frequency. Risk factors for urolithiasis: none.  Urology consulted for non-obstructing stone and recurrent UTI.  He had a hematuria work up by Dr. Orellana in 2023, this showed mild urethral stricture and BPH.     Yes

## 2024-06-25 NOTE — PROGRESS NOTES
[facility] [place]  Nurses Note -- 4 Eyes      6/25/2024       Skin assessed on: Q Shift      [x] No Pressure Injuries Present    []Prevention Measures Documented    [] Yes LDA  for Pressure Injury Previously documented     [] Yes New Pressure Injury Discovered   [] LDA for New Pressure Injury Added      Attending RN:  Navjot Rubio RN     Second RN:  Kellie Sales RN

## 2024-06-25 NOTE — ASSESSMENT & PLAN NOTE
Body mass index is 43.14 kg/m². Morbid obesity complicates all aspects of disease management from diagnostic modalities to treatment. Weight loss encouraged and health benefits explained to patient.

## 2024-06-25 NOTE — ASSESSMENT & PLAN NOTE
Patient's FSGs are controlled on current medication regimen.  Last A1c reviewed-   Lab Results   Component Value Date    HGBA1C 5.5 06/22/2024     Most recent fingerstick glucose reviewed-   Recent Labs   Lab 06/24/24  1618 06/24/24  1930 06/25/24  0727 06/25/24  1126   POCTGLUCOSE 111* 112* 84 100       Current correctional scale  Low  Maintain anti-hyperglycemic dose as follows-   Antihyperglycemics (From admission, onward)    Start     Stop Route Frequency Ordered    06/21/24 2009  insulin aspart U-100 pen 0-5 Units         -- SubQ Before meals & nightly PRN 06/21/24 1909        Hold Oral hypoglycemics while patient is in the hospital. Follow up on A1c.

## 2024-06-25 NOTE — PROGRESS NOTES
[facility] Medicine  Progress Note    Patient Name: Russell Santos  MRN: [id number]  Patient Class: IP- Inpatient   Admission Date: 6/21/2024  Length of Stay: 4 days  Attending Physician: Keri Porras-Ana PEARSON DO  Primary Care Provider: Roxanne Phillip -        Subjective:     Principal Problem:MICHELLE (acute kidney injury)        HPI:  63 y.o. AAM with h/o paroxsymal afib (on xarelto 15mg PO daily and amiodarone 200mg PO daily), essential HTN, HLD, NIDDM type 2, and Chronic Leukopenia (sees Dr. Marquez) presents to the ER with c/o decresed uop and back pain. Was recently admitted to [facility] from 5/4-5/11 for MICHELLE and UTI (MICHELLE 8.8 initial creat and resolved upon d/c with creatinine 1.1-followed with Dr. Middleton and d/c home on Augmentin which he complete for Enterococcus).  States dysuira and bladder spasms for the past 3 days. Generalized weakness and fatigue. No fevers or chills. No N/V/abdominal pain. Back pain worsen than his chronic back pain.  NO chest pain, SOB/LYNCH, palpation, or lower extremity edema.     States she followed up with Nephrology on 6/11 and was doing well. Does note that he had a brandt placed when he was hospitalized and had discomfort when it was d/c. States he also recently started back on his Benicar 40mg daily for BP control.     Labs to the ER concerning for recurrent MICHELLE with BUN 53/Creatine 67.  Bicar 17.  WBC 2.69 but dose have a h/o Leukopenia being follow by Dr. Marquez.  Urine concerning for recurrent UTI.  Lactic acid normal. SBP noted to be low/normal ranging . IVF started.     CT non-contrast abd/pelvis:  Impression:  Cholelithiasis.  6 mm nonobstructing right renal calculus.  Mild circumferential thickening of the urinary bladder.  Consider correlation with urinalysis to evaluate for acute cystitis.  Dilated appendix measuring 10 mm.  No periappendiceal infiltration.  Micro nodules at the lung bases.  For multiple solid nodules all <6 mm, Fleischner Society 2017  guidelines recommend no routine follow up for a low risk patient, or follow up with non-contrast chest CT at 12 months after discovery in a high risk patient.      Started on Vanc due to recent hospitalization and cultures.     Because this patient's clinical condition (MICHELLE) is guarded and requires frequent lab checks, IVF, IV abx which required adjustments based on daily renal function, and monitor for worsening vitals due to developing sepsis , care in an alternative location isn't clinically appropriate for the reasons stated above.  We have been consulted for admission to inpatient tele.               Overview/Hospital Course:  Mr. Santos is a 62 yo M admitted with acute kidney injury and acute cystitis. Started on antibiotics (last culture with enteroccocus faecalis). Broadened to meropenem. Urine cx with pseudmonas and e.coli. Ct abdomen/pelvis with 6 mm nonobstructing right renal calculus. Urology consulted-does not suspect stone is causing pt recurrent urinary infection. Nephrology consulted. On bicarb drip. Slow improvement. Chronic leukopenia,  ongoing with neutropenic precautions. Hematology consulted-Most likely recently worsened secondary to acute illness and antibiotics. ID consulted- agree with 3-5d antibiotics while inpatient for possible cystitis, but can stop at d/c.De-escalating meropenem to cefepime on 06/25/24.     Interval History: No acute overnight events. Patient remained afebrile. Urinating without difficulty.  Denies any dysuria or hematuria.  Denies any bleeding.    Review of Systems   Constitutional:  Negative for fatigue and fever.   Gastrointestinal:  Negative for abdominal pain, diarrhea, nausea and vomiting.   Genitourinary:  Negative for difficulty urinating, dysuria and flank pain.   Musculoskeletal:  Negative for back pain.   Neurological:  Negative for weakness.     Objective:     Vital Signs (Most Recent):  Temp: 97.6 °F (36.4 °C) (06/25/24 1125)  Pulse: 65 (06/25/24 1125)  Resp:  19 (06/25/24 1125)  BP: 100/61 (06/25/24 1125)  SpO2: 100 % (06/25/24 1125) Vital Signs (24h Range):  Temp:  [97.5 °F (36.4 °C)-98.6 °F (37 °C)] 97.6 °F (36.4 °C)  Pulse:  [57-71] 65  Resp:  [18-19] 19  SpO2:  [76 %-100 %] 100 %  BP: (100-123)/(58-64) 100/61     Weight: 132.5 kg (292 lb 1.8 oz)  Body mass index is 43.14 kg/m².    Intake/Output Summary (Last 24 hours) at 6/25/2024 1601  Last data filed at 6/25/2024 1405  Gross per 24 hour   Intake 2040 ml   Output 3400 ml   Net -1360 ml         Physical Exam  Vitals and nursing note reviewed.   Constitutional:       General: He is not in acute distress.     Appearance: He is morbidly obese. He is not ill-appearing.   Cardiovascular:      Rate and Rhythm: Normal rate and regular rhythm.      Pulses: Normal pulses.      Heart sounds: No murmur heard.  Pulmonary:      Effort: Pulmonary effort is normal. No respiratory distress.      Breath sounds: Normal breath sounds.   Abdominal:      General: Bowel sounds are normal. There is no distension.      Palpations: Abdomen is soft.      Tenderness: There is no right CVA tenderness or left CVA tenderness.   Musculoskeletal:         General: No swelling.      Right lower leg: No edema.      Left lower leg: No edema.   Skin:     General: Skin is warm.   Neurological:      General: No focal deficit present.      Mental Status: He is alert and oriented to person, place, and time.   Psychiatric:         Mood and Affect: Mood normal.         Behavior: Behavior normal.         Thought Content: Thought content normal.             Significant Labs: All pertinent labs within the past 24 hours have been reviewed.    Significant Imaging: I have reviewed all pertinent imaging results/findings within the past 24 hours.    Assessment/Plan:      * MICHELLE (acute kidney injury)  Presents with acute kidney injury, BUN 53/C r 6.7. FENa 4.1% suggestive of intrinsic etiology.  - Nephrology consulted  - Hold ARB.  - treat underlying UTI  - slowly  improving  - recheck in AM      Cystitis  - Previous culture with enteroccus and discharged with augmentin  - given vanc in ED - add on meropenem for now for ESBL coverage and hypotension   - Urine cultures with E coli and Pseudomonas  - ID consulted for recommendations for recurrent UTI and multiple organisms: agree with 3-5d antibiotics while inpatient for possible cystitis, but can stop at d/c.  - De-escalating meropenem to cefepime on 06/25/24.   - has right renal stone, urology consulted    Recurrent UTI  -see plan as above for cystitis   -urology consulted: Consider repeat cystoscopy outpatient      Kidney stone  -CT scan with right nonobstructing stone  -renal ultrasound with no hydronephrosis  -urology consulted    Hyponatremia  Patient has hyponatremia which is controlled,We will aim to correct the sodium by 4-6mEq in 24 hours. We will monitor sodium Daily. The hyponatremia is due to renal insufficiency. We will obtain the following studies: Urine sodium, urine osmolality, serum osmolality or TSH, T4. We will treat the hyponatremia with IV fluids as follows: sodium bicarb. The patient's sodium results have been reviewed and are listed below.  Recent Labs   Lab 06/25/24  0500   *     - recheck in AM  - improving   -nephrology following    Leukopenia  Noted to be slightly lower than baseline (3-4). No c/o fevers or chills. NO abscess on CT abd/pelvis noted. Will monitor for now.   - Neutropenic precautions  - hematology consulted: Most likely recently worsened secondary to acute illness and antibiotics.  - follows with Hematology as outpatient      PAF (paroxysmal atrial fibrillation)  Patient with Paroxysmal (<7 days) atrial fibrillation which is controlled currently with Beta Blocker and Amiodarone. Patient is currently in sinus rhythm.[id number] Score: 1.  Anticoagulation indicated. Anticoagulation done with Xarelto 15mg daily .  Will hold home Xarelto given MICHELLE and possible need for renal bx if no  significant improvement in renal function.  Will resume if renal function improves and cleared by Nephrology.     Essential hypertension  Chronic, controlled. Latest blood pressure and vitals reviewed-     Temp:  [97.5 °F (36.4 °C)-98.6 °F (37 °C)]   Pulse:  [57-71]   Resp:  [18-19]   BP: (100-123)/(58-64)   SpO2:  [76 %-100 %] .   Home meds for hypertension were reviewed and noted below.   Hypertension Medications               metoprolol tartrate (LOPRESSOR) 25 MG tablet Take 1 tablet (25 mg total) by mouth 2 (two) times daily.    olmesartan (BENICAR) 40 MG tablet Take 40 mg by mouth once daily.            While in the hospital, will manage blood pressure as follows; Adjust home antihypertensive regimen as follows- Hold benicar for now and resume metoprolol with hold parameters for low/normal BP and HR.     Will utilize p.r.n. blood pressure medication only if patient's blood pressure greater than 180/110 and he develops symptoms such as worsening chest pain or shortness of breath.    Hyperlipidemia  Resume stain     Type 2 diabetes mellitus with kidney complication, without long-term current use of insulin  Patient's FSGs are controlled on current medication regimen.  Last A1c reviewed-   Lab Results   Component Value Date    HGBA1C 5.5 06/22/2024     Most recent fingerstick glucose reviewed-   Recent Labs   Lab 06/24/24  1618 06/24/24  1930 06/25/24  0727 06/25/24  1126   POCTGLUCOSE 111* 112* 84 100       Current correctional scale  Low  Maintain anti-hyperglycemic dose as follows-   Antihyperglycemics (From admission, onward)      Start     Stop Route Frequency Ordered    06/21/24 2009  insulin aspart U-100 pen 0-5 Units         -- SubQ Before meals & nightly PRN 06/21/24 1909          Hold Oral hypoglycemics while patient is in the hospital. Follow up on A1c.     Hypothyroidism  Resume home levothyroxine      Morbid obesity  Body mass index is 43.14 kg/m². Morbid obesity complicates all aspects of disease  management from diagnostic modalities to treatment. Weight loss encouraged and health benefits explained to patient.           VTE Risk Mitigation (From admission, onward)           Ordered     heparin (porcine) injection 5,000 Units  Every 8 hours         06/22/24 0515     IP VTE HIGH RISK PATIENT  Once         06/21/24 1908     Place sequential compression device  Until discontinued         06/21/24 1908                    Discharge Planning   CASA: 6/27/2024     Code Status: Full Code   Is the patient medically ready for discharge?:     Reason for patient still in hospital (select all that apply): Patient trending condition, Treatment, and Consult recommendations  Discharge Plan A: Home   Discharge Delays: None known at this time              Maureen Porras DO  Department of Hospital Medicine   [facility]

## 2024-06-26 LAB
ALBUMIN SERPL BCP-MCNC: 2.8 G/DL (ref 3.5–5.2)
ALP SERPL-CCNC: 53 U/L (ref 55–135)
ALT SERPL W/O P-5'-P-CCNC: 16 U/L (ref 10–44)
ANION GAP SERPL CALC-SCNC: 11 MMOL/L (ref 8–16)
AST SERPL-CCNC: 25 U/L (ref 10–40)
BASOPHILS # BLD AUTO: 0.02 K/UL (ref 0–0.2)
BASOPHILS NFR BLD: 1.3 % (ref 0–1.9)
BILIRUB SERPL-MCNC: 0.3 MG/DL (ref 0.1–1)
BUN SERPL-MCNC: 44 MG/DL (ref 8–23)
CALCIUM SERPL-MCNC: 7.9 MG/DL (ref 8.7–10.5)
CHLORIDE SERPL-SCNC: 105 MMOL/L (ref 95–110)
CO2 SERPL-SCNC: 24 MMOL/L (ref 23–29)
CREAT SERPL-MCNC: 4.3 MG/DL (ref 0.5–1.4)
DIFFERENTIAL METHOD BLD: ABNORMAL
EOSINOPHIL # BLD AUTO: 0.1 K/UL (ref 0–0.5)
EOSINOPHIL NFR BLD: 5 % (ref 0–8)
ERYTHROCYTE [DISTWIDTH] IN BLOOD BY AUTOMATED COUNT: 18.5 % (ref 11.5–14.5)
EST. GFR  (NO RACE VARIABLE): 15 ML/MIN/1.73 M^2
GLUCOSE SERPL-MCNC: 83 MG/DL (ref 70–110)
HCT VFR BLD AUTO: 27.5 % (ref 40–54)
HGB BLD-MCNC: 8.5 G/DL (ref 14–18)
IMM GRANULOCYTES # BLD AUTO: 0 K/UL (ref 0–0.04)
IMM GRANULOCYTES NFR BLD AUTO: 0 % (ref 0–0.5)
LYMPHOCYTES # BLD AUTO: 0.6 K/UL (ref 1–4.8)
LYMPHOCYTES NFR BLD: 39.4 % (ref 18–48)
MCH RBC QN AUTO: 22.7 PG (ref 27–31)
MCHC RBC AUTO-ENTMCNC: 30.9 G/DL (ref 32–36)
MCV RBC AUTO: 73 FL (ref 82–98)
MONOCYTES # BLD AUTO: 0 K/UL (ref 0.3–1)
MONOCYTES NFR BLD: 1.9 % (ref 4–15)
NEUTROPHILS # BLD AUTO: 0.8 K/UL (ref 1.8–7.7)
NEUTROPHILS NFR BLD: 52.4 % (ref 38–73)
NRBC BLD-RTO: 0 /100 WBC
PLATELET # BLD AUTO: 415 K/UL (ref 150–450)
PMV BLD AUTO: 9.2 FL (ref 9.2–12.9)
POCT GLUCOSE: 104 MG/DL (ref 70–110)
POCT GLUCOSE: 121 MG/DL (ref 70–110)
POCT GLUCOSE: 121 MG/DL (ref 70–110)
POCT GLUCOSE: 123 MG/DL (ref 70–110)
POTASSIUM SERPL-SCNC: 3.7 MMOL/L (ref 3.5–5.1)
PROT SERPL-MCNC: 6.3 G/DL (ref 6–8.4)
RBC # BLD AUTO: 3.75 M/UL (ref 4.6–6.2)
SODIUM SERPL-SCNC: 140 MMOL/L (ref 136–145)
WBC # BLD AUTO: 1.6 K/UL (ref 3.9–12.7)

## 2024-06-26 PROCEDURE — 63600175 PHARM REV CODE 636 W HCPCS: Performed by: INTERNAL MEDICINE

## 2024-06-26 PROCEDURE — 25000003 PHARM REV CODE 250: Performed by: STUDENT IN AN ORGANIZED HEALTH CARE EDUCATION/TRAINING PROGRAM

## 2024-06-26 PROCEDURE — 21400001 HC TELEMETRY ROOM

## 2024-06-26 PROCEDURE — 85025 COMPLETE CBC W/AUTO DIFF WBC: CPT | Performed by: STUDENT IN AN ORGANIZED HEALTH CARE EDUCATION/TRAINING PROGRAM

## 2024-06-26 PROCEDURE — 36415 COLL VENOUS BLD VENIPUNCTURE: CPT | Performed by: STUDENT IN AN ORGANIZED HEALTH CARE EDUCATION/TRAINING PROGRAM

## 2024-06-26 PROCEDURE — 27000207 HC ISOLATION

## 2024-06-26 PROCEDURE — 80053 COMPREHEN METABOLIC PANEL: CPT | Performed by: STUDENT IN AN ORGANIZED HEALTH CARE EDUCATION/TRAINING PROGRAM

## 2024-06-26 PROCEDURE — 25000003 PHARM REV CODE 250: Performed by: INTERNAL MEDICINE

## 2024-06-26 PROCEDURE — 99232 SBSQ HOSP IP/OBS MODERATE 35: CPT | Mod: ,,, | Performed by: STUDENT IN AN ORGANIZED HEALTH CARE EDUCATION/TRAINING PROGRAM

## 2024-06-26 PROCEDURE — 63600175 PHARM REV CODE 636 W HCPCS: Performed by: STUDENT IN AN ORGANIZED HEALTH CARE EDUCATION/TRAINING PROGRAM

## 2024-06-26 RX ADMIN — LEVOTHYROXINE SODIUM 200 MCG: 0.1 TABLET ORAL at 05:06

## 2024-06-26 RX ADMIN — AMIODARONE HYDROCHLORIDE 200 MG: 200 TABLET ORAL at 08:06

## 2024-06-26 RX ADMIN — DOCUSATE SODIUM 100 MG: 100 CAPSULE, LIQUID FILLED ORAL at 10:06

## 2024-06-26 RX ADMIN — OXYCODONE HYDROCHLORIDE AND ACETAMINOPHEN 1 TABLET: 5; 325 TABLET ORAL at 10:06

## 2024-06-26 RX ADMIN — HEPARIN SODIUM 5000 UNITS: 5000 INJECTION INTRAVENOUS; SUBCUTANEOUS at 02:06

## 2024-06-26 RX ADMIN — GABAPENTIN 300 MG: 300 CAPSULE ORAL at 10:06

## 2024-06-26 RX ADMIN — SODIUM BICARBONATE 650 MG: 325 TABLET ORAL at 02:06

## 2024-06-26 RX ADMIN — SODIUM BICARBONATE 650 MG: 325 TABLET ORAL at 10:06

## 2024-06-26 RX ADMIN — CEFEPIME 2 G: 2 INJECTION, POWDER, FOR SOLUTION INTRAVENOUS at 04:06

## 2024-06-26 RX ADMIN — DOCUSATE SODIUM 100 MG: 100 CAPSULE, LIQUID FILLED ORAL at 08:06

## 2024-06-26 RX ADMIN — HEPARIN SODIUM 5000 UNITS: 5000 INJECTION INTRAVENOUS; SUBCUTANEOUS at 10:06

## 2024-06-26 RX ADMIN — HEPARIN SODIUM 5000 UNITS: 5000 INJECTION INTRAVENOUS; SUBCUTANEOUS at 05:06

## 2024-06-26 RX ADMIN — SODIUM BICARBONATE 650 MG: 325 TABLET ORAL at 08:06

## 2024-06-26 RX ADMIN — ATORVASTATIN CALCIUM 80 MG: 40 TABLET, FILM COATED ORAL at 10:06

## 2024-06-26 NOTE — SUBJECTIVE & OBJECTIVE
Interval History: denies R flank pain    Review of Systems   Constitutional:  Negative for activity change, appetite change, chills, diaphoresis and fever.   HENT:  Negative for congestion and rhinorrhea.    Eyes:  Negative for visual disturbance.   Respiratory:  Negative for choking and shortness of breath.    Gastrointestinal:  Negative for abdominal distention, abdominal pain, constipation, diarrhea, nausea and vomiting.   Genitourinary:  Negative for difficulty urinating, dysuria, flank pain, hematuria, scrotal swelling, testicular pain and urgency.   Skin:  Negative for color change, pallor and wound.   Neurological:  Negative for dizziness and syncope.   Psychiatric/Behavioral:  Negative for confusion and hallucinations.      Objective:     Temp:  [97.5 °F (36.4 °C)-98.1 °F (36.7 °C)] 97.9 °F (36.6 °C)  Pulse:  [56-79] 68  Resp:  [18-20] 19  SpO2:  [96 %-100 %] 98 %  BP: (101-136)/(58-76) 108/66     Body mass index is 43.14 kg/m².    Date 06/26/24 0700 - 06/27/24 0659   Shift 4399-8003 5658-2375 3288-8044 24 Hour Total   INTAKE   P.O. 480 240  720   Shift Total(mL/kg) 480(3.6) 240(1.8)  720(5.4)   OUTPUT   Urine(mL/kg/hr) 900(0.8) 400  1300   Shift Total(mL/kg) 900(6.8) 400(3)  1300(9.8)   Weight (kg) 132.5 132.5 132.5 132.5     Post Void Cath Residual Output (mL): 0 mL (06/22/24 1102)    Drains       None                    Physical Exam  Constitutional:       General: He is not in acute distress.     Appearance: He is well-developed. He is not ill-appearing, toxic-appearing or diaphoretic.   HENT:      Head: Normocephalic and atraumatic.      Mouth/Throat:      Mouth: Mucous membranes are moist.   Eyes:      Conjunctiva/sclera: Conjunctivae normal.   Pulmonary:      Effort: Pulmonary effort is normal. No respiratory distress.   Abdominal:      General: Abdomen is flat. There is no distension.      Palpations: Abdomen is soft.   Musculoskeletal:         General: No swelling or deformity.      Cervical back:  Neck supple.   Skin:     Findings: No rash.   Neurological:      Mental Status: He is alert and oriented to person, place, and time.   Psychiatric:         Mood and Affect: Mood normal.         Thought Content: Thought content normal.         Judgment: Judgment normal.           Significant Labs:    BMP:  Recent Labs   Lab 06/24/24  0425 06/25/24  0500 06/26/24  0411   * 134* 140   K 3.7 3.9 3.7   CL 94* 100 105   CO2 22* 24 24   BUN 47* 44* 44*   CREATININE 5.4* 4.8* 4.3*   CALCIUM 7.8* 7.7* 7.9*       CBC:   Recent Labs   Lab 06/24/24  0425 06/25/24  0500 06/26/24  0411   WBC 1.53* 1.39* 1.60*   HGB 9.2* 8.7* 8.5*   HCT 29.4* 26.9* 27.5*    344 415         Significant Imaging:  U/S: I have reviewed all results within the past 24 hours and my personal findings are:  R non obstructing stone, no hydronephrosis bilaterally

## 2024-06-26 NOTE — SUBJECTIVE & OBJECTIVE
Interval History: No acute overnight events. Patient remained afebrile. Urinating without difficulty.  Denies any dysuria or hematuria.  Denies any bleeding.    Review of Systems   Constitutional:  Negative for fatigue and fever.   Gastrointestinal:  Negative for abdominal pain, diarrhea, nausea and vomiting.   Genitourinary:  Negative for difficulty urinating, dysuria and flank pain.   Musculoskeletal:  Negative for back pain.   Neurological:  Negative for weakness.     Objective:     Vital Signs (Most Recent):  Temp: 97.6 °F (36.4 °C) (06/26/24 1201)  Pulse: 79 (06/26/24 1201)  Resp: 19 (06/26/24 1201)  BP: 131/76 (06/26/24 1201)  SpO2: 97 % (06/26/24 1201) Vital Signs (24h Range):  Temp:  [97.5 °F (36.4 °C)-98.3 °F (36.8 °C)] 97.6 °F (36.4 °C)  Pulse:  [56-79] 79  Resp:  [18-20] 19  SpO2:  [96 %-100 %] 97 %  BP: (101-136)/(58-76) 131/76     Weight: 132.5 kg (292 lb 1.8 oz)  Body mass index is 43.14 kg/m².    Intake/Output Summary (Last 24 hours) at 6/26/2024 1313  Last data filed at 6/26/2024 0900  Gross per 24 hour   Intake 480 ml   Output 2100 ml   Net -1620 ml         Physical Exam  Vitals and nursing note reviewed.   Constitutional:       General: He is not in acute distress.     Appearance: He is morbidly obese. He is not ill-appearing.   Cardiovascular:      Rate and Rhythm: Normal rate and regular rhythm.      Pulses: Normal pulses.      Heart sounds: No murmur heard.  Pulmonary:      Effort: Pulmonary effort is normal. No respiratory distress.      Breath sounds: Normal breath sounds.   Abdominal:      General: Bowel sounds are normal. There is no distension.      Palpations: Abdomen is soft.      Tenderness: There is no right CVA tenderness or left CVA tenderness.   Musculoskeletal:         General: No swelling.      Right lower leg: No edema.      Left lower leg: No edema.   Skin:     General: Skin is warm.   Neurological:      General: No focal deficit present.      Mental Status: He is alert and  oriented to person, place, and time.   Psychiatric:         Mood and Affect: Mood normal.         Behavior: Behavior normal.         Thought Content: Thought content normal.             Significant Labs: All pertinent labs within the past 24 hours have been reviewed.    Significant Imaging: I have reviewed all pertinent imaging results/findings within the past 24 hours.

## 2024-06-26 NOTE — PROGRESS NOTES
"                        Renal Progress Note    Date of Admission:  6/21/2024  1:59 PM    Length of Stay: 5  Days    Subjective: no complaints    Objective:    Current Facility-Administered Medications   Medication    acetaminophen tablet 650 mg    amiodarone tablet 200 mg    atorvastatin tablet 80 mg    ceFEPIme (MAXIPIME) 2 g in D5W 100 mL IVPB (MB+)    dextrose 10% bolus 125 mL 125 mL    dextrose 10% bolus 250 mL 250 mL    docusate sodium capsule 100 mg    gabapentin capsule 300 mg    glucagon (human recombinant) injection 1 mg    glucose chewable tablet 16 g    glucose chewable tablet 24 g    heparin (porcine) injection 5,000 Units    hydrALAZINE injection 5 mg    insulin aspart U-100 pen 0-5 Units    levothyroxine tablet 200 mcg    melatonin tablet 6 mg    naloxone 0.4 mg/mL injection 0.02 mg    ondansetron injection 4 mg    oxyCODONE-acetaminophen 5-325 mg per tablet 1 tablet    polyethylene glycol packet 17 g    sodium bicarbonate tablet 650 mg    sodium chloride 0.9% flush 10 mL       Vitals:    06/26/24 0238 06/26/24 0430 06/26/24 0725 06/26/24 0746   BP:  136/76 (!) 108/58    BP Location:       Patient Position:       Pulse: (!) 57 61 69 69   Resp:  18 19    Temp:  97.5 °F (36.4 °C) 98.1 °F (36.7 °C)    TempSrc:  Axillary     SpO2:  96% 100%    Weight:       Height:           I/O last 3 completed shifts:  In: 2040 [P.O.:960; I.V.:1080]  Out: 4200 [Urine:4200]  No intake/output data recorded.        Laboratories:    Recent Labs   Lab 06/26/24  0411   WBC 1.60*   RBC 3.75*   HGB 8.5*   HCT 27.5*      MCV 73*   MCH 22.7*   MCHC 30.9*       Recent Labs   Lab 06/26/24  0411   CALCIUM 7.9*   ALBUMIN 2.8*   PROT 6.3      K 3.7   CO2 24      BUN 44*   CREATININE 4.3*   ALKPHOS 53*   ALT 16   AST 25   BILITOT 0.3       No results for input(s): "COLORU", "CLARITYU", "SPECGRAV", "PHUR", "PROTEINUA", "GLUCOSEU", "BILIRUBINCON", "BLOODU", "WBCU", "RBCU", "BACTERIA", "MUCUS" in the last 24 " hours.    Microbiology Results (last 7 days)       Procedure Component Value Units Date/Time    Urine culture [[id number]]  (Abnormal)  (Susceptibility) Collected: 06/21/24 1741    Order Status: Completed Specimen: Urine Updated: 06/24/24 0950     Urine Culture, Routine ESCHERICHIA COLI  10,000 - 49,999 cfu/ml        PSEUDOMONAS AERUGINOSA  50,000 - 99,999 cfu/ml      Narrative:      Specimen Source->Urine    Group A Strep, Molecular [[id number]] Collected: 06/22/24 0837    Order Status: Completed Specimen: Throat Updated: 06/22/24 0950     Group A Strep, Molecular Negative              Diagnostic Tests: n/a        Assessment:    62 y/o male admitted with:    - MICHELLE creatinine down some  - E-coli / Pseudomonas UTI   - Hyponatremia IMPROVING  - PAF  - DM-2  - HTN  - HLD  - Anemia of chronic illness likely (normal iron stores)  - Hypoalbuminemia  - Hx. Chronic Leukopenia        Plan:    - Antibiotics per ID  - Watching [id number]-nkuyt-edcva-acid/base and I&O  - Renal ADA diet + protein supplements  - Hematology and Urology following  - Discharge planning and other problems per admitting

## 2024-06-26 NOTE — PROGRESS NOTES
[facility] Medicine  Progress Note    Patient Name: Russell Santos  MRN: [id number]  Patient Class: IP- Inpatient   Admission Date: 6/21/2024  Length of Stay: 5 days  Attending Physician: Keri Porras-Ana PEARSON DO  Primary Care Provider: Roxanne Phillip -        Subjective:     Principal Problem:MICHELLE (acute kidney injury)        HPI:  63 y.o. AAM with h/o paroxsymal afib (on xarelto 15mg PO daily and amiodarone 200mg PO daily), essential HTN, HLD, NIDDM type 2, and Chronic Leukopenia (sees Dr. Marquez) presents to the ER with c/o decresed uop and back pain. Was recently admitted to [facility] from 5/4-5/11 for MICHELLE and UTI (MICHELLE 8.8 initial creat and resolved upon d/c with creatinine 1.1-followed with Dr. Middleton and d/c home on Augmentin which he complete for Enterococcus).  States dysuira and bladder spasms for the past 3 days. Generalized weakness and fatigue. No fevers or chills. No N/V/abdominal pain. Back pain worsen than his chronic back pain.  NO chest pain, SOB/LYNCH, palpation, or lower extremity edema.     States she followed up with Nephrology on 6/11 and was doing well. Does note that he had a brandt placed when he was hospitalized and had discomfort when it was d/c. States he also recently started back on his Benicar 40mg daily for BP control.     Labs to the ER concerning for recurrent MICHELLE with BUN 53/Creatine 67.  Bicar 17.  WBC 2.69 but dose have a h/o Leukopenia being follow by Dr. Marquez.  Urine concerning for recurrent UTI.  Lactic acid normal. SBP noted to be low/normal ranging . IVF started.     CT non-contrast abd/pelvis:  Impression:  Cholelithiasis.  6 mm nonobstructing right renal calculus.  Mild circumferential thickening of the urinary bladder.  Consider correlation with urinalysis to evaluate for acute cystitis.  Dilated appendix measuring 10 mm.  No periappendiceal infiltration.  Micro nodules at the lung bases.  For multiple solid nodules all <6 mm, Fleischner Society 2017  guidelines recommend no routine follow up for a low risk patient, or follow up with non-contrast chest CT at 12 months after discovery in a high risk patient.      Started on Vanc due to recent hospitalization and cultures.     Because this patient's clinical condition (MICHELLE) is guarded and requires frequent lab checks, IVF, IV abx which required adjustments based on daily renal function, and monitor for worsening vitals due to developing sepsis , care in an alternative location isn't clinically appropriate for the reasons stated above.  We have been consulted for admission to inpatient tele.               Overview/Hospital Course:  Mr. Santos is a 62 yo M admitted with acute kidney injury and acute cystitis. Started on antibiotics (last culture with enteroccocus faecalis). Broadened to meropenem. Urine cx with pseudmonas and e.coli. Ct abdomen/pelvis with 6 mm nonobstructing right renal calculus. Urology consulted-does not suspect stone is causing pt recurrent urinary infection. Nephrology consulted. On bicarb drip. Slow improvement. Chronic leukopenia,  ongoing with neutropenic precautions. Hematology consulted-Most likely recently worsened secondary to acute illness and antibiotics. ID consulted- agree with 3-5d antibiotics while inpatient for possible cystitis, but can stop at d/c. De-escalating meropenem to cefepime on 06/25/24.     Interval History: No acute overnight events. Patient remained afebrile. Urinating without difficulty.  Denies any dysuria or hematuria.  Denies any bleeding.    Review of Systems   Constitutional:  Negative for fatigue and fever.   Gastrointestinal:  Negative for abdominal pain, diarrhea, nausea and vomiting.   Genitourinary:  Negative for difficulty urinating, dysuria and flank pain.   Musculoskeletal:  Negative for back pain.   Neurological:  Negative for weakness.     Objective:     Vital Signs (Most Recent):  Temp: 97.6 °F (36.4 °C) (06/26/24 1201)  Pulse: 79 (06/26/24  1201)  Resp: 19 (06/26/24 1201)  BP: 131/76 (06/26/24 1201)  SpO2: 97 % (06/26/24 1201) Vital Signs (24h Range):  Temp:  [97.5 °F (36.4 °C)-98.3 °F (36.8 °C)] 97.6 °F (36.4 °C)  Pulse:  [56-79] 79  Resp:  [18-20] 19  SpO2:  [96 %-100 %] 97 %  BP: (101-136)/(58-76) 131/76     Weight: 132.5 kg (292 lb 1.8 oz)  Body mass index is 43.14 kg/m².    Intake/Output Summary (Last 24 hours) at 6/26/2024 1313  Last data filed at 6/26/2024 0900  Gross per 24 hour   Intake 480 ml   Output 2100 ml   Net -1620 ml         Physical Exam  Vitals and nursing note reviewed.   Constitutional:       General: He is not in acute distress.     Appearance: He is morbidly obese. He is not ill-appearing.   Cardiovascular:      Rate and Rhythm: Normal rate and regular rhythm.      Pulses: Normal pulses.      Heart sounds: No murmur heard.  Pulmonary:      Effort: Pulmonary effort is normal. No respiratory distress.      Breath sounds: Normal breath sounds.   Abdominal:      General: Bowel sounds are normal. There is no distension.      Palpations: Abdomen is soft.      Tenderness: There is no right CVA tenderness or left CVA tenderness.   Musculoskeletal:         General: No swelling.      Right lower leg: No edema.      Left lower leg: No edema.   Skin:     General: Skin is warm.   Neurological:      General: No focal deficit present.      Mental Status: He is alert and oriented to person, place, and time.   Psychiatric:         Mood and Affect: Mood normal.         Behavior: Behavior normal.         Thought Content: Thought content normal.             Significant Labs: All pertinent labs within the past 24 hours have been reviewed.    Significant Imaging: I have reviewed all pertinent imaging results/findings within the past 24 hours.    Assessment/Plan:      * MICHELLE (acute kidney injury)  Presents with acute kidney injury, BUN 53/C r 6.7. FENa 4.1% suggestive of intrinsic etiology.  - Nephrology consulted  - Hold ARB.  - treat underlying UTI  -  slowly improving  - recheck in AM      Cystitis  - Previous culture with enteroccus and discharged with augmentin  - given vanc in ED - add on meropenem for now for ESBL coverage and hypotension   - Urine cultures with E coli and Pseudomonas  - ID consulted for recommendations for recurrent UTI and multiple organisms: agree with 3-5d antibiotics while inpatient for possible cystitis, but can stop at d/c.  - De-escalating meropenem to cefepime on 06/25/24.   - has right renal stone, urology consulted    Recurrent UTI  -see plan as above for cystitis   -urology consulted: Consider repeat cystoscopy outpatient      Kidney stone  -CT scan with right nonobstructing stone  -renal ultrasound with no hydronephrosis  -urology consulted    Hyponatremia  Patient has hyponatremia which is controlled,We will aim to correct the sodium by 4-6mEq in 24 hours. We will monitor sodium Daily. The hyponatremia is due to renal insufficiency. We will obtain the following studies: Urine sodium, urine osmolality, serum osmolality or TSH, T4. We will treat the hyponatremia with IV fluids as follows: sodium bicarb. The patient's sodium results have been reviewed and are listed below.  Recent Labs   Lab 06/26/24  0411        - recheck in AM  - improving   -nephrology following  -resolved    Leukopenia  Noted to be slightly lower than baseline (3-4). No c/o fevers or chills. NO abscess on CT abd/pelvis noted. Will monitor for now.   - Neutropenic precautions  - hematology consulted: Most likely recently worsened secondary to acute illness and antibiotics.  - follows with Hematology as outpatient      PAF (paroxysmal atrial fibrillation)  Patient with Paroxysmal (<7 days) atrial fibrillation which is controlled currently with Beta Blocker and Amiodarone. Patient is currently in sinus rhythm.[id number] Score: 1.  Anticoagulation indicated. Anticoagulation done with Xarelto 15mg daily .  Will hold home Xarelto given MICHELLE and possible need for  renal bx if no significant improvement in renal function.  Will resume if renal function improves and cleared by Nephrology.     Essential hypertension  Chronic, controlled. Latest blood pressure and vitals reviewed-     Temp:  [97.5 °F (36.4 °C)-98.3 °F (36.8 °C)]   Pulse:  [56-79]   Resp:  [18-20]   BP: (101-136)/(58-76)   SpO2:  [96 %-100 %] .   Home meds for hypertension were reviewed and noted below.   Hypertension Medications               metoprolol tartrate (LOPRESSOR) 25 MG tablet Take 1 tablet (25 mg total) by mouth 2 (two) times daily.    olmesartan (BENICAR) 40 MG tablet Take 40 mg by mouth once daily.            While in the hospital, will manage blood pressure as follows; Adjust home antihypertensive regimen as follows- Hold benicar for now and resume metoprolol with hold parameters for low/normal BP and HR.     Will utilize p.r.n. blood pressure medication only if patient's blood pressure greater than 180/110 and he develops symptoms such as worsening chest pain or shortness of breath.    Hyperlipidemia  Resume stain     Type 2 diabetes mellitus with kidney complication, without long-term current use of insulin  Patient's FSGs are controlled on current medication regimen.  Last A1c reviewed-   Lab Results   Component Value Date    HGBA1C 5.5 06/22/2024     Most recent fingerstick glucose reviewed-   Recent Labs   Lab 06/24/24  1618 06/24/24  1930 06/25/24  0727 06/25/24  1126   POCTGLUCOSE 111* 112* 84 100       Current correctional scale  Low  Maintain anti-hyperglycemic dose as follows-   Antihyperglycemics (From admission, onward)      Start     Stop Route Frequency Ordered    06/21/24 2009  insulin aspart U-100 pen 0-5 Units         -- SubQ Before meals & nightly PRN 06/21/24 1909          Hold Oral hypoglycemics while patient is in the hospital. Follow up on A1c.     Hypothyroidism  Resume home levothyroxine      Morbid obesity  Body mass index is 43.14 kg/m². Morbid obesity complicates all  aspects of disease management from diagnostic modalities to treatment. Weight loss encouraged and health benefits explained to patient.           VTE Risk Mitigation (From admission, onward)           Ordered     heparin (porcine) injection 5,000 Units  Every 8 hours         06/22/24 0515     IP VTE HIGH RISK PATIENT  Once         06/21/24 1908     Place sequential compression device  Until discontinued         06/21/24 1908                    Discharge Planning   CASA: 6/27/2024     Code Status: Full Code   Is the patient medically ready for discharge?:     Reason for patient still in hospital (select all that apply): Patient trending condition, Treatment, and Consult recommendations  Discharge Plan A: Home   Discharge Delays: None known at this time              Maureen Porras DO  Department of Hospital Medicine   [facility]

## 2024-06-26 NOTE — ASSESSMENT & PLAN NOTE
Chronic, controlled. Latest blood pressure and vitals reviewed-     Temp:  [97.5 °F (36.4 °C)-98.3 °F (36.8 °C)]   Pulse:  [56-79]   Resp:  [18-20]   BP: (101-136)/(58-76)   SpO2:  [96 %-100 %] .   Home meds for hypertension were reviewed and noted below.   Hypertension Medications               metoprolol tartrate (LOPRESSOR) 25 MG tablet Take 1 tablet (25 mg total) by mouth 2 (two) times daily.    olmesartan (BENICAR) 40 MG tablet Take 40 mg by mouth once daily.            While in the hospital, will manage blood pressure as follows; Adjust home antihypertensive regimen as follows- Hold benicar for now and resume metoprolol with hold parameters for low/normal BP and HR.     Will utilize p.r.n. blood pressure medication only if patient's blood pressure greater than 180/110 and he develops symptoms such as worsening chest pain or shortness of breath.

## 2024-06-26 NOTE — ASSESSMENT & PLAN NOTE
Patient has hyponatremia which is controlled,We will aim to correct the sodium by 4-6mEq in 24 hours. We will monitor sodium Daily. The hyponatremia is due to renal insufficiency. We will obtain the following studies: Urine sodium, urine osmolality, serum osmolality or TSH, T4. We will treat the hyponatremia with IV fluids as follows: sodium bicarb. The patient's sodium results have been reviewed and are listed below.  Recent Labs   Lab 06/26/24  0411        - recheck in AM  - improving   -nephrology following  -resolved

## 2024-06-26 NOTE — ASSESSMENT & PLAN NOTE
Patient with Paroxysmal (<7 days) atrial fibrillation which is controlled currently with Beta Blocker and Amiodarone. Patient is currently in sinus rhythm.[id number] Score: 1.  Anticoagulation indicated. Anticoagulation done with Xarelto 15mg daily .  Will hold home Xarelto given MICHELLE and possible need for renal bx if no significant improvement in renal function.  Will resume if renal function improves and cleared by Nephrology.

## 2024-06-26 NOTE — ASSESSMENT & PLAN NOTE
Noted to be slightly lower than baseline (3-4). No c/o fevers or chills. NO abscess on CT abd/pelvis noted. Will monitor for now.   - Neutropenic precautions  - hematology consulted: Most likely recently worsened secondary to acute illness and antibiotics.  - follows with Hematology as outpatient

## 2024-06-27 VITALS
BODY MASS INDEX: 43.27 KG/M2 | RESPIRATION RATE: 18 BRPM | DIASTOLIC BLOOD PRESSURE: 71 MMHG | TEMPERATURE: 98 F | SYSTOLIC BLOOD PRESSURE: 127 MMHG | HEIGHT: 69 IN | WEIGHT: 292.13 LBS | OXYGEN SATURATION: 100 % | HEART RATE: 75 BPM

## 2024-06-27 LAB
ANION GAP SERPL CALC-SCNC: 10 MMOL/L (ref 8–16)
BASOPHILS # BLD AUTO: 0.03 K/UL (ref 0–0.2)
BASOPHILS NFR BLD: 1.6 % (ref 0–1.9)
BUN SERPL-MCNC: 35 MG/DL (ref 8–23)
CALCIUM SERPL-MCNC: 8.1 MG/DL (ref 8.7–10.5)
CHLORIDE SERPL-SCNC: 106 MMOL/L (ref 95–110)
CO2 SERPL-SCNC: 24 MMOL/L (ref 23–29)
CREAT SERPL-MCNC: 3.3 MG/DL (ref 0.5–1.4)
DIFFERENTIAL METHOD BLD: ABNORMAL
EOSINOPHIL # BLD AUTO: 0.1 K/UL (ref 0–0.5)
EOSINOPHIL NFR BLD: 7 % (ref 0–8)
ERYTHROCYTE [DISTWIDTH] IN BLOOD BY AUTOMATED COUNT: 19.1 % (ref 11.5–14.5)
EST. GFR  (NO RACE VARIABLE): 20 ML/MIN/1.73 M^2
GLUCOSE SERPL-MCNC: 97 MG/DL (ref 70–110)
HCT VFR BLD AUTO: 26.7 % (ref 40–54)
HGB BLD-MCNC: 8.1 G/DL (ref 14–18)
IMM GRANULOCYTES # BLD AUTO: 0.01 K/UL (ref 0–0.04)
IMM GRANULOCYTES NFR BLD AUTO: 0.5 % (ref 0–0.5)
LYMPHOCYTES # BLD AUTO: 0.8 K/UL (ref 1–4.8)
LYMPHOCYTES NFR BLD: 44.9 % (ref 18–48)
MCH RBC QN AUTO: 23 PG (ref 27–31)
MCHC RBC AUTO-ENTMCNC: 30.3 G/DL (ref 32–36)
MCV RBC AUTO: 76 FL (ref 82–98)
MONOCYTES # BLD AUTO: 0 K/UL (ref 0.3–1)
MONOCYTES NFR BLD: 2.2 % (ref 4–15)
NEUTROPHILS # BLD AUTO: 0.8 K/UL (ref 1.8–7.7)
NEUTROPHILS NFR BLD: 43.8 % (ref 38–73)
NRBC BLD-RTO: 0 /100 WBC
PLATELET # BLD AUTO: 391 K/UL (ref 150–450)
PMV BLD AUTO: 8.9 FL (ref 9.2–12.9)
POCT GLUCOSE: 153 MG/DL (ref 70–110)
POCT GLUCOSE: 96 MG/DL (ref 70–110)
POTASSIUM SERPL-SCNC: 3.7 MMOL/L (ref 3.5–5.1)
RBC # BLD AUTO: 3.52 M/UL (ref 4.6–6.2)
SODIUM SERPL-SCNC: 140 MMOL/L (ref 136–145)
WBC # BLD AUTO: 1.85 K/UL (ref 3.9–12.7)

## 2024-06-27 PROCEDURE — 25000003 PHARM REV CODE 250: Performed by: STUDENT IN AN ORGANIZED HEALTH CARE EDUCATION/TRAINING PROGRAM

## 2024-06-27 PROCEDURE — 99232 SBSQ HOSP IP/OBS MODERATE 35: CPT | Mod: ,,, | Performed by: UROLOGY

## 2024-06-27 PROCEDURE — 36415 COLL VENOUS BLD VENIPUNCTURE: CPT | Performed by: STUDENT IN AN ORGANIZED HEALTH CARE EDUCATION/TRAINING PROGRAM

## 2024-06-27 PROCEDURE — 25000003 PHARM REV CODE 250: Performed by: INTERNAL MEDICINE

## 2024-06-27 PROCEDURE — 63600175 PHARM REV CODE 636 W HCPCS: Performed by: STUDENT IN AN ORGANIZED HEALTH CARE EDUCATION/TRAINING PROGRAM

## 2024-06-27 PROCEDURE — 80048 BASIC METABOLIC PNL TOTAL CA: CPT | Performed by: STUDENT IN AN ORGANIZED HEALTH CARE EDUCATION/TRAINING PROGRAM

## 2024-06-27 PROCEDURE — 63600175 PHARM REV CODE 636 W HCPCS: Performed by: INTERNAL MEDICINE

## 2024-06-27 PROCEDURE — 85025 COMPLETE CBC W/AUTO DIFF WBC: CPT | Performed by: STUDENT IN AN ORGANIZED HEALTH CARE EDUCATION/TRAINING PROGRAM

## 2024-06-27 RX ADMIN — DOCUSATE SODIUM 100 MG: 100 CAPSULE, LIQUID FILLED ORAL at 08:06

## 2024-06-27 RX ADMIN — HEPARIN SODIUM 5000 UNITS: 5000 INJECTION INTRAVENOUS; SUBCUTANEOUS at 02:06

## 2024-06-27 RX ADMIN — LEVOTHYROXINE SODIUM 200 MCG: 0.1 TABLET ORAL at 05:06

## 2024-06-27 RX ADMIN — OXYCODONE HYDROCHLORIDE AND ACETAMINOPHEN 1 TABLET: 5; 325 TABLET ORAL at 04:06

## 2024-06-27 RX ADMIN — CEFEPIME 2 G: 2 INJECTION, POWDER, FOR SOLUTION INTRAVENOUS at 11:06

## 2024-06-27 RX ADMIN — SODIUM BICARBONATE 650 MG: 325 TABLET ORAL at 02:06

## 2024-06-27 RX ADMIN — AMIODARONE HYDROCHLORIDE 200 MG: 200 TABLET ORAL at 08:06

## 2024-06-27 RX ADMIN — HEPARIN SODIUM 5000 UNITS: 5000 INJECTION INTRAVENOUS; SUBCUTANEOUS at 05:06

## 2024-06-27 RX ADMIN — OXYCODONE HYDROCHLORIDE AND ACETAMINOPHEN 1 TABLET: 5; 325 TABLET ORAL at 10:06

## 2024-06-27 RX ADMIN — SODIUM BICARBONATE 650 MG: 325 TABLET ORAL at 08:06

## 2024-06-27 NOTE — PLAN OF CARE
Problem: Adult Inpatient Plan of Care  Goal: Plan of Care Review  Outcome: Adequate for Care Transition  Goal: Patient-Specific Goal (Individualized)  Outcome: Adequate for Care Transition  Goal: Absence of Hospital-Acquired Illness or Injury  Outcome: Adequate for Care Transition  Goal: Optimal Comfort and Wellbeing  Outcome: Adequate for Care Transition  Goal: Readiness for Transition of Care  Outcome: Adequate for Care Transition     Problem: Infection  Goal: Absence of Infection Signs and Symptoms  Outcome: Adequate for Care Transition     Problem: Diabetes Comorbidity  Goal: Blood Glucose Level Within Targeted Range  Outcome: Adequate for Care Transition     Problem: Acute Kidney Injury/Impairment  Goal: Fluid and Electrolyte Balance  Outcome: Adequate for Care Transition  Goal: Improved Oral Intake  Outcome: Adequate for Care Transition  Goal: Effective Renal Function  Outcome: Adequate for Care Transition     Problem: Bariatric Environmental Safety  Goal: Safety Maintained with Care  Outcome: Adequate for Care Transition     Problem: Skin Injury Risk Increased  Goal: Skin Health and Integrity  Outcome: Adequate for Care Transition

## 2024-06-27 NOTE — PLAN OF CARE
06/27/24 1042   Medicare Message   Important Message from Medicare regarding Discharge Appeal Rights Given to patient/caregiver;Signed/date by patient/caregiver;Explained to patient/caregiver   Date IMM was signed 06/27/24   Time IMM was signed 1037

## 2024-06-27 NOTE — PROGRESS NOTES
[facility] [place]  Nurses Note -- 4 Eyes      6/26/2024       Skin assessed on: Q Shift      [x] No Pressure Injuries Present    []Prevention Measures Documented    [] Yes LDA  for Pressure Injury Previously documented     [] Yes New Pressure Injury Discovered   [] LDA for New Pressure Injury Added      Attending RN:  Kiersten Hurtado RN     Second RN:  Navjot Rubio RN

## 2024-06-27 NOTE — ASSESSMENT & PLAN NOTE
The cultures from May and current admission show different bacteria.  This suggests his stone is not a nidus for infection.    Consider repeat cystoscopy as an outpatient. Currently without LUTS.

## 2024-06-27 NOTE — SUBJECTIVE & OBJECTIVE
Interval History: Denies flank pain. Voiding well. Denies slow/weak stream.     Review of Systems   Constitutional:  Negative for activity change, appetite change, chills, diaphoresis and fever.   HENT:  Negative for congestion and rhinorrhea.    Eyes:  Negative for visual disturbance.   Respiratory:  Negative for choking and shortness of breath.    Gastrointestinal:  Negative for abdominal distention, abdominal pain, constipation, diarrhea, nausea and vomiting.   Genitourinary:  Negative for difficulty urinating, dysuria, flank pain, hematuria, scrotal swelling, testicular pain and urgency.   Skin:  Negative for color change, pallor and wound.   Neurological:  Negative for dizziness and syncope.   Psychiatric/Behavioral:  Negative for confusion and hallucinations.      Objective:     Temp:  [97.4 °F (36.3 °C)-97.9 °F (36.6 °C)] 97.5 °F (36.4 °C)  Pulse:  [58-82] 77  Resp:  [18-20] 18  SpO2:  [97 %-100 %] 99 %  BP: (108-131)/(66-84) 126/71     Body mass index is 43.14 kg/m².      Post Void Cath Residual Output (mL): 0 mL (06/22/24 1102)    Drains       None                    Physical Exam  Constitutional:       General: He is not in acute distress.     Appearance: He is well-developed. He is not ill-appearing, toxic-appearing or diaphoretic.   HENT:      Head: Normocephalic and atraumatic.      Mouth/Throat:      Mouth: Mucous membranes are moist.   Eyes:      Conjunctiva/sclera: Conjunctivae normal.   Pulmonary:      Effort: Pulmonary effort is normal. No respiratory distress.   Abdominal:      General: There is no distension.      Palpations: Abdomen is soft.   Musculoskeletal:         General: No swelling or deformity.   Skin:     Findings: No rash.   Neurological:      Mental Status: He is alert and oriented to person, place, and time.   Psychiatric:         Mood and Affect: Mood normal.         Thought Content: Thought content normal.         Judgment: Judgment normal.           Significant  "Labs:    BMP:  Recent Labs   Lab 06/25/24  0500 06/26/24  0411 06/27/24  0631   * 140 140   K 3.9 3.7 3.7    105 106   CO2 24 24 24   BUN 44* 44* 35*   CREATININE 4.8* 4.3* 3.3*   CALCIUM 7.7* 7.9* 8.1*       CBC:   Recent Labs   Lab 06/25/24  0500 06/26/24  0411 06/27/24  0631   WBC 1.39* 1.60* 1.85*   HGB 8.7* 8.5* 8.1*   HCT 26.9* 27.5* 26.7*    415 391       Blood Culture: No results for input(s): "LABBLOO" in the last 168 hours.  Urine Culture:   Recent Labs   Lab 06/21/24  1741   LABURIN ESCHERICHIA COLI  10,000 - 49,999 cfu/ml  *  PSEUDOMONAS AERUGINOSA  50,000 - 99,999 cfu/ml  *     Urine Studies:   Recent Labs   Lab 06/21/24  1741   COLORU Yellow   APPEARANCEUA Cloudy*   PHUR 6.0   SPECGRAV 1.010   PROTEINUA 1+*   GLUCUA Negative   KETONESU Negative   BILIRUBINUA Negative   OCCULTUA 2+*   NITRITE Negative   UROBILINOGEN Negative   LEUKOCYTESUR 3+*   RBCUA 17*   WBCUA >100*   BACTERIA Moderate*   SQUAMEPITHEL 10   HYALINECASTS 4*       Significant Imaging:  All pertinent imaging results/findings from the past 24 hours have been reviewed.      "

## 2024-06-27 NOTE — NURSING
[facility] [place]  Nurses Note -- 4 Eyes      6/27/2024       Skin assessed on: Q Shift      [x] No Pressure Injuries Present    []Prevention Measures Documented    [] Yes LDA  for Pressure Injury Previously documented     [] Yes New Pressure Injury Discovered   [] LDA for New Pressure Injury Added      Attending RN:  Raiza Durán LPN     Second RN:  LAM Burch

## 2024-06-27 NOTE — PLAN OF CARE
Case Management Final Discharge Note    Discharge Disposition: Home    New DME ordered / company name: None    Relevant SDOH / Transition of Care Barriers:  None     Primary Caretaker and contact information: Spouse, Sara [phone]    Scheduled followup appointment: PCP, Urology and Nephrology - scheduled.    Referrals placed: Pulmonology - Office will contact pt. Message sent to office staff    Transportation: Pt will need taxi transportation home.    Patient and family educated on discharge services and updated on DC plan. Bedside RN notified, patient clear to discharge from Case Management Perspective.       06/27/24 1153   Final Note   Assessment Type Final Discharge Note   Anticipated Discharge Disposition Home   What phone number can be called within the next 1-3 days to see how you are doing after discharge? [phone]   Hospital Resources/Appts/Education Provided Appointments scheduled and added to AVS   Post-Acute Status   Coverage [employer]   Discharge Delays None known at this time

## 2024-06-27 NOTE — NURSING
Critical value called to me by Brandt in the Lab.  Dr. Porras notified via secure chat.  Will continue to monitor.

## 2024-06-27 NOTE — ASSESSMENT & PLAN NOTE
Non-obstructing at the time of CT scan, 6/21  Flank pain has resolved, no obstructing noted on RBUS

## 2024-06-27 NOTE — PROGRESS NOTES
[facility] - Telemetry  Urology  Progress Note    Patient Name: Russell Santos  MRN: [id number]  Admission Date: 6/21/2024  Hospital Length of Stay: 5 days  Code Status: Full Code   Attending Provider: Maureen Porras DO   Primary Care Physician: Roxanne Phillip -    Subjective:     HPI:  Urolithiasis  Patient complains of right abdominal pain with radiation to the abdomen. Onset of symptoms was gradual starting 1 day ago with completely resolved course since that time. Patient describes the pain as aching, intermittent and rated as mild. The patient has had no nausea/no vomiting and no diaphoresis. There has been no fever or chills. The patient is not complaining of dysuria or frequency. Risk factors for urolithiasis: none.  Urology consulted for non-obstructing stone and recurrent UTI.  He had a hematuria work up by Dr. Orellana in 2023, this showed mild urethral stricture and BPH.      Interval History: denies R flank pain    Review of Systems   Constitutional:  Negative for activity change, appetite change, chills, diaphoresis and fever.   HENT:  Negative for congestion and rhinorrhea.    Eyes:  Negative for visual disturbance.   Respiratory:  Negative for choking and shortness of breath.    Gastrointestinal:  Negative for abdominal distention, abdominal pain, constipation, diarrhea, nausea and vomiting.   Genitourinary:  Negative for difficulty urinating, dysuria, flank pain, hematuria, scrotal swelling, testicular pain and urgency.   Skin:  Negative for color change, pallor and wound.   Neurological:  Negative for dizziness and syncope.   Psychiatric/Behavioral:  Negative for confusion and hallucinations.      Objective:     Temp:  [97.5 °F (36.4 °C)-98.1 °F (36.7 °C)] 97.9 °F (36.6 °C)  Pulse:  [56-79] 68  Resp:  [18-20] 19  SpO2:  [96 %-100 %] 98 %  BP: (101-136)/(58-76) 108/66     Body mass index is 43.14 kg/m².    Date 06/26/24 0700 - 06/27/24 0659   Shift 7982-2329 1261-6305 2930-7133 24 Hour Total   INTAKE    P.O. 480 240  720   Shift Total(mL/kg) 480(3.6) 240(1.8)  720(5.4)   OUTPUT   Urine(mL/kg/hr) 900(0.8) 400  1300   Shift Total(mL/kg) 900(6.8) 400(3)  1300(9.8)   Weight (kg) 132.5 132.5 132.5 132.5     Post Void Cath Residual Output (mL): 0 mL (06/22/24 1102)    Drains       None                    Physical Exam  Constitutional:       General: He is not in acute distress.     Appearance: He is well-developed. He is not ill-appearing, toxic-appearing or diaphoretic.   HENT:      Head: Normocephalic and atraumatic.      Mouth/Throat:      Mouth: Mucous membranes are moist.   Eyes:      Conjunctiva/sclera: Conjunctivae normal.   Pulmonary:      Effort: Pulmonary effort is normal. No respiratory distress.   Abdominal:      General: Abdomen is flat. There is no distension.      Palpations: Abdomen is soft.   Musculoskeletal:         General: No swelling or deformity.      Cervical back: Neck supple.   Skin:     Findings: No rash.   Neurological:      Mental Status: He is alert and oriented to person, place, and time.   Psychiatric:         Mood and Affect: Mood normal.         Thought Content: Thought content normal.         Judgment: Judgment normal.           Significant Labs:    BMP:  Recent Labs   Lab 06/24/24  0425 06/25/24  0500 06/26/24  0411   * 134* 140   K 3.7 3.9 3.7   CL 94* 100 105   CO2 22* 24 24   BUN 47* 44* 44*   CREATININE 5.4* 4.8* 4.3*   CALCIUM 7.8* 7.7* 7.9*       CBC:   Recent Labs   Lab 06/24/24  0425 06/25/24  0500 06/26/24  0411   WBC 1.53* 1.39* 1.60*   HGB 9.2* 8.7* 8.5*   HCT 29.4* 26.9* 27.5*    344 415         Significant Imaging:  U/S: I have reviewed all results within the past 24 hours and my personal findings are:  R non obstructing stone, no hydronephrosis bilaterally                  Assessment/Plan:     Recurrent UTI  The cultures from May and current admission show different bacteria.  This suggests his stone is not a nidus for infection.    Consider repeat  cystoscopy as an outpatient.    Kidney stone  Non-obstructing at the time of CT scan, 6/21  Flank pain has resolved, no obstructing noted on RBUS    Cystitis  Treat with culture specific antibiotics for 10 days        VTE Risk Mitigation (From admission, onward)           Ordered     heparin (porcine) injection 5,000 Units  Every 8 hours         06/22/24 0515     IP VTE HIGH RISK PATIENT  Once         06/21/24 1908     Place sequential compression device  Until discontinued         06/21/24 1908                    Flakita Hernandez MD  Urology  [facility]

## 2024-06-27 NOTE — PROGRESS NOTES
"                        Renal Progress Note    Date of Admission:  6/21/2024  1:59 PM    Length of Stay: 6  Days    Subjective: n/a    Objective:    Current Facility-Administered Medications   Medication    acetaminophen tablet 650 mg    amiodarone tablet 200 mg    atorvastatin tablet 80 mg    ceFEPIme (MAXIPIME) 2 g in D5W 100 mL IVPB (MB+)    dextrose 10% bolus 125 mL 125 mL    dextrose 10% bolus 250 mL 250 mL    docusate sodium capsule 100 mg    gabapentin capsule 300 mg    glucagon (human recombinant) injection 1 mg    glucose chewable tablet 16 g    glucose chewable tablet 24 g    heparin (porcine) injection 5,000 Units    hydrALAZINE injection 5 mg    insulin aspart U-100 pen 0-5 Units    levothyroxine tablet 200 mcg    melatonin tablet 6 mg    naloxone 0.4 mg/mL injection 0.02 mg    ondansetron injection 4 mg    oxyCODONE-acetaminophen 5-325 mg per tablet 1 tablet    polyethylene glycol packet 17 g    sodium bicarbonate tablet 650 mg    sodium chloride 0.9% flush 10 mL       Vitals:    06/26/24 2346 06/27/24 0301 06/27/24 0413 06/27/24 0428   BP: 115/73  130/84    BP Location: Right arm  Right arm    Patient Position: Lying  Lying    Pulse: 62 82 65    Resp: 20  20 18   Temp: 97.4 °F (36.3 °C)  97.8 °F (36.6 °C)    TempSrc: Axillary  Oral    SpO2: 99%  100%    Weight:       Height:           I/O last 3 completed shifts:  In: 1440 [P.O.:1440]  Out: 3900 [Urine:3900]  I/O this shift:  In: 240 [P.O.:240]  Out: 850 [Urine:850]        Laboratories:    No results for input(s): "WBC", "RBC", "HGB", "HCT", "PLT", "MCV", "MCH", "MCHC" in the last 24 hours.      No results for input(s): "GLUCOSE", "CALCIUM", "ALBUMIN", "PROT", "NA", "K", "CO2", "CL", "BUN", "CREATININE", "ALKPHOS", "ALT", "AST", "BILITOT" in the last 24 hours.      No results for input(s): "COLORU", "CLARITYU", "SPECGRAV", "PHUR", "PROTEINUA", "GLUCOSEU", "BILIRUBINCON", "BLOODU", "WBCU", "RBCU", "BACTERIA", "MUCUS" in the last 24 " hours.    Microbiology Results (last 7 days)       Procedure Component Value Units Date/Time    Urine culture [[id number]]  (Abnormal)  (Susceptibility) Collected: 06/21/24 1741    Order Status: Completed Specimen: Urine Updated: 06/24/24 0950     Urine Culture, Routine ESCHERICHIA COLI  10,000 - 49,999 cfu/ml        PSEUDOMONAS AERUGINOSA  50,000 - 99,999 cfu/ml      Narrative:      Specimen Source->Urine    Group A Strep, Molecular [[id number]] Collected: 06/22/24 0837    Order Status: Completed Specimen: Throat Updated: 06/22/24 0950     Group A Strep, Molecular Negative              Diagnostic Tests: n/a        Assessment:    62 y/o male admitted with:    - MICHELLE creatinine trending down   - E-coli / Pseudomonas UTI   - Hyponatremia IMPROVING  - PAF  - DM-2  - HTN  - HLD  - Anemia of chronic illness likely (normal iron stores)  - Hypoalbuminemia  - Hx. Chronic Leukopenia        Plan:    - Antibiotics per ID  - Watching Zjl-mhria-fscio-acid/base and I&O  - Renal ADA diet + protein supplements  - Hematology and Urology following  - Discharge at your discretion, will follow in our office  - Other problems per admitting

## 2024-06-27 NOTE — DISCHARGE SUMMARY
[facility] Medicine  Discharge Summary      Patient Name: Russell Santos  MRN: [id number]  DARY: [id number]  Patient Class: IP- Inpatient  Admission Date: 6/21/2024  Hospital Length of Stay: 6 days  Discharge Date and Time:  06/27/2024 1:48 PM  Attending Physician: Maureen Porras DO   Discharging Provider: Maureen Porras DO  Primary Care Provider: Roxanne Phillip -    Primary Care Team: Networked reference to record PCT     HPI:   63 y.o. AAM with h/o paroxsymal afib (on xarelto 15mg PO daily and amiodarone 200mg PO daily), essential HTN, HLD, NIDDM type 2, and Chronic Leukopenia (sees Dr. Marquez) presents to the ER with c/o decresed uop and back pain. Was recently admitted to [facility] from 5/4-5/11 for MICHELLE and UTI (MICHELLE 8.8 initial creat and resolved upon d/c with creatinine 1.1-followed with Dr. Middleton and d/c home on Augmentin which he complete for Enterococcus).  States dysuira and bladder spasms for the past 3 days. Generalized weakness and fatigue. No fevers or chills. No N/V/abdominal pain. Back pain worsen than his chronic back pain.  NO chest pain, SOB/LYNCH, palpation, or lower extremity edema.     States she followed up with Nephrology on 6/11 and was doing well. Does note that he had a brandt placed when he was hospitalized and had discomfort when it was d/c. States he also recently started back on his Benicar 40mg daily for BP control.     Labs to the ER concerning for recurrent MICHELLE with BUN 53/Creatine 67.  Bicar 17.  WBC 2.69 but dose have a h/o Leukopenia being follow by Dr. Marquez.  Urine concerning for recurrent UTI.  Lactic acid normal. SBP noted to be low/normal ranging . IVF started.     CT non-contrast abd/pelvis:  Impression:  Cholelithiasis.  6 mm nonobstructing right renal calculus.  Mild circumferential thickening of the urinary bladder.  Consider correlation with urinalysis to evaluate for acute cystitis.  Dilated appendix measuring 10 mm.  No periappendiceal  infiltration.  Micro nodules at the lung bases.  For multiple solid nodules all <6 mm, Fleischner Society 2017 guidelines recommend no routine follow up for a low risk patient, or follow up with non-contrast chest CT at 12 months after discovery in a high risk patient.      Started on Vanc due to recent hospitalization and cultures.     Because this patient's clinical condition (MICHELLE) is guarded and requires frequent lab checks, IVF, IV abx which required adjustments based on daily renal function, and monitor for worsening vitals due to developing sepsis , care in an alternative location isn't clinically appropriate for the reasons stated above.  We have been consulted for admission to inpatient tele.               * No surgery found *      Hospital Course:   Mr. Santos is a 62 yo M admitted with acute kidney injury and acute cystitis. Started on antibiotics (last culture with enteroccocus faecalis). Broadened to meropenem. Urine cx with pseudmonas and e.coli. Ct abdomen/pelvis with 6 mm nonobstructing right renal calculus. Urology consulted-does not suspect stone is causing pt recurrent urinary infection. Nephrology consulted. On bicarb drip. Slow improvement. Chronic leukopenia,  ongoing with neutropenic precautions. Hematology consulted-Most likely recently worsened secondary to acute illness and antibiotics. ID consulted- agree with 3-5d antibiotics while inpatient for possible cystitis, but can stop at d/c. De-escalating meropenem to cefepime on 06/25/24. Renal function improving.  Discussed with Nephrology, okay to discharge with outpatient follow-up with nephrologist (Dr. Jennings).  Okay to resume home Xarelto per Nephrology.    Patient admits feeling better overall.  Urinating without difficulty.  Denies any dysuria or hematuria. Pt denies any fever, headaches, vision changes, chest pain, shortness of breath, palpitations, abdominal pain, nausea, vomiting, or any new weaknesses. Feels ready to go home.  Patient's exam on discharge was as follow: Patient is alert and oriented, appears in no acute distress, heart with regular rate and rhythm, lungs clear to asculation with non-labored breathing, abdomen soft, and no new weaknesses or focal deficits seen. Bilateral lower extremities without any edema or calf tenderness.     Patient was counseled regarding any abnormal labs, differential diagnosis, treatment options, risk-benefit, lifestyle changes, prognosis, current condition, and medications. Patient was interactive and attentive.  Patient's questions were answered in a respectful and timely manner. Patient was instructed to follow-up with PCP within 1 week and to continue taking medications as prescribed.  Instructed to also follow up with Nephrology and Urology. Also, extensively discussed the risks, benefits, and side effects of patient's medications. Discussed with patient about any medication changes. Patient verbalized understanding and agrees to treatment plan.  Patient is stable for discharge.  Patient has no other questions or concerns at this time.  ED precautions discussed with the patient.    Vital signs are stable. Ambulating without any difficulty. Tolerating p.o. intake without any nausea or vomiting. Afebrile for over 24 hours. Patient is in stable condition and has no questions or concerns. Patient will be discharge to home once transportation secured.  CM/SW to assist with discharge planning.     Vitals:    06/27/24 0814 06/27/24 1047 06/27/24 1051 06/27/24 1122   BP: 126/71   127/71   BP Location: Right arm   Right arm   Patient Position: Lying   Sitting   Pulse: 77 75  75   Resp: 18  17 18   Temp: 97.5 °F (36.4 °C)   98.1 °F (36.7 °C)   TempSrc: Oral   Oral   SpO2: 99%   100%   Weight:       Height:                Goals of Care Treatment Preferences:  Code Status: Full Code      Consults:   Consults (From admission, onward)          Status Ordering Provider     Inpatient consult to Urology  Once         Provider:  Flakita Hernandez MD    Completed HANS DUNHAM.     Inpatient consult to Hematology/Oncology - [facility]  Once        Provider:  Lorena Moore MD    Completed HANS DUNHAM.     Inpatient consult to Infectious Diseases  Once        Provider:  Cristina Tavarez MD    Completed ROLANDA LUCIO     Inpatient consult to Nephrology  Once        Provider:  Reanna Middleton MD    Completed ROLANDA LUCIO            No new Assessment & Plan notes have been filed under this hospital service since the last note was generated.  Service: Hospital Medicine    Final Active Diagnoses:    Diagnosis Date Noted POA    PRINCIPAL PROBLEM:  MICHELLE (acute kidney injury) [N17.9] 05/04/2024 Yes    Cystitis [N30.90] 06/22/2024 Yes    Recurrent UTI [N39.0] 06/25/2024 Yes    Kidney stone [N20.0] 06/25/2024 Yes    Hyponatremia [E87.1] 06/22/2024 Yes    Leukopenia [D72.819] 12/03/2020 Yes    PAF (paroxysmal atrial fibrillation) [I48.0] 09/16/2019 Yes    Essential hypertension [I10] 10/17/2017 Yes    Hyperlipidemia [E78.5] 10/17/2017 Yes    Type 2 diabetes mellitus with kidney complication, without long-term current use of insulin [E11.29] 10/17/2017 Yes     Chronic    Hypothyroidism [E03.9] 08/08/2022 Yes    Morbid obesity [E66.01] 10/17/2017 Yes      Problems Resolved During this Admission:       Discharged Condition: stable    Disposition: Home or Self Care    Follow Up:   Follow-up Information       Roxanne Phillip -. Go on 6/28/2024.    Why: To a scheduled hospital follow-up appointment 1:30 pm  Contact information:  [address] [facility]  [place] [address]  [phone]               Jeremias Rai MD. Go on 7/1/2024.    Specialty: Nephrology  Why: To a scheduled hospital follow-up appointment at 9:45 am  Contact information:  [address] [place] [address] [address]  [place] [address]  [phone]               Urology Follow up in 2 week(s).                           Patient Instructions:      Ambulatory referral/consult  to Pulmonology   Standing Status: Future   Referral Priority: Routine Referral Type: Consultation   Referral Reason: Specialty Services Required   Requested Specialty: Pulmonary Disease   Number of Visits Requested: 1     Diet renal     Diet Cardiac     Notify your health care provider if you experience any of the following:  temperature >100.4     Notify your health care provider if you experience any of the following:  persistent nausea and vomiting or diarrhea     Notify your health care provider if you experience any of the following:  difficulty breathing or increased cough     Notify your health care provider if you experience any of the following:  persistent dizziness, light-headedness, or visual disturbances     Notify your health care provider if you experience any of the following:  increased confusion or weakness     Activity as tolerated       Significant Diagnostic Studies: Labs: All labs within the past 24 hours have been reviewed      Recent Results (from the past 100 hour(s))   POCT glucose    Collection Time: 06/23/24 11:32 AM   Result Value Ref Range    POCT Glucose 95 70 - 110 mg/dL   POCT glucose    Collection Time: 06/23/24  4:46 PM   Result Value Ref Range    POCT Glucose 105 70 - 110 mg/dL   POCT glucose    Collection Time: 06/23/24  8:33 PM   Result Value Ref Range    POCT Glucose 110 70 - 110 mg/dL   Comprehensive Metabolic Panel    Collection Time: 06/24/24  4:25 AM   Result Value Ref Range    Sodium 129 (L) 136 - 145 mmol/L    Potassium 3.7 3.5 - 5.1 mmol/L    Chloride 94 (L) 95 - 110 mmol/L    CO2 22 (L) 23 - 29 mmol/L    Glucose 81 70 - 110 mg/dL    BUN 47 (H) 8 - 23 mg/dL    Creatinine 5.4 (H) 0.5 - 1.4 mg/dL    Calcium 7.8 (L) 8.7 - 10.5 mg/dL    Total Protein 6.5 6.0 - 8.4 g/dL    Albumin 2.8 (L) 3.5 - 5.2 g/dL    Total Bilirubin 0.5 0.1 - 1.0 mg/dL    Alkaline Phosphatase 70 55 - 135 U/L    AST 18 10 - 40 U/L    ALT 12 10 - 44 U/L    eGFR 11 (A) >60 mL/min/1.73 m^2    Anion Gap 13 8  - 16 mmol/L   CBC Auto Differential    Collection Time: 06/24/24  4:25 AM   Result Value Ref Range    WBC 1.53 (LL) 3.90 - 12.70 K/uL    RBC 4.03 (L) 4.60 - 6.20 M/uL    Hemoglobin 9.2 (L) 14.0 - 18.0 g/dL    Hematocrit 29.4 (L) 40.0 - 54.0 %    MCV 73 (L) 82 - 98 fL    MCH 22.8 (L) 27.0 - 31.0 pg    MCHC 31.3 (L) 32.0 - 36.0 g/dL    RDW 18.2 (H) 11.5 - 14.5 %    Platelets 327 150 - 450 K/uL    MPV 9.6 9.2 - 12.9 fL    Immature Granulocytes 0.7 (H) 0.0 - 0.5 %    Gran # (ANC) 0.7 (L) 1.8 - 7.7 K/uL    Immature Grans (Abs) 0.01 0.00 - 0.04 K/uL    Lymph # 0.6 (L) 1.0 - 4.8 K/uL    Mono # 0.0 (L) 0.3 - 1.0 K/uL    Eos # 0.1 0.0 - 0.5 K/uL    Baso # 0.02 0.00 - 0.20 K/uL    nRBC 0 0 /100 WBC    Gran % 45.8 38.0 - 73.0 %    Lymph % 41.8 18.0 - 48.0 %    Mono % 2.6 (L) 4.0 - 15.0 %    Eosinophil % 7.8 0.0 - 8.0 %    Basophil % 1.3 0.0 - 1.9 %    Platelet Estimate Appears normal     Aniso Moderate     Poik Moderate     Hypo Occasional     Ovalocytes Occasional     Lamont Cells Moderate     Spherocytes Occasional     Acanthocytes Present     Fragmented Cells Occasional     Differential Method Automated    POCT glucose    Collection Time: 06/24/24  7:39 AM   Result Value Ref Range    POCT Glucose 83 70 - 110 mg/dL   POCT glucose    Collection Time: 06/24/24 11:21 AM   Result Value Ref Range    POCT Glucose 84 70 - 110 mg/dL   POCT glucose    Collection Time: 06/24/24  4:18 PM   Result Value Ref Range    POCT Glucose 111 (H) 70 - 110 mg/dL   POCT glucose    Collection Time: 06/24/24  7:30 PM   Result Value Ref Range    POCT Glucose 112 (H) 70 - 110 mg/dL   Comprehensive Metabolic Panel    Collection Time: 06/25/24  5:00 AM   Result Value Ref Range    Sodium 134 (L) 136 - 145 mmol/L    Potassium 3.9 3.5 - 5.1 mmol/L    Chloride 100 95 - 110 mmol/L    CO2 24 23 - 29 mmol/L    Glucose 77 70 - 110 mg/dL    BUN 44 (H) 8 - 23 mg/dL    Creatinine 4.8 (H) 0.5 - 1.4 mg/dL    Calcium 7.7 (L) 8.7 - 10.5 mg/dL    Total Protein 6.0 6.0 -  8.4 g/dL    Albumin 2.7 (L) 3.5 - 5.2 g/dL    Total Bilirubin 0.3 0.1 - 1.0 mg/dL    Alkaline Phosphatase 54 (L) 55 - 135 U/L    AST 19 10 - 40 U/L    ALT 12 10 - 44 U/L    eGFR 13 (A) >60 mL/min/1.73 m^2    Anion Gap 10 8 - 16 mmol/L   CBC Auto Differential    Collection Time: 06/25/24  5:00 AM   Result Value Ref Range    WBC 1.39 (LL) 3.90 - 12.70 K/uL    RBC 3.77 (L) 4.60 - 6.20 M/uL    Hemoglobin 8.7 (L) 14.0 - 18.0 g/dL    Hematocrit 26.9 (L) 40.0 - 54.0 %    MCV 71 (L) 82 - 98 fL    MCH 23.1 (L) 27.0 - 31.0 pg    MCHC 32.3 32.0 - 36.0 g/dL    RDW 18.2 (H) 11.5 - 14.5 %    Platelets 344 150 - 450 K/uL    MPV 9.2 9.2 - 12.9 fL    Immature Granulocytes 0.0 0.0 - 0.5 %    Gran # (ANC) 0.7 (L) 1.8 - 7.7 K/uL    Immature Grans (Abs) 0.00 0.00 - 0.04 K/uL    Lymph # 0.6 (L) 1.0 - 4.8 K/uL    Mono # 0.0 (L) 0.3 - 1.0 K/uL    Eos # 0.1 0.0 - 0.5 K/uL    Baso # 0.03 0.00 - 0.20 K/uL    nRBC 0 0 /100 WBC    Gran % 50.3 38.0 - 73.0 %    Lymph % 39.6 18.0 - 48.0 %    Mono % 1.4 (L) 4.0 - 15.0 %    Eosinophil % 6.5 0.0 - 8.0 %    Basophil % 2.2 (H) 0.0 - 1.9 %    Platelet Estimate Appears normal     Aniso Moderate     Poik Moderate     Hypo Occasional     Ovalocytes Occasional     Pennock Cells Moderate     Spherocytes Occasional     Acanthocytes Present     Fragmented Cells Occasional     Differential Method Automated    Iron and TIBC    Collection Time: 06/25/24  5:00 AM   Result Value Ref Range    Iron 63 45 - 160 ug/dL    Transferrin 154 (L) 200 - 375 mg/dL    TIBC 228 (L) 250 - 450 ug/dL    Saturated Iron 28 20 - 50 %   Ferritin    Collection Time: 06/25/24  5:00 AM   Result Value Ref Range    Ferritin 225 20.0 - 300.0 ng/mL   POCT glucose    Collection Time: 06/25/24  7:27 AM   Result Value Ref Range    POCT Glucose 84 70 - 110 mg/dL   POCT glucose    Collection Time: 06/25/24 11:26 AM   Result Value Ref Range    POCT Glucose 100 70 - 110 mg/dL   POCT glucose    Collection Time: 06/25/24  4:22 PM   Result Value Ref Range     POCT Glucose 107 70 - 110 mg/dL   Comprehensive Metabolic Panel    Collection Time: 06/26/24  4:11 AM   Result Value Ref Range    Sodium 140 136 - 145 mmol/L    Potassium 3.7 3.5 - 5.1 mmol/L    Chloride 105 95 - 110 mmol/L    CO2 24 23 - 29 mmol/L    Glucose 83 70 - 110 mg/dL    BUN 44 (H) 8 - 23 mg/dL    Creatinine 4.3 (H) 0.5 - 1.4 mg/dL    Calcium 7.9 (L) 8.7 - 10.5 mg/dL    Total Protein 6.3 6.0 - 8.4 g/dL    Albumin 2.8 (L) 3.5 - 5.2 g/dL    Total Bilirubin 0.3 0.1 - 1.0 mg/dL    Alkaline Phosphatase 53 (L) 55 - 135 U/L    AST 25 10 - 40 U/L    ALT 16 10 - 44 U/L    eGFR 15 (A) >60 mL/min/1.73 m^2    Anion Gap 11 8 - 16 mmol/L   CBC Auto Differential    Collection Time: 06/26/24  4:11 AM   Result Value Ref Range    WBC 1.60 (LL) 3.90 - 12.70 K/uL    RBC 3.75 (L) 4.60 - 6.20 M/uL    Hemoglobin 8.5 (L) 14.0 - 18.0 g/dL    Hematocrit 27.5 (L) 40.0 - 54.0 %    MCV 73 (L) 82 - 98 fL    MCH 22.7 (L) 27.0 - 31.0 pg    MCHC 30.9 (L) 32.0 - 36.0 g/dL    RDW 18.5 (H) 11.5 - 14.5 %    Platelets 415 150 - 450 K/uL    MPV 9.2 9.2 - 12.9 fL    Immature Granulocytes 0.0 0.0 - 0.5 %    Gran # (ANC) 0.8 (L) 1.8 - 7.7 K/uL    Immature Grans (Abs) 0.00 0.00 - 0.04 K/uL    Lymph # 0.6 (L) 1.0 - 4.8 K/uL    Mono # 0.0 (L) 0.3 - 1.0 K/uL    Eos # 0.1 0.0 - 0.5 K/uL    Baso # 0.02 0.00 - 0.20 K/uL    nRBC 0 0 /100 WBC    Gran % 52.4 38.0 - 73.0 %    Lymph % 39.4 18.0 - 48.0 %    Mono % 1.9 (L) 4.0 - 15.0 %    Eosinophil % 5.0 0.0 - 8.0 %    Basophil % 1.3 0.0 - 1.9 %    Differential Method Automated    POCT glucose    Collection Time: 06/26/24  7:24 AM   Result Value Ref Range    POCT Glucose 121 (H) 70 - 110 mg/dL   POCT glucose    Collection Time: 06/26/24 11:59 AM   Result Value Ref Range    POCT Glucose 104 70 - 110 mg/dL   POCT glucose    Collection Time: 06/26/24  4:28 PM   Result Value Ref Range    POCT Glucose 121 (H) 70 - 110 mg/dL   POCT glucose    Collection Time: 06/26/24  8:01 PM   Result Value Ref Range    POCT  Glucose 123 (H) 70 - 110 mg/dL   CBC Auto Differential    Collection Time: 06/27/24  6:31 AM   Result Value Ref Range    WBC 1.85 (LL) 3.90 - 12.70 K/uL    RBC 3.52 (L) 4.60 - 6.20 M/uL    Hemoglobin 8.1 (L) 14.0 - 18.0 g/dL    Hematocrit 26.7 (L) 40.0 - 54.0 %    MCV 76 (L) 82 - 98 fL    MCH 23.0 (L) 27.0 - 31.0 pg    MCHC 30.3 (L) 32.0 - 36.0 g/dL    RDW 19.1 (H) 11.5 - 14.5 %    Platelets 391 150 - 450 K/uL    MPV 8.9 (L) 9.2 - 12.9 fL    Immature Granulocytes 0.5 0.0 - 0.5 %    Gran # (ANC) 0.8 (L) 1.8 - 7.7 K/uL    Immature Grans (Abs) 0.01 0.00 - 0.04 K/uL    Lymph # 0.8 (L) 1.0 - 4.8 K/uL    Mono # 0.0 (L) 0.3 - 1.0 K/uL    Eos # 0.1 0.0 - 0.5 K/uL    Baso # 0.03 0.00 - 0.20 K/uL    nRBC 0 0 /100 WBC    Gran % 43.8 38.0 - 73.0 %    Lymph % 44.9 18.0 - 48.0 %    Mono % 2.2 (L) 4.0 - 15.0 %    Eosinophil % 7.0 0.0 - 8.0 %    Basophil % 1.6 0.0 - 1.9 %    Differential Method Automated    Basic Metabolic Panel    Collection Time: 06/27/24  6:31 AM   Result Value Ref Range    Sodium 140 136 - 145 mmol/L    Potassium 3.7 3.5 - 5.1 mmol/L    Chloride 106 95 - 110 mmol/L    CO2 24 23 - 29 mmol/L    Glucose 97 70 - 110 mg/dL    BUN 35 (H) 8 - 23 mg/dL    Creatinine 3.3 (H) 0.5 - 1.4 mg/dL    Calcium 8.1 (L) 8.7 - 10.5 mg/dL    Anion Gap 10 8 - 16 mmol/L    eGFR 20 (A) >60 mL/min/1.73 m^2       Microbiology Results (last 7 days)       Procedure Component Value Units Date/Time    Urine culture [[id number]]  (Abnormal)  (Susceptibility) Collected: 06/21/24 1741    Order Status: Completed Specimen: Urine Updated: 06/24/24 0950     Urine Culture, Routine ESCHERICHIA COLI  10,000 - 49,999 cfu/ml        PSEUDOMONAS AERUGINOSA  50,000 - 99,999 cfu/ml      Narrative:      Specimen Source->Urine    Group A Strep, Molecular [[id number]] Collected: 06/22/24 0837    Order Status: Completed Specimen: Throat Updated: 06/22/24 0950     Group A Strep, Molecular Negative            Imaging Results               CT Abdomen Pelvis   Without Contrast (Final result)  Result time 06/21/24 20:33:44      Final result by Roxana Maciel MD (06/21/24 20:33:44)                   Impression:      Cholelithiasis.    6 mm nonobstructing right renal calculus.    Mild circumferential thickening of the urinary bladder.  Consider correlation with urinalysis to evaluate for acute cystitis.    Dilated appendix measuring 10 mm.  No periappendiceal infiltration.    Micronodules at the lung bases.  For multiple solid nodules all <6 mm, Fleischner Society 2017 guidelines recommend no routine follow up for a low risk patient, or follow up with non-contrast chest CT at 12 months after discovery in a high risk patient.    This report was flagged in Epic as abnormal.      Electronically signed by: Roxana Maciel  Date:    06/21/2024  Time:    20:33               Narrative:    EXAMINATION:  CT ABDOMEN PELVIS WITHOUT    CLINICAL HISTORY:  Dysuria and bladder spasms.    TECHNIQUE:  5 mm unenhanced axial images from the lung bases through the greater trochanters were performed.  Coronal and sagittal reformatted images were provided.    COMPARISON:  05/04/2024    FINDINGS:  Within the limits of a noncontrast examination, the liver, spleen, pancreas, left kidney and adrenal glands are unremarkable.  The gallbladder contains multiple gallstones.    There is a 6 mm nonobstructing right renal calculus.    There is no gross abdominal adenopathy or ascites.  Moderate atherosclerotic changes are present.    The appendix is dilated measuring up to 10 mm.  On the previous examination, it was also dilated and measured 12 mm.  There is no periappendiceal stranding or fluid.  There are no pelvic masses or adenopathy.  There is no free pelvic fluid.  Mild circumferential thickening of the urinary bladder wall is seen.    At the lung bases, there is mild basilar atelectatic change.  There are multiple micro nodules.  There are coronary arterial calcifications.    Spondylolysis and  spondylolisthesis are seen.                                       MRI Lumbar Spine Without Contrast (Final result)  Result time 06/21/24 18:24:07      Final result by Chevy Collins MD (06/21/24 18:24:07)                   Impression:      Grade I L5-S1 spondylolisthesis with associated severe bilateral neural foraminal narrowing and marked narrowing the lateral recesses.  There is impinging on the exiting bilateral L5 nerve roots.    Moderate narrowing of the spinal canal at the L5-S1 level secondary to the degree of anterolisthesis and uncovering of the disc.    Additional multilevel degenerative changes in the lumbar spine as above.      Electronically signed by: Chevy Collins MD  Date:    06/21/2024  Time:    18:24               Narrative:    EXAMINATION:  MRI LUMBAR SPINE WITHOUT CONTRAST    CLINICAL HISTORY:  bilateral leg weakness;    TECHNIQUE:  Multiplanar, multisequence MR images were acquired from the thoracolumbar junction to the sacrum without the administration of contrast.    COMPARISON:  CT scan dated 05/04/2024.    FINDINGS:  There is unchanged appearance of grade 1 anterolisthesis of L5 on S1 with bilateral pars defects.  The remainder of the lumbar alignment is within normal limits.    The vertebral body heights are maintained.  The bone marrow signal is within normal limits.  There is no evidence of a fracture.    The conus terminates at the level of L1.  The cauda equina nerve roots are within normal limits.    There is hypertrophy of the posterior elements.  There is multilevel disc desiccation.  Evaluation of the individual disc levels reveals the following:    L1-L2, there is a disc bulge along with facet hypertrophy and ligamentum flavum hypertrophy.  The spinal canal is within normal limits.  There is mild bilateral neural foraminal narrowing.    L2-L3, there is a disc bulge along with facet hypertrophy and ligamentum flavum hypertrophy.  The spinal canal is within normal limits.  There is  mild bilateral neural foraminal narrowing.    L3-L4, there is a disc bulge along with facet hypertrophy and ligamentum flavum hypertrophy.  There is mild spinal canal narrowing.  There is mild bilateral neural foraminal narrowing.    L4-L5, there is a disc bulge along with facet hypertrophy and ligamentum flavum hypertrophy.  There is narrowing the lateral recesses.  There is mild narrowing of the spinal canal.  There is moderate bilateral neural foraminal narrowing.    L5-S1, there is a disc bulge along with facet hypertrophy and ligamentum flavum hypertrophy.  There is superimposed central disc protrusion.  There are bilateral foraminal disc protrusions too.  There is impinging on the exiting bilateral S1 nerve roots.  There is severe bilateral neural foraminal narrowing.    There is atrophy of the paraspinous musculature.  There is edema within the subcutaneous tissues of the lower lumbar spine.  The remainder of the paraspinal soft tissues are within normal limits.                                       X-Ray Orbit Eye Foreign Body Bilat (Final result)  Result time 06/21/24 16:23:48      Final result by Noe Mckeon DO (06/21/24 16:23:48)                   Impression:      Please see above      Electronically signed by: Noe Mckeon DO  Date:    06/21/2024  Time:    16:23               Narrative:    EXAMINATION:  XR ORBIT EYE FOREIGN BODY BILAT    CLINICAL HISTORY:  Residual foreign body in soft tissue    TECHNIQUE:  AP, lateral and oblique views of the orbits bilaterally    COMPARISON:  None    FINDINGS:  No evidence for radiopaque foreign body projects over the orbits bilaterally.  Bony orbits are grossly intact.  No significant opacification visualized paranasal sinuses.  Further evaluation as warranted clinically.                                         Pending Diagnostic Studies:       None           Medications:  Reconciled Home Medications:      Medication List        START taking these medications       rivaroxaban 15 mg Tab  Commonly known as: XARELTO  Take 1 tablet (15 mg total) by mouth daily with dinner or evening meal.            CONTINUE taking these medications      acetaminophen 500 MG tablet  Commonly known as: TYLENOL  Take 500 mg by mouth every 6 (six) hours as needed for Pain.     amiodarone 200 MG Tab  Commonly known as: PACERONE  Take 200 mg by mouth once daily.     calcium carbonate 200 mg calcium (500 mg) chewable tablet  Commonly known as: TUMS  Take 2 tablets (1,000 mg total) by mouth 2 (two) times daily.     cholecalciferol (vitamin D3) 50 mcg (2,000 unit) Tab  Commonly known as: VITAMIN D3  Take by mouth once daily.     EPINEPHrine 0.3 mg/0.3 mL Atin  Commonly known as: EPIPEN     ergocalciferol 50,000 unit Cap  Commonly known as: ERGOCALCIFEROL  Take 50,000 Units by mouth every 7 days.     ferrous gluconate 324 mg (37.5 mg iron) Tab tablet  Take 1 tablet (324 mg total) by mouth 2 (two) times daily with meals.     fish oil-omega-3 fatty acids 300-1,000 mg capsule  Take 1 capsule by mouth 2 (two) times a day.     gabapentin 600 MG tablet  Commonly known as: NEURONTIN  Take 1 tablet by mouth nightly.     levothyroxine 200 MCG tablet  Commonly known as: SYNTHROID  Take 200 mcg by mouth before breakfast.     metoprolol tartrate 25 MG tablet  Commonly known as: LOPRESSOR  Take 1 tablet (25 mg total) by mouth 2 (two) times daily.     omeprazole 20 MG capsule  Commonly known as: PRILOSEC     rosuvastatin 40 MG Tab  Commonly known as: CRESTOR  Take 10 mg by mouth every evening.            STOP taking these medications      metFORMIN 1000 MG tablet  Commonly known as: GLUCOPHAGE     olmesartan 40 MG tablet  Commonly known as: BENICAR              Indwelling Lines/Drains at time of discharge:   Lines/Drains/Airways       None                   Time spent on the discharge of patient: Greater than 35 minutes         Maureen Porras DO  Department of Hospital Medicine  [facility] [place] - Telemetry

## 2024-06-27 NOTE — PROGRESS NOTES
[facility] - Telemetry  Urology  Progress Note    Patient Name: Russell Santos  MRN: [id number]  Admission Date: 6/21/2024  Hospital Length of Stay: 6 days  Code Status: Full Code   Attending Provider: Maureen Porras DO   Primary Care Physician: Roxanne Phillip -    Subjective:     HPI:  Urolithiasis  Patient complains of right abdominal pain with radiation to the abdomen. Onset of symptoms was gradual starting 1 day ago with completely resolved course since that time. Patient describes the pain as aching, intermittent and rated as mild. The patient has had no nausea/no vomiting and no diaphoresis. There has been no fever or chills. The patient is not complaining of dysuria or frequency. Risk factors for urolithiasis: none.  Urology consulted for non-obstructing stone and recurrent UTI.  He had a hematuria work up by Dr. Orellana in 2023, this showed mild urethral stricture and BPH.      Interval History: Denies flank pain. Voiding well. Denies slow/weak stream.     Review of Systems   Constitutional:  Negative for activity change, appetite change, chills, diaphoresis and fever.   HENT:  Negative for congestion and rhinorrhea.    Eyes:  Negative for visual disturbance.   Respiratory:  Negative for choking and shortness of breath.    Gastrointestinal:  Negative for abdominal distention, abdominal pain, constipation, diarrhea, nausea and vomiting.   Genitourinary:  Negative for difficulty urinating, dysuria, flank pain, hematuria, scrotal swelling, testicular pain and urgency.   Skin:  Negative for color change, pallor and wound.   Neurological:  Negative for dizziness and syncope.   Psychiatric/Behavioral:  Negative for confusion and hallucinations.      Objective:     Temp:  [97.4 °F (36.3 °C)-97.9 °F (36.6 °C)] 97.5 °F (36.4 °C)  Pulse:  [58-82] 77  Resp:  [18-20] 18  SpO2:  [97 %-100 %] 99 %  BP: (108-131)/(66-84) 126/71     Body mass index is 43.14 kg/m².      Post Void Cath Residual Output (mL): 0 mL (06/22/24  "1102)    Drains       None                    Physical Exam  Constitutional:       General: He is not in acute distress.     Appearance: He is well-developed. He is not ill-appearing, toxic-appearing or diaphoretic.   HENT:      Head: Normocephalic and atraumatic.      Mouth/Throat:      Mouth: Mucous membranes are moist.   Eyes:      Conjunctiva/sclera: Conjunctivae normal.   Pulmonary:      Effort: Pulmonary effort is normal. No respiratory distress.   Abdominal:      General: There is no distension.      Palpations: Abdomen is soft.   Musculoskeletal:         General: No swelling or deformity.   Skin:     Findings: No rash.   Neurological:      Mental Status: He is alert and oriented to person, place, and time.   Psychiatric:         Mood and Affect: Mood normal.         Thought Content: Thought content normal.         Judgment: Judgment normal.           Significant Labs:    BMP:  Recent Labs   Lab 06/25/24  0500 06/26/24  0411 06/27/24  0631   * 140 140   K 3.9 3.7 3.7    105 106   CO2 24 24 24   BUN 44* 44* 35*   CREATININE 4.8* 4.3* 3.3*   CALCIUM 7.7* 7.9* 8.1*       CBC:   Recent Labs   Lab 06/25/24  0500 06/26/24  0411 06/27/24  0631   WBC 1.39* 1.60* 1.85*   HGB 8.7* 8.5* 8.1*   HCT 26.9* 27.5* 26.7*    415 391       Blood Culture: No results for input(s): "LABBLOO" in the last 168 hours.  Urine Culture:   Recent Labs   Lab 06/21/24  1741   LABURIN ESCHERICHIA COLI  10,000 - 49,999 cfu/ml  *  PSEUDOMONAS AERUGINOSA  50,000 - 99,999 cfu/ml  *     Urine Studies:   Recent Labs   Lab 06/21/24  1741   COLORU Yellow   APPEARANCEUA Cloudy*   PHUR 6.0   SPECGRAV 1.010   PROTEINUA 1+*   GLUCUA Negative   KETONESU Negative   BILIRUBINUA Negative   OCCULTUA 2+*   NITRITE Negative   UROBILINOGEN Negative   LEUKOCYTESUR 3+*   RBCUA 17*   WBCUA >100*   BACTERIA Moderate*   SQUAMEPITHEL 10   HYALINECASTS 4*       Significant Imaging:  All pertinent imaging results/findings from the past 24 hours have " been reviewed.        Assessment/Plan:     Recurrent UTI  The cultures from May and current admission show different bacteria.  This suggests his stone is not a nidus for infection.    Consider repeat cystoscopy as an outpatient. Currently without LUTS.     Kidney stone  Non-obstructing at the time of CT scan, 6/21  Flank pain has resolved, no obstructing noted on RBUS    Cystitis  Treat with culture specific antibiotics for 10 days        VTE Risk Mitigation (From admission, onward)           Ordered     heparin (porcine) injection 5,000 Units  Every 8 hours         06/22/24 0515     IP VTE HIGH RISK PATIENT  Once         06/21/24 1908     Place sequential compression device  Until discontinued         06/21/24 1908                    Nicolette Orellana MD  Urology  [facility] - Telemetry

## 2024-07-02 ENCOUNTER — OFFICE VISIT (OUTPATIENT)
Dept: PULMONOLOGY | Facility: CLINIC | Age: 64
End: 2024-07-02
Payer: MEDICARE

## 2024-07-02 VITALS
SYSTOLIC BLOOD PRESSURE: 147 MMHG | OXYGEN SATURATION: 96 % | HEIGHT: 69 IN | HEART RATE: 80 BPM | DIASTOLIC BLOOD PRESSURE: 92 MMHG | BODY MASS INDEX: 43.14 KG/M2

## 2024-07-02 DIAGNOSIS — G47.33 OSA (OBSTRUCTIVE SLEEP APNEA): ICD-10-CM

## 2024-07-02 DIAGNOSIS — N17.9 AKI (ACUTE KIDNEY INJURY): ICD-10-CM

## 2024-07-02 DIAGNOSIS — D70.9 NEUTROPENIA, UNSPECIFIED TYPE: Primary | ICD-10-CM

## 2024-07-02 DIAGNOSIS — R91.1 PULMONARY NODULE: ICD-10-CM

## 2024-07-02 PROBLEM — D72.819 LEUKOPENIA: Status: RESOLVED | Noted: 2020-12-03 | Resolved: 2024-07-02

## 2024-07-02 PROCEDURE — 1111F DSCHRG MED/CURRENT MED MERGE: CPT | Mod: CPTII,S$GLB,, | Performed by: INTERNAL MEDICINE

## 2024-07-02 PROCEDURE — 4010F ACE/ARB THERAPY RXD/TAKEN: CPT | Mod: CPTII,S$GLB,, | Performed by: INTERNAL MEDICINE

## 2024-07-02 PROCEDURE — 3044F HG A1C LEVEL LT 7.0%: CPT | Mod: CPTII,S$GLB,, | Performed by: INTERNAL MEDICINE

## 2024-07-02 PROCEDURE — 99999 PR PBB SHADOW E&M-EST. PATIENT-LVL IV: CPT | Mod: PBBFAC,,, | Performed by: INTERNAL MEDICINE

## 2024-07-02 PROCEDURE — 3077F SYST BP >= 140 MM HG: CPT | Mod: CPTII,S$GLB,, | Performed by: INTERNAL MEDICINE

## 2024-07-02 PROCEDURE — 1159F MED LIST DOCD IN RCRD: CPT | Mod: CPTII,S$GLB,, | Performed by: INTERNAL MEDICINE

## 2024-07-02 PROCEDURE — 3080F DIAST BP >= 90 MM HG: CPT | Mod: CPTII,S$GLB,, | Performed by: INTERNAL MEDICINE

## 2024-07-02 PROCEDURE — 3008F BODY MASS INDEX DOCD: CPT | Mod: CPTII,S$GLB,, | Performed by: INTERNAL MEDICINE

## 2024-07-02 PROCEDURE — 99204 OFFICE O/P NEW MOD 45 MIN: CPT | Mod: S$GLB,,, | Performed by: INTERNAL MEDICINE

## 2024-07-02 NOTE — PROGRESS NOTES
Russell Santos  was seen as a new patient for the evaluation of  pulmonary nodule.    CHIEF COMPLAINT:  Hospital Follow Up      HISTORY OF PRESENT ILLNESS: Russell Santos is a 63 y.o. male  has a past medical history of Acquired hypothyroidism, Atrial fibrillation (2019), Diabetes mellitus, High cholesterol, History of DVT (deep vein thrombosis), History of pulmonary embolism, Hypertension, LARRY (obstructive sleep apnea), and S/P IVC filter.  Patient was hospitalized 24-24 for boris a/w non-obstructing renal calculus.  Patient was treated with ivf and antibiotics with gradual improvement in renal function.  Ct abd with micro pulmonary nodules.  Patient was referred to pulmonary for further inputs.       Per patient, persistent lower extremities swelling.  Did not see nephrology for follow up.  No fever/chills.  No chest pain.  No coughing or wheezing.  No hemoptysis.  Good urine output.      Additional Pulmonary History:   Occupational/Environmental Exposures:  [employer]  Exposure to Animals/Pets:  denied   Foreign Travel History:  denied   History of exposures to TB:  denied   Family History of Lung Cancer:  father  from lung cancer at 78   Tobacco:  denied  Childhood history of Lung Disease:  denied  Chest surgery or trauma:  denied      PAST MEDICAL HISTORY:    Active Ambulatory Problems     Diagnosis Date Noted    Essential hypertension 10/17/2017    Hyperlipidemia 10/17/2017    Type 2 diabetes mellitus with kidney complication, without long-term current use of insulin 10/17/2017    Morbid obesity 10/17/2017    PAF (paroxysmal atrial fibrillation) 2019    Hypotension 2022    History of DVT (deep vein thrombosis) 2022    Hypothyroidism 2022    BORIS (acute kidney injury) 2024    Pyelonephritis 2024    Right shoulder pain 05/10/2024    Cystitis 2024    Hyponatremia 2024    Kidney stone 2024    Recurrent UTI 2024    Neutropenia,  unspecified type 07/02/2024    Pulmonary nodule 07/02/2024    LARRY (obstructive sleep apnea) 07/02/2024     Resolved Ambulatory Problems     Diagnosis Date Noted    Chest pain 10/17/2017    Sepsis secondary to UTI 10/18/2017    Near syncope 09/16/2019    Leukopenia 12/03/2020    Atrial flutter with rapid ventricular response 08/08/2022    Lab test positive for detection of COVID-19 virus 08/08/2022    Soft tissue abscess 08/09/2022    Acute appendicitis 05/04/2024    Hyperphosphatemia 05/05/2024     Past Medical History:   Diagnosis Date    Acquired hypothyroidism     Atrial fibrillation 09/16/2019    Diabetes mellitus     High cholesterol     History of pulmonary embolism     Hypertension     S/P IVC filter                 PAST SURGICAL HISTORY:    Past Surgical History:   Procedure Laterality Date    THYROIDECTOMY, PARTIAL  2006    TONSILLECTOMY      TREATMENT OF CARDIAC ARRHYTHMIA  9/16/2019    Procedure: Cardioversion or Defibrillation;  Surgeon: Deven Vasquez MD;  Location: [facility] CATH LAB;  Service: Cardiology;;         FAMILY HISTORY:                Family History   Family history unknown: Yes       SOCIAL HISTORY:          Tobacco:   Social History     Tobacco Use   Smoking Status Never   Smokeless Tobacco Never     alcohol use:    Social History     Substance and Sexual Activity   Alcohol Use Not Currently    Comment: occasionally                   ALLERGIES:    Review of patient's allergies indicates:   Allergen Reactions    Contrast media Hives       CURRENT MEDICATIONS:    Current Outpatient Medications   Medication Sig Dispense Refill    acetaminophen (TYLENOL) 500 MG tablet Take 500 mg by mouth every 6 (six) hours as needed for Pain.      amiodarone (PACERONE) 200 MG Tab Take 200 mg by mouth once daily.      calcium carbonate (TUMS) 200 mg calcium (500 mg) chewable tablet Take 2 tablets (1,000 mg total) by mouth 2 (two) times daily. 120 tablet 11    cholecalciferol, vitamin D3, (VITAMIN D3) 50 mcg  "(2,000 unit) Tab Take by mouth once daily.      EPINEPHrine (EPIPEN) 0.3 mg/0.3 mL AtIn       ergocalciferol (ERGOCALCIFEROL) 50,000 unit Cap Take 50,000 Units by mouth every 7 days.      ferrous gluconate 324 mg (37.5 mg iron) Tab tablet Take 1 tablet (324 mg total) by mouth 2 (two) times daily with meals. 60 tablet 11    gabapentin (NEURONTIN) 600 MG tablet Take 1 tablet by mouth nightly.      levothyroxine (SYNTHROID) 200 MCG tablet Take 200 mcg by mouth before breakfast.      omega-3 fatty acids/fish oil (FISH OIL-OMEGA-3 FATTY ACIDS) 300-1,000 mg capsule Take 1 capsule by mouth 2 (two) times a day.      omeprazole (PRILOSEC) 20 MG capsule       rivaroxaban (XARELTO) 15 mg Tab Take 1 tablet (15 mg total) by mouth daily with dinner or evening meal.      rosuvastatin (CRESTOR) 40 MG Tab Take 10 mg by mouth every evening.      metoprolol tartrate (LOPRESSOR) 25 MG tablet Take 1 tablet (25 mg total) by mouth 2 (two) times daily. 60 tablet 11     No current facility-administered medications for this visit.                  REVIEW OF SYSTEMS:     Pulmonary related symptoms as per HPI.  Gen:  + weight loss, no fever, no night sweat  HEENT:  no visual changes, no sore throat, no hearing loss  CV:  No chest pain, no orthopnea, no PND  GI:  no melena, no hematochezia, no diarhea, no constipation.  :  no dysuria, no hematuria, no hesistancy, no dribbling  Neuro:  no syncope, no vertigo, no tinitus  Psych:  No homocide or suicide ideation; no depression.  Endocrine:  No heat or cold intolerance.  Sleep:  diagnosed with lily at [facility].  Currently with bipap. Using daily  Otherwise, a balance of systems reviewed is negative.          PHYSICAL EXAM:  Vitals:    07/02/24 1258   BP: (!) 147/92   Pulse: 80   SpO2: 96%   Height: 5' 9" (1.753 m)   PainSc:   4   PainLoc: Shoulder     Body mass index is 43.14 kg/m².     GENERAL:  well develop; no apparent distress  HEENT:  no nasal congestion; no discharge noted; class 4 modified " mallampatti.   NECK:  supple; no palpable masses.  CARDIO: regular rate and rhythm  PULM:  clear to auscultation bilaterally; no intercostals retractions; no accessory muscle usage   ABDOMEN:  soft nontender/nondistended.  +bowel sound  EXTREMITIES no cc; + edema  NEURO:  CN II-XII intact.  5/5 motor in all extremities.  sensation grossly intact   to light touch.  PSYCH:  normal affect.  Alert and oriented x 4    LABS  Pulmonary Functions Testing Results(personally reviewed):  none  VBG (personally reviewed):  5/4/24 7.24/42/14/18  CXR (personally reviewed):  8/8/22 no consolidation or effusion  CT ABD (personally reviewed): 6/21/24 scattered micronodule bilaterally.  Largest is 6 mm in rll (2:14).  Similar to 5/4/24 ct abd findings.      ASSESSMENT/PLAN  Problem List Items Addressed This Visit       MICHELLE (acute kidney injury)    Overview     follow up with Dr. Middleton.  Renal function improving gradually.           Neutropenia, unspecified type - Primary    Overview     Followed by Dr. Marquez.    S/p bm biopsy in 2020 which was non-revealing         LARRY (obstructive sleep apnea)    Overview     diagnosed with larry at [facility]    Currently with bpap and doing well.    Advise patient to bring bipap to clinic visit         Pulmonary nodule    Overview     scattered subcentimeter.  Largest is 6 mm at rll.  Unchanged from 5/4/24-6/21/24.    Will send for dedicated chest ct in 3 months.           Relevant Orders    CT Chest Without Contrast       Patient will No follow-ups on file.

## 2024-07-10 NOTE — PROGRESS NOTES
Subjective:       Russell Santos is a 63 y.o. male who is a new patient who was referred by Dr. Radha Yoon-Drake*  for evaluation of hematuria.      Initially with gross hematuria, now microhematuria x 6 months. Reports gross hematuria occurred after intercourse. Initially treated for UTI due to dysuria. No UCx available. Denies significant LUTS other than frequency due to diuretic. +Xarelto (+PE). PSA reported to be normal, lab not included in referral records. Denies family history of prostate cancer. Nonsmoker. No prior nephrolithiasis.     Allergy to contrast.     UA micro >30 (4/23)    RENEE (12/22) DIS - normal kidneys, no stones/masses/hydro    Cysto 7/23 - three areas of thin stricture noted in prox bulbar and membranous urethra. Able to passed with scope with minimal difficulty. +BPH, kissing lateral lobes.      Seen recently as hospital consult for UTI and nephrolithiasis. CT in 5/2024 showed single nonobstructive 6mm R renal stone. Repeat CT 6/2024 showed similar. Recently with two UTIs, differing organisms. Regaining strength since hospitalization.     CT 6/24 - 6mm R renal stone    UCx:  5/24 - >100k Enterococus  6/24 - 50-99k Pseudomonas, 10-49k E coli    The following portions of the patient's history were reviewed and updated as appropriate: allergies, current medications, past family history, past medical history, past social history, past surgical history and problem list.    Review of Systems  Twelve point review of systems completed. Pertinent positive and negatives listed in HPI.      Objective:    Vitals: There were no vitals taken for this visit.    Physical Exam   General: well developed, well nourished in no acute distress  Head: normocephalic, atraumatic  Neck: supple, trachea midline, no obvious enlargement of thyroid  HEENT: EOMI, mucus membranes moist, sclera anicteric, no hearing impairment  Lungs: symmetric expansion, non-labored breathing  Skin: no rashes or lesions  Neuro: alert and  "oriented x 3, no gross deficits  Psych: normal judgment and insight, normal mood/affect and non-anxious  Genitourinary: deferred   YAJAIRA: deferred      Lab Review   Urine analysis today in clinic shows trace protein     Lab Results   Component Value Date    WBC 1.85 (LL) 06/27/2024    HGB 8.1 (L) 06/27/2024    HCT 26.7 (L) 06/27/2024    MCV 76 (L) 06/27/2024     06/27/2024     Lab Results   Component Value Date    CREATININE 3.3 (H) 06/27/2024    BUN 35 (H) 06/27/2024     No results found for: "PSA"  No results found for: "PSADIAG"    Imaging  RENEE reviewed, CT reviewed  Reports and images were personally reviewed by me and discussed with the patient today         Assessment/Plan:      1. Hematuria, gross    - Discussed etiology and workup of hematuria   - UCx - UA clear   - Cytology - not indicated   - CT urogram - unable to do 2/2 contrast allergy. RENEE already done. CT RSS 7/24 - 6mm R renal stone.   - Office cystoscopy - done 7/2023, +BPH, mild urethral strictures     2. Proteinuria, unspecified type    - Discussed. He will f/u with PCP. Consider nephrology consult.      3. Recurrent UTI   - Recent hospitalization   - Different bacteria, do not suspect stone as nidus for infection   - Cysto done 7/2023 - hope to avoid repeat    4. Nephrolithiasis   - 6mm R renal stone   - Currently do not suspect to be nidus for infection   - Will monitor. KUB 3 months. Could consider ESWL if able to be seen. Needs to first regain strength from recent hospitalization.      Follow up in 3mths with KUB           "

## 2024-07-11 ENCOUNTER — OFFICE VISIT (OUTPATIENT)
Dept: UROLOGY | Facility: CLINIC | Age: 64
End: 2024-07-11
Payer: MEDICARE

## 2024-07-11 DIAGNOSIS — R31.0 HEMATURIA, GROSS: ICD-10-CM

## 2024-07-11 DIAGNOSIS — N20.0 NEPHROLITHIASIS: Primary | ICD-10-CM

## 2024-07-11 DIAGNOSIS — N39.0 RECURRENT UTI: ICD-10-CM

## 2024-07-11 DIAGNOSIS — R80.9 PROTEINURIA, UNSPECIFIED TYPE: ICD-10-CM

## 2024-07-11 PROCEDURE — 99214 OFFICE O/P EST MOD 30 MIN: CPT | Mod: S$GLB,,, | Performed by: UROLOGY

## 2024-07-11 PROCEDURE — 1111F DSCHRG MED/CURRENT MED MERGE: CPT | Mod: CPTII,S$GLB,, | Performed by: UROLOGY

## 2024-07-11 PROCEDURE — 99999 PR PBB SHADOW E&M-EST. PATIENT-LVL III: CPT | Mod: PBBFAC,,, | Performed by: UROLOGY

## 2024-07-11 PROCEDURE — 4010F ACE/ARB THERAPY RXD/TAKEN: CPT | Mod: CPTII,S$GLB,, | Performed by: UROLOGY

## 2024-07-11 PROCEDURE — 1160F RVW MEDS BY RX/DR IN RCRD: CPT | Mod: CPTII,S$GLB,, | Performed by: UROLOGY

## 2024-07-11 PROCEDURE — 1159F MED LIST DOCD IN RCRD: CPT | Mod: CPTII,S$GLB,, | Performed by: UROLOGY

## 2024-07-11 PROCEDURE — 3044F HG A1C LEVEL LT 7.0%: CPT | Mod: CPTII,S$GLB,, | Performed by: UROLOGY

## 2024-08-05 PROBLEM — N12 PYELONEPHRITIS: Status: RESOLVED | Noted: 2024-05-05 | Resolved: 2024-08-05

## 2024-09-10 ENCOUNTER — PATIENT MESSAGE (OUTPATIENT)
Dept: LAB | Facility: HOSPITAL | Age: 64
End: 2024-09-10
Payer: MEDICARE

## 2024-09-30 PROBLEM — N39.0 RECURRENT UTI: Status: RESOLVED | Noted: 2024-06-25 | Resolved: 2024-09-30

## 2024-10-07 PROBLEM — N17.9 AKI (ACUTE KIDNEY INJURY): Status: RESOLVED | Noted: 2024-05-04 | Resolved: 2024-10-07

## 2025-06-10 NOTE — ASSESSMENT & PLAN NOTE
Body mass index is 44.7 kg/m². Morbid obesity complicates all aspects of disease management from diagnostic modalities to treatment. Weight loss encouraged and health benefits explained to patient.        H&P reviewed. After examining the patient I find no changes in the patients condition since the H&P had been written.    Vitals:    06/10/25 0649   BP: 115/61   Pulse: 75   Resp: 20   Temp: (!) 97.1 °F (36.2 °C)   SpO2: 96%

## 2025-08-19 ENCOUNTER — OFFICE VISIT (OUTPATIENT)
Dept: HEMATOLOGY/ONCOLOGY | Facility: CLINIC | Age: 65
End: 2025-08-19
Payer: MEDICARE

## 2025-08-19 VITALS
SYSTOLIC BLOOD PRESSURE: 132 MMHG | HEART RATE: 61 BPM | OXYGEN SATURATION: 95 % | TEMPERATURE: 98 F | BODY MASS INDEX: 46.65 KG/M2 | DIASTOLIC BLOOD PRESSURE: 78 MMHG | HEIGHT: 69 IN | WEIGHT: 315 LBS

## 2025-08-19 DIAGNOSIS — D72.819 LEUKOPENIA, UNSPECIFIED TYPE: Primary | ICD-10-CM

## 2025-08-19 DIAGNOSIS — I48.0 PAF (PAROXYSMAL ATRIAL FIBRILLATION): ICD-10-CM

## 2025-08-19 DIAGNOSIS — D56.3 ALPHA THALASSEMIA TRAIT: ICD-10-CM

## 2025-08-19 DIAGNOSIS — D64.9 ANEMIA, UNSPECIFIED TYPE: ICD-10-CM

## 2025-08-19 PROCEDURE — 3078F DIAST BP <80 MM HG: CPT | Mod: CPTII,S$GLB,, | Performed by: INTERNAL MEDICINE

## 2025-08-19 PROCEDURE — 3075F SYST BP GE 130 - 139MM HG: CPT | Mod: CPTII,S$GLB,, | Performed by: INTERNAL MEDICINE

## 2025-08-19 PROCEDURE — 99214 OFFICE O/P EST MOD 30 MIN: CPT | Mod: S$GLB,,, | Performed by: INTERNAL MEDICINE

## 2025-08-19 PROCEDURE — 1159F MED LIST DOCD IN RCRD: CPT | Mod: CPTII,S$GLB,, | Performed by: INTERNAL MEDICINE

## 2025-08-19 PROCEDURE — 99999 PR PBB SHADOW E&M-EST. PATIENT-LVL IV: CPT | Mod: PBBFAC,,, | Performed by: INTERNAL MEDICINE

## 2025-08-19 PROCEDURE — 3008F BODY MASS INDEX DOCD: CPT | Mod: CPTII,S$GLB,, | Performed by: INTERNAL MEDICINE

## 2025-08-19 PROCEDURE — 4010F ACE/ARB THERAPY RXD/TAKEN: CPT | Mod: CPTII,S$GLB,, | Performed by: INTERNAL MEDICINE

## (undated) DEVICE — ELECTRODE EKG QUICK COMBO